# Patient Record
Sex: FEMALE | Race: OTHER | HISPANIC OR LATINO
[De-identification: names, ages, dates, MRNs, and addresses within clinical notes are randomized per-mention and may not be internally consistent; named-entity substitution may affect disease eponyms.]

---

## 2017-03-08 ENCOUNTER — LABORATORY RESULT (OUTPATIENT)
Age: 82
End: 2017-03-08

## 2017-03-08 ENCOUNTER — APPOINTMENT (OUTPATIENT)
Dept: INTERNAL MEDICINE | Facility: CLINIC | Age: 82
End: 2017-03-08

## 2017-03-08 VITALS
SYSTOLIC BLOOD PRESSURE: 118 MMHG | WEIGHT: 170 LBS | HEIGHT: 61 IN | HEART RATE: 80 BPM | DIASTOLIC BLOOD PRESSURE: 86 MMHG | BODY MASS INDEX: 32.1 KG/M2 | TEMPERATURE: 98.5 F

## 2017-03-08 DIAGNOSIS — Z86.19 PERSONAL HISTORY OF OTHER INFECTIOUS AND PARASITIC DISEASES: ICD-10-CM

## 2017-03-10 LAB
25(OH)D3 SERPL-MCNC: 52.6 NG/ML
ALBUMIN SERPL ELPH-MCNC: 4.1 G/DL
ALP BLD-CCNC: 64 U/L
ALT SERPL-CCNC: 8 U/L
ANION GAP SERPL CALC-SCNC: 16 MMOL/L
APPEARANCE: CLEAR
AST SERPL-CCNC: 12 U/L
BASOPHILS # BLD AUTO: 0.01 K/UL
BASOPHILS NFR BLD AUTO: 0.2 %
BILIRUB SERPL-MCNC: 0.4 MG/DL
BILIRUBIN URINE: NEGATIVE
BLOOD URINE: NEGATIVE
BUN SERPL-MCNC: 26 MG/DL
CALCIUM SERPL-MCNC: 9.1 MG/DL
CHLORIDE SERPL-SCNC: 105 MMOL/L
CHOLEST SERPL-MCNC: 262 MG/DL
CHOLEST/HDLC SERPL: 5.5 RATIO
CO2 SERPL-SCNC: 24 MMOL/L
COLOR: YELLOW
CREAT SERPL-MCNC: 0.78 MG/DL
CREAT SPEC-SCNC: 147 MG/DL
EOSINOPHIL # BLD AUTO: 0.06 K/UL
EOSINOPHIL NFR BLD AUTO: 1.1 %
FOLATE SERPL-MCNC: 8.5 NG/ML
GLUCOSE QUALITATIVE U: NORMAL MG/DL
GLUCOSE SERPL-MCNC: 95 MG/DL
HCT VFR BLD CALC: 43.8 %
HDLC SERPL-MCNC: 48 MG/DL
HGB BLD-MCNC: 14.5 G/DL
IMM GRANULOCYTES NFR BLD AUTO: 0.5 %
KETONES URINE: NEGATIVE
LDLC SERPL CALC-MCNC: 168 MG/DL
LEUKOCYTE ESTERASE URINE: ABNORMAL
LYMPHOCYTES # BLD AUTO: 1.7 K/UL
LYMPHOCYTES NFR BLD AUTO: 30.4 %
MAN DIFF?: NORMAL
MCHC RBC-ENTMCNC: 29.2 PG
MCHC RBC-ENTMCNC: 33.1 GM/DL
MCV RBC AUTO: 88.3 FL
MICROALBUMIN 24H UR DL<=1MG/L-MCNC: 7.5 MG/DL
MICROALBUMIN/CREAT 24H UR-RTO: 51 UG/MG
MONOCYTES # BLD AUTO: 0.43 K/UL
MONOCYTES NFR BLD AUTO: 7.7 %
NEUTROPHILS # BLD AUTO: 3.36 K/UL
NEUTROPHILS NFR BLD AUTO: 60.1 %
NITRITE URINE: NEGATIVE
PH URINE: 6
PLATELET # BLD AUTO: 272 K/UL
POTASSIUM SERPL-SCNC: 4.7 MMOL/L
PROT SERPL-MCNC: 6.8 G/DL
PROTEIN URINE: ABNORMAL MG/DL
RBC # BLD: 4.96 M/UL
RBC # FLD: 13.1 %
SODIUM SERPL-SCNC: 145 MMOL/L
SPECIFIC GRAVITY URINE: 1.03
TRIGL SERPL-MCNC: 229 MG/DL
UROBILINOGEN URINE: NORMAL MG/DL
VIT B12 SERPL-MCNC: 318 PG/ML
WBC # FLD AUTO: 5.59 K/UL

## 2017-06-21 ENCOUNTER — RX RENEWAL (OUTPATIENT)
Age: 82
End: 2017-06-21

## 2017-08-03 ENCOUNTER — APPOINTMENT (OUTPATIENT)
Dept: INTERNAL MEDICINE | Facility: CLINIC | Age: 82
End: 2017-08-03

## 2017-08-14 ENCOUNTER — RX RENEWAL (OUTPATIENT)
Age: 82
End: 2017-08-14

## 2017-09-19 ENCOUNTER — APPOINTMENT (OUTPATIENT)
Dept: INTERNAL MEDICINE | Facility: CLINIC | Age: 82
End: 2017-09-19
Payer: MEDICAID

## 2017-09-19 VITALS
OXYGEN SATURATION: 97 % | SYSTOLIC BLOOD PRESSURE: 140 MMHG | HEIGHT: 61 IN | WEIGHT: 169 LBS | DIASTOLIC BLOOD PRESSURE: 70 MMHG | BODY MASS INDEX: 31.91 KG/M2 | TEMPERATURE: 98.2 F | HEART RATE: 78 BPM

## 2017-09-19 VITALS — DIASTOLIC BLOOD PRESSURE: 60 MMHG | SYSTOLIC BLOOD PRESSURE: 120 MMHG

## 2017-09-19 DIAGNOSIS — Z71.89 OTHER SPECIFIED COUNSELING: ICD-10-CM

## 2017-09-19 PROCEDURE — 96372 THER/PROPH/DIAG INJ SC/IM: CPT

## 2017-09-19 PROCEDURE — 99215 OFFICE O/P EST HI 40 MIN: CPT | Mod: 25

## 2017-09-19 PROCEDURE — 90732 PPSV23 VACC 2 YRS+ SUBQ/IM: CPT

## 2017-09-19 PROCEDURE — G0009: CPT

## 2017-09-19 RX ORDER — CYANOCOBALAMIN 1000 UG/ML
1000 INJECTION INTRAMUSCULAR; SUBCUTANEOUS
Qty: 0 | Refills: 0 | Status: COMPLETED | OUTPATIENT
Start: 2017-09-19

## 2017-09-19 RX ADMIN — CYANOCOBALAMIN 0 MCG/ML: 1000 INJECTION INTRAMUSCULAR; SUBCUTANEOUS at 00:00

## 2017-09-20 LAB
25(OH)D3 SERPL-MCNC: 56.6 NG/ML
ALBUMIN SERPL ELPH-MCNC: 3.9 G/DL
ALP BLD-CCNC: 66 U/L
ALT SERPL-CCNC: <4 U/L
ANION GAP SERPL CALC-SCNC: 13 MMOL/L
APPEARANCE: ABNORMAL
AST SERPL-CCNC: 13 U/L
BACTERIA: ABNORMAL
BASOPHILS # BLD AUTO: 0.01 K/UL
BASOPHILS NFR BLD AUTO: 0.1 %
BILIRUB SERPL-MCNC: 0.2 MG/DL
BILIRUBIN URINE: NEGATIVE
BLOOD URINE: ABNORMAL
BUN SERPL-MCNC: 28 MG/DL
CALCIUM OXALATE CRYSTALS: ABNORMAL
CALCIUM SERPL-MCNC: 9.4 MG/DL
CHLORIDE SERPL-SCNC: 105 MMOL/L
CHOLEST SERPL-MCNC: 160 MG/DL
CHOLEST/HDLC SERPL: 3.9 RATIO
CO2 SERPL-SCNC: 23 MMOL/L
COLOR: YELLOW
CREAT SERPL-MCNC: 0.97 MG/DL
CREAT SPEC-SCNC: 190 MG/DL
EOSINOPHIL # BLD AUTO: 0.06 K/UL
EOSINOPHIL NFR BLD AUTO: 0.8 %
FRUCTOSAMINE SERPL-MCNC: 205 UMOL/L
GLUCOSE QUALITATIVE U: NORMAL MG/DL
GLUCOSE SERPL-MCNC: 95 MG/DL
HBA1C MFR BLD HPLC: 5.7 %
HCT VFR BLD CALC: 40.3 %
HDLC SERPL-MCNC: 41 MG/DL
HGB BLD-MCNC: 13.1 G/DL
HYALINE CASTS: 0 /LPF
IMM GRANULOCYTES NFR BLD AUTO: 0.6 %
KETONES URINE: NEGATIVE
LDLC SERPL CALC-MCNC: 66 MG/DL
LEUKOCYTE ESTERASE URINE: NEGATIVE
LYMPHOCYTES # BLD AUTO: 1.69 K/UL
LYMPHOCYTES NFR BLD AUTO: 23.8 %
MAN DIFF?: NORMAL
MCHC RBC-ENTMCNC: 28 PG
MCHC RBC-ENTMCNC: 32.5 GM/DL
MCV RBC AUTO: 86.1 FL
MICROALBUMIN 24H UR DL<=1MG/L-MCNC: 2.5 MG/DL
MICROALBUMIN/CREAT 24H UR-RTO: 13 MG/G
MICROSCOPIC-UA: NORMAL
MONOCYTES # BLD AUTO: 0.45 K/UL
MONOCYTES NFR BLD AUTO: 6.3 %
NEUTROPHILS # BLD AUTO: 4.86 K/UL
NEUTROPHILS NFR BLD AUTO: 68.4 %
NITRITE URINE: POSITIVE
PH URINE: 6
PLATELET # BLD AUTO: 294 K/UL
POTASSIUM SERPL-SCNC: 4.5 MMOL/L
PROT SERPL-MCNC: 6.9 G/DL
PROTEIN URINE: ABNORMAL MG/DL
RBC # BLD: 4.68 M/UL
RBC # FLD: 13.8 %
RED BLOOD CELLS URINE: 3 /HPF
SODIUM SERPL-SCNC: 141 MMOL/L
SPECIFIC GRAVITY URINE: 1.03
SQUAMOUS EPITHELIAL CELLS: 7 /HPF
TRIGL SERPL-MCNC: 267 MG/DL
UROBILINOGEN URINE: 1 MG/DL
WBC # FLD AUTO: 7.11 K/UL
WHITE BLOOD CELLS URINE: 5 /HPF

## 2017-10-13 ENCOUNTER — APPOINTMENT (OUTPATIENT)
Dept: UROLOGY | Facility: CLINIC | Age: 82
End: 2017-10-13
Payer: MEDICAID

## 2017-10-13 VITALS
WEIGHT: 167 LBS | TEMPERATURE: 98.4 F | RESPIRATION RATE: 16 BRPM | BODY MASS INDEX: 31.53 KG/M2 | HEART RATE: 69 BPM | OXYGEN SATURATION: 97 % | HEIGHT: 61 IN

## 2017-10-13 DIAGNOSIS — G56.01 CARPAL TUNNEL SYNDROME, RIGHT UPPER LIMB: ICD-10-CM

## 2017-10-13 PROCEDURE — 99204 OFFICE O/P NEW MOD 45 MIN: CPT

## 2017-10-16 ENCOUNTER — FORM ENCOUNTER (OUTPATIENT)
Age: 82
End: 2017-10-16

## 2017-10-17 ENCOUNTER — OUTPATIENT (OUTPATIENT)
Dept: OUTPATIENT SERVICES | Facility: HOSPITAL | Age: 82
LOS: 1 days | End: 2017-10-17
Payer: MEDICAID

## 2017-10-17 ENCOUNTER — APPOINTMENT (OUTPATIENT)
Dept: RADIOLOGY | Facility: IMAGING CENTER | Age: 82
End: 2017-10-17
Payer: MEDICAID

## 2017-10-17 DIAGNOSIS — Z00.00 ENCOUNTER FOR GENERAL ADULT MEDICAL EXAMINATION WITHOUT ABNORMAL FINDINGS: ICD-10-CM

## 2017-10-17 LAB
APPEARANCE: ABNORMAL
BACTERIA UR CULT: ABNORMAL
BACTERIA: ABNORMAL
BILIRUBIN URINE: NEGATIVE
BLOOD URINE: NEGATIVE
COLOR: ABNORMAL
GLUCOSE QUALITATIVE U: NEGATIVE MG/DL
HYALINE CASTS: 6 /LPF
KETONES URINE: ABNORMAL
LEUKOCYTE ESTERASE URINE: ABNORMAL
MICROSCOPIC-UA: NORMAL
NITRITE URINE: POSITIVE
PH URINE: 5
PROTEIN URINE: ABNORMAL MG/DL
RED BLOOD CELLS URINE: 2 /HPF
SPECIFIC GRAVITY URINE: 1.02
SQUAMOUS EPITHELIAL CELLS: 6 /HPF
UROBILINOGEN URINE: 1 MG/DL
WHITE BLOOD CELLS URINE: 4 /HPF

## 2017-10-17 PROCEDURE — 77080 DXA BONE DENSITY AXIAL: CPT | Mod: 26

## 2017-10-17 PROCEDURE — 77080 DXA BONE DENSITY AXIAL: CPT

## 2017-10-23 LAB — CORE LAB FLUID CYTOLOGY: NORMAL

## 2017-10-24 ENCOUNTER — APPOINTMENT (OUTPATIENT)
Dept: INTERNAL MEDICINE | Facility: CLINIC | Age: 82
End: 2017-10-24
Payer: MEDICAID

## 2017-10-24 VITALS
DIASTOLIC BLOOD PRESSURE: 60 MMHG | HEART RATE: 89 BPM | BODY MASS INDEX: 31.91 KG/M2 | TEMPERATURE: 98.2 F | HEIGHT: 61 IN | OXYGEN SATURATION: 96 % | WEIGHT: 169 LBS | SYSTOLIC BLOOD PRESSURE: 130 MMHG

## 2017-10-24 PROCEDURE — 99214 OFFICE O/P EST MOD 30 MIN: CPT

## 2017-10-24 RX ORDER — SULFAMETHOXAZOLE AND TRIMETHOPRIM 800; 160 MG/1; MG/1
800-160 TABLET ORAL TWICE DAILY
Qty: 10 | Refills: 0 | Status: COMPLETED | COMMUNITY
Start: 2017-10-17 | End: 2017-10-24

## 2017-10-25 ENCOUNTER — APPOINTMENT (OUTPATIENT)
Dept: UROLOGY | Facility: CLINIC | Age: 82
End: 2017-10-25

## 2017-10-25 ENCOUNTER — OUTPATIENT (OUTPATIENT)
Dept: OUTPATIENT SERVICES | Facility: HOSPITAL | Age: 82
LOS: 1 days | End: 2017-10-25
Payer: MEDICAID

## 2017-10-25 ENCOUNTER — APPOINTMENT (OUTPATIENT)
Dept: ULTRASOUND IMAGING | Facility: HOSPITAL | Age: 82
End: 2017-10-25
Payer: MEDICAID

## 2017-10-25 DIAGNOSIS — R31.29 OTHER MICROSCOPIC HEMATURIA: ICD-10-CM

## 2017-10-25 PROCEDURE — 76775 US EXAM ABDO BACK WALL LIM: CPT

## 2017-10-25 PROCEDURE — 76775 US EXAM ABDO BACK WALL LIM: CPT | Mod: 26

## 2017-11-10 ENCOUNTER — APPOINTMENT (OUTPATIENT)
Dept: UROLOGY | Facility: CLINIC | Age: 82
End: 2017-11-10

## 2018-01-09 ENCOUNTER — APPOINTMENT (OUTPATIENT)
Dept: UROLOGY | Facility: CLINIC | Age: 83
End: 2018-01-09
Payer: MEDICAID

## 2018-01-09 VITALS
TEMPERATURE: 98.1 F | BODY MASS INDEX: 31.72 KG/M2 | HEART RATE: 80 BPM | WEIGHT: 168 LBS | RESPIRATION RATE: 16 BRPM | SYSTOLIC BLOOD PRESSURE: 142 MMHG | HEIGHT: 61 IN | DIASTOLIC BLOOD PRESSURE: 80 MMHG

## 2018-01-09 PROCEDURE — 99214 OFFICE O/P EST MOD 30 MIN: CPT

## 2018-01-10 LAB
ANION GAP SERPL CALC-SCNC: 14 MMOL/L
APPEARANCE: CLEAR
BACTERIA: NEGATIVE
BILIRUBIN URINE: NEGATIVE
BLOOD URINE: NEGATIVE
BUN SERPL-MCNC: 22 MG/DL
CALCIUM SERPL-MCNC: 9.6 MG/DL
CHLORIDE SERPL-SCNC: 104 MMOL/L
CO2 SERPL-SCNC: 23 MMOL/L
COLOR: YELLOW
CREAT SERPL-MCNC: 0.82 MG/DL
GLUCOSE QUALITATIVE U: NEGATIVE MG/DL
GLUCOSE SERPL-MCNC: 90 MG/DL
HYALINE CASTS: 9 /LPF
KETONES URINE: NEGATIVE
LEUKOCYTE ESTERASE URINE: NEGATIVE
MICROSCOPIC-UA: NORMAL
NITRITE URINE: NEGATIVE
PH URINE: 6
POTASSIUM SERPL-SCNC: 4.5 MMOL/L
PROTEIN URINE: NEGATIVE MG/DL
RED BLOOD CELLS URINE: 2 /HPF
SODIUM SERPL-SCNC: 141 MMOL/L
SPECIFIC GRAVITY URINE: 1.02
SQUAMOUS EPITHELIAL CELLS: 8 /HPF
UROBILINOGEN URINE: NEGATIVE MG/DL
WHITE BLOOD CELLS URINE: 3 /HPF

## 2018-01-11 LAB — BACTERIA UR CULT: NORMAL

## 2018-01-12 ENCOUNTER — RX RENEWAL (OUTPATIENT)
Age: 83
End: 2018-01-12

## 2018-01-19 ENCOUNTER — APPOINTMENT (OUTPATIENT)
Dept: CT IMAGING | Facility: HOSPITAL | Age: 83
End: 2018-01-19

## 2018-01-25 ENCOUNTER — APPOINTMENT (OUTPATIENT)
Dept: INTERNAL MEDICINE | Facility: CLINIC | Age: 83
End: 2018-01-25

## 2018-02-06 ENCOUNTER — APPOINTMENT (OUTPATIENT)
Dept: UROLOGY | Facility: CLINIC | Age: 83
End: 2018-02-06

## 2018-02-10 ENCOUNTER — INPATIENT (INPATIENT)
Facility: HOSPITAL | Age: 83
LOS: 2 days | Discharge: HOME | DRG: 313 | End: 2018-02-13
Attending: STUDENT IN AN ORGANIZED HEALTH CARE EDUCATION/TRAINING PROGRAM | Admitting: STUDENT IN AN ORGANIZED HEALTH CARE EDUCATION/TRAINING PROGRAM
Payer: MEDICAID

## 2018-02-10 VITALS
HEART RATE: 69 BPM | TEMPERATURE: 98 F | DIASTOLIC BLOOD PRESSURE: 79 MMHG | RESPIRATION RATE: 18 BRPM | SYSTOLIC BLOOD PRESSURE: 132 MMHG | OXYGEN SATURATION: 97 %

## 2018-02-10 DIAGNOSIS — R07.9 CHEST PAIN, UNSPECIFIED: ICD-10-CM

## 2018-02-10 DIAGNOSIS — E11.9 TYPE 2 DIABETES MELLITUS WITHOUT COMPLICATIONS: ICD-10-CM

## 2018-02-10 DIAGNOSIS — I10 ESSENTIAL (PRIMARY) HYPERTENSION: ICD-10-CM

## 2018-02-10 DIAGNOSIS — Z29.9 ENCOUNTER FOR PROPHYLACTIC MEASURES, UNSPECIFIED: ICD-10-CM

## 2018-02-10 DIAGNOSIS — E78.5 HYPERLIPIDEMIA, UNSPECIFIED: ICD-10-CM

## 2018-02-10 DIAGNOSIS — J18.9 PNEUMONIA, UNSPECIFIED ORGANISM: ICD-10-CM

## 2018-02-10 LAB
ALBUMIN SERPL ELPH-MCNC: 3.2 G/DL — LOW (ref 3.5–5)
ALP SERPL-CCNC: 75 U/L — SIGNIFICANT CHANGE UP (ref 40–120)
ALT FLD-CCNC: 16 U/L DA — SIGNIFICANT CHANGE UP (ref 10–60)
ANION GAP SERPL CALC-SCNC: 8 MMOL/L — SIGNIFICANT CHANGE UP (ref 5–17)
APPEARANCE UR: CLEAR — SIGNIFICANT CHANGE UP
APTT BLD: 28.3 SEC — SIGNIFICANT CHANGE UP (ref 27.5–37.4)
AST SERPL-CCNC: 10 U/L — SIGNIFICANT CHANGE UP (ref 10–40)
BASOPHILS # BLD AUTO: 0.1 K/UL — SIGNIFICANT CHANGE UP (ref 0–0.2)
BASOPHILS NFR BLD AUTO: 0.7 % — SIGNIFICANT CHANGE UP (ref 0–2)
BILIRUB SERPL-MCNC: 0.3 MG/DL — SIGNIFICANT CHANGE UP (ref 0.2–1.2)
BILIRUB UR-MCNC: NEGATIVE — SIGNIFICANT CHANGE UP
BUN SERPL-MCNC: 17 MG/DL — SIGNIFICANT CHANGE UP (ref 7–18)
CALCIUM SERPL-MCNC: 8.4 MG/DL — SIGNIFICANT CHANGE UP (ref 8.4–10.5)
CHLORIDE SERPL-SCNC: 110 MMOL/L — HIGH (ref 96–108)
CK MB BLD-MCNC: <2.3 % — SIGNIFICANT CHANGE UP (ref 0–3.5)
CK MB CFR SERPL CALC: <1 NG/ML — SIGNIFICANT CHANGE UP (ref 0–3.6)
CK SERPL-CCNC: 43 U/L — SIGNIFICANT CHANGE UP (ref 21–215)
CO2 SERPL-SCNC: 24 MMOL/L — SIGNIFICANT CHANGE UP (ref 22–31)
COLOR SPEC: YELLOW — SIGNIFICANT CHANGE UP
CREAT SERPL-MCNC: 0.72 MG/DL — SIGNIFICANT CHANGE UP (ref 0.5–1.3)
DIFF PNL FLD: NEGATIVE — SIGNIFICANT CHANGE UP
EOSINOPHIL # BLD AUTO: 0.1 K/UL — SIGNIFICANT CHANGE UP (ref 0–0.5)
EOSINOPHIL NFR BLD AUTO: 1.7 % — SIGNIFICANT CHANGE UP (ref 0–6)
GLUCOSE SERPL-MCNC: 87 MG/DL — SIGNIFICANT CHANGE UP (ref 70–99)
GLUCOSE UR QL: NEGATIVE — SIGNIFICANT CHANGE UP
HCT VFR BLD CALC: 43.2 % — SIGNIFICANT CHANGE UP (ref 34.5–45)
HGB BLD-MCNC: 14 G/DL — SIGNIFICANT CHANGE UP (ref 11.5–15.5)
INR BLD: 0.96 RATIO — SIGNIFICANT CHANGE UP (ref 0.88–1.16)
KETONES UR-MCNC: NEGATIVE — SIGNIFICANT CHANGE UP
LEUKOCYTE ESTERASE UR-ACNC: ABNORMAL
LYMPHOCYTES # BLD AUTO: 2.1 K/UL — SIGNIFICANT CHANGE UP (ref 1–3.3)
LYMPHOCYTES # BLD AUTO: 29.6 % — SIGNIFICANT CHANGE UP (ref 13–44)
MCHC RBC-ENTMCNC: 29.2 PG — SIGNIFICANT CHANGE UP (ref 27–34)
MCHC RBC-ENTMCNC: 32.3 GM/DL — SIGNIFICANT CHANGE UP (ref 32–36)
MCV RBC AUTO: 90.5 FL — SIGNIFICANT CHANGE UP (ref 80–100)
MONOCYTES # BLD AUTO: 0.5 K/UL — SIGNIFICANT CHANGE UP (ref 0–0.9)
MONOCYTES NFR BLD AUTO: 6.3 % — SIGNIFICANT CHANGE UP (ref 2–14)
NEUTROPHILS # BLD AUTO: 4.5 K/UL — SIGNIFICANT CHANGE UP (ref 1.8–7.4)
NEUTROPHILS NFR BLD AUTO: 61.8 % — SIGNIFICANT CHANGE UP (ref 43–77)
NITRITE UR-MCNC: NEGATIVE — SIGNIFICANT CHANGE UP
NT-PROBNP SERPL-SCNC: 350 PG/ML — SIGNIFICANT CHANGE UP (ref 0–450)
PH UR: 5 — SIGNIFICANT CHANGE UP (ref 5–8)
PLATELET # BLD AUTO: 259 K/UL — SIGNIFICANT CHANGE UP (ref 150–400)
POTASSIUM SERPL-MCNC: 4.1 MMOL/L — SIGNIFICANT CHANGE UP (ref 3.5–5.3)
POTASSIUM SERPL-SCNC: 4.1 MMOL/L — SIGNIFICANT CHANGE UP (ref 3.5–5.3)
PROT SERPL-MCNC: 6.8 G/DL — SIGNIFICANT CHANGE UP (ref 6–8.3)
PROT UR-MCNC: NEGATIVE — SIGNIFICANT CHANGE UP
PROTHROM AB SERPL-ACNC: 10.5 SEC — SIGNIFICANT CHANGE UP (ref 9.8–12.7)
RAPID RVP RESULT: SIGNIFICANT CHANGE UP
RBC # BLD: 4.78 M/UL — SIGNIFICANT CHANGE UP (ref 3.8–5.2)
RBC # FLD: 12.6 % — SIGNIFICANT CHANGE UP (ref 10.3–14.5)
SODIUM SERPL-SCNC: 142 MMOL/L — SIGNIFICANT CHANGE UP (ref 135–145)
SP GR SPEC: 1.02 — SIGNIFICANT CHANGE UP (ref 1.01–1.02)
TROPONIN I SERPL-MCNC: <0.015 NG/ML — SIGNIFICANT CHANGE UP (ref 0–0.04)
UROBILINOGEN FLD QL: NEGATIVE — SIGNIFICANT CHANGE UP
WBC # BLD: 7.3 K/UL — SIGNIFICANT CHANGE UP (ref 3.8–10.5)
WBC # FLD AUTO: 7.3 K/UL — SIGNIFICANT CHANGE UP (ref 3.8–10.5)

## 2018-02-10 PROCEDURE — 71046 X-RAY EXAM CHEST 2 VIEWS: CPT | Mod: 26

## 2018-02-10 PROCEDURE — 99285 EMERGENCY DEPT VISIT HI MDM: CPT

## 2018-02-10 RX ORDER — METOPROLOL TARTRATE 50 MG
12.5 TABLET ORAL
Qty: 0 | Refills: 0 | Status: DISCONTINUED | OUTPATIENT
Start: 2018-02-10 | End: 2018-02-12

## 2018-02-10 RX ORDER — ASPIRIN/CALCIUM CARB/MAGNESIUM 324 MG
81 TABLET ORAL DAILY
Qty: 0 | Refills: 0 | Status: DISCONTINUED | OUTPATIENT
Start: 2018-02-10 | End: 2018-02-13

## 2018-02-10 RX ORDER — ONDANSETRON 8 MG/1
4 TABLET, FILM COATED ORAL ONCE
Qty: 0 | Refills: 0 | Status: COMPLETED | OUTPATIENT
Start: 2018-02-10 | End: 2018-02-10

## 2018-02-10 RX ORDER — ENOXAPARIN SODIUM 100 MG/ML
40 INJECTION SUBCUTANEOUS DAILY
Qty: 0 | Refills: 0 | Status: DISCONTINUED | OUTPATIENT
Start: 2018-02-10 | End: 2018-02-13

## 2018-02-10 RX ORDER — ATORVASTATIN CALCIUM 80 MG/1
20 TABLET, FILM COATED ORAL AT BEDTIME
Qty: 0 | Refills: 0 | Status: DISCONTINUED | OUTPATIENT
Start: 2018-02-10 | End: 2018-02-13

## 2018-02-10 RX ORDER — GABAPENTIN 400 MG/1
100 CAPSULE ORAL DAILY
Qty: 0 | Refills: 0 | Status: DISCONTINUED | OUTPATIENT
Start: 2018-02-10 | End: 2018-02-13

## 2018-02-10 RX ORDER — AZITHROMYCIN 500 MG/1
500 TABLET, FILM COATED ORAL ONCE
Qty: 0 | Refills: 0 | Status: COMPLETED | OUTPATIENT
Start: 2018-02-10 | End: 2018-02-10

## 2018-02-10 RX ORDER — ASPIRIN/CALCIUM CARB/MAGNESIUM 324 MG
325 TABLET ORAL ONCE
Qty: 0 | Refills: 0 | Status: COMPLETED | OUTPATIENT
Start: 2018-02-10 | End: 2018-02-10

## 2018-02-10 RX ORDER — CEFTRIAXONE 500 MG/1
1 INJECTION, POWDER, FOR SOLUTION INTRAMUSCULAR; INTRAVENOUS ONCE
Qty: 0 | Refills: 0 | Status: COMPLETED | OUTPATIENT
Start: 2018-02-10 | End: 2018-02-10

## 2018-02-10 RX ORDER — LISINOPRIL 2.5 MG/1
2.5 TABLET ORAL DAILY
Qty: 0 | Refills: 0 | Status: DISCONTINUED | OUTPATIENT
Start: 2018-02-10 | End: 2018-02-13

## 2018-02-10 RX ADMIN — AZITHROMYCIN 250 MILLIGRAM(S): 500 TABLET, FILM COATED ORAL at 18:41

## 2018-02-10 RX ADMIN — ONDANSETRON 4 MILLIGRAM(S): 8 TABLET, FILM COATED ORAL at 16:21

## 2018-02-10 RX ADMIN — CEFTRIAXONE 100 GRAM(S): 500 INJECTION, POWDER, FOR SOLUTION INTRAMUSCULAR; INTRAVENOUS at 18:40

## 2018-02-10 RX ADMIN — Medication 325 MILLIGRAM(S): at 16:21

## 2018-02-10 RX ADMIN — ATORVASTATIN CALCIUM 20 MILLIGRAM(S): 80 TABLET, FILM COATED ORAL at 21:32

## 2018-02-10 NOTE — ED PROVIDER NOTE - CARDIAC, MLM
Normal rate, regular rhythm.  Heart sounds S1, S2.  No murmurs, rubs or gallops. No crackles or rhonchi.

## 2018-02-10 NOTE — H&P ADULT - RS GEN PE MLT RESP DETAILS PC
respirations non-labored/normal/chest wall tenderness/breath sounds equal/no rales/no wheezes/airway patent/good air movement/clear to auscultation bilaterally/no rhonchi

## 2018-02-10 NOTE — H&P ADULT - ASSESSMENT
85 F, from home lives w/ family ambulates independently, w/ PMH HTN, DM and HLD c/o headache and chest pain since this AM.    CXR - Platelike atelectasis or small infiltrate in the left lower lobe. Follow-up PA and lateral views recommended.    Pt was admitted for management of PNA and r/o ACS.

## 2018-02-10 NOTE — H&P ADULT - HISTORY OF PRESENT ILLNESS
84 y/o F c/o CP, headache x todays. Pt is accompanied by granddaughter and daughter. PMHx: HTN, DM PSHx: none. Granddaughter states that Pt has recently been found with hematuria, urologist is Dr. Hanley. Pt states that she feels a headache since waking up this morning, felt weird after waking up to her alarm clock. Pt denies f/c, or any other complaints. NKDA. History obtained via daughter at bedside with interpretator service.     85 F, from home lives w/ family ambulates independently, w/ PMH HTN, DM and HLD c/o headache and chest pain since this AM. Pt woke up this AM and had frontal headache, pain 5/10, self resolved. Additionally, pt noticed mid-sternal chest pain lasting 5 minutes and self resolved, pain 3/10, non-radiating, no aggravating or relieving factors. Denies neurological symptoms, SOB, N/V, D/C.

## 2018-02-10 NOTE — H&P ADULT - ATTENDING COMMENTS
Agree with above   Patient is seen and examined at bedside. She is 86 yo Czech speaking female, from home lives w/ family ambulates independently, w/ PMH HTN, DM and HLD c/o headache and chest pain. During my examination patient did not have any chest pain. She denied fever, cough, runny nose, dizziness, palpitations, SOB, n/v, abdominal pain, change in urinary or bowel habits   ROS as above   PE:   General: Elderly lady, NAD   Neck: Supple, no JVD  Cardio: Regular rate and rhtyhtm, no murmur   Pulm: clear breath sounds, no wheezing   GI/: obese abdomen, non tender, BS(+)  Extr: no edema, no rash   Neuro: AO, no focal weakness     Vital Signs Last 24 Hrs  T(C): 37.1 (11 Feb 2018 11:21), Max: 37.1 (11 Feb 2018 11:21)  T(F): 98.7 (11 Feb 2018 11:21), Max: 98.7 (11 Feb 2018 11:21)  HR: 63 (11 Feb 2018 11:21) (63 - 76)  BP: 140/71 (11 Feb 2018 11:21) (113/52 - 146/65)  BP(mean): --  RR: 16 (11 Feb 2018 11:21) (16 - 23)  SpO2: 96% (11 Feb 2018 11:21) (96% - 99%)    1. Chest pain unlikely cardiac related, but given the history of HTN and DM will rule out ACS  Telemetry monitoring   Trend CE, two sets negative   EKG: NSR   c/w Aspirn, statin and BB  Lipid profile and TSH normal     2. LLL PNA on CXR; patient does not have any symptoms of PNA   Will get CXR PA/lateral   Afebrile, no leukocytosis   Will hold off on antibiotics for now   RVP done and negative     3. DM: f/u HbA1C  c/w Metformin 500 mg daily     The rest as above

## 2018-02-10 NOTE — ED PROVIDER NOTE - OBJECTIVE STATEMENT
84 y/o F c/o CP, headache x todays. Pt is accompanied by granddaughter and daughter. PMHx: HTN, DM PSHx: none. Granddaughter states that Pt has recently been found with hematuria, urologist is Dr. Hanley. Pt states that she feels a headache since waking up this morning, felt weird after waking up to her alarm clock. Pt denies f/c, or any other complaints. NKDA. 84 y/o F c/o CP  x todays. Pt is accompanied by granddaughter and daughter. PMHx: HTN, DM PSHx: none. CP pressure-like. non-radiating. associated with nausea and diaphoresis. CP currently improved. ROS: hematuria hx, urologist is Dr. Hanley. Pt states that she feels a headache since waking up this morning, felt weird after waking up to her alarm clock. Headache is not new and has had previously but CP is new. Pt denies f/c, or any other complaints. NKDA.

## 2018-02-10 NOTE — H&P ADULT - PROBLEM SELECTOR PLAN 1
CXR - Platelike atelectasis or small infiltrate in the left lower lobe.  s/p 1 dose rocephin, zithromax @ ED  c/w rocephin, zithromax CXR - Platelike atelectasis or small infiltrate in the left lower lobe.  s/p 1 dose rocephin, zithromax @ ED  c/w rocephin, zithromax  f/u RVP CXR - Platelike atelectasis or small infiltrate in the left lower lobe.  s/p 1 dose rocephin, zithromax @ ED  c/w rocephin, zithromax  f/u RVP  f/u CT chest no cont r/o PNA  f/u procalcitonin CXR - Platelike atelectasis or small infiltrate in the left lower lobe.  s/p 1 dose rocephin, zithromax @ ED  f/u RVP  f/u CT chest no cont r/o PNA  hold off on abx until CT results is back  f/u procalcitonin

## 2018-02-10 NOTE — H&P ADULT - NEUROLOGICAL DETAILS
alert and oriented x 3/responds to pain/responds to verbal commands/sensation intact/cranial nerves intact/deep reflexes intact

## 2018-02-10 NOTE — ED PROVIDER NOTE - MEDICAL DECISION MAKING DETAILS
multiple risk factors for ACS, will need admission for cereal enzymes, ECHO, EKG, cardiac enzymes, coagulation, in order to r/o PNX PNA. Given no focal neurologic symptoms or trauma, seems unlikely. multiple risk factors for ACS, will need admission for cereal enzymes, ECHO, EKG, cardiac enzymes, coagulation, in order to r/o PNA, PTX, Dissection. Given no focal neurologic symptoms or trauma, seems unlikely the later. multiple risk factors for ACS, will need admission for serial enzymes, ECHO, EKG, cardiac enzymes, coagulation, in order to r/o PNA, PTX, Dissection. Given no focal neurologic symptoms or trauma, seems unlikely the later.

## 2018-02-11 ENCOUNTER — TRANSCRIPTION ENCOUNTER (OUTPATIENT)
Age: 83
End: 2018-02-11

## 2018-02-11 LAB
ANION GAP SERPL CALC-SCNC: 7 MMOL/L — SIGNIFICANT CHANGE UP (ref 5–17)
BUN SERPL-MCNC: 14 MG/DL — SIGNIFICANT CHANGE UP (ref 7–18)
CALCIUM SERPL-MCNC: 8.4 MG/DL — SIGNIFICANT CHANGE UP (ref 8.4–10.5)
CHLORIDE SERPL-SCNC: 109 MMOL/L — HIGH (ref 96–108)
CHOLEST SERPL-MCNC: 127 MG/DL — SIGNIFICANT CHANGE UP (ref 10–199)
CK MB BLD-MCNC: <2.2 % — SIGNIFICANT CHANGE UP (ref 0–3.5)
CK MB CFR SERPL CALC: <1 NG/ML — SIGNIFICANT CHANGE UP (ref 0–3.6)
CK SERPL-CCNC: 45 U/L — SIGNIFICANT CHANGE UP (ref 21–215)
CO2 SERPL-SCNC: 26 MMOL/L — SIGNIFICANT CHANGE UP (ref 22–31)
CREAT SERPL-MCNC: 0.62 MG/DL — SIGNIFICANT CHANGE UP (ref 0.5–1.3)
GLUCOSE SERPL-MCNC: 87 MG/DL — SIGNIFICANT CHANGE UP (ref 70–99)
HBA1C BLD-MCNC: 5.6 % — SIGNIFICANT CHANGE UP (ref 4–5.6)
HCT VFR BLD CALC: 41.6 % — SIGNIFICANT CHANGE UP (ref 34.5–45)
HDLC SERPL-MCNC: 42 MG/DL — SIGNIFICANT CHANGE UP (ref 40–125)
HGB BLD-MCNC: 13 G/DL — SIGNIFICANT CHANGE UP (ref 11.5–15.5)
LIPID PNL WITH DIRECT LDL SERPL: 52 MG/DL — SIGNIFICANT CHANGE UP
MAGNESIUM SERPL-MCNC: 2.1 MG/DL — SIGNIFICANT CHANGE UP (ref 1.6–2.6)
MCHC RBC-ENTMCNC: 28.1 PG — SIGNIFICANT CHANGE UP (ref 27–34)
MCHC RBC-ENTMCNC: 31.3 GM/DL — LOW (ref 32–36)
MCV RBC AUTO: 89.8 FL — SIGNIFICANT CHANGE UP (ref 80–100)
PHOSPHATE SERPL-MCNC: 3.6 MG/DL — SIGNIFICANT CHANGE UP (ref 2.5–4.5)
PLATELET # BLD AUTO: 228 K/UL — SIGNIFICANT CHANGE UP (ref 150–400)
POTASSIUM SERPL-MCNC: 4 MMOL/L — SIGNIFICANT CHANGE UP (ref 3.5–5.3)
POTASSIUM SERPL-SCNC: 4 MMOL/L — SIGNIFICANT CHANGE UP (ref 3.5–5.3)
RBC # BLD: 4.63 M/UL — SIGNIFICANT CHANGE UP (ref 3.8–5.2)
RBC # FLD: 12.3 % — SIGNIFICANT CHANGE UP (ref 10.3–14.5)
SODIUM SERPL-SCNC: 142 MMOL/L — SIGNIFICANT CHANGE UP (ref 135–145)
TOTAL CHOLESTEROL/HDL RATIO MEASUREMENT: 3 RATIO — LOW (ref 3.3–7.1)
TRIGL SERPL-MCNC: 165 MG/DL — HIGH (ref 10–149)
TROPONIN I SERPL-MCNC: <0.015 NG/ML — SIGNIFICANT CHANGE UP (ref 0–0.04)
TSH SERPL-MCNC: 2.48 UU/ML — SIGNIFICANT CHANGE UP (ref 0.34–4.82)
VIT B12 SERPL-MCNC: 323 PG/ML — SIGNIFICANT CHANGE UP (ref 232–1245)
WBC # BLD: 5.9 K/UL — SIGNIFICANT CHANGE UP (ref 3.8–10.5)
WBC # FLD AUTO: 5.9 K/UL — SIGNIFICANT CHANGE UP (ref 3.8–10.5)

## 2018-02-11 PROCEDURE — 99223 1ST HOSP IP/OBS HIGH 75: CPT | Mod: GC

## 2018-02-11 RX ORDER — DEXTROSE 50 % IN WATER 50 %
25 SYRINGE (ML) INTRAVENOUS ONCE
Qty: 0 | Refills: 0 | Status: DISCONTINUED | OUTPATIENT
Start: 2018-02-11 | End: 2018-02-13

## 2018-02-11 RX ORDER — INSULIN LISPRO 100/ML
VIAL (ML) SUBCUTANEOUS
Qty: 0 | Refills: 0 | Status: DISCONTINUED | OUTPATIENT
Start: 2018-02-11 | End: 2018-02-13

## 2018-02-11 RX ORDER — ACETAMINOPHEN 500 MG
650 TABLET ORAL EVERY 6 HOURS
Qty: 0 | Refills: 0 | Status: DISCONTINUED | OUTPATIENT
Start: 2018-02-11 | End: 2018-02-13

## 2018-02-11 RX ORDER — SODIUM CHLORIDE 9 MG/ML
1000 INJECTION, SOLUTION INTRAVENOUS
Qty: 0 | Refills: 0 | Status: DISCONTINUED | OUTPATIENT
Start: 2018-02-11 | End: 2018-02-13

## 2018-02-11 RX ORDER — OXYCODONE AND ACETAMINOPHEN 5; 325 MG/1; MG/1
1 TABLET ORAL EVERY 6 HOURS
Qty: 0 | Refills: 0 | Status: DISCONTINUED | OUTPATIENT
Start: 2018-02-11 | End: 2018-02-13

## 2018-02-11 RX ORDER — DEXTROSE 50 % IN WATER 50 %
12.5 SYRINGE (ML) INTRAVENOUS ONCE
Qty: 0 | Refills: 0 | Status: DISCONTINUED | OUTPATIENT
Start: 2018-02-11 | End: 2018-02-13

## 2018-02-11 RX ORDER — DEXTROSE 50 % IN WATER 50 %
1 SYRINGE (ML) INTRAVENOUS ONCE
Qty: 0 | Refills: 0 | Status: DISCONTINUED | OUTPATIENT
Start: 2018-02-11 | End: 2018-02-13

## 2018-02-11 RX ORDER — GLUCAGON INJECTION, SOLUTION 0.5 MG/.1ML
1 INJECTION, SOLUTION SUBCUTANEOUS ONCE
Qty: 0 | Refills: 0 | Status: DISCONTINUED | OUTPATIENT
Start: 2018-02-11 | End: 2018-02-13

## 2018-02-11 RX ADMIN — LISINOPRIL 2.5 MILLIGRAM(S): 2.5 TABLET ORAL at 05:31

## 2018-02-11 RX ADMIN — Medication 650 MILLIGRAM(S): at 11:12

## 2018-02-11 RX ADMIN — Medication 650 MILLIGRAM(S): at 11:40

## 2018-02-11 RX ADMIN — OXYCODONE AND ACETAMINOPHEN 1 TABLET(S): 5; 325 TABLET ORAL at 12:18

## 2018-02-11 RX ADMIN — Medication 12.5 MILLIGRAM(S): at 05:31

## 2018-02-11 RX ADMIN — ENOXAPARIN SODIUM 40 MILLIGRAM(S): 100 INJECTION SUBCUTANEOUS at 11:12

## 2018-02-11 RX ADMIN — GABAPENTIN 100 MILLIGRAM(S): 400 CAPSULE ORAL at 11:12

## 2018-02-11 RX ADMIN — OXYCODONE AND ACETAMINOPHEN 1 TABLET(S): 5; 325 TABLET ORAL at 12:40

## 2018-02-11 RX ADMIN — Medication 81 MILLIGRAM(S): at 11:12

## 2018-02-11 RX ADMIN — ATORVASTATIN CALCIUM 20 MILLIGRAM(S): 80 TABLET, FILM COATED ORAL at 21:12

## 2018-02-11 NOTE — PROGRESS NOTE ADULT - SUBJECTIVE AND OBJECTIVE BOX
PGY1 Note discussed with supervising resident and primary attending.    Patient is a 85y old  Female who presents with a chief complaint of headache and chest pain (10 Feb 2018 18:42)      INTERVAL HPI/OVERNIGHT EVENTS:    MEDICATIONS  (STANDING):  aspirin  chewable 81 milliGRAM(s) Oral daily  atorvastatin 20 milliGRAM(s) Oral at bedtime  dextrose 5%. 1000 milliLiter(s) (50 mL/Hr) IV Continuous <Continuous>  dextrose 50% Injectable 12.5 Gram(s) IV Push once  dextrose 50% Injectable 25 Gram(s) IV Push once  dextrose 50% Injectable 25 Gram(s) IV Push once  enoxaparin Injectable 40 milliGRAM(s) SubCutaneous daily  gabapentin 100 milliGRAM(s) Oral daily  insulin lispro (HumaLOG) corrective regimen sliding scale   SubCutaneous three times a day before meals  lisinopril 2.5 milliGRAM(s) Oral daily  metoprolol     tartrate 12.5 milliGRAM(s) Oral two times a day    MEDICATIONS  (PRN):  dextrose Gel 1 Dose(s) Oral once PRN Blood Glucose LESS THAN 70 milliGRAM(s)/deciliter  glucagon  Injectable 1 milliGRAM(s) IntraMuscular once PRN Glucose LESS THAN 70 milligrams/deciliter      Allergies    No Known Allergies    Intolerances        REVIEW OF SYSTEMS:  CONSTITUTIONAL: No fever, weight loss, or fatigue  RESPIRATORY: No cough, wheezing, chills or hemoptysis; No shortness of breath  CARDIOVASCULAR: No chest pain, palpitations, dizziness, or leg swelling  GASTROINTESTINAL: No abdominal or epigastric pain. No nausea, vomiting, or hematemesis; No diarrhea or constipation. No melena or hematochezia.  NEUROLOGICAL: No headaches, memory loss, loss of strength, numbness, or tremors  SKIN: No itching, burning, rashes, or lesions     Vital Signs Last 24 Hrs  T(C): 36.6 (2018 05:03), Max: 36.9 (2018 01:45)  T(F): 97.9 (2018 05:03), Max: 98.4 (2018 01:45)  HR: 71 (2018 05:03) (69 - 76)  BP: 137/59 (2018 05:03) (113/52 - 146/65)  BP(mean): --  RR: 16 (2018 05:03) (16 - 23)  SpO2: 96% (2018 05:03) (96% - 99%)    PHYSICAL EXAM:  GENERAL: NAD, well-groomed, well-developed  HEAD:  Atraumatic, Normocephalic  EYES: EOMI, PERRLA, conjunctiva and sclera clear  NECK: Supple, No JVD, Normal thyroid  CHEST/LUNG: Clear to percussion bilaterally; No rales, rhonchi, wheezing, or rubs  HEART: Regular rate and rhythm; No murmurs, rubs, or gallops  ABDOMEN: Soft, Nontender, Nondistended; Bowel sounds present  NERVOUS SYSTEM:  Alert & Oriented X3, Good concentration; Motor Strength 5/5 B/L   EXTREMITIES:  2+ Peripheral Pulses, No clubbing, cyanosis, or edema  SKIN;    LABS:                        13.0   5.9   )-----------( 228      ( 2018 06:21 )             41.6     02-11    142  |  109<H>  |  14  ----------------------------<  87  4.0   |  26  |  0.62    Ca    8.4      2018 06:21  Phos  3.6     02-11  Mg     2.1     02-11    TPro  6.8  /  Alb  3.2<L>  /  TBili  0.3  /  DBili  x   /  AST  10  /  ALT  16  /  AlkPhos  75  02-10    PT/INR - ( 10 Feb 2018 16:19 )   PT: 10.5 sec;   INR: 0.96 ratio         PTT - ( 10 Feb 2018 16:19 )  PTT:28.3 sec  Urinalysis Basic - ( 10 Feb 2018 17:19 )    Color: Yellow / Appearance: Clear / S.025 / pH: x  Gluc: x / Ketone: Negative  / Bili: Negative / Urobili: Negative   Blood: x / Protein: Negative / Nitrite: Negative   Leuk Esterase: Moderate / RBC: 0-2 /HPF / WBC 11-25 /HPF   Sq Epi: x / Non Sq Epi: Moderate /HPF / Bacteria: Few /HPF      CAPILLARY BLOOD GLUCOSE      POCT Blood Glucose.: 107 mg/dL (2018 02:01)      RADIOLOGY & ADDITIONAL TESTS:    Imaging Personally Reviewed:  [ ] YES  [ ] NO    Consultant(s) Notes Reviewed:  [ ] YES  [ ] NO PGY1 Note discussed with supervising resident and primary attending.    Patient is a 85y old  Female who presents with a chief complaint of headache and chest pain (10 Feb 2018 18:42)      INTERVAL HPI/OVERNIGHT EVENTS: no new overnight events. Pt had no headache, mild chest discomfortable.      MEDICATIONS  (STANDING):  aspirin  chewable 81 milliGRAM(s) Oral daily  atorvastatin 20 milliGRAM(s) Oral at bedtime  dextrose 5%. 1000 milliLiter(s) (50 mL/Hr) IV Continuous <Continuous>  dextrose 50% Injectable 12.5 Gram(s) IV Push once  dextrose 50% Injectable 25 Gram(s) IV Push once  dextrose 50% Injectable 25 Gram(s) IV Push once  enoxaparin Injectable 40 milliGRAM(s) SubCutaneous daily  gabapentin 100 milliGRAM(s) Oral daily  insulin lispro (HumaLOG) corrective regimen sliding scale   SubCutaneous three times a day before meals  lisinopril 2.5 milliGRAM(s) Oral daily  metoprolol     tartrate 12.5 milliGRAM(s) Oral two times a day    MEDICATIONS  (PRN):  dextrose Gel 1 Dose(s) Oral once PRN Blood Glucose LESS THAN 70 milliGRAM(s)/deciliter  glucagon  Injectable 1 milliGRAM(s) IntraMuscular once PRN Glucose LESS THAN 70 milligrams/deciliter      Allergies    No Known Allergies    Intolerances        Vital Signs Last 24 Hrs  T(C): 36.6 (2018 05:03), Max: 36.9 (2018 01:45)  T(F): 97.9 (2018 05:03), Max: 98.4 (2018 01:45)  HR: 71 (2018 05:03) (69 - 76)  BP: 137/59 (2018 05:03) (113/52 - 146/65)  BP(mean): --  RR: 16 (2018 05:03) (16 - 23)  SpO2: 96% (2018 05:03) (96% - 99%)    PHYSICAL EXAM:  GENERAL: NAD, well-groomed, well-developed  CHEST/LUNG: Clear to percussion bilaterally; No rales, rhonchi, wheezing, or rubs  HEART: Regular rate and rhythm; No murmurs, rubs, or gallops  ABDOMEN: Soft, Nontender, Nondistended; Bowel sounds present  NERVOUS SYSTEM:  Alert & Oriented X3, Good concentration  EXTREMITIES:  2+ Peripheral Pulses, No clubbing, cyanosis, or edema      LABS:                        13.0   5.9   )-----------( 228      ( 2018 06:21 )             41.6     02-11    142  |  109<H>  |  14  ----------------------------<  87  4.0   |  26  |  0.62    Ca    8.4      2018 06:21  Phos  3.6       Mg     2.1         TPro  6.8  /  Alb  3.2<L>  /  TBili  0.3  /  DBili  x   /  AST  10  /  ALT  16  /  AlkPhos  75  02-10    PT/INR - ( 10 Feb 2018 16:19 )   PT: 10.5 sec;   INR: 0.96 ratio         PTT - ( 10 Feb 2018 16:19 )  PTT:28.3 sec  Urinalysis Basic - ( 10 Feb 2018 17:19 )    Color: Yellow / Appearance: Clear / S.025 / pH: x  Gluc: x / Ketone: Negative  / Bili: Negative / Urobili: Negative   Blood: x / Protein: Negative / Nitrite: Negative   Leuk Esterase: Moderate / RBC: 0-2 /HPF / WBC 11-25 /HPF   Sq Epi: x / Non Sq Epi: Moderate /HPF / Bacteria: Few /HPF      CAPILLARY BLOOD GLUCOSE      POCT Blood Glucose.: 107 mg/dL (2018 02:01)      RADIOLOGY & ADDITIONAL TESTS:    Imaging Personally Reviewed:  [ x] YES  [ ] NO    Consultant(s) Notes Reviewed:  [x ] YES  [ ] NO PGY1 Note discussed with supervising resident and primary attending.    Patient is a 85y old  Female who presents with a chief complaint of headache and chest pain (10 Feb 2018 18:42)      INTERVAL HPI/OVERNIGHT EVENTS: no new overnight events. Pt still have mild headache, mild chest discomfortable.      MEDICATIONS  (STANDING):  aspirin  chewable 81 milliGRAM(s) Oral daily  atorvastatin 20 milliGRAM(s) Oral at bedtime  dextrose 5%. 1000 milliLiter(s) (50 mL/Hr) IV Continuous <Continuous>  dextrose 50% Injectable 12.5 Gram(s) IV Push once  dextrose 50% Injectable 25 Gram(s) IV Push once  dextrose 50% Injectable 25 Gram(s) IV Push once  enoxaparin Injectable 40 milliGRAM(s) SubCutaneous daily  gabapentin 100 milliGRAM(s) Oral daily  insulin lispro (HumaLOG) corrective regimen sliding scale   SubCutaneous three times a day before meals  lisinopril 2.5 milliGRAM(s) Oral daily  metoprolol     tartrate 12.5 milliGRAM(s) Oral two times a day    MEDICATIONS  (PRN):  dextrose Gel 1 Dose(s) Oral once PRN Blood Glucose LESS THAN 70 milliGRAM(s)/deciliter  glucagon  Injectable 1 milliGRAM(s) IntraMuscular once PRN Glucose LESS THAN 70 milligrams/deciliter      Allergies    No Known Allergies    Intolerances        Vital Signs Last 24 Hrs  T(C): 36.6 (2018 05:03), Max: 36.9 (2018 01:45)  T(F): 97.9 (2018 05:03), Max: 98.4 (2018 01:45)  HR: 71 (2018 05:03) (69 - 76)  BP: 137/59 (2018 05:03) (113/52 - 146/65)  BP(mean): --  RR: 16 (2018 05:03) (16 - 23)  SpO2: 96% (2018 05:03) (96% - 99%)    PHYSICAL EXAM:  GENERAL: NAD, well-groomed, well-developed  CHEST/LUNG: Clear to percussion bilaterally; No rales, rhonchi, wheezing, or rubs  HEART: Regular rate and rhythm; No murmurs, rubs, or gallops  ABDOMEN: Soft, Nontender, Nondistended; Bowel sounds present  NERVOUS SYSTEM:  Alert & Oriented X3, Good concentration  EXTREMITIES:  2+ Peripheral Pulses, No clubbing, cyanosis, or edema      LABS:                        13.0   5.9   )-----------( 228      ( 2018 06:21 )             41.6     02-11    142  |  109<H>  |  14  ----------------------------<  87  4.0   |  26  |  0.62    Ca    8.4      2018 06:21  Phos  3.6       Mg     2.1         TPro  6.8  /  Alb  3.2<L>  /  TBili  0.3  /  DBili  x   /  AST  10  /  ALT  16  /  AlkPhos  75  02-10    PT/INR - ( 10 Feb 2018 16:19 )   PT: 10.5 sec;   INR: 0.96 ratio         PTT - ( 10 Feb 2018 16:19 )  PTT:28.3 sec  Urinalysis Basic - ( 10 Feb 2018 17:19 )    Color: Yellow / Appearance: Clear / S.025 / pH: x  Gluc: x / Ketone: Negative  / Bili: Negative / Urobili: Negative   Blood: x / Protein: Negative / Nitrite: Negative   Leuk Esterase: Moderate / RBC: 0-2 /HPF / WBC 11-25 /HPF   Sq Epi: x / Non Sq Epi: Moderate /HPF / Bacteria: Few /HPF      CAPILLARY BLOOD GLUCOSE      POCT Blood Glucose.: 107 mg/dL (2018 02:01)      RADIOLOGY & ADDITIONAL TESTS:    Imaging Personally Reviewed:  [ x] YES  [ ] NO    Consultant(s) Notes Reviewed:  [x ] YES  [ ] NO

## 2018-02-11 NOTE — DISCHARGE NOTE ADULT - PLAN OF CARE
keep HbA1c <7 Please continue home meds for diabetes and follow up with your primary care doctor within 2 weeks. Please continue home meds for HLD and follow up with your primary care doctor within 2 weeks. keep BP <140/90mmHg Please continue home meds for HTN and follow up with your primary care doctor within 2 weeks. symptoms resolved Please continue ?? and follow up with your primary care doctor within 2 weeks. Your dizziness and headache resolved now. Your carotid artery ultrasound normal. MRI head shows?  . Echocardiogram shows ?? Your dizziness and headache resolved now. Your carotid artery ultrasound normal. MRI head shows?  . Echocardiogram shows ??. Please follow up with primary care doctor within 2 weeks. Your dizziness and headache resolved now. Your carotid artery ultrasound normal. MRI head negative . Echocardiogram shows  EF 55-60%, normal. Please follow up with primary care doctor within 2 weeks.

## 2018-02-11 NOTE — DISCHARGE NOTE ADULT - MEDICATION SUMMARY - MEDICATIONS TO TAKE
I will START or STAY ON the medications listed below when I get home from the hospital:    aspirin 81 mg oral tablet, chewable  -- 1 tab(s) by mouth once a day  -- Indication: For Need for prophylactic measure    lisinopril 2.5 mg oral tablet  -- 1 tab(s) by mouth once a day  -- Indication: For HTN (hypertension)    gabapentin 100 mg oral tablet  -- 1 tab(s) by mouth once a day  -- Indication: For Pain    metFORMIN 500 mg oral tablet  -- 1 tab(s) by mouth once a day  -- Indication: For Diabetes mellitus    atorvastatin 20 mg oral tablet  -- 1 tab(s) by mouth once a day  -- Indication: For HLD (hyperlipidemia)

## 2018-02-11 NOTE — DISCHARGE NOTE ADULT - CARE PLAN
Principal Discharge DX:	Pneumonia  Goal:	symptoms resolved  Assessment and plan of treatment:	Please continue ?? and follow up with your primary care doctor within 2 weeks.  Secondary Diagnosis:	Diabetes mellitus  Goal:	keep HbA1c <7  Assessment and plan of treatment:	Please continue home meds for diabetes and follow up with your primary care doctor within 2 weeks.  Secondary Diagnosis:	HLD (hyperlipidemia)  Assessment and plan of treatment:	Please continue home meds for HLD and follow up with your primary care doctor within 2 weeks.  Secondary Diagnosis:	HTN (hypertension)  Goal:	keep BP <140/90mmHg  Assessment and plan of treatment:	Please continue home meds for HTN and follow up with your primary care doctor within 2 weeks. Principal Discharge DX:	Dizziness  Goal:	symptoms resolved  Assessment and plan of treatment:	Your dizziness and headache resolved now. Your carotid artery ultrasound normal. MRI head shows?  . Echocardiogram shows ??  Secondary Diagnosis:	Diabetes mellitus  Goal:	keep HbA1c <7  Assessment and plan of treatment:	Please continue home meds for diabetes and follow up with your primary care doctor within 2 weeks.  Secondary Diagnosis:	HLD (hyperlipidemia)  Assessment and plan of treatment:	Please continue home meds for HLD and follow up with your primary care doctor within 2 weeks.  Secondary Diagnosis:	HTN (hypertension)  Goal:	keep BP <140/90mmHg  Assessment and plan of treatment:	Please continue home meds for HTN and follow up with your primary care doctor within 2 weeks. Principal Discharge DX:	Dizziness  Goal:	symptoms resolved  Assessment and plan of treatment:	Your dizziness and headache resolved now. Your carotid artery ultrasound normal. MRI head shows?  . Echocardiogram shows ??. Please follow up with primary care doctor within 2 weeks.  Secondary Diagnosis:	Diabetes mellitus  Goal:	keep HbA1c <7  Assessment and plan of treatment:	Please continue home meds for diabetes and follow up with your primary care doctor within 2 weeks.  Secondary Diagnosis:	HLD (hyperlipidemia)  Assessment and plan of treatment:	Please continue home meds for HLD and follow up with your primary care doctor within 2 weeks.  Secondary Diagnosis:	HTN (hypertension)  Goal:	keep BP <140/90mmHg  Assessment and plan of treatment:	Please continue home meds for HTN and follow up with your primary care doctor within 2 weeks. Principal Discharge DX:	Dizziness  Goal:	symptoms resolved  Assessment and plan of treatment:	Your dizziness and headache resolved now. Your carotid artery ultrasound normal. MRI head negative . Echocardiogram shows  EF 55-60%, normal. Please follow up with primary care doctor within 2 weeks.  Secondary Diagnosis:	Diabetes mellitus  Goal:	keep HbA1c <7  Assessment and plan of treatment:	Please continue home meds for diabetes and follow up with your primary care doctor within 2 weeks.  Secondary Diagnosis:	HLD (hyperlipidemia)  Assessment and plan of treatment:	Please continue home meds for HLD and follow up with your primary care doctor within 2 weeks.  Secondary Diagnosis:	HTN (hypertension)  Goal:	keep BP <140/90mmHg  Assessment and plan of treatment:	Please continue home meds for HTN and follow up with your primary care doctor within 2 weeks.

## 2018-02-11 NOTE — DISCHARGE NOTE ADULT - HOSPITAL COURSE
85 F, from home lives w/ family ambulates independently, w/ PMH HTN, DM and HLD c/o headache and chest pain.     CXR - Platelike atelectasis or small infiltrate in the left lower lobe. Follow-up PA and lateral views recommended.    Pt was admitted for management of PNA and r/o ACS.     Problem/Plan - 1:  ·  Problem: Pneumonia.  Plan: CXR - Platelike atelectasis or small infiltrate in the left lower lobe.  s/p 1 dose rocephin, zithromax @ ED  RVP negative   f/u CT chest no cont r/o PNA  hold off on abx until CT results is back.      Problem/Plan - 2:  ·  Problem: Chest pain.  Plan: tender to palpation mid-sternal, low suspicion for ACS   EKG NSR VR 72  tropx 2 neg.      Problem/Plan - 3:  ·  Problem: Diabetes mellitus.  Plan: f/u A1c  HSS  AccuCheck  DASH/low carb diet.      Problem/Plan - 4:  ·  Problem: HLD (hyperlipidemia).  Plan: lipid panel shows elevated TG   c/w home med statin.      Problem/Plan - 5:  ·  Problem: HTN (hypertension).  Plan: BP stable  c/w home BP meds  Monitor BP  DASH/low carb diet. Patient is a 86 yo female, from home lives with family ambulates independently, with PMH HTN, DM and HLD c/o headache and chest pain. CXR - Platelike atelectasis or small infiltrate in the left lower lobe. Pt was admitted for management of PNA and r/o ACS. Pt got 1 dose rocephin and zithromax in ED, RVP negative , CT chest no contrast shows ??. EKG NSR VR at 72, cardiac enzyme negative x 2 sets, ACS ruled out. Pt had history of DM, A1C ??, on insuline sliding scale in hospital. Lipid panel shows elevated TG, continue with home statins. BP controlled with home meds, on DASH/TLC diet in hospital.     Pt was seen and examined at bedside, medically stable to be discharged to home today. Discussed with Dr. Estrella about the discharge plan. Patient is a 84 yo female, from home lives with family ambulates independently, with PMH HTN, DM and HLD c/o headache and chest pain. CXR - Platelike atelectasis or small infiltrate in the left lower lobe. Pt was admitted for management of PNA and r/o ACS. Pt got 1 dose rocephin and zithromax in ED, RVP negative. Pt had no fever, no leukocytosis, less likely had PNA.  EKG NSR VR at 72, cardiac enzyme negative x 2 sets, ACS ruled out.  Pt had dizziness and nausea on Monday morning. MRI head negative, carotid duplex normal. Echo shows ??    Pt had history of DM, A1C 5.6, on insuline sliding scale in hospital. Lipid panel shows elevated TG, continue with home statins. BP controlled with home meds, on DASH/TLC diet in hospital.     Pt was seen and examined at bedside, medically stable to be discharged to home today. Discussed with Dr. Estrella about the discharge plan. Patient is a 86 yo female, from home lives with family ambulates independently, with PMH HTN, DM and HLD c/o headache and chest pain. CXR - Platelike atelectasis or small infiltrate in the left lower lobe. Pt was admitted for management of PNA and r/o ACS. Pt got 1 dose rocephin and zithromax in ED, RVP negative. Pt had no fever, no leukocytosis, less likely had PNA.  EKG NSR VR at 72, cardiac enzyme negative x 2 sets, ACS ruled out.  Pt had dizziness and nausea on Monday morning. MRI head ???, carotid duplex normal. Echo shows ??    Pt had history of DM, A1C 5.6, on insuline sliding scale in hospital. Lipid panel shows elevated TG, continue with home statins. BP controlled with home meds, on DASH/TLC diet in hospital.     Pt was seen and examined at bedside, medically stable to be discharged to home today. Discussed with Dr. Estrella about the discharge plan. Patient is a 84 yo female, from home lives with family ambulates independently, with PMH HTN, DM and HLD c/o headache and chest pain. CXR - Platelike atelectasis or small infiltrate in the left lower lobe. Pt was admitted for management of PNA and r/o ACS. Pt got 1 dose rocephin and zithromax in ED, RVP negative. Pt had no fever, no leukocytosis, less likely had PNA.  EKG NSR VR at 72, cardiac enzyme negative x 2 sets, ACS ruled out.  Pt had dizziness and nausea on Monday morning. MRI head negative, carotid duplex normal. Echo shows EF 55-60%, grade 1 diastolic dysfunction. Pt symptoms resolved this morning.     Pt had history of DM, A1C 5.6, on insuline sliding scale in hospital. Lipid panel shows elevated TG, continue with home statins. BP controlled with home meds, on DASH/TLC diet in hospital.     Pt was seen and examined at bedside, medically stable to be discharged to home today. Discussed with Dr. Estrella about the discharge plan.

## 2018-02-11 NOTE — DISCHARGE NOTE ADULT - PATIENT PORTAL LINK FT
You can access the [facility] Portal, offered by [facility], by registering with the following website: [email or web address]

## 2018-02-12 LAB
24R-OH-CALCIDIOL SERPL-MCNC: 34.6 NG/ML — SIGNIFICANT CHANGE UP (ref 30–80)
ANION GAP SERPL CALC-SCNC: 10 MMOL/L — SIGNIFICANT CHANGE UP (ref 5–17)
BUN SERPL-MCNC: 17 MG/DL — SIGNIFICANT CHANGE UP (ref 7–18)
CALCIUM SERPL-MCNC: 8.4 MG/DL — SIGNIFICANT CHANGE UP (ref 8.4–10.5)
CHLORIDE SERPL-SCNC: 107 MMOL/L — SIGNIFICANT CHANGE UP (ref 96–108)
CO2 SERPL-SCNC: 24 MMOL/L — SIGNIFICANT CHANGE UP (ref 22–31)
CREAT SERPL-MCNC: 0.93 MG/DL — SIGNIFICANT CHANGE UP (ref 0.5–1.3)
GLUCOSE SERPL-MCNC: 126 MG/DL — HIGH (ref 70–99)
HCT VFR BLD CALC: 40.7 % — SIGNIFICANT CHANGE UP (ref 34.5–45)
HGB BLD-MCNC: 13.5 G/DL — SIGNIFICANT CHANGE UP (ref 11.5–15.5)
MCHC RBC-ENTMCNC: 29.9 PG — SIGNIFICANT CHANGE UP (ref 27–34)
MCHC RBC-ENTMCNC: 33.1 GM/DL — SIGNIFICANT CHANGE UP (ref 32–36)
MCV RBC AUTO: 90.1 FL — SIGNIFICANT CHANGE UP (ref 80–100)
PLATELET # BLD AUTO: 226 K/UL — SIGNIFICANT CHANGE UP (ref 150–400)
POTASSIUM SERPL-MCNC: 3.9 MMOL/L — SIGNIFICANT CHANGE UP (ref 3.5–5.3)
POTASSIUM SERPL-SCNC: 3.9 MMOL/L — SIGNIFICANT CHANGE UP (ref 3.5–5.3)
RBC # BLD: 4.52 M/UL — SIGNIFICANT CHANGE UP (ref 3.8–5.2)
RBC # FLD: 12.2 % — SIGNIFICANT CHANGE UP (ref 10.3–14.5)
SODIUM SERPL-SCNC: 141 MMOL/L — SIGNIFICANT CHANGE UP (ref 135–145)
WBC # BLD: 5.2 K/UL — SIGNIFICANT CHANGE UP (ref 3.8–10.5)
WBC # FLD AUTO: 5.2 K/UL — SIGNIFICANT CHANGE UP (ref 3.8–10.5)

## 2018-02-12 PROCEDURE — 93880 EXTRACRANIAL BILAT STUDY: CPT | Mod: 26

## 2018-02-12 PROCEDURE — 99233 SBSQ HOSP IP/OBS HIGH 50: CPT | Mod: GC

## 2018-02-12 PROCEDURE — 70551 MRI BRAIN STEM W/O DYE: CPT | Mod: 26

## 2018-02-12 RX ADMIN — LISINOPRIL 2.5 MILLIGRAM(S): 2.5 TABLET ORAL at 05:25

## 2018-02-12 RX ADMIN — ENOXAPARIN SODIUM 40 MILLIGRAM(S): 100 INJECTION SUBCUTANEOUS at 12:37

## 2018-02-12 RX ADMIN — ATORVASTATIN CALCIUM 20 MILLIGRAM(S): 80 TABLET, FILM COATED ORAL at 21:15

## 2018-02-12 RX ADMIN — GABAPENTIN 100 MILLIGRAM(S): 400 CAPSULE ORAL at 12:38

## 2018-02-12 RX ADMIN — Medication 81 MILLIGRAM(S): at 12:37

## 2018-02-12 RX ADMIN — Medication 12.5 MILLIGRAM(S): at 05:25

## 2018-02-12 NOTE — PROGRESS NOTE ADULT - PROBLEM SELECTOR PLAN 1
CXR - Platelike atelectasis or small infiltrate in the left lower lobe.  s/p 1 dose rocephin, zithromax @ ED  RVP negative   f/u CT chest no cont r/o PNA  hold off on abx until CT results is back
CXR - Platelike atelectasis or small infiltrate in the left lower lobe.  s/p 1 dose rocephin, zithromax @ ED  RVP negative   f/u CT chest no cont r/o PNA  hold off on abx until CT results is back

## 2018-02-12 NOTE — PROGRESS NOTE ADULT - SUBJECTIVE AND OBJECTIVE BOX
PGY1 Note discussed with supervising resident and primary attending.    Patient is a 85y old  Female who presents with a chief complaint of headache and chest pain (2018 11:25)      INTERVAL HPI/OVERNIGHT EVENTS:    MEDICATIONS  (STANDING):  aspirin  chewable 81 milliGRAM(s) Oral daily  atorvastatin 20 milliGRAM(s) Oral at bedtime  dextrose 5%. 1000 milliLiter(s) (50 mL/Hr) IV Continuous <Continuous>  dextrose 50% Injectable 12.5 Gram(s) IV Push once  dextrose 50% Injectable 25 Gram(s) IV Push once  dextrose 50% Injectable 25 Gram(s) IV Push once  enoxaparin Injectable 40 milliGRAM(s) SubCutaneous daily  gabapentin 100 milliGRAM(s) Oral daily  insulin lispro (HumaLOG) corrective regimen sliding scale   SubCutaneous three times a day before meals  lisinopril 2.5 milliGRAM(s) Oral daily  metoprolol     tartrate 12.5 milliGRAM(s) Oral two times a day    MEDICATIONS  (PRN):  acetaminophen   Tablet. 650 milliGRAM(s) Oral every 6 hours PRN Mild Pain (1 - 3)  dextrose Gel 1 Dose(s) Oral once PRN Blood Glucose LESS THAN 70 milliGRAM(s)/deciliter  glucagon  Injectable 1 milliGRAM(s) IntraMuscular once PRN Glucose LESS THAN 70 milligrams/deciliter  oxyCODONE    5 mG/acetaminophen 325 mG 1 Tablet(s) Oral every 6 hours PRN Severe Pain (7 - 10)      Allergies    No Known Allergies    Intolerances        REVIEW OF SYSTEMS:  CONSTITUTIONAL: No fever, weight loss, or fatigue  RESPIRATORY: No cough, wheezing, chills or hemoptysis; No shortness of breath  CARDIOVASCULAR: No chest pain, palpitations, dizziness, or leg swelling  GASTROINTESTINAL: No abdominal or epigastric pain. No nausea, vomiting, or hematemesis; No diarrhea or constipation. No melena or hematochezia.  NEUROLOGICAL: No headaches, memory loss, loss of strength, numbness, or tremors  SKIN: No itching, burning, rashes, or lesions     Vital Signs Last 24 Hrs  T(C): 36.7 (2018 05:30), Max: 37.1 (2018 11:21)  T(F): 98 (2018 05:30), Max: 98.8 (2018 20:29)  HR: 63 (2018 05:30) (58 - 69)  BP: 132/56 (2018 05:30) (107/56 - 152/71)  BP(mean): --  RR: 17 (2018 05:30) (16 - 17)  SpO2: 100% (2018 05:30) (95% - 100%)    PHYSICAL EXAM:  GENERAL: NAD, well-groomed, well-developed  HEAD:  Atraumatic, Normocephalic  EYES: EOMI, PERRLA, conjunctiva and sclera clear  NECK: Supple, No JVD, Normal thyroid  CHEST/LUNG: Clear to percussion bilaterally; No rales, rhonchi, wheezing, or rubs  HEART: Regular rate and rhythm; No murmurs, rubs, or gallops  ABDOMEN: Soft, Nontender, Nondistended; Bowel sounds present  NERVOUS SYSTEM:  Alert & Oriented X3, Good concentration; Motor Strength 5/5 B/L   EXTREMITIES:  2+ Peripheral Pulses, No clubbing, cyanosis, or edema  SKIN;    LABS:                        13.0   5.9   )-----------( 228      ( 2018 06:21 )             41.6     02-11    142  |  109<H>  |  14  ----------------------------<  87  4.0   |  26  |  0.62    Ca    8.4      2018 06:21  Phos  3.6     02-11  Mg     2.1     02-11    TPro  6.8  /  Alb  3.2<L>  /  TBili  0.3  /  DBili  x   /  AST  10  /  ALT  16  /  AlkPhos  75  02-10    PT/INR - ( 10 Feb 2018 16:19 )   PT: 10.5 sec;   INR: 0.96 ratio         PTT - ( 10 Feb 2018 16:19 )  PTT:28.3 sec  Urinalysis Basic - ( 10 Feb 2018 17:19 )    Color: Yellow / Appearance: Clear / S.025 / pH: x  Gluc: x / Ketone: Negative  / Bili: Negative / Urobili: Negative   Blood: x / Protein: Negative / Nitrite: Negative   Leuk Esterase: Moderate / RBC: 0-2 /HPF / WBC 11-25 /HPF   Sq Epi: x / Non Sq Epi: Moderate /HPF / Bacteria: Few /HPF      CAPILLARY BLOOD GLUCOSE      POCT Blood Glucose.: 100 mg/dL (2018 21:20)  POCT Blood Glucose.: 101 mg/dL (2018 16:59)  POCT Blood Glucose.: 106 mg/dL (2018 11:31)  POCT Blood Glucose.: 81 mg/dL (2018 07:48)      RADIOLOGY & ADDITIONAL TESTS:    Imaging Personally Reviewed:  [ ] YES  [ ] NO    Consultant(s) Notes Reviewed:  [ ] YES  [ ] NO PGY1 Note discussed with supervising resident and primary attending.    Patient is a 85y old  Female who presents with a chief complaint of headache and chest pain (2018 11:25)      INTERVAL HPI/OVERNIGHT EVENTS: no overnight events.    MEDICATIONS  (STANDING):  aspirin  chewable 81 milliGRAM(s) Oral daily  atorvastatin 20 milliGRAM(s) Oral at bedtime  dextrose 5%. 1000 milliLiter(s) (50 mL/Hr) IV Continuous <Continuous>  dextrose 50% Injectable 12.5 Gram(s) IV Push once  dextrose 50% Injectable 25 Gram(s) IV Push once  dextrose 50% Injectable 25 Gram(s) IV Push once  enoxaparin Injectable 40 milliGRAM(s) SubCutaneous daily  gabapentin 100 milliGRAM(s) Oral daily  insulin lispro (HumaLOG) corrective regimen sliding scale   SubCutaneous three times a day before meals  lisinopril 2.5 milliGRAM(s) Oral daily  metoprolol     tartrate 12.5 milliGRAM(s) Oral two times a day    MEDICATIONS  (PRN):  acetaminophen   Tablet. 650 milliGRAM(s) Oral every 6 hours PRN Mild Pain (1 - 3)  dextrose Gel 1 Dose(s) Oral once PRN Blood Glucose LESS THAN 70 milliGRAM(s)/deciliter  glucagon  Injectable 1 milliGRAM(s) IntraMuscular once PRN Glucose LESS THAN 70 milligrams/deciliter  oxyCODONE    5 mG/acetaminophen 325 mG 1 Tablet(s) Oral every 6 hours PRN Severe Pain (7 - 10)      Allergies    No Known Allergies    Intolerances      Vital Signs Last 24 Hrs  T(C): 36.7 (2018 05:30), Max: 37.1 (2018 11:21)  T(F): 98 (2018 05:30), Max: 98.8 (2018 20:29)  HR: 63 (2018 05:30) (58 - 69)  BP: 132/56 (2018 05:30) (107/56 - 152/71)  BP(mean): --  RR: 17 (2018 05:30) (16 - 17)  SpO2: 100% (2018 05:30) (95% - 100%)    PHYSICAL EXAM:  GENERAL: NAD, well-groomed, well-developed  CHEST/LUNG: Clear to percussion bilaterally; No rales, rhonchi, wheezing, or rubs  HEART: Regular rate and rhythm; No murmurs, rubs, or gallops  ABDOMEN: Soft, Nontender, Nondistended; Bowel sounds present  NERVOUS SYSTEM:  Alert & Oriented X3, Good concentration; Motor Strength 5/5 B/L   EXTREMITIES:  2+ Peripheral Pulses, No clubbing, cyanosis, or edema    LABS:                        13.0   5.9   )-----------( 228      ( 2018 06:21 )             41.6     02-11    142  |  109<H>  |  14  ----------------------------<  87  4.0   |  26  |  0.62    Ca    8.4      2018 06:21  Phos  3.6       Mg     2.1     11    TPro  6.8  /  Alb  3.2<L>  /  TBili  0.3  /  DBili  x   /  AST  10  /  ALT  16  /  AlkPhos  75  02-10    PT/INR - ( 10 Feb 2018 16:19 )   PT: 10.5 sec;   INR: 0.96 ratio         PTT - ( 10 Feb 2018 16:19 )  PTT:28.3 sec  Urinalysis Basic - ( 10 Feb 2018 17:19 )    Color: Yellow / Appearance: Clear / S.025 / pH: x  Gluc: x / Ketone: Negative  / Bili: Negative / Urobili: Negative   Blood: x / Protein: Negative / Nitrite: Negative   Leuk Esterase: Moderate / RBC: 0-2 /HPF / WBC 11-25 /HPF   Sq Epi: x / Non Sq Epi: Moderate /HPF / Bacteria: Few /HPF      CAPILLARY BLOOD GLUCOSE      POCT Blood Glucose.: 100 mg/dL (2018 21:20)  POCT Blood Glucose.: 101 mg/dL (2018 16:59)  POCT Blood Glucose.: 106 mg/dL (2018 11:31)  POCT Blood Glucose.: 81 mg/dL (2018 07:48)      RADIOLOGY & ADDITIONAL TESTS:    Imaging Personally Reviewed:  [ x] YES  [ ] NO    Consultant(s) Notes Reviewed:  [ x] YES  [ ] NO

## 2018-02-12 NOTE — PROGRESS NOTE ADULT - PROBLEM SELECTOR PLAN 4
lipid panel shows elevated TG   c/w home med statin
lipid panel shows elevated TG   c/w home med statin

## 2018-02-12 NOTE — PROGRESS NOTE ADULT - ASSESSMENT
85 F, from home lives w/ family ambulates independently, w/ PMH HTN, DM and HLD c/o headache and chest pain.     CXR - Platelike atelectasis or small infiltrate in the left lower lobe. Follow-up PA and lateral views recommended.    Pt was admitted for management of PNA and r/o ACS.

## 2018-02-12 NOTE — PROGRESS NOTE ADULT - PROBLEM SELECTOR PLAN 5
BP stable  c/w home BP meds  Monitor BP  DASH/low carb diet
BP stable  c/w home BP meds  Monitor BP  DASH/low carb diet

## 2018-02-12 NOTE — PROGRESS NOTE ADULT - ATTENDING COMMENTS
Patient seen and examined at bedside: She is a [place] speaking elderly lady who was not able to use  phone. Jocelyn the PSA helped with translation.   Today she reports that she feels nauseated and has dizziness. She denies headache, chest pain, SOB, vomiting, abdominal pain, change in urinary or bowel habits.     ROS as above   PE:   General: pleasant elderly lady, NAD, comfortably laying in bed   Neck: Supple, no JVD   Cardio: Regular rate and rhythm, no murmur   Pulm: clear breath sounds, no wheezing   GI/: abdomen is soft, non tender, BS (+)   Extr: no rash, no edema   Neuro; AO x3, no focal weakness     Vital Signs Last 24 Hrs  T(C): 37 (12 Feb 2018 11:03), Max: 37.1 (11 Feb 2018 20:29)  T(F): 98.6 (12 Feb 2018 11:03), Max: 98.8 (11 Feb 2018 20:29)  HR: 60 (12 Feb 2018 11:03) (58 - 71)  BP: 152/73 (12 Feb 2018 11:03) (107/56 - 152/73)  BP(mean): --  RR: 18 (12 Feb 2018 11:03) (16 - 18)  SpO2: 100% (12 Feb 2018 11:03) (95% - 100%)    1. Nausea and dizziness concern for posterior circulation stroke  CT head done in ED negative   Will get MRI of brain and US carotids     2. PNA: unlikely, no symptoms, no leukocytosis, no fever   d/c CT chest     3. DM  4. HLD

## 2018-02-12 NOTE — PROGRESS NOTE ADULT - PROBLEM SELECTOR PLAN 6
IMPROVE = 2 for age and immobilization   lovenox for DVT ppx   GI ppx not indicated
IMPROVE = 2 for age and immobilization   lovenox for DVT ppx   GI ppx not indicated

## 2018-02-13 VITALS
SYSTOLIC BLOOD PRESSURE: 144 MMHG | TEMPERATURE: 98 F | DIASTOLIC BLOOD PRESSURE: 78 MMHG | OXYGEN SATURATION: 96 % | HEART RATE: 62 BPM | RESPIRATION RATE: 18 BRPM

## 2018-02-13 LAB
ANION GAP SERPL CALC-SCNC: 7 MMOL/L — SIGNIFICANT CHANGE UP (ref 5–17)
BUN SERPL-MCNC: 19 MG/DL — HIGH (ref 7–18)
CALCIUM SERPL-MCNC: 8.5 MG/DL — SIGNIFICANT CHANGE UP (ref 8.4–10.5)
CHLORIDE SERPL-SCNC: 108 MMOL/L — SIGNIFICANT CHANGE UP (ref 96–108)
CO2 SERPL-SCNC: 27 MMOL/L — SIGNIFICANT CHANGE UP (ref 22–31)
CREAT SERPL-MCNC: 0.73 MG/DL — SIGNIFICANT CHANGE UP (ref 0.5–1.3)
GLUCOSE SERPL-MCNC: 94 MG/DL — SIGNIFICANT CHANGE UP (ref 70–99)
HCT VFR BLD CALC: 44.1 % — SIGNIFICANT CHANGE UP (ref 34.5–45)
HGB BLD-MCNC: 13.9 G/DL — SIGNIFICANT CHANGE UP (ref 11.5–15.5)
MCHC RBC-ENTMCNC: 28.2 PG — SIGNIFICANT CHANGE UP (ref 27–34)
MCHC RBC-ENTMCNC: 31.4 GM/DL — LOW (ref 32–36)
MCV RBC AUTO: 89.5 FL — SIGNIFICANT CHANGE UP (ref 80–100)
PLATELET # BLD AUTO: 254 K/UL — SIGNIFICANT CHANGE UP (ref 150–400)
POTASSIUM SERPL-MCNC: 4.2 MMOL/L — SIGNIFICANT CHANGE UP (ref 3.5–5.3)
POTASSIUM SERPL-SCNC: 4.2 MMOL/L — SIGNIFICANT CHANGE UP (ref 3.5–5.3)
RBC # BLD: 4.93 M/UL — SIGNIFICANT CHANGE UP (ref 3.8–5.2)
RBC # FLD: 12.2 % — SIGNIFICANT CHANGE UP (ref 10.3–14.5)
SODIUM SERPL-SCNC: 142 MMOL/L — SIGNIFICANT CHANGE UP (ref 135–145)
WBC # BLD: 5.9 K/UL — SIGNIFICANT CHANGE UP (ref 3.8–10.5)
WBC # FLD AUTO: 5.9 K/UL — SIGNIFICANT CHANGE UP (ref 3.8–10.5)

## 2018-02-13 PROCEDURE — 99239 HOSP IP/OBS DSCHRG MGMT >30: CPT

## 2018-02-13 RX ORDER — ASPIRIN/CALCIUM CARB/MAGNESIUM 324 MG
1 TABLET ORAL
Qty: 30 | Refills: 0
Start: 2018-02-13 | End: 2018-03-14

## 2018-02-13 NOTE — CHART NOTE - NSCHARTNOTEFT_GEN_A_CORE
Patient was seen and examined at bedside with her daughter being present and helped with translation.   She was admitted for chest pain, headache and dizziness.   ACS ruled out. Received one dose of antibiotics for possible PNA on admission, but PNA ruled out also.   There was some concern for posterior circulation stoke which ruled out: MRI of head and US carotids with no significant changes   Patient received a gentle hydration and dizziness resolved. She is medically stable. All the results given and explained to patients daughter.     ROS is negative   PE:   GEneral: NAD, comfortably sitting   Neck: supple, no JVD   Cardio: regular rate and rhythm, no murmur  Pulm: clear breath sounds, no wheezing   GI/: abdomen is soft, non tender, BS (+)   Extr: no rash, no edema  Neuro: AO x3, no focal weakness     Vital Signs Last 24 Hrs  T(C): 36.7 (13 Feb 2018 12:21), Max: 37.1 (13 Feb 2018 00:05)  T(F): 98.1 (13 Feb 2018 12:21), Max: 98.7 (13 Feb 2018 00:05)  HR: 62 (13 Feb 2018 12:21) (56 - 66)  BP: 144/78 (13 Feb 2018 12:21) (107/54 - 144/78)  BP(mean): --  RR: 18 (13 Feb 2018 12:21) (18 - 18)  SpO2: 96% (13 Feb 2018 12:21) (96% - 100%)

## 2018-02-24 ENCOUNTER — RX RENEWAL (OUTPATIENT)
Age: 83
End: 2018-02-24

## 2018-03-05 ENCOUNTER — APPOINTMENT (OUTPATIENT)
Dept: INTERNAL MEDICINE | Facility: CLINIC | Age: 83
End: 2018-03-05

## 2018-03-12 ENCOUNTER — RX RENEWAL (OUTPATIENT)
Age: 83
End: 2018-03-12

## 2018-04-23 ENCOUNTER — APPOINTMENT (OUTPATIENT)
Dept: INTERNAL MEDICINE | Facility: CLINIC | Age: 83
End: 2018-04-23

## 2018-05-08 ENCOUNTER — APPOINTMENT (OUTPATIENT)
Dept: INTERNAL MEDICINE | Facility: CLINIC | Age: 83
End: 2018-05-08

## 2018-05-23 PROCEDURE — 84100 ASSAY OF PHOSPHORUS: CPT

## 2018-05-23 PROCEDURE — 82306 VITAMIN D 25 HYDROXY: CPT

## 2018-05-23 PROCEDURE — 70551 MRI BRAIN STEM W/O DYE: CPT

## 2018-05-23 PROCEDURE — 85730 THROMBOPLASTIN TIME PARTIAL: CPT

## 2018-05-23 PROCEDURE — 87798 DETECT AGENT NOS DNA AMP: CPT

## 2018-05-23 PROCEDURE — 80048 BASIC METABOLIC PNL TOTAL CA: CPT

## 2018-05-23 PROCEDURE — 71046 X-RAY EXAM CHEST 2 VIEWS: CPT

## 2018-05-23 PROCEDURE — 82550 ASSAY OF CK (CPK): CPT

## 2018-05-23 PROCEDURE — 85027 COMPLETE CBC AUTOMATED: CPT

## 2018-05-23 PROCEDURE — 83036 HEMOGLOBIN GLYCOSYLATED A1C: CPT

## 2018-05-23 PROCEDURE — 87633 RESP VIRUS 12-25 TARGETS: CPT

## 2018-05-23 PROCEDURE — 83735 ASSAY OF MAGNESIUM: CPT

## 2018-05-23 PROCEDURE — 87486 CHLMYD PNEUM DNA AMP PROBE: CPT

## 2018-05-23 PROCEDURE — 82607 VITAMIN B-12: CPT

## 2018-05-23 PROCEDURE — 81001 URINALYSIS AUTO W/SCOPE: CPT

## 2018-05-23 PROCEDURE — 85610 PROTHROMBIN TIME: CPT

## 2018-05-23 PROCEDURE — 96374 THER/PROPH/DIAG INJ IV PUSH: CPT

## 2018-05-23 PROCEDURE — 99285 EMERGENCY DEPT VISIT HI MDM: CPT | Mod: 25

## 2018-05-23 PROCEDURE — 82553 CREATINE MB FRACTION: CPT

## 2018-05-23 PROCEDURE — 93880 EXTRACRANIAL BILAT STUDY: CPT

## 2018-05-23 PROCEDURE — 80053 COMPREHEN METABOLIC PANEL: CPT

## 2018-05-23 PROCEDURE — 96375 TX/PRO/DX INJ NEW DRUG ADDON: CPT

## 2018-05-23 PROCEDURE — 84443 ASSAY THYROID STIM HORMONE: CPT

## 2018-05-23 PROCEDURE — 80061 LIPID PANEL: CPT

## 2018-05-23 PROCEDURE — 93005 ELECTROCARDIOGRAM TRACING: CPT

## 2018-05-23 PROCEDURE — 84484 ASSAY OF TROPONIN QUANT: CPT

## 2018-05-23 PROCEDURE — 82962 GLUCOSE BLOOD TEST: CPT

## 2018-05-23 PROCEDURE — 93306 TTE W/DOPPLER COMPLETE: CPT

## 2018-05-23 PROCEDURE — 87581 M.PNEUMON DNA AMP PROBE: CPT

## 2018-05-23 PROCEDURE — 83880 ASSAY OF NATRIURETIC PEPTIDE: CPT

## 2018-08-06 ENCOUNTER — LABORATORY RESULT (OUTPATIENT)
Age: 83
End: 2018-08-06

## 2018-08-06 ENCOUNTER — APPOINTMENT (OUTPATIENT)
Dept: INTERNAL MEDICINE | Facility: CLINIC | Age: 83
End: 2018-08-06
Payer: MEDICAID

## 2018-08-06 ENCOUNTER — TRANSCRIPTION ENCOUNTER (OUTPATIENT)
Age: 83
End: 2018-08-06

## 2018-08-06 VITALS
BODY MASS INDEX: 30.58 KG/M2 | HEART RATE: 92 BPM | OXYGEN SATURATION: 96 % | SYSTOLIC BLOOD PRESSURE: 128 MMHG | WEIGHT: 162 LBS | HEIGHT: 61 IN | DIASTOLIC BLOOD PRESSURE: 77 MMHG | TEMPERATURE: 98.4 F

## 2018-08-06 DIAGNOSIS — R06.02 SHORTNESS OF BREATH: ICD-10-CM

## 2018-08-06 DIAGNOSIS — Z87.19 PERSONAL HISTORY OF OTHER DISEASES OF THE DIGESTIVE SYSTEM: ICD-10-CM

## 2018-08-06 DIAGNOSIS — Z01.818 ENCOUNTER FOR OTHER PREPROCEDURAL EXAMINATION: ICD-10-CM

## 2018-08-06 DIAGNOSIS — H26.9 UNSPECIFIED CATARACT: ICD-10-CM

## 2018-08-06 DIAGNOSIS — Z87.898 PERSONAL HISTORY OF OTHER SPECIFIED CONDITIONS: ICD-10-CM

## 2018-08-06 DIAGNOSIS — R19.5 OTHER FECAL ABNORMALITIES: ICD-10-CM

## 2018-08-06 DIAGNOSIS — K08.109 COMPLETE LOSS OF TEETH, UNSPECIFIED CAUSE, UNSPECIFIED CLASS: ICD-10-CM

## 2018-08-06 DIAGNOSIS — Z87.440 PERSONAL HISTORY OF URINARY (TRACT) INFECTIONS: ICD-10-CM

## 2018-08-06 DIAGNOSIS — Z86.19 PERSONAL HISTORY OF OTHER INFECTIOUS AND PARASITIC DISEASES: ICD-10-CM

## 2018-08-06 PROCEDURE — 82270 OCCULT BLOOD FECES: CPT

## 2018-08-06 PROCEDURE — 99397 PER PM REEVAL EST PAT 65+ YR: CPT

## 2018-08-06 PROCEDURE — 99213 OFFICE O/P EST LOW 20 MIN: CPT

## 2018-08-06 PROCEDURE — 93000 ELECTROCARDIOGRAM COMPLETE: CPT

## 2018-08-06 NOTE — ASSESSMENT
[FreeTextEntry1] : health maintenance She is up to date with vaccines and will need flu vaccine . she will be checked for varicella for immunity and if positive she will need vaccine  for shingles.  she will have hep C testing and hep B.   colonoscopy she had serrated polyp on left side of her colon and needs fu next 4/19 for colonoscopy.  teeth poor repair needs dental appt and needs full upper teeth  dentures.   tinea she will use nystatin cream and for rash she needs to be evaluated for bed bugs and fleas from her dog  for itching she can take Claritin.  DM will check on her labs for control.  Check her protein in her urine.    osteopenia repeat bone density needed next year.  bmi 30  risks of obesity and need for diet and eating healthy Obesity is a complex disorder involving an excessive amount of body fat. Obesity isn't just a cosmetic concern. It increases your risk of diseases and health problems, such as heart disease, diabetes and high blood pressure.\par \par Being extremely obese means you are especially likely to have health problems related to your weight.\par \par \par The good news is that even modest weight loss can improve or prevent the health problems associated with obesity. Dietary changes, increased physical activity and behavior changes can help you lose weight. Prescription medications and weight-loss surgery are additional options for treating obesity.\par \par \par \par \par Symptoms\par \par Obesity is diagnosed when your body mass index (BMI) is 30 or higher. Your body mass index is calculated by dividing your weight in kilograms (kg) by your height in meters (m) squared. \par \par \par BMI\par \par Weight status\par \par \par Below 18.5 Underweight \par 18.5-24.9 Normal \par 25.0-29.9 Overweight \par 30.0-34.9 Obese (Class I) \par 35.0-39.9 Obese (Class II) \par 40.0 and higher Extreme obesity (Class III) \par \par For most people, BMI provides a reasonable estimate of body fat. However, BMI doesn't directly measure body fat, so some people, such as muscular athletes, may have a BMI in the obese category even though they don't have excess body fat. Ask your doctor if your BMI is a problem. \par \par When to see a doctor\par \par If you think you may be obese, and especially if you're concerned about weight-related health problems, see your doctor or health care provider. You and your provider can evaluate your health risks and discuss your weight-loss options. \par \par Request an Appointment at [facility]\par \par Causes\par \par Although there are genetic, behavioral and hormonal influences on body weight, obesity occurs when you take in more calories than you burn through exercise and normal daily activities. Your body stores these excess calories as fat.\par \par Obesity can sometimes be traced to a medical cause, such as Prader-Willi syndrome, Cushing's syndrome, and other diseases and conditions. However, these disorders are rare and, in general, the principal causes of obesity are:\par •Inactivity. If you're not very active, you don't burn as many calories. With a sedentary lifestyle, you can easily take in more calories every day than you use through exercise and normal daily activities.\par •Unhealthy diet and eating habits. Weight gain is inevitable if you regularly eat more calories than you burn. And most Americans' diets are too high in calories and are full of fast food and high-calorie beverages.\par \par Risk factors\par \par Obesity usually results from a combination of causes and contributing factors, including:\par •Genetics. Your genes may affect the amount of body fat you store, and where that fat is distributed. Genetics may also play a role in how efficiently your body converts food into energy and how your body burns calories during exercise.\par •Family lifestyle. Obesity tends to run in families. If one or both of your parents are obese, your risk of being obese is increased. That's not just because of genetics. Family members tend to share similar eating and activity habits.\par •Inactivity. If you're not very active, you don't burn as many calories. With a sedentary lifestyle, you can easily take in more calories every day than you burn through exercise and routine daily activities. Having medical problems, such as arthritis, can lead to decreased activity, which contributes to weight gain.\par •Unhealthy diet. A diet that's high in calories, lacking in fruits and vegetables, full of fast food, and laden with high-calorie beverages and oversized portions contributes to weight gain.\par •Medical problems. In some people, obesity can be traced to a medical cause, such as Prader-Willi syndrome, Cushing's syndrome and other conditions. Medical problems, such as arthritis, also can lead to decreased activity, which may result in weight gain.\par •Certain medications. Some medications can lead to weight gain if you don't compensate through diet or activity. These medications include some antidepressants, anti-seizure medications, diabetes medications, antipsychotic medications, steroids and beta blockers.\par •Social and economic issues. Research has linked social and economic factors to obesity. Avoiding obesity is difficult if you don't have safe areas to exercise. Similarly, you may not have been taught healthy ways of cooking, or you may not have money to buy healthier foods. In addition, the people you spend time with may influence your weight — you're more likely to become obese if you have obese friends or relatives.\par •Age. Obesity can occur at any age, even in young children. But as you age, hormonal changes and a less active lifestyle increase your risk of obesity. In addition, the amount of muscle in your body tends to decrease with age. This lower muscle mass leads to a decrease in metabolism. These changes also reduce calorie needs, and can make it harder to keep off excess weight. If you don't consciously control what you eat and become more physically active as you age, you'll likely gain weight.\par •Pregnancy. During pregnancy, a woman's weight necessarily increases. Some women find this weight difficult to lose after the baby is born. This weight gain may contribute to the development of obesity in women.\par •Quitting smoking. Quitting smoking is often associated with weight gain. And for some, it can lead to enough weight gain that the person becomes obese. In the long run, however, quitting smoking is still a greater benefit to your health than continuing to smoke.\par •Lack of sleep. Not getting enough sleep or getting too much sleep can cause changes in hormones that increase your appetite. You may also crave foods high in calories and carbohydrates, which can contribute to weight gain.\par \par Even if you have one or more of these risk factors, it doesn't mean that you're destined to become obese. You can counteract most risk factors through diet, physical activity and exercise, and behavior changes.\par \par Complications\par \par If you're obese, you're more likely to develop a number of potentially serious health problems, including:\par •High triglycerides and low high-density lipoprotein (HDL) cholesterol\par •Type 2 diabetes\par •High blood pressure\par •Metabolic syndrome — a combination of high blood sugar, high blood pressure, high triglycerides and low HDL cholesterol\par •Heart disease\par •Stroke\par •Cancer, including cancer of the uterus, cervix, endometrium, ovaries, breast, colon, rectum, esophagus, liver, gallbladder, pancreas, kidney and prostate\par •Breathing disorders, including sleep apnea, a potentially serious sleep disorder in which breathing repeatedly stops and starts\par •Gallbladder disease\par •Gynecological problems, such as infertility and irregular periods\par •Erectile dysfunction and sexual health issues\par •Nonalcoholic fatty liver disease, a condition in which fat builds up in the liver and can cause inflammation or scarring\par •Osteoarthritis\par \par Quality of life\par \par When you're obese, your overall quality of life may be diminished. You may not be able to do things you used to do, such as participating in enjoyable activities. You may avoid public places. Obese people may even encounter discrimination.\par \par Other weight-related issues that may affect your quality of life include:\par •Depression\par •Disability\par •Sexual problems\par •Shame and guilt\par •Social isolation\par •Lower work achievement\par \par Prevention\par \par Whether you're at risk of becoming obese, currently overweight or at a healthy weight, you can take steps to prevent unhealthy weight gain and related health problems. Not surprisingly, the steps to prevent weight gain are the same as the steps to lose weight: daily exercise, a healthy diet, and a long-term commitment to watch what you eat and drink.\par •Exercise regularly. You need to get 150 to 300 minutes of moderate-intensity activity a week to prevent weight gain. Moderately intense physical activities include fast walking and swimming.\par •Follow a healthy eating plan. Focus on low-calorie, nutrient-dense foods, such as fruits, veg\par

## 2018-08-06 NOTE — PHYSICAL EXAM
[Well Developed] : well developed [Well Nourished] : well nourished [Normal Voice Quality] : was normal [Conjunctiva] : the conjunctiva were normal in both eyes [PERRL] : pupils were equal in size, round, and reactive to light [EOM Intact] : extraocular movements were intact [Normal Appearance] : was normal in appearance [Neck Supple] : was supple [No Tracheal Deviation] : the trachea was midline [Enlarged Diffusely] : was not enlarged [JVP Elevated ___cm] : the JVP was not elevated [Rate ___] : at [unfilled] breaths per minute [Normal Rhythm/Effort] : normal respiratory rhythm and effort [Clear Bilaterally] : the lungs were clear to auscultation bilaterally [Normal to Percussion] : the lungs were normal to percussion [5th Left ICS - MCL] : palpated at the 5th LICS in the midclavicular line [Heart Rate ___] : [unfilled] bpm [Normal Rate] : normal [Normal S1] : normal S1 [Normal S2] : normal S2 [S3] : no S3 [S4] : no S4 [No Murmur] : no murmurs heard [No Pitting Edema] : no pitting edema present [Rt] : varicose veins of the right leg noted [Lt] : varicose veins of the left leg noted [Right Carotid Bruit] : no bruit heard over the right carotid [Left Carotid Bruit] : no bruit heard over the left carotid [Right Femoral Bruit] : no bruit heard over the right femoral artery [Left Femoral Bruit] : no bruit heard over the left femoral artery [2+] : left 2+ [No Abnormalities] : the abdominal aorta was not enlarged and no bruit was heard [Bruit] : no bruit heard [Examination Of The Breasts] : a normal appearance [No Discharge] : no discharge [Axillary Lymph Nodes Enlarged Bilaterally] : no enlarged nodes [Soft, Nontender] : the abdomen was soft and nontender [No Mass] : no masses were palpated [No HSM] : no hepatosplenomegaly noted [Normal rectal exam] : was normal [Occult Blood Positive] : was negative for occult blood [Gross Blood] : no gross blood [Stool Sample Taken] : a stool sample was obtained [Fecal Impaction] : no fecal impaction was present [No Visual Abnormalities] : no visible abnormalities [Normal Lordosis] : normal lordosis [No Scoliosis] : no scoliosis [No Tenderness to Palpation] : no spine tenderness on palpation [No Masses] : no masses [Full ROM] : full ROM [No Pain with ROM] : no pain with motion in any direction [Intact] : all reflexes within normal limits bilaterally [Normal Station and Gait] : the gait and station were normal [Normal Motor Tone] : the muscle tone was normal [Involuntary Movements] : no involuntary movements were seen [Normal Scalp] : inspection of the scalp showed no abnormalities [Examination Of The Hair] : texture and distribution of hair was normal [Complexion Medium] : medium complexion [Tattoo - Single] : no tattoos observed [Normal] : the deep tendon reflexes were normal [Normal Mental Status] : the patient's orientation, memory, attention, language and fund of knowledge were normal [Appropriate] : appropriate [Impaired judgment] : intact judgment [Impaired Insight] : intact insight [Right Foot Was Examined] : Right foot ~C was examined [Left Foot Was Examined] : left foot ~C was examined [None] : no ulcers in either foot were found [] : with full ROM [de-identified] : kyphosis of thoracic spine.

## 2018-08-06 NOTE — HEALTH RISK ASSESSMENT
[Good] : ~his/her~  mood as  good [FreeTextEntry1] : itching [] : No [No falls in past year] : Patient reported no falls in the past year [0] : 2) Feeling down, depressed, or hopeless: Not at all (0) [[id number]] : 0 [HIV test declined] : HIV test declined [Hepatitis C test offered] : Hepatitis C test offered [Change in mental status noted] : No change in mental status noted [Language] : denies difficulty with language [Behavior] : denies difficulty with behavior [Learning/Retaining New Information] : denies difficulty learning/retaining new information [Handling Complex Tasks] : denies difficulty handling complex tasks [Reasoning] : denies difficulty with reasoning [Spatial Ability and Orientation] : denies difficulty with spatial ability and orientation [None] : None [With Family] : lives with family [# of Members in Household ___] :  household currently consist of [unfilled] member(s) [Retired] : retired [Less Than High School] : less than high school [] :  [# Of Children ___] : has [unfilled] children [Sexually Active] : not sexually active [Feels Safe at Home] : Feels safe at home [Fully functional (bathing, dressing, toileting, transferring, walking, feeding)] : Fully functional (bathing, dressing, toileting, transferring, walking, feeding) [Fully functional (using the telephone, shopping, preparing meals, housekeeping, doing laundry, using] : Fully functional and needs no help or supervision to perform IADLs (using the telephone, shopping, preparing meals, housekeeping, doing laundry, using transportation, managing medications and managing finances) [Reports changes in hearing] : Reports no changes in hearing [Reports changes in vision] : Reports no changes in vision [Reports changes in dental health] : Reports no changes in dental health [Smoke Detector] : no smoke detector [Carbon Monoxide Detector] : no carbon monoxide detector [Guns at Home] : no guns at home [Safety elements used in home] : no safety elements used in home [Seat Belt] :  uses seat belt [Sunscreen] : does not use sunscreen [Travel to Developing Areas] : does not  travel to developing areas [TB Exposure] : is not being exposed to tuberculosis [Caregiver Concerns] : does not have caregiver concerns [MammogramDate] : 4/16  [MammogramComments] : birads 2 [BoneDensityDate] : 10/17 [BoneDensityComments] : osteopenia [ColonoscopyDate] : 4/16 [ColonoscopyComments] : serrated adenoma   [de-identified] : last exam last year [de-identified] : unsure  [Discussed at today's visit] : Advance Directives Discussed at today's visit

## 2018-08-06 NOTE — HISTORY OF PRESENT ILLNESS
[Family Member] : family member [FreeTextEntry1] : itching.    pt is accompanied by her granddaughter for interpretation.   [de-identified] : Pt is a 85 yr old woman who came for cpe and for itching.  pt states she started to itch all over her body with a rash 2 weeks.  She has not been on any medications that are new.  it didn’t occur after eating something and has not used a new soap or detergent.  No travel outside of [place].  She has it under her breast.

## 2018-08-07 PROBLEM — I10 ESSENTIAL (PRIMARY) HYPERTENSION: Chronic | Status: ACTIVE | Noted: 2018-02-12

## 2018-08-07 PROBLEM — E11.9 TYPE 2 DIABETES MELLITUS WITHOUT COMPLICATIONS: Chronic | Status: ACTIVE | Noted: 2018-02-12

## 2018-08-07 PROBLEM — E78.5 HYPERLIPIDEMIA, UNSPECIFIED: Chronic | Status: ACTIVE | Noted: 2018-02-10

## 2018-08-08 LAB
25(OH)D3 SERPL-MCNC: 29.9 NG/ML
ALBUMIN SERPL ELPH-MCNC: 4.4 G/DL
ALP BLD-CCNC: 82 U/L
ALT SERPL-CCNC: 8 U/L
ANION GAP SERPL CALC-SCNC: 14 MMOL/L
AST SERPL-CCNC: 12 U/L
BARLEY IGE QN: <0.1 KUA/L
BASOPHILS # BLD AUTO: 0.02 K/UL
BASOPHILS NFR BLD AUTO: 0.2 %
BILIRUB SERPL-MCNC: 0.2 MG/DL
BUN SERPL-MCNC: 24 MG/DL
CALCIUM SERPL-MCNC: 9.2 MG/DL
CHERRY IGE QN: <0.1 KUA/L
CHLORIDE SERPL-SCNC: 103 MMOL/L
CHOLEST SERPL-MCNC: 247 MG/DL
CHOLEST/HDLC SERPL: 5.7 RATIO
CO2 SERPL-SCNC: 25 MMOL/L
COW MILK IGE QN: <0.1 KUA/L
CRAB IGE QN: <0.1 KUA/L
CREAT SERPL-MCNC: 0.85 MG/DL
DEPRECATED BARLEY IGE RAST QL: 0
DEPRECATED CHERRY IGE RAST QL: 0
DEPRECATED COW MILK IGE RAST QL: 0
DEPRECATED CRAB IGE RAST QL: 0
DEPRECATED EGG WHITE IGE RAST QL: 0
DEPRECATED OAT IGE RAST QL: 0
DEPRECATED PEANUT IGE RAST QL: 0
DEPRECATED RYE IGE RAST QL: 0
DEPRECATED SOYBEAN IGE RAST QL: 0
DEPRECATED WHEAT IGE RAST QL: 0
EGG WHITE IGE QN: <0.1 KUA/L
EOSINOPHIL # BLD AUTO: 0.26 K/UL
EOSINOPHIL NFR BLD AUTO: 3.2 %
FOLATE SERPL-MCNC: 3.7 NG/ML
FRUCTOSAMINE SERPL-MCNC: 217 UMOL/L
GLUCOSE SERPL-MCNC: 117 MG/DL
HBA1C MFR BLD HPLC: 5.7 %
HBV SURFACE AB SER QL: NONREACTIVE
HBV SURFACE AG SER QL: NONREACTIVE
HCT VFR BLD CALC: 44.9 %
HCV AB SER QL: NONREACTIVE
HCV S/CO RATIO: 0.1 S/CO
HDLC SERPL-MCNC: 43 MG/DL
HGB BLD-MCNC: 14 G/DL
IMM GRANULOCYTES NFR BLD AUTO: 0.5 %
LDLC SERPL CALC-MCNC: NORMAL
LYMPHOCYTES # BLD AUTO: 1.66 K/UL
LYMPHOCYTES NFR BLD AUTO: 20.6 %
MAN DIFF?: NORMAL
MCHC RBC-ENTMCNC: 28.5 PG
MCHC RBC-ENTMCNC: 31.2 GM/DL
MCV RBC AUTO: 91.3 FL
MONOCYTES # BLD AUTO: 0.52 K/UL
MONOCYTES NFR BLD AUTO: 6.4 %
NEUTROPHILS # BLD AUTO: 5.57 K/UL
NEUTROPHILS NFR BLD AUTO: 69.1 %
OAT IGE QN: <0.1 KUA/L
PEANUT IGE QN: <0.1 KUA/L
PLATELET # BLD AUTO: 344 K/UL
POTASSIUM SERPL-SCNC: 4.3 MMOL/L
PROT SERPL-MCNC: 7 G/DL
RBC # BLD: 4.92 M/UL
RBC # FLD: 14.1 %
RYE IGE QN: <0.1 KUA/L
SODIUM SERPL-SCNC: 142 MMOL/L
SOYBEAN IGE QN: <0.1 KUA/L
TOTAL IGE SMQN RAST: 329 KU/L
TRIGL SERPL-MCNC: 419 MG/DL
VIT B12 SERPL-MCNC: 338 PG/ML
VZV AB TITR SER: POSITIVE
VZV IGG SER IF-ACNC: 1860 INDEX
WBC # FLD AUTO: 8.07 K/UL
WHEAT IGE QN: <0.1 KUA/L

## 2018-08-09 LAB
ANA PAT FLD IF-IMP: ABNORMAL
ANA SER IF-ACNC: ABNORMAL

## 2018-08-11 ENCOUNTER — TRANSCRIPTION ENCOUNTER (OUTPATIENT)
Age: 83
End: 2018-08-11

## 2018-08-11 ENCOUNTER — RX RENEWAL (OUTPATIENT)
Age: 83
End: 2018-08-11

## 2018-08-11 LAB
A ALTERNATA IGE QN: 0.33 KUA/L
A FUMIGATUS IGE QN: <0.1 KUA/L
APPEARANCE: CLEAR
BERMUDA GRASS IGE QN: <0.1 KUA/L
BILIRUBIN URINE: NEGATIVE
BLOOD URINE: NEGATIVE
BOXELDER IGE QN: <0.1 KUA/L
C HERBARUM IGE QN: <0.1 KUA/L
CALIF WALNUT IGE QN: <0.1 KUA/L
CAT DANDER IGE QN: <0.1 KUA/L
CMN PIGWEED IGE QN: <0.1 KUA/L
COLOR: YELLOW
COMMON RAGWEED IGE QN: <0.1 KUA/L
COTTONWOOD IGE QN: <0.1 KUA/L
CREAT SPEC-SCNC: 73 MG/DL
D FARINAE IGE QN: <0.1 KUA/L
D PTERONYSS IGE QN: <0.1 KUA/L
DEPRECATED A ALTERNATA IGE RAST QL: NORMAL
DEPRECATED A FUMIGATUS IGE RAST QL: 0
DEPRECATED BERMUDA GRASS IGE RAST QL: 0
DEPRECATED BOXELDER IGE RAST QL: 0
DEPRECATED C HERBARUM IGE RAST QL: 0
DEPRECATED CAT DANDER IGE RAST QL: 0
DEPRECATED COMMON PIGWEED IGE RAST QL: 0
DEPRECATED COMMON RAGWEED IGE RAST QL: 0
DEPRECATED COTTONWOOD IGE RAST QL: 0
DEPRECATED D FARINAE IGE RAST QL: 0
DEPRECATED D PTERONYSS IGE RAST QL: 0
DEPRECATED DOG DANDER IGE RAST QL: 0
DEPRECATED GOOSEFOOT IGE RAST QL: 0
DEPRECATED LONDON PLANE IGE RAST QL: 0
DEPRECATED MUGWORT IGE RAST QL: 0
DEPRECATED P NOTATUM IGE RAST QL: NORMAL
DEPRECATED RED CEDAR IGE RAST QL: 0
DEPRECATED ROACH IGE RAST QL: NORMAL
DEPRECATED SHEEP SORREL IGE RAST QL: 0
DEPRECATED SILVER BIRCH IGE RAST QL: 0
DEPRECATED TIMOTHY IGE RAST QL: 0
DEPRECATED WHITE ASH IGE RAST QL: 0
DEPRECATED WHITE OAK IGE RAST QL: 0
DOG DANDER IGE QN: <0.1 KUA/L
GLUCOSE QUALITATIVE U: NEGATIVE MG/DL
GOOSEFOOT IGE QN: <0.1 KUA/L
HEMOCCULT STL QL IA: NEGATIVE
KETONES URINE: NEGATIVE
LEUKOCYTE ESTERASE URINE: NEGATIVE
LONDON PLANE IGE QN: <0.1 KUA/L
MICROALBUMIN 24H UR DL<=1MG/L-MCNC: <1.2 MG/DL
MICROALBUMIN/CREAT 24H UR-RTO: NORMAL
MUGWORT IGE QN: <0.1 KUA/L
MULBERRY (T70) CLASS: 0
MULBERRY (T70) CONC: <0.1 KUA/L
NITRITE URINE: NEGATIVE
P NOTATUM IGE QN: 0.23 KUA/L
PH URINE: 6
PROTEIN URINE: NEGATIVE MG/DL
RED CEDAR IGE QN: <0.1 KUA/L
ROACH IGE QN: 0.19 KUA/L
SHEEP SORREL IGE QN: <0.1 KUA/L
SILVER BIRCH IGE QN: <0.1 KUA/L
SPECIFIC GRAVITY URINE: 1.02
TIMOTHY IGE QN: <0.1 KUA/L
TREE ALLERG MIX1 IGE QL: 0
UROBILINOGEN URINE: NEGATIVE MG/DL
WHITE ASH IGE QN: <0.1 KUA/L
WHITE ELM IGE QN: 0
WHITE ELM IGE QN: <0.1 KUA/L
WHITE OAK IGE QN: <0.1 KUA/L

## 2018-08-19 ENCOUNTER — FORM ENCOUNTER (OUTPATIENT)
Age: 83
End: 2018-08-19

## 2018-08-20 ENCOUNTER — OUTPATIENT (OUTPATIENT)
Dept: OUTPATIENT SERVICES | Facility: HOSPITAL | Age: 83
LOS: 1 days | End: 2018-08-20
Payer: MEDICAID

## 2018-08-20 ENCOUNTER — APPOINTMENT (OUTPATIENT)
Dept: MAMMOGRAPHY | Facility: HOSPITAL | Age: 83
End: 2018-08-20
Payer: MEDICAID

## 2018-08-20 DIAGNOSIS — Z12.31 ENCOUNTER FOR SCREENING MAMMOGRAM FOR MALIGNANT NEOPLASM OF BREAST: ICD-10-CM

## 2018-08-20 PROCEDURE — 77067 SCR MAMMO BI INCL CAD: CPT

## 2018-08-20 PROCEDURE — 77067 SCR MAMMO BI INCL CAD: CPT | Mod: 26

## 2018-08-20 PROCEDURE — 77063 BREAST TOMOSYNTHESIS BI: CPT | Mod: 26

## 2018-08-20 PROCEDURE — 77063 BREAST TOMOSYNTHESIS BI: CPT

## 2018-08-30 ENCOUNTER — FORM ENCOUNTER (OUTPATIENT)
Age: 83
End: 2018-08-30

## 2018-08-31 ENCOUNTER — APPOINTMENT (OUTPATIENT)
Dept: ULTRASOUND IMAGING | Facility: IMAGING CENTER | Age: 83
End: 2018-08-31
Payer: MEDICAID

## 2018-08-31 ENCOUNTER — APPOINTMENT (OUTPATIENT)
Dept: MAMMOGRAPHY | Facility: IMAGING CENTER | Age: 83
End: 2018-08-31
Payer: MEDICAID

## 2018-08-31 ENCOUNTER — OUTPATIENT (OUTPATIENT)
Dept: OUTPATIENT SERVICES | Facility: HOSPITAL | Age: 83
LOS: 1 days | End: 2018-08-31
Payer: MEDICAID

## 2018-08-31 DIAGNOSIS — R92.2 INCONCLUSIVE MAMMOGRAM: ICD-10-CM

## 2018-08-31 PROCEDURE — 77066 DX MAMMO INCL CAD BI: CPT | Mod: 26

## 2018-08-31 PROCEDURE — 77066 DX MAMMO INCL CAD BI: CPT

## 2018-08-31 PROCEDURE — G0279: CPT | Mod: 26

## 2018-08-31 PROCEDURE — 76642 ULTRASOUND BREAST LIMITED: CPT

## 2018-08-31 PROCEDURE — G0279: CPT

## 2018-08-31 PROCEDURE — 76642 ULTRASOUND BREAST LIMITED: CPT | Mod: 26,50

## 2018-09-06 ENCOUNTER — RX RENEWAL (OUTPATIENT)
Age: 83
End: 2018-09-06

## 2018-09-11 ENCOUNTER — APPOINTMENT (OUTPATIENT)
Dept: DERMATOLOGY | Facility: CLINIC | Age: 83
End: 2018-09-11

## 2018-09-16 ENCOUNTER — TRANSCRIPTION ENCOUNTER (OUTPATIENT)
Age: 83
End: 2018-09-16

## 2018-09-28 ENCOUNTER — APPOINTMENT (OUTPATIENT)
Dept: RHEUMATOLOGY | Facility: CLINIC | Age: 83
End: 2018-09-28
Payer: MEDICAID

## 2018-09-28 VITALS
OXYGEN SATURATION: 97 % | BODY MASS INDEX: 30.02 KG/M2 | WEIGHT: 159 LBS | TEMPERATURE: 97.8 F | SYSTOLIC BLOOD PRESSURE: 127 MMHG | HEART RATE: 77 BPM | DIASTOLIC BLOOD PRESSURE: 74 MMHG | HEIGHT: 61 IN

## 2018-09-28 PROCEDURE — 99203 OFFICE O/P NEW LOW 30 MIN: CPT

## 2018-09-28 RX ORDER — MULTIVIT-MIN/IRON/FOLIC ACID/K 18-600-40
500-400 CAPSULE ORAL
Qty: 90 | Refills: 3 | Status: DISCONTINUED | COMMUNITY
Start: 2017-03-08 | End: 2018-09-28

## 2018-09-28 RX ORDER — CETIRIZINE HYDROCHLORIDE TABLETS 10 MG/1
10 TABLET, FILM COATED ORAL
Qty: 30 | Refills: 0 | Status: DISCONTINUED | COMMUNITY
Start: 2018-08-06 | End: 2018-09-28

## 2018-09-28 RX ORDER — NYSTATIN 100000 [USP'U]/G
100000 CREAM TOPICAL
Qty: 2 | Refills: 2 | Status: DISCONTINUED | COMMUNITY
Start: 2018-08-06 | End: 2018-09-28

## 2018-09-28 RX ORDER — GABAPENTIN 100 MG/1
100 CAPSULE ORAL
Qty: 30 | Refills: 0 | Status: DISCONTINUED | COMMUNITY
Start: 2017-06-07 | End: 2018-09-28

## 2018-09-28 RX ORDER — NYSTATIN 100000 [USP'U]/G
100000 CREAM TOPICAL
Qty: 30 | Refills: 0 | Status: DISCONTINUED | COMMUNITY
Start: 2017-04-24 | End: 2018-09-28

## 2018-10-02 ENCOUNTER — RX RENEWAL (OUTPATIENT)
Age: 83
End: 2018-10-02

## 2018-10-02 RX ORDER — CLOTRIMAZOLE AND BETAMETHASONE DIPROPIONATE 10; .5 MG/G; MG/G
1-0.05 CREAM TOPICAL 3 TIMES DAILY
Qty: 6 | Refills: 0 | Status: DISCONTINUED | COMMUNITY
Start: 2018-09-28 | End: 2018-10-02

## 2018-10-08 ENCOUNTER — APPOINTMENT (OUTPATIENT)
Dept: INTERNAL MEDICINE | Facility: CLINIC | Age: 83
End: 2018-10-08
Payer: MEDICAID

## 2018-10-08 VITALS
HEART RATE: 77 BPM | BODY MASS INDEX: 30.02 KG/M2 | OXYGEN SATURATION: 98 % | HEIGHT: 61 IN | SYSTOLIC BLOOD PRESSURE: 120 MMHG | WEIGHT: 159 LBS | DIASTOLIC BLOOD PRESSURE: 74 MMHG | TEMPERATURE: 97.9 F

## 2018-10-08 DIAGNOSIS — R76.8 OTHER SPECIFIED ABNORMAL IMMUNOLOGICAL FINDINGS IN SERUM: ICD-10-CM

## 2018-10-08 PROCEDURE — 99215 OFFICE O/P EST HI 40 MIN: CPT | Mod: 25

## 2018-10-08 PROCEDURE — 96372 THER/PROPH/DIAG INJ SC/IM: CPT

## 2018-10-08 PROCEDURE — 10060 I&D ABSCESS SIMPLE/SINGLE: CPT

## 2018-10-08 RX ORDER — CYANOCOBALAMIN 1000 UG/ML
1000 INJECTION INTRAMUSCULAR; SUBCUTANEOUS
Qty: 0 | Refills: 0 | Status: COMPLETED | OUTPATIENT
Start: 2018-10-08

## 2018-10-08 RX ADMIN — CYANOCOBALAMIN 0 MCG/ML: 1000 INJECTION INTRAMUSCULAR; SUBCUTANEOUS at 00:00

## 2018-10-08 NOTE — ASSESSMENT
[FreeTextEntry1] : infected sebaceous cyst draining thick exudate area dressed and cleaned  with peroxide, alcohol and bacitracin placed and pt was placed on  oral Keflex I discussed seeing dermatologist for her  infected cyst for removal of sac if needed and her skin  changes on her feet.    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 What are vitamin B12 deficiency and folic acid deficiency? — Vitamin B12 and folic acid (also called "folate" or "vitamin B9") are 2 different vitamins that your body needs to work normally. A "deficiency" means that your body does not have as much of something as it needs.\par \par People can have vitamin B12 deficiency, folic acid deficiency, or both.\par \par Why are vitamin B12 and folic acid important? — Your body needs vitamin B12 for many things. For example, you need vitamin B12 to make new blood cells, such as red blood cells, and for your nervous system to work normally.\par \par Your body needs folic acid, too, to make new cells. Folic acid is especially important for women who are planning to get pregnant (or might get pregnant). That's because folic acid is needed, especially during the first few weeks of pregnancy, for a baby to develop normally. Folic acid is needed for the spinal cord to develop correctly.\par \par What causes vitamin B12 deficiency or folic acid deficiency? — People can get vitamin B12 or folic acid deficiency when:\par \par ?They don't eat enough foods that have the vitamin in them – This is more common in the case of folic acid, which is found in plants. It can also be the cause of vitamin B12 deficiency for people who eat a vegetarian diet and do not take extra vitamin B12. (Vitamin B12 is found in meats and animal products like eggs.)\par \par \par ?They eat foods with vitamin B12 and folic acid, but their body can't absorb or use the vitamins normally – These things can happen with certain medical problems or as a side effect of certain medicines. This is more common in the case of vitamin B12.\par \par \par What are the symptoms of vitamin B12 deficiency and folic acid deficiency? — A deficiency of vitamin B12, folic acid, or both can cause anemia. Anemia is the term doctors use when a person has too few red blood cells. Anemia can cause headaches or trouble breathing, especially with exercise. It can also make people feel tired or weak.\par \par Vitamin B12 deficiency can also cause other symptoms, including:\par \par ?Tingling or numbness in the hands or feet\par \par ?Trouble walking\par \par ?Mood changes\par \par ?Memory problems or trouble thinking clearly\par \par \par Should I see a doctor or nurse? — Yes. You should see your doctor or nurse if you have any of the symptoms listed above.\par \par Is there a test for vitamin B12 deficiency and folic acid deficiency? — Yes. Your doctor or nurse can do blood tests to check for deficiencies of these vitamins.\par \par If you have vitamin B12 or folic acid deficiency, your doctor or nurse will want to know why. He or she might do other tests to find out if it’s because of a medical condition or a problem with your diet.\par \par How are vitamin B12 deficiency and folic acid deficiency treated? — Doctors treat vitamin B12 deficiency by giving people vitamin B12. It comes in the form of a shot or pills. Your doctor will decide which form is better for your situation.\par \par Doctors treat folic acid deficiency by giving people folic acid. This treatment comes as a pill. Women who are pregnant or planning to get pregnant should also make sure they are getting enough folic acid.\par \par Your doctor or nurse will also treat the cause of your deficiency, if it can be treated.\par \par Can vitamin B12 deficiency and folic acid deficiency be prevented? — Yes. You can lower your chances of getting vitamin B12 deficiency and folic acid deficiency by eating foods that have the vitamins in them. Foods with vitamin B12 include all kinds of meat, fish, and animal products like eggs and milk. Foods with folic acid include fruits and green leafy vegetables. Many grains, like bread and cereals, have vitamin B12 added to them.\par \par If you don't eat these foods, your doctor or nurse might recommend that you take a vitamin supplement. Vitamin supplements are pills, capsules, or liquids that have nutrients in them.\par \par If you are a strict vegetarian or vegan and do not eat any meats, dairy foods, or eggs, you will not get enough vitamin B12 from your diet alone. To avoid getting vitamin B12 deficiency, you will need to take a vitamin B12 supplement or eat foods with vitamin B12 added to them.\par \par What if I want to get pregnant? — If you want to get pregnant, you should start taking a multivitamin at least 1 month before you start trying to get pregnant. Choose a "prenatal" multivitamin that has at least 400 micrograms of folic acid. Show your doctor or nurse the vitamin you plan to take to make sure the doses are right for you and your baby. Too much of some vitamins can be harmful.\par  DM is well controlled and her last hgba1c was 5.7% and I will decrease her metformin to 1/2 tab a day and if able 1/4 and if need DC.    breast nodule she will need to go to  breast specialist.    pt has decreased her appetite and doesn’t feel like eating as much.    SErrated polyp of colon on left descending colon  pt will have fu and I discussed this with the family and will consider re scoping in another 2 yrsl.    If pt develops fever , chills or more drainage or erythema go to er.

## 2018-10-08 NOTE — PHYSICAL EXAM
[No Acute Distress] : no acute distress [Well Nourished] : well nourished [Well Developed] : well developed [Well-Appearing] : well-appearing [Normal Sclera/Conjunctiva] : normal sclera/conjunctiva [PERRL] : pupils equal round and reactive to light [Normal Outer Ear/Nose] : the outer ears and nose were normal in appearance [Normal Oropharynx] : the oropharynx was normal [No JVD] : no jugular venous distention [Supple] : supple [No Lymphadenopathy] : no lymphadenopathy [No Respiratory Distress] : no respiratory distress  [Clear to Auscultation] : lungs were clear to auscultation bilaterally [No Accessory Muscle Use] : no accessory muscle use [Normal Rate] : normal rate  [Regular Rhythm] : with a regular rhythm [Normal S1, S2] : normal S1 and S2 [No Edema] : there was no peripheral edema [Normal Anterior Cervical Nodes] : no anterior cervical lymphadenopathy [No CVA Tenderness] : no CVA  tenderness [No Spinal Tenderness] : no spinal tenderness [No Joint Swelling] : no joint swelling [Grossly Normal Strength/Tone] : grossly normal strength/tone [Normal Scalp] : inspection of the scalp showed no abnormalities [No Focal Deficits] : no focal deficits [Normal Affect] : the affect was normal [Normal Insight/Judgement] : insight and judgment were intact

## 2018-10-08 NOTE — COUNSELING
[Healthy eating counseling provided] : healthy eating [Low Salt Diet] : Low salt diet [Sleep ___ hours/day] : Sleep [unfilled] hours/day [Engage in a relaxing activity] : Engage in a relaxing activity [Plan in advance] : Plan in advance [None] : None [Good understanding] : Patient has a good understanding of lifestyle changes and the steps needed to achieve self management goals

## 2018-10-08 NOTE — HISTORY OF PRESENT ILLNESS
[Family Member] : family member [FreeTextEntry1] : results, itching abscess [de-identified] : Pt states she has less itching  and has been having hip pain and went to rheumatologist and to start therapy for arthritis.  She has not gone to the breast clinic .  She will make appt.  She is otherwise feeling better.

## 2018-10-09 LAB
CARDIOLIPIN AB SER IA-ACNC: NEGATIVE
CEA SERPL-MCNC: 1 NG/ML
ENA SCL70 IGG SER IA-ACNC: <0.2 AL
ENA SS-A AB SER IA-ACNC: <0.2 AL
ENA SS-B AB SER IA-ACNC: <0.2 AL

## 2018-10-10 LAB
DSDNA AB SER-ACNC: <12 IU/ML
MITOCHONDRIA AB SER IF-ACNC: NORMAL
SMOOTH MUSCLE AB SER QL IF: NORMAL
THYROGLOB AB SERPL-ACNC: <20 IU/ML
THYROPEROXIDASE AB SERPL IA-ACNC: <10 IU/ML

## 2018-10-11 ENCOUNTER — APPOINTMENT (OUTPATIENT)
Dept: DERMATOLOGY | Facility: CLINIC | Age: 83
End: 2018-10-11
Payer: MEDICAID

## 2018-10-11 VITALS
HEART RATE: 77 BPM | SYSTOLIC BLOOD PRESSURE: 118 MMHG | TEMPERATURE: 98.2 F | HEIGHT: 61 IN | WEIGHT: 160 LBS | BODY MASS INDEX: 30.21 KG/M2 | DIASTOLIC BLOOD PRESSURE: 67 MMHG

## 2018-10-11 LAB
PS IGA SER QL: <20 U/ML
PS IGG SER QL: <10 U/ML
PS IGM SER QL: 41 U/ML

## 2018-10-11 PROCEDURE — 99214 OFFICE O/P EST MOD 30 MIN: CPT

## 2018-10-15 ENCOUNTER — RX RENEWAL (OUTPATIENT)
Age: 83
End: 2018-10-15

## 2018-10-15 ENCOUNTER — TRANSCRIPTION ENCOUNTER (OUTPATIENT)
Age: 83
End: 2018-10-15

## 2018-10-25 ENCOUNTER — RESULT REVIEW (OUTPATIENT)
Age: 83
End: 2018-10-25

## 2018-10-29 ENCOUNTER — APPOINTMENT (OUTPATIENT)
Dept: BREAST CENTER | Facility: CLINIC | Age: 83
End: 2018-10-29
Payer: MEDICAID

## 2018-10-29 VITALS
DIASTOLIC BLOOD PRESSURE: 72 MMHG | HEIGHT: 61 IN | OXYGEN SATURATION: 96 % | SYSTOLIC BLOOD PRESSURE: 114 MMHG | BODY MASS INDEX: 30.21 KG/M2 | HEART RATE: 71 BPM | TEMPERATURE: 97.8 F | WEIGHT: 160 LBS

## 2018-10-29 DIAGNOSIS — Z78.9 OTHER SPECIFIED HEALTH STATUS: ICD-10-CM

## 2018-10-29 DIAGNOSIS — Z63.4 DISAPPEARANCE AND DEATH OF FAMILY MEMBER: ICD-10-CM

## 2018-10-29 PROCEDURE — 99203 OFFICE O/P NEW LOW 30 MIN: CPT

## 2018-10-29 SDOH — SOCIAL STABILITY - SOCIAL INSECURITY: DISSAPEARANCE AND DEATH OF FAMILY MEMBER: Z63.4

## 2018-11-06 ENCOUNTER — APPOINTMENT (OUTPATIENT)
Dept: DERMATOLOGY | Facility: CLINIC | Age: 83
End: 2018-11-06
Payer: MEDICAID

## 2018-11-06 VITALS
HEIGHT: 61 IN | OXYGEN SATURATION: 94 % | WEIGHT: 160 LBS | HEART RATE: 81 BPM | DIASTOLIC BLOOD PRESSURE: 71 MMHG | TEMPERATURE: 97.9 F | BODY MASS INDEX: 30.21 KG/M2 | SYSTOLIC BLOOD PRESSURE: 107 MMHG

## 2018-11-06 PROCEDURE — 99214 OFFICE O/P EST MOD 30 MIN: CPT

## 2018-11-07 LAB — TSH SERPL-ACNC: 3.33 UIU/ML

## 2018-11-26 ENCOUNTER — APPOINTMENT (OUTPATIENT)
Dept: RHEUMATOLOGY | Facility: CLINIC | Age: 83
End: 2018-11-26

## 2018-12-10 ENCOUNTER — APPOINTMENT (OUTPATIENT)
Dept: INTERNAL MEDICINE | Facility: CLINIC | Age: 83
End: 2018-12-10
Payer: MEDICAID

## 2018-12-10 VITALS
TEMPERATURE: 98 F | HEART RATE: 80 BPM | OXYGEN SATURATION: 96 % | DIASTOLIC BLOOD PRESSURE: 72 MMHG | BODY MASS INDEX: 28.7 KG/M2 | HEIGHT: 61 IN | SYSTOLIC BLOOD PRESSURE: 135 MMHG | WEIGHT: 152 LBS

## 2018-12-10 PROCEDURE — 99214 OFFICE O/P EST MOD 30 MIN: CPT | Mod: 25

## 2018-12-10 PROCEDURE — 96372 THER/PROPH/DIAG INJ SC/IM: CPT

## 2018-12-10 RX ORDER — CEPHALEXIN 500 MG/1
500 CAPSULE ORAL
Qty: 10 | Refills: 0 | Status: COMPLETED | COMMUNITY
Start: 2018-10-08 | End: 2018-12-10

## 2018-12-10 RX ORDER — CETIRIZINE HYDROCHLORIDE 10 MG/1
10 TABLET, COATED ORAL
Qty: 30 | Refills: 1 | Status: COMPLETED | COMMUNITY
Start: 2018-09-06 | End: 2018-12-10

## 2018-12-10 RX ORDER — CYANOCOBALAMIN 1000 UG/ML
1000 INJECTION INTRAMUSCULAR; SUBCUTANEOUS
Qty: 0 | Refills: 0 | Status: COMPLETED | OUTPATIENT
Start: 2018-12-10

## 2018-12-10 RX ADMIN — CYANOCOBALAMIN 0 MCG/ML: 1000 INJECTION INTRAMUSCULAR; SUBCUTANEOUS at 00:00

## 2018-12-10 NOTE — HISTORY OF PRESENT ILLNESS
[FreeTextEntry1] : cold  [de-identified] : pt is accompanied by her granddaughter  jazlyn.  Pt states last week she developed decreased appetite, has a cough non productive  has congestion, no wheezing or chest discomfort or fever.  she doesn’t have sob.  She states has meals on Wheels MWF and she is supervised by family member .  Her granddaughter states when she is there she eats everything.  She saw the dermatologist and dI appreciate her note.  She also saw breast specialist and will fu in 4 months and they discussed breast bx.   She no longer has itching and is on medication.

## 2018-12-10 NOTE — ASSESSMENT
[FreeTextEntry1] : URi rest increase fluids  take mucinex dm Q 12 hrs nasal inhaler and if symptoms worsen call me and come in.   \par The common cold most often causes a runny nose, nasal congestion, and sneezing. You may also have a sore throat, cough, headache, or other symptoms.\par \par \par \par \par \par Causes\par \par \par \par \par \par \par It is called the common cold for good reason. There are over one billion colds in the [place] each year. You and your children will probably have more colds than any other type of illness.\par \par Colds are the most common reason that children miss school and parents miss work. Parents often get colds from their children.\par \par Children can get many colds every year. They usually get them from other children. A cold can spread quickly through schools or daycares.\par \par Colds can occur at any time of the year, but they are most common in the winter or rainy seasons.\par \par A cold virus spreads through tiny, air droplets that are released when the sick person sneezes, coughs, or blows their nose.\par \par You can catch a cold if:\par •A person with a cold sneezes, coughs, or blows their nose near you\par •You touch your nose, eyes, or mouth after you have touched something contaminated by the virus, such as a toy or doorknob \par \par People are most contagious for the first 2 to 3 days of a cold. A cold is most often not contagious after the first week.\par \par \par \par \par \par \par \par Symptoms\par \par \par \par \par \par \par Cold symptoms usually start about 2 or 3 days after you came in contact with the virus, although it could take up to a week. Symptoms mostly affect the nose.\par \par The most common cold symptoms are:\par •Nasal congestion\par •Runny nose\par •Scratchy throat\par •Sneezing\par \par Adults and older children with colds generally have a low fever or no fever. Young children often run a fever around 100°F to 102°F (37.7°C to 38.8°C).\par \par Depending on which virus caused your cold, you may also have:\par •Cough\par •Decreased appetite\par •Headache\par •Muscle aches\par •Postnasal drip\par •Sore throat \par Cold symptoms\par \par \par \par \par Treatment\par \par \par \par \par \par \par Most colds go away in a few days. Some things you can do to take care of yourself with a cold include:\par •Get plenty of rest and drink fluids.\par •Over-the-counter (OTC) cold and cough medicines may help ease symptoms in adults and older children. They do not make your cold go away faster, but can help you feel better. These OTC medicines are not recommended for children under age 4.\par •Antibiotics should not be used to treat a common cold.\par •Many alternative treatments have been tried for colds, such as vitamin C, zinc supplements, and echinacea. Talk to your health care provider before trying any herbs or supplements.\par \par \par \par \par \par [place] (Prognosis)\par \par \par \par \par \par \par The fluid from your runny nose will become thicker. It may turn yellow or green within a few days. This is normal, and not a reason for antibiotics.\par \par Most cold symptoms go away within a week in most cases. If you still feel sick after 7 days, see your provider. Your provider may check to rule out a sinus infection, allergies, or other medical problem.\par \par \par \par \par \par Possible Complications\par \par \par \par \par \par \par Colds are the most common trigger of wheezing in children with asthma.\par \par A cold may also lead to:\par •Bronchitis\par •Ear infection\par •Pneumonia\par •Sinusitis\par \par \par \par \par \par When to Contact a Medical Professional\par \par \par \par \par \par \par Try treating your cold at home first. Call your provider if:\par •You have problems breathing.\par •Your symptoms get worse or do not improve after 7 to 10 days.\par \par \par \par \par \par Prevention\par \par \par \par \par \par \par To lower your chances of getting sick: \par •Always wash your hands. Children and adults should wash hands after nose-wiping, diapering, and using the bathroom, and before eating and preparing food.\par •Disinfect your environment. Clean commonly touched surfaces (such as sink handles, door knobs, and sleeping mats) with an EPA-approved disinfectant.\par •Choose smaller  classes for your children.\par •Use instant hand sanitizers to stop the spread of germs.\par •Use paper towels instead of sharing cloth towels. \par \par The immune system helps your body fight off infection. Here are ways to support the immune system: \par •Avoid secondhand smoke. It is responsible for many health problems, including colds.\par •DO NOT use antibiotics if they are not needed.\par •Breastfeed infants if possible. Breast milk is known to protect against respiratory tract infections in children, even years after you stop breastfeeding.\par •Drink plenty of fluids to help your immune system work properly.\par •Eat yogurt that contains "active cultures." These may help prevent colds. Probiotics may help prevent colds in children.\par •Get enough sleep. \par \par \par \par \par \par Alternative Names\par \par \par \par \par \par \par Upper respiratory infection - viral; Cold\par \par \par \par \par \par Patient Instructions\par \par \par \par \par \par •Allergic rhinitis - what to ask your doctor - adult\par •Allergic rhinitis - what to ask your doctor - child\par •Colds and the flu - what to ask your doctor - adult\par •Colds and the flu - what to ask your doctor - child\par  She has lost weight  and is not eating as well as in the past  especially with her cold but has lost 8 lbs . I discussed trying ensure for diabetic glycera  or monition her eating  and give her foods that she likes.  Her dm is well controlled and will stop metformin and  see how her blood sugars are.  She will return in 3months for her cpe.  and labs.  Breast nodules fu with breast specialist and repeat mammogram and us of breast  possible bx will be done.  B12 def will give her b12 today .

## 2018-12-10 NOTE — CURRENT MEDS
[facility]    [place] [address]    Phone:  [phone]    Fax:  [phone]       Thank You for choosing us for your health care visit. We are glad to serve you and happy to provide you with this summary of your visit. Please help us to ensure we have accurate records. If you find anything that needs to be changed, please let our staff know as soon as possible.          Your Demographic Information     Patient Name Sex     Julissa Batista Female 1961       Ethnic Group Patient Race    Not of  or  Origin Black/      Your Visit Details     Date & Time Provider Department    2017 2:15 PM MD DENISE Temple [facility]      Your Upcoming Appointment*(Max 10)     Tuesday May 23, 2017  3:00 PM CDT   Lab Visit with ADT LAB   [facility] [address]    [place] [address]   [phone]            2017 12:30 PM CST   Follow-up Visit with Nils Albrecht MD   [facility] [address]    [place] [address]   [phone]              Your To Do List     Future Orders Please Complete On or Around Expires    GLYCOHEMOGLOBIN  May 23, 2017 2017    HEPATITIS C ANTIBODY WITH REFLEX  May 23, 2017 2017    LIPID PANEL WITH REFLEX  May 23, 2017 2017    MAMMO SCREENING BILATERAL  May 23, 2017 (Approximate) May 23, 2018    MICROALBUMIN URINE RANDOM  May 23, 2017 2017    Follow-Up    Return in about 6 months (around 2017), or if symptoms worsen or fail to improve, for recheck dm.      We Ordered or Performed the Following     SERVICE TO PULMONARY MEDICINE       Conditions Discussed Today or Order-Related Diagnoses        Comments    Type 2 diabetes mellitus without complication, without long-term current use of insulin  (CMS/HCC)    -  Primary     Sleep apnea, unspecified type         Hypertension         Need for hepatitis C screening test         Breast cancer screening           Your Vitals Were     BP Pulse Temp Height Weight LMP    124/84 (BP Location: [facility], Patient Position: Sitting, Cuff Size: Large Adult) 119 98.1 °F (36.7 °C) (Oral) 5' 4\" (1.626 m) 277 lb 4.8 oz (125.8 kg) 06/13/2016    SpO2 BMI Smoking Status             98% 47.6 kg/m2 Never Smoker         Medications Prescribed or Re-Ordered Today     losartan (COZAAR) 25 MG tablet    Sig - Route: Take 1 tablet by mouth daily. - Oral    Class: Eprescribe    Pharmacy: [facility] [address] [place] [address] [address] AT [facility] of [employer] Ph #: [phone]    metFORMIN (GLUCOPHAGE) 500 MG tablet    Sig - Route: Take 1 tablet by mouth 2 times daily (with meals). - Oral    Class: Eprescribe    Pharmacy: [facility] [address] [place] [address] N DR [address] AT [facility] [employer] Ph #: [phone]    Notes to Pharmacy: New dose      Your Current Medications Are        Disp Refills Start End    losartan (COZAAR) 25 MG tablet 90 tablet 1 5/23/2017     Sig - Route: Take 1 tablet by mouth daily. - Oral    Class: Eprescribe    metFORMIN (GLUCOPHAGE) 500 MG tablet 180 tablet 1 5/23/2017     Sig - Route: Take 1 tablet by mouth 2 times daily (with meals). - Oral    Class: Eprescribe    Notes to Pharmacy: New dose    albuterol 108 (90 BASE) MCG/ACT inhaler 1 Inhaler 0 12/21/2016     Sig - Route: Inhale 2 puffs into the lungs every 4 hours as needed for Shortness of Breath or Wheezing. - Inhalation    Class: Eprescribe    rabeprazole (ACIPHEX) 20 MG tablet 30 tablet 1 10/20/2016     Sig - Route: Take 1 tablet by mouth daily. - Oral    Class: Eprescribe      Discontinued Medications        Reason for Discontinue    doxycycline hyclate (VIBRAMYCIN) 100 MG capsule Therapy Completed      Allergies     Cephalexin HIVES    Pt. Had  outbreak hives on day ten of a course of cephalexin on 6/21/14.      Immunizations History as of 5/23/2017     Name Date    INFLUENZA QUADRIVALENT 10/13/2014    Influenza 10/11/2012, 10/28/2010, 9/16/2009, 2/7/2009    Pneumococcal Polysaccharide Adult 2/7/2009    Td:Adult type tetanus/diphtheria 9/25/2004, 10/31/1995    Tdap 5/21/2009      Problem List as of 5/23/2017     Obesity    Seborrheic dermatitis    Type 2 diabetes mellitus without complication, without long-term current use of insulin (CMS/[facility])    Hypertension    Esophageal reflux    Sleep apnea            Patient Instructions     None       [Takes medication as prescribed] : takes [None] : Patient does not have any barriers to medication adherence

## 2018-12-10 NOTE — PHYSICAL EXAM
[No Acute Distress] : no acute distress [Well Nourished] : well nourished [Well Developed] : well developed [Normal Voice/Communication] : normal voice/communication [Normal Sclera/Conjunctiva] : normal sclera/conjunctiva [PERRL] : pupils equal round and reactive to light [EOMI] : extraocular movements intact [Normal TMs] : both tympanic membranes were normal [No JVD] : no jugular venous distention [Supple] : supple [No Lymphadenopathy] : no lymphadenopathy [No Respiratory Distress] : no respiratory distress  [Clear to Auscultation] : lungs were clear to auscultation bilaterally [No Accessory Muscle Use] : no accessory muscle use [Normal Rate] : normal rate  [Regular Rhythm] : with a regular rhythm [Normal S1, S2] : normal S1 and S2 [No Edema] : there was no peripheral edema [Soft] : abdomen soft [Non Tender] : non-tender [No HSM] : no HSM [Normal Bowel Sounds] : normal bowel sounds [No CVA Tenderness] : no CVA  tenderness [No Spinal Tenderness] : no spinal tenderness [No Joint Swelling] : no joint swelling [Grossly Normal Strength/Tone] : grossly normal strength/tone [No Rash] : no rash [No Skin Lesions] : no skin lesions [Normal Gait] : normal gait [Coordination Grossly Intact] : coordination grossly intact [No Focal Deficits] : no focal deficits [Speech Grossly Normal] : speech grossly normal [Normal Affect] : the affect was normal [Alert and Oriented x3] : oriented to person, place, and time [Normal Mood] : the mood was normal [Normal Insight/Judgement] : insight and judgment were intact

## 2018-12-30 ENCOUNTER — TRANSCRIPTION ENCOUNTER (OUTPATIENT)
Age: 83
End: 2018-12-30

## 2018-12-30 ENCOUNTER — RX RENEWAL (OUTPATIENT)
Age: 83
End: 2018-12-30

## 2019-02-06 ENCOUNTER — TRANSCRIPTION ENCOUNTER (OUTPATIENT)
Age: 84
End: 2019-02-06

## 2019-02-06 ENCOUNTER — RX RENEWAL (OUTPATIENT)
Age: 84
End: 2019-02-06

## 2019-03-11 ENCOUNTER — LABORATORY RESULT (OUTPATIENT)
Age: 84
End: 2019-03-11

## 2019-03-11 ENCOUNTER — APPOINTMENT (OUTPATIENT)
Dept: INTERNAL MEDICINE | Facility: CLINIC | Age: 84
End: 2019-03-11
Payer: MEDICAID

## 2019-03-11 ENCOUNTER — RX RENEWAL (OUTPATIENT)
Age: 84
End: 2019-03-11

## 2019-03-11 VITALS
DIASTOLIC BLOOD PRESSURE: 67 MMHG | TEMPERATURE: 98.5 F | OXYGEN SATURATION: 96 % | HEART RATE: 79 BPM | SYSTOLIC BLOOD PRESSURE: 142 MMHG | BODY MASS INDEX: 29.64 KG/M2 | WEIGHT: 157 LBS | HEIGHT: 61 IN

## 2019-03-11 LAB
25(OH)D3 SERPL-MCNC: 40.4 NG/ML
ALBUMIN SERPL ELPH-MCNC: 4.3 G/DL
ALP BLD-CCNC: 80 U/L
ALT SERPL-CCNC: 10 U/L
ANION GAP SERPL CALC-SCNC: 11 MMOL/L
APPEARANCE: CLEAR
AST SERPL-CCNC: 13 U/L
BILIRUB SERPL-MCNC: 0.4 MG/DL
BILIRUBIN URINE: NEGATIVE
BLOOD URINE: NEGATIVE
BUN SERPL-MCNC: 21 MG/DL
CALCIUM SERPL-MCNC: 9.3 MG/DL
CHLORIDE SERPL-SCNC: 106 MMOL/L
CHOLEST SERPL-MCNC: 163 MG/DL
CHOLEST/HDLC SERPL: 3.5 RATIO
CO2 SERPL-SCNC: 25 MMOL/L
COLOR: YELLOW
CREAT SERPL-MCNC: 0.75 MG/DL
FRUCTOSAMINE SERPL-MCNC: 199 UMOL/L
GLUCOSE QUALITATIVE U: NEGATIVE
GLUCOSE SERPL-MCNC: 96 MG/DL
HBA1C MFR BLD HPLC: 5.8 %
HDLC SERPL-MCNC: 46 MG/DL
KETONES URINE: NEGATIVE
LDLC SERPL CALC-MCNC: 86 MG/DL
LEUKOCYTE ESTERASE URINE: ABNORMAL
NITRITE URINE: NEGATIVE
PH URINE: 6
POTASSIUM SERPL-SCNC: 4.7 MMOL/L
PROT SERPL-MCNC: 6.8 G/DL
PROTEIN URINE: NORMAL
SODIUM SERPL-SCNC: 142 MMOL/L
SPECIFIC GRAVITY URINE: 1.03
TRIGL SERPL-MCNC: 155 MG/DL
UROBILINOGEN URINE: NORMAL

## 2019-03-11 PROCEDURE — 99214 OFFICE O/P EST MOD 30 MIN: CPT

## 2019-03-11 NOTE — HISTORY OF PRESENT ILLNESS
[Family Member] : family member [FreeTextEntry1] : abn mammogram  [de-identified] : Pt had abnormal mammogram and needs fu breast sonogram and mammogram . she has been feeling well.  she had recent ruptured blood vessel in her left eye and her daughter showed me a picture.  she is taking her medication and is doing well.  She doens t have any issues presently that she wants to discuss .  She has gained weight and is 157 5 lbs.

## 2019-03-11 NOTE — COUNSELING
[Healthy eating counseling provided] : healthy eating [Walking] : Walking [Sleep ___ hours/day] : Sleep [unfilled] hours/day [Engage in a relaxing activity] : Engage in a relaxing activity [Plan in advance] : Plan in advance [None] : None [Good understanding] : Patient has a good understanding of lifestyle changes and the steps needed to achieve self management goals

## 2019-03-11 NOTE — PHYSICAL EXAM
[No Acute Distress] : no acute distress [Well Nourished] : well nourished [Normal Voice/Communication] : normal voice/communication [Normal Sclera/Conjunctiva] : normal sclera/conjunctiva [PERRL] : pupils equal round and reactive to light [Normal Oropharynx] : the oropharynx was normal [Supple] : supple [No Respiratory Distress] : no respiratory distress  [Clear to Auscultation] : lungs were clear to auscultation bilaterally [No Accessory Muscle Use] : no accessory muscle use [Normal Rate] : normal rate  [Regular Rhythm] : with a regular rhythm [Normal S1, S2] : normal S1 and S2 [No Edema] : there was no peripheral edema [Soft] : abdomen soft [Non Tender] : non-tender [No HSM] : no HSM [Normal Bowel Sounds] : normal bowel sounds [Normal Posterior Cervical Nodes] : no posterior cervical lymphadenopathy [Normal Anterior Cervical Nodes] : no anterior cervical lymphadenopathy [No CVA Tenderness] : no CVA  tenderness [No Spinal Tenderness] : no spinal tenderness [No Joint Swelling] : no joint swelling [Grossly Normal Strength/Tone] : grossly normal strength/tone [Normal Gait] : normal gait [Coordination Grossly Intact] : coordination grossly intact [Normal Affect] : the affect was normal [Normal Insight/Judgement] : insight and judgment were intact [de-identified] : 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

## 2019-03-11 NOTE — ASSESSMENT
[FreeTextEntry1] : lichen simplex not responding to medication and should go to her dermatologist .  eye hemorrhage I would like her to fu with eye doctor to check her eye pressures but most likely due to her having Valsalva maneuver  or lifting something heavy and will now be careful.  DM will repeat labs.  abnormal mammogram and needs 6 month fu which is now and will order us breast and mammogram.  She  needs shingles vaccine

## 2019-03-28 ENCOUNTER — TRANSCRIPTION ENCOUNTER (OUTPATIENT)
Age: 84
End: 2019-03-28

## 2019-04-04 ENCOUNTER — APPOINTMENT (OUTPATIENT)
Dept: DERMATOLOGY | Facility: CLINIC | Age: 84
End: 2019-04-04
Payer: MEDICAID

## 2019-04-04 VITALS
HEIGHT: 61 IN | DIASTOLIC BLOOD PRESSURE: 66 MMHG | HEART RATE: 79 BPM | WEIGHT: 163 LBS | TEMPERATURE: 98 F | BODY MASS INDEX: 30.78 KG/M2 | SYSTOLIC BLOOD PRESSURE: 123 MMHG

## 2019-04-04 PROCEDURE — 99213 OFFICE O/P EST LOW 20 MIN: CPT

## 2019-05-12 ENCOUNTER — FORM ENCOUNTER (OUTPATIENT)
Age: 84
End: 2019-05-12

## 2019-05-13 ENCOUNTER — APPOINTMENT (OUTPATIENT)
Dept: MAMMOGRAPHY | Facility: IMAGING CENTER | Age: 84
End: 2019-05-13

## 2019-05-13 ENCOUNTER — OUTPATIENT (OUTPATIENT)
Dept: OUTPATIENT SERVICES | Facility: HOSPITAL | Age: 84
LOS: 1 days | End: 2019-05-13
Payer: MEDICAID

## 2019-05-13 ENCOUNTER — APPOINTMENT (OUTPATIENT)
Dept: ULTRASOUND IMAGING | Facility: IMAGING CENTER | Age: 84
End: 2019-05-13

## 2019-05-13 DIAGNOSIS — R92.8 OTHER ABNORMAL AND INCONCLUSIVE FINDINGS ON DIAGNOSTIC IMAGING OF BREAST: ICD-10-CM

## 2019-05-13 PROCEDURE — 77066 DX MAMMO INCL CAD BI: CPT

## 2019-05-13 PROCEDURE — G0279: CPT | Mod: 26

## 2019-05-13 PROCEDURE — 77062 BREAST TOMOSYNTHESIS BI: CPT | Mod: 26

## 2019-05-13 PROCEDURE — G0279: CPT

## 2019-05-13 PROCEDURE — 76642 ULTRASOUND BREAST LIMITED: CPT | Mod: 26,50

## 2019-05-13 PROCEDURE — 76642 ULTRASOUND BREAST LIMITED: CPT

## 2019-05-13 PROCEDURE — 77066 DX MAMMO INCL CAD BI: CPT | Mod: 26

## 2019-05-29 ENCOUNTER — APPOINTMENT (OUTPATIENT)
Dept: UROLOGY | Facility: CLINIC | Age: 84
End: 2019-05-29
Payer: MEDICAID

## 2019-05-29 ENCOUNTER — APPOINTMENT (OUTPATIENT)
Dept: INTERNAL MEDICINE | Facility: CLINIC | Age: 84
End: 2019-05-29
Payer: MEDICAID

## 2019-05-29 ENCOUNTER — LABORATORY RESULT (OUTPATIENT)
Age: 84
End: 2019-05-29

## 2019-05-29 VITALS
HEIGHT: 61 IN | WEIGHT: 160 LBS | HEART RATE: 77 BPM | BODY MASS INDEX: 30.21 KG/M2 | OXYGEN SATURATION: 95 % | SYSTOLIC BLOOD PRESSURE: 115 MMHG | DIASTOLIC BLOOD PRESSURE: 63 MMHG | TEMPERATURE: 98 F

## 2019-05-29 DIAGNOSIS — L72.3 SEBACEOUS CYST: ICD-10-CM

## 2019-05-29 DIAGNOSIS — R80.9 PROTEINURIA, UNSPECIFIED: ICD-10-CM

## 2019-05-29 DIAGNOSIS — L08.9 SEBACEOUS CYST: ICD-10-CM

## 2019-05-29 PROCEDURE — 99214 OFFICE O/P EST MOD 30 MIN: CPT

## 2019-05-29 RX ORDER — SODIUM BICARBONATE, SODIUM CHLORIDE 700; 2300 MG/3G; MG/3G
2300-700 POWDER, FOR SOLUTION NASAL
Qty: 1 | Refills: 0 | Status: COMPLETED | COMMUNITY
Start: 2018-12-10 | End: 2019-05-29

## 2019-05-29 RX ORDER — CIPROFLOXACIN HYDROCHLORIDE 250 MG/1
250 TABLET, FILM COATED ORAL
Qty: 14 | Refills: 0 | Status: COMPLETED | COMMUNITY
Start: 2019-03-11 | End: 2019-05-29

## 2019-05-29 RX ORDER — GUAIFENESIN AND DEXTROMETHORPHAN HYDROBROMIDE 600; 30 MG/1; MG/1
30-600 TABLET, EXTENDED RELEASE ORAL
Qty: 10 | Refills: 0 | Status: COMPLETED | COMMUNITY
Start: 2018-12-10 | End: 2019-05-29

## 2019-05-29 RX ORDER — HYDROXYZINE HYDROCHLORIDE 10 MG/1
10 TABLET ORAL
Qty: 60 | Refills: 0 | Status: COMPLETED | COMMUNITY
Start: 2018-11-06 | End: 2019-05-29

## 2019-05-29 NOTE — HEALTH RISK ASSESSMENT
[] : No [0] : 2) Feeling down, depressed, or hopeless: Not at all (0) [No falls in past year] : Patient reported no falls in the past year [[id number]] : 0 [de-identified] : walks  [de-identified] : low fat diet low salt

## 2019-05-29 NOTE — COUNSELING
[Healthy eating counseling provided] : healthy eating [Fall prevention counseling provided] : fall prevention  [Walking] : Walking [Engage in a relaxing activity] : Engage in a relaxing activity [Sleep ___ hours/day] : Sleep [unfilled] hours/day [Plan in advance] : Plan in advance [Adequate lighting] : Adequate lighting [Use proper foot wear] : Use proper foot wear [Use recommended devices] : Use recommended devices [No throw rugs] : No throw rugs [None] : None [Good understanding] : Patient has a good understanding of lifestyle changes and the steps needed to achieve self management goals

## 2019-05-29 NOTE — PHYSICAL EXAM
[General Appearance - Well Developed] : well developed [Normal Appearance] : normal appearance [General Appearance - Well Nourished] : well nourished [Well Groomed] : well groomed [General Appearance - In No Acute Distress] : no acute distress [Abdomen Soft] : soft [Costovertebral Angle Tenderness] : no ~M costovertebral angle tenderness [Abdomen Tenderness] : non-tender [Urinary Bladder Findings] : the bladder was normal on palpation [Edema] : no peripheral edema [] : no respiratory distress [Respiration, Rhythm And Depth] : normal respiratory rhythm and effort [Exaggerated Use Of Accessory Muscles For Inspiration] : no accessory muscle use [Oriented To Time, Place, And Person] : oriented to person, place, and time [Affect] : the affect was normal [Mood] : the mood was normal [Not Anxious] : not anxious [No Focal Deficits] : no focal deficits [Normal Station and Gait] : the gait and station were normal for the patient's age [No Palpable Adenopathy] : no palpable adenopathy

## 2019-05-29 NOTE — PHYSICAL EXAM
[No Acute Distress] : no acute distress [Well Nourished] : well nourished [Well Developed] : well developed [Normal Sclera/Conjunctiva] : normal sclera/conjunctiva [Normal Voice/Communication] : normal voice/communication [EOMI] : extraocular movements intact [Normal Outer Ear/Nose] : the outer ears and nose were normal in appearance [PERRL] : pupils equal round and reactive to light [No JVD] : no jugular venous distention [Normal Oropharynx] : the oropharynx was normal [No Respiratory Distress] : no respiratory distress  [Supple] : supple [No Lymphadenopathy] : no lymphadenopathy [Clear to Auscultation] : lungs were clear to auscultation bilaterally [No Accessory Muscle Use] : no accessory muscle use [Normal Rate] : normal rate  [No Edema] : there was no peripheral edema [Normal S1, S2] : normal S1 and S2 [Regular Rhythm] : with a regular rhythm [Soft] : abdomen soft [Non-distended] : non-distended [Non Tender] : non-tender [No HSM] : no HSM [Normal Bowel Sounds] : normal bowel sounds [Normal Anterior Cervical Nodes] : no anterior cervical lymphadenopathy [Normal Posterior Cervical Nodes] : no posterior cervical lymphadenopathy [No CVA Tenderness] : no CVA  tenderness [No Joint Swelling] : no joint swelling [No Spinal Tenderness] : no spinal tenderness [Normal Scalp] : inspection of the scalp showed no abnormalities [Abnormal Color] : normal color and pigmentation [Examination Of The Hair] : texture and distribution of hair was normal [Skin Turgor Decreased] : normal skin turgor [Skin Lesions 1] : no skin lesions were observed [Complexion Medium] : medium complexion [Normal] : the deep tendon reflexes were normal [Normal Mental Status] : the patient's orientation, memory, attention, language and fund of knowledge were normal [Appropriate] : appropriate [Impaired judgment] : intact judgment [Impaired Insight] : intact insight [Comprehensive Foot Exam Normal] : Right and left foot were examined and both feet are normal. No ulcers in either foot. Toes are normal and with full ROM.  Normal tactile sensation with monofilament testing throughout both feet [de-identified] : pain with elevation of her right leg in hip.

## 2019-05-29 NOTE — HISTORY OF PRESENT ILLNESS
[Family Member] : family member [FreeTextEntry1] : headache [de-identified] : pt states she has headaches daily for the last three months.  She states her pain is over the front of her head.  The pain level is  8/10 and  doesn’t have  double vision but has dizziness.  The pain is pulsating.  she takes advil sometimes and it helps.  It  occur during the day.  it lasts various amounts of time  but once she takes advil it resolves.  She doesn’t have imbalance or weakness in her arms or legs  .  She states loud noises make it worse.

## 2019-05-29 NOTE — HISTORY OF PRESENT ILLNESS
[Urinary Retention] : no urinary retention [FreeTextEntry1] : Very pleasant 86 year-old woman presents for followup of microscopic hematuria and hydronephrosis. Patient was recommended to undergo a workup for microscopic hematuria approximately a year and a half ago, however she did not followup. She presents today with increasing blood in her urine. Recent urinalysis demonstrated 11 red blood cells per high-power field. She denies dysuria. No gross hematuria. No flank pain or suprapubic pain. No nausea or vomiting. No other complaints. [Urinary Urgency] : no urinary urgency [Urinary Frequency] : no urinary frequency [Nocturia] : no nocturia [Weak Stream] : no weak stream [Straining] : no straining [Dysuria] : no dysuria [Hematuria - Gross] : no gross hematuria [Hematuria - Microscopic] : microscopic hematuria [Bladder Spasm] : no bladder spasm [Flank Pain] : no flank pain [Abdominal Pain] : no abdominal pain [Fever] : no fever [Fatigue] : no fatigue [Nausea] : no nausea [Anorexia] : no anorexia

## 2019-05-29 NOTE — ASSESSMENT
[FreeTextEntry1] : headaches She will have mri of brain referral to neurologist and  has had blood testing and will do sed rate and crp  again and cbc and chem .  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 she is 86 yr s old and hip replacement would be dangerous.    She DM  prediabetes no medication needed.    she has seen eye doctor and told it was normal exam.  breast fu mammogram and us of both breasts for Aug.  She should take Tylenol for her headaches.  her bp is well controlled  and not cause .  She has rbc in urine and should see urologist

## 2019-05-29 NOTE — ASSESSMENT
[FreeTextEntry1] : Very pleasant 86 year-old woman with microscopic hematuria\par -Labs reviewed\par -CMP to check renal function\par -CT urogram\par -cystoscopy\par -Extensive discussion of the potential etiologies of microscopic hematuria, as well as the need to complete full work up.  Patient understands and wishes to proceed.

## 2019-05-30 LAB
ALBUMIN SERPL ELPH-MCNC: 4.3 G/DL
ALP BLD-CCNC: 77 U/L
ALT SERPL-CCNC: 9 U/L
ANA SER IF-ACNC: NEGATIVE
ANION GAP SERPL CALC-SCNC: 13 MMOL/L
APPEARANCE: ABNORMAL
AST SERPL-CCNC: 11 U/L
BASOPHILS # BLD AUTO: 0.02 K/UL
BASOPHILS NFR BLD AUTO: 0.3 %
BILIRUB SERPL-MCNC: 0.4 MG/DL
BILIRUBIN URINE: NEGATIVE
BLOOD URINE: NEGATIVE
BUN SERPL-MCNC: 22 MG/DL
CALCIUM SERPL-MCNC: 9.5 MG/DL
CHLORIDE SERPL-SCNC: 103 MMOL/L
CHOLEST SERPL-MCNC: 187 MG/DL
CHOLEST/HDLC SERPL: 4.1 RATIO
CO2 SERPL-SCNC: 25 MMOL/L
COLOR: YELLOW
CREAT SERPL-MCNC: 0.74 MG/DL
CRP SERPL-MCNC: 0.12 MG/DL
EOSINOPHIL # BLD AUTO: 0.08 K/UL
EOSINOPHIL NFR BLD AUTO: 1.1 %
ERYTHROCYTE [SEDIMENTATION RATE] IN BLOOD BY WESTERGREN METHOD: 23 MM/HR
FOLATE SERPL-MCNC: 6.3 NG/ML
GLUCOSE QUALITATIVE U: NEGATIVE
GLUCOSE SERPL-MCNC: 92 MG/DL
HCT VFR BLD CALC: 43.7 %
HDLC SERPL-MCNC: 46 MG/DL
HGB BLD-MCNC: 14 G/DL
IMM GRANULOCYTES NFR BLD AUTO: 0.4 %
KETONES URINE: NEGATIVE
LDLC SERPL CALC-MCNC: 99 MG/DL
LEUKOCYTE ESTERASE URINE: NEGATIVE
LYMPHOCYTES # BLD AUTO: 1.63 K/UL
LYMPHOCYTES NFR BLD AUTO: 23.3 %
MAN DIFF?: NORMAL
MCHC RBC-ENTMCNC: 27.9 PG
MCHC RBC-ENTMCNC: 32 GM/DL
MCV RBC AUTO: 87.2 FL
MONOCYTES # BLD AUTO: 0.56 K/UL
MONOCYTES NFR BLD AUTO: 8 %
NEUTROPHILS # BLD AUTO: 4.69 K/UL
NEUTROPHILS NFR BLD AUTO: 66.9 %
NITRITE URINE: NEGATIVE
PH URINE: 6
PLATELET # BLD AUTO: 306 K/UL
POTASSIUM SERPL-SCNC: 4.7 MMOL/L
PROT SERPL-MCNC: 6.9 G/DL
PROTEIN URINE: NORMAL
RBC # BLD: 5.01 M/UL
RBC # FLD: 13.6 %
SODIUM SERPL-SCNC: 141 MMOL/L
SPECIFIC GRAVITY URINE: 1.02
TRIGL SERPL-MCNC: 209 MG/DL
UROBILINOGEN URINE: NORMAL
VIT B12 SERPL-MCNC: 483 PG/ML
WBC # FLD AUTO: 7.01 K/UL

## 2019-06-11 ENCOUNTER — FORM ENCOUNTER (OUTPATIENT)
Age: 84
End: 2019-06-11

## 2019-06-12 ENCOUNTER — OUTPATIENT (OUTPATIENT)
Dept: OUTPATIENT SERVICES | Facility: HOSPITAL | Age: 84
LOS: 1 days | End: 2019-06-12
Payer: MEDICAID

## 2019-06-12 ENCOUNTER — APPOINTMENT (OUTPATIENT)
Dept: MRI IMAGING | Facility: HOSPITAL | Age: 84
End: 2019-06-12
Payer: MEDICAID

## 2019-06-12 DIAGNOSIS — R42 DIZZINESS AND GIDDINESS: ICD-10-CM

## 2019-06-12 DIAGNOSIS — R51 HEADACHE: ICD-10-CM

## 2019-06-12 PROCEDURE — 70553 MRI BRAIN STEM W/O & W/DYE: CPT

## 2019-06-12 PROCEDURE — 70553 MRI BRAIN STEM W/O & W/DYE: CPT | Mod: 26

## 2019-06-14 ENCOUNTER — APPOINTMENT (OUTPATIENT)
Dept: CT IMAGING | Facility: HOSPITAL | Age: 84
End: 2019-06-14
Payer: MEDICAID

## 2019-06-14 ENCOUNTER — OUTPATIENT (OUTPATIENT)
Dept: OUTPATIENT SERVICES | Facility: HOSPITAL | Age: 84
LOS: 1 days | End: 2019-06-14
Payer: MEDICAID

## 2019-06-14 DIAGNOSIS — N13.30 UNSPECIFIED HYDRONEPHROSIS: ICD-10-CM

## 2019-06-14 DIAGNOSIS — R31.29 OTHER MICROSCOPIC HEMATURIA: ICD-10-CM

## 2019-06-14 PROCEDURE — 74178 CT ABD&PLV WO CNTR FLWD CNTR: CPT

## 2019-06-14 PROCEDURE — 74178 CT ABD&PLV WO CNTR FLWD CNTR: CPT | Mod: 26

## 2019-06-19 ENCOUNTER — APPOINTMENT (OUTPATIENT)
Dept: UROLOGY | Facility: CLINIC | Age: 84
End: 2019-06-19
Payer: MEDICAID

## 2019-06-19 DIAGNOSIS — R31.29 OTHER MICROSCOPIC HEMATURIA: ICD-10-CM

## 2019-06-19 DIAGNOSIS — N13.30 UNSPECIFIED HYDRONEPHROSIS: ICD-10-CM

## 2019-06-19 PROCEDURE — 99214 OFFICE O/P EST MOD 30 MIN: CPT | Mod: 25

## 2019-06-19 PROCEDURE — 52000 CYSTOURETHROSCOPY: CPT

## 2019-06-19 NOTE — HISTORY OF PRESENT ILLNESS
[Hematuria - Microscopic] : microscopic hematuria [Urinary Retention] : no urinary retention [Urinary Urgency] : no urinary urgency [FreeTextEntry1] : Very pleasant 86 year-old woman who presents for followup of microscopic hematuria and hydronephrosis. Patient recently underwent a CT urogram which demonstrated parapelvic cysts but no hydronephrosis. There were no upper tract abnormalities. She additionally underwent cystoscopy today which demonstrated no urothelial lesions. She denies dysuria. No gross hematuria. No flank pain or suprapubic pain. No nausea or vomiting. No specific timing to her symptoms. No aggravating or alleviating factors that she knows of. [Urinary Frequency] : no urinary frequency [Nocturia] : no nocturia [Dysuria] : no dysuria [Straining] : no straining [Weak Stream] : no weak stream [Bladder Spasm] : no bladder spasm [Hematuria - Gross] : no gross hematuria [Abdominal Pain] : no abdominal pain [Flank Pain] : no flank pain [Fever] : no fever [Fatigue] : no fatigue [Nausea] : no nausea [Anorexia] : no anorexia

## 2019-06-19 NOTE — ASSESSMENT
[FreeTextEntry1] : Very pleasant 86 year-old woman who presents for followup of microscopic hematuria and hydronephrosis\par -Prior labs reviewed\par -CT images reviewed\par -Cystoscopy demonstrates no urothelial abnormalities\par -We discussed the benign nature of renal cysts\par -Follow up in 6 months with urinalysis

## 2019-06-19 NOTE — PHYSICAL EXAM
[General Appearance - Well Developed] : well developed [General Appearance - Well Nourished] : well nourished [Normal Appearance] : normal appearance [Well Groomed] : well groomed [Abdomen Soft] : soft [General Appearance - In No Acute Distress] : no acute distress [Abdomen Tenderness] : non-tender [Costovertebral Angle Tenderness] : no ~M costovertebral angle tenderness [Urinary Bladder Findings] : the bladder was normal on palpation [Edema] : no peripheral edema [Respiration, Rhythm And Depth] : normal respiratory rhythm and effort [] : no respiratory distress [Oriented To Time, Place, And Person] : oriented to person, place, and time [Exaggerated Use Of Accessory Muscles For Inspiration] : no accessory muscle use [Affect] : the affect was normal [Mood] : the mood was normal [Normal Station and Gait] : the gait and station were normal for the patient's age [Not Anxious] : not anxious [No Palpable Adenopathy] : no palpable adenopathy [No Focal Deficits] : no focal deficits

## 2019-07-18 ENCOUNTER — TRANSCRIPTION ENCOUNTER (OUTPATIENT)
Age: 84
End: 2019-07-18

## 2019-07-18 ENCOUNTER — RX RENEWAL (OUTPATIENT)
Age: 84
End: 2019-07-18

## 2019-08-12 ENCOUNTER — APPOINTMENT (OUTPATIENT)
Dept: INTERNAL MEDICINE | Facility: CLINIC | Age: 84
End: 2019-08-12
Payer: MEDICAID

## 2019-08-12 VITALS
HEIGHT: 61 IN | HEART RATE: 76 BPM | TEMPERATURE: 98 F | WEIGHT: 159 LBS | OXYGEN SATURATION: 97 % | BODY MASS INDEX: 30.02 KG/M2 | SYSTOLIC BLOOD PRESSURE: 139 MMHG | DIASTOLIC BLOOD PRESSURE: 79 MMHG

## 2019-08-12 DIAGNOSIS — N63.0 UNSPECIFIED LUMP IN UNSPECIFIED BREAST: ICD-10-CM

## 2019-08-12 DIAGNOSIS — E55.9 VITAMIN D DEFICIENCY, UNSPECIFIED: ICD-10-CM

## 2019-08-12 LAB
25(OH)D3 SERPL-MCNC: 34.2 NG/ML
ALBUMIN SERPL ELPH-MCNC: 4.3 G/DL
ALP BLD-CCNC: 78 U/L
ALT SERPL-CCNC: 8 U/L
ANION GAP SERPL CALC-SCNC: 13 MMOL/L
AST SERPL-CCNC: 11 U/L
BILIRUB SERPL-MCNC: 0.6 MG/DL
BUN SERPL-MCNC: 22 MG/DL
CALCIUM SERPL-MCNC: 9.1 MG/DL
CHLORIDE SERPL-SCNC: 104 MMOL/L
CHOLEST SERPL-MCNC: 187 MG/DL
CHOLEST/HDLC SERPL: 4.2 RATIO
CO2 SERPL-SCNC: 24 MMOL/L
CREAT SERPL-MCNC: 0.8 MG/DL
ESTIMATED AVERAGE GLUCOSE: 111 MG/DL
FOLATE SERPL-MCNC: 7.9 NG/ML
FRUCTOSAMINE SERPL-MCNC: 215 UMOL/L
GLUCOSE SERPL-MCNC: 107 MG/DL
HBA1C MFR BLD HPLC: 5.5 %
HDLC SERPL-MCNC: 45 MG/DL
LDLC SERPL CALC-MCNC: 108 MG/DL
POTASSIUM SERPL-SCNC: 4.8 MMOL/L
PROT SERPL-MCNC: 6.9 G/DL
SODIUM SERPL-SCNC: 141 MMOL/L
TRIGL SERPL-MCNC: 171 MG/DL
VIT B12 SERPL-MCNC: 433 PG/ML

## 2019-08-12 PROCEDURE — 93000 ELECTROCARDIOGRAM COMPLETE: CPT

## 2019-08-12 PROCEDURE — 99397 PER PM REEVAL EST PAT 65+ YR: CPT | Mod: 25

## 2019-08-12 RX ORDER — GREEN TEA/HOODIA GORDONII 315-12.5MG
CAPSULE ORAL
Qty: 30 | Refills: 3 | Status: COMPLETED | COMMUNITY
Start: 2018-10-08 | End: 2019-08-12

## 2019-08-12 RX ORDER — ZOSTER VACCINE RECOMBINANT, ADJUVANTED 50 MCG/0.5
50 KIT INTRAMUSCULAR
Qty: 1 | Refills: 2 | Status: DISCONTINUED | COMMUNITY
Start: 2019-03-11 | End: 2019-08-12

## 2019-08-12 RX ORDER — TRIAMCINOLONE ACETONIDE 1 MG/G
0.1 CREAM TOPICAL
Qty: 1 | Refills: 1 | Status: COMPLETED | COMMUNITY
Start: 2018-10-11 | End: 2019-08-12

## 2019-08-12 RX ORDER — GLY/DIMETH/PETROLAT,WHT/WATER
CREAM (GRAM) TOPICAL DAILY
Qty: 1 | Refills: 1 | Status: COMPLETED | COMMUNITY
Start: 2018-10-11 | End: 2019-08-12

## 2019-08-12 RX ORDER — BUDESONIDE 32 UG/1
32 SPRAY, METERED NASAL DAILY
Qty: 1 | Refills: 1 | Status: COMPLETED | COMMUNITY
Start: 2018-12-10 | End: 2019-08-12

## 2019-08-12 RX ORDER — BETAMETHASONE DIPROPIONATE 0.5 MG/G
0.05 OINTMENT TOPICAL TWICE DAILY
Qty: 1 | Refills: 2 | Status: COMPLETED | COMMUNITY
Start: 2018-10-02 | End: 2019-08-12

## 2019-08-12 RX ORDER — POLYVINYL ALCOHOL 14 MG/ML
1.4 SOLUTION/ DROPS OPHTHALMIC
Qty: 15 | Refills: 0 | Status: COMPLETED | COMMUNITY
Start: 2017-04-19 | End: 2019-08-12

## 2019-08-12 RX ORDER — HYDROCORTISONE 1 %
12 CREAM (GRAM) TOPICAL TWICE DAILY
Qty: 1 | Refills: 2 | Status: COMPLETED | COMMUNITY
Start: 2017-04-24 | End: 2019-08-12

## 2019-08-12 RX ORDER — SKIN CLEANSER
CLEANSER (GRAM) TOPICAL
Qty: 1 | Refills: 5 | Status: COMPLETED | COMMUNITY
Start: 2018-10-11 | End: 2019-08-12

## 2019-08-12 NOTE — ASSESSMENT
[FreeTextEntry1] : health maintenance -  [facility] will need repeat us of breast now and mammogram bmi 30  risks of obesity and need for diet and eating healthy Obesity is a complex disorder involving an excessive amount of body fat. Obesity isn't just a cosmetic concern. It increases your risk of diseases and health problems, such as heart disease, diabetes and high blood pressure.\par \par Being extremely obese means you are especially likely to have health problems related to your weight.\par \par \par The good news is that even modest weight loss can improve or prevent the health problems associated with obesity. Dietary changes, increased physical activity and behavior changes can help you lose weight. Prescription medications and weight-loss surgery are additional options for treating obesity.\par \par \par \par \par Symptoms\par \par Obesity is diagnosed when your body mass index (BMI) is 30 or higher. Your body mass index is calculated by dividing your weight in kilograms (kg) by your height in meters (m) squared. \par \par \par BMI\par \par Weight status\par \par \par Below 18.5 Underweight \par 18.5-24.9 Normal \par 25.0-29.9 Overweight \par 30.0-34.9 Obese (Class I) \par 35.0-39.9 Obese (Class II) \par 40.0 and higher Extreme obesity (Class III) \par \par For most people, BMI provides a reasonable estimate of body fat. However, BMI doesn't directly measure body fat, so some people, such as muscular athletes, may have a BMI in the obese category even though they don't have excess body fat. Ask your doctor if your BMI is a problem. \par \par When to see a doctor\par \par If you think you may be obese, and especially if you're concerned about weight-related health problems, see your doctor or health care provider. You and your provider can evaluate your health risks and discuss your weight-loss options. \par \par Request an Appointment at [facility]\par \par Causes\par \par Although there are genetic, behavioral and hormonal influences on body weight, obesity occurs when you take in more calories than you burn through exercise and normal daily activities. Your body stores these excess calories as fat.\par \par Obesity can sometimes be traced to a medical cause, such as Prader-Willi syndrome, Cushing's syndrome, and other diseases and conditions. However, these disorders are rare and, in general, the principal causes of obesity are:\par •Inactivity. If you're not very active, you don't burn as many calories. With a sedentary lifestyle, you can easily take in more calories every day than you use through exercise and normal daily activities.\par •Unhealthy diet and eating habits. Weight gain is inevitable if you regularly eat more calories than you burn. And most Americans' diets are too high in calories and are full of fast food and high-calorie beverages.\par \par Risk factors\par \par Obesity usually results from a combination of causes and contributing factors, including:\par •Genetics. Your genes may affect the amount of body fat you store, and where that fat is distributed. Genetics may also play a role in how efficiently your body converts food into energy and how your body burns calories during exercise.\par •Family lifestyle. Obesity tends to run in families. If one or both of your parents are obese, your risk of being obese is increased. That's not just because of genetics. Family members tend to share similar eating and activity habits.\par •Inactivity. If you're not very active, you don't burn as many calories. With a sedentary lifestyle, you can easily take in more calories every day than you burn through exercise and routine daily activities. Having medical problems, such as arthritis, can lead to decreased activity, which contributes to weight gain.\par •Unhealthy diet. A diet that's high in calories, lacking in fruits and vegetables, full of fast food, and laden with high-calorie beverages and oversized portions contributes to weight gain.\par •Medical problems. In some people, obesity can be traced to a medical cause, such as Prader-Willi syndrome, Cushing's syndrome and other conditions. Medical problems, such as arthritis, also can lead to decreased activity, which may result in weight gain.\par •Certain medications. Some medications can lead to weight gain if you don't compensate through diet or activity. These medications include some antidepressants, anti-seizure medications, diabetes medications, antipsychotic medications, steroids and beta blockers.\par •Social and economic issues. Research has linked social and economic factors to obesity. Avoiding obesity is difficult if you don't have safe areas to exercise. Similarly, you may not have been taught healthy ways of cooking, or you may not have money to buy healthier foods. In addition, the people you spend time with may influence your weight — you're more likely to become obese if you have obese friends or relatives.\par •Age. Obesity can occur at any age, even in young children. But as you age, hormonal changes and a less active lifestyle increase your risk of obesity. In addition, the amount of muscle in your body tends to decrease with age. This lower muscle mass leads to a decrease in metabolism. These changes also reduce calorie needs, and can make it harder to keep off excess weight. If you don't consciously control what you eat and become more physically active as you age, you'll likely gain weight.\par •Pregnancy. During pregnancy, a woman's weight necessarily increases. Some women find this weight difficult to lose after the baby is born. This weight gain may contribute to the development of obesity in women.\par •Quitting smoking. Quitting smoking is often associated with weight gain. And for some, it can lead to enough weight gain that the person becomes obese. In the long run, however, quitting smoking is still a greater benefit to your health than continuing to smoke.\par •Lack of sleep. Not getting enough sleep or getting too much sleep can cause changes in hormones that increase your appetite. You may also crave foods high in calories and carbohydrates, which can contribute to weight gain.\par \par Even if you have one or more of these risk factors, it doesn't mean that you're destined to become obese. You can counteract most risk factors through diet, physical activity and exercise, and behavior changes.\par \par Complications\par \par If you're obese, you're more likely to develop a number of potentially serious health problems, including:\par •High triglycerides and low high-density lipoprotein (HDL) cholesterol\par •Type 2 diabetes\par •High blood pressure\par •Metabolic syndrome — a combination of high blood sugar, high blood pressure, high triglycerides and low HDL cholesterol\par •Heart disease\par •Stroke\par •Cancer, including cancer of the uterus, cervix, endometrium, ovaries, breast, colon, rectum, esophagus, liver, gallbladder, pancreas, kidney and prostate\par •Breathing disorders, including sleep apnea, a potentially serious sleep disorder in which breathing repeatedly stops and starts\par •Gallbladder disease\par •Gynecological problems, such as infertility and irregular periods\par •Erectile dysfunction and sexual health issues\par •Nonalcoholic fatty liver disease, a condition in which fat builds up in the liver and can cause inflammation or scarring\par •Osteoarthritis\par \par Quality of life\par \par When you're obese, your overall quality of life may be diminished. You may not be able to do things you used to do, such as participating in enjoyable activities. You may avoid public places. Obese people may even encounter discrimination.\par \par Other weight-related issues that may affect your quality of life include:\par •Depression\par •Disability\par •Sexual problems\par •Shame and guilt\par •Social isolation\par •Lower work achievement\par \par Prevention\par \par Whether you're at risk of becoming obese, currently overweight or at a healthy weight, you can take steps to prevent unhealthy weight gain and related health problems. Not surprisingly, the steps to prevent weight gain are the same as the steps to lose weight: daily exercise, a healthy diet, and a long-term commitment to watch what you eat and drink.\par •Exercise regularly. You need to get 150 to 300 minutes of moderate-intensity activity a week to prevent weight gain. Moderately intense physical activities include fast walking and swimming.\par •Follow a healthy eating plan. Focus on low-calorie, nutrient-dense foods, such as fruits, veg\par She is up to date with eye exam and bone density and colonoscopy . She had serrated polyp and discussed fu in 1-2 yrs.   dm well controlled   arthritis of hips and knees weight loss exercise,.  She has seen urologist and I appreciate his note she has appt with neurologist.

## 2019-08-12 NOTE — COUNSELING
[Fall prevention counseling provided] : Fall prevention counseling provided [Adequate lighting] : Adequate lighting [Use proper foot wear] : Use proper foot wear [Use recommended devices] : Use recommended devices [Sleep ___ hours/day] : Sleep [unfilled] hours/day [Engage in a relaxing activity] : Engage in a relaxing activity [Plan in advance] : Plan in advance [Potential consequences of obesity discussed] : Potential consequences of obesity discussed [Benefits of weight loss discussed] : Benefits of weight loss discussed [Encouraged to maintain food diary] : Encouraged to maintain food diary [Encouraged to increase physical activity] : Encouraged to increase physical activity [Weigh Self Weekly] : weigh self weekly [Decrease Portions] : decrease portions [____ min/wk Activity] : [unfilled] min/wk activity [Keep Food Diary] : keep food diary [None] : None [Good understanding] : Patient has a good understanding of lifestyle changes and steps needed to achieve self management goal [Target Wt Loss Goal ___] : Weight Loss Goals: Target weight loss goal [unfilled] lbs [FreeTextEntry1] : diabetic low fat [FreeTextEntry2] : ideal weight 115-132  [de-identified] : healthy eating low fat low salt diet diabetic diet

## 2019-08-12 NOTE — DATA REVIEWED
[FreeTextEntry1] : reviewed ekg rate 66/min normal sinus rhythm lad qrs 86ms qt 398/417ms pr 160n ms p 106ms

## 2019-08-12 NOTE — HISTORY OF PRESENT ILLNESS
[Family Member] : family member [FreeTextEntry1] : cpe  [de-identified] : pt is a 86 yr old woman who has diabetes, osteopenia, hematuria and diverticulosis who came for her cpe.

## 2019-08-12 NOTE — PHYSICAL EXAM
[Well Developed] : well developed [Well Nourished] : well nourished [Normal Voice Quality] : was normal [Normal Verbal Skills] : the patient had normal verbal communication skills [Normal Nonverbal Skills] : normal nonverbal communication skills were demonstrated [Conjunctiva] : the conjunctiva were normal in both eyes [PERRL] : pupils were equal in size, round, and reactive to light [EOM Intact] : extraocular movements were intact [Normal Outer Ear/Nose] : the outer ears and nose were normal in appearance [Normal Oropharynx] : the oropharynx was normal [Normal TMs] : both tympanic membranes were normal [Normal Nasal Mucosa] : the nasal mucosa was normal [Normal Appearance] : was normal in appearance [Neck Supple] : was supple [No Tracheal Deviation] : the trachea was midline [Rate ___] : at [unfilled] breaths per minute [Normal Rhythm/Effort] : normal respiratory rhythm and effort [Clear Bilaterally] : the lungs were clear to auscultation bilaterally [Normal to Percussion] : the lungs were normal to percussion [5th Left ICS - MCL] : palpated at the 5th LICS in the midclavicular line [Normal Rate] : normal [Heart Rate ___] : [unfilled] bpm [Rhythm Regular] : regular [Normal S1] : normal S1 [Normal S2] : normal S2 [No Murmur] : no murmurs heard [No Pitting Edema] : no pitting edema present [2+] : left 2+ [No Abnormalities] : the abdominal aorta was not enlarged and no bruit was heard [Examination Of The Breasts] : a normal appearance [No Discharge] : no discharge [Flat] : flat [No Mass] : no masses were palpated [Soft, Nontender] : the abdomen was soft and nontender [No HSM] : no hepatosplenomegaly noted [None] : no hernias were palpable [Declined Rectal Exam] : declined rectal exam [No Lymphangitis] : no lymphangitis observed [Normal Kyphosis] : normal kyphosis [No Visual Abnormalities] : no visible abnormalities [Normal Lordosis] : normal lordosis [No Scoliosis] : no scoliosis [No Tenderness to Palpation] : no spine tenderness on palpation [No Masses] : no masses [Full ROM] : full ROM [No Pain with ROM] : no pain with motion in any direction [Intact] : all reflexes within normal limits bilaterally [Normal Station and Gait] : the gait and station were normal [Normal Motor Tone] : the muscle tone was normal [Involuntary Movements] : no involuntary movements were seen [Normal Scalp] : inspection of the scalp showed no abnormalities [Examination Of The Hair] : texture and distribution of hair was normal [Complexion Medium] : medium complexion [Skin Lesions 1] : Skin lesion: [Normal] : the deep tendon reflexes were normal [Normal Mental Status] : the patient's orientation, memory, attention, language and fund of knowledge were normal [Appropriate] : appropriate [Comprehensive Foot Exam Normal] : Right and left foot were examined and both feet are normal. No ulcers in either foot. Toes are normal and with full ROM.  Normal tactile sensation with monofilament testing throughout both feet [Enlarged Diffusely] : was not enlarged [JVP Elevated ___cm] : the JVP was not elevated [S4] : no S4 [S3] : no S3 [Rt] : no varicose veins of the right leg [Right Carotid Bruit] : no bruit heard over the right carotid [Lt] : no varicose veins of the left leg [Right Femoral Bruit] : no bruit heard over the right femoral artery [Left Carotid Bruit] : no bruit heard over the left carotid [Left Femoral Bruit] : no bruit heard over the left femoral artery [Bruit] : no bruit heard [Postauricular Lymph Nodes Enlarged Bilaterally] : nodes not enlarged [Preauricular Lymph Nodes Enlarged Bilaterally] : nodes not enlarged [Submandibular Lymph Nodes Enlarged Bilaterally] : nodes not enlarged [Suboccipital Lymph Nodes Enlarged Bilaterally] : nodes not enlarged [Submental Lymph Nodes Enlarged] : nodes not enlarged [Cervical Lymph Nodes Enlarged Posterior Bilaterally] : nodes not enlarged [Cervical Lymph Nodes Enlarged Anterior Bilaterally] : nodes not enlarged [Supraclavicular Lymph Nodes Enlarged Bilaterally] : nodes not enlarged [Axillary Lymph Nodes Enlarged Bilaterally] : nodes not enlarged [Epitrochlear Lymph Nodes Enlarged Bilaterally] : nodes not enlarged [Inguinal Lymph Nodes Enlarged Bilaterally] : nodes not enlarged [Femoral Lymph Nodes Enlarged Bilaterally] : nodes not enlarged [Impaired Insight] : intact insight [Impaired judgment] : intact judgment [de-identified] : declined  [de-identified] : teeth missing tongue normal , scar on left lower lip    [de-identified] : bilateral knee crepitations and hip pain and could not flex hammer toe on right foot

## 2019-08-12 NOTE — HEALTH RISK ASSESSMENT
[Good] : ~his/her~  mood as  good [No] : No [No falls in past year] : Patient reported no falls in the past year [0] : 2) Feeling down, depressed, or hopeless: Not at all (0) [Patient reported mammogram was abnormal] : Patient reported mammogram was abnormal [Patient reported bone density results were abnormal] : Patient reported bone density results were abnormal [HIV test declined] : HIV test declined [Hepatitis C test offered] : Hepatitis C test offered [None] : None [With Family] : lives with family [# of Members in Household ___] :  household currently consist of [unfilled] member(s) [Retired] : retired [Less Than High School] : less than high school [] :  [# Of Children ___] : has [unfilled] children [Feels Safe at Home] : Feels safe at home [Fully functional (bathing, dressing, toileting, transferring, walking, feeding)] : Fully functional (bathing, dressing, toileting, transferring, walking, feeding) [Fully functional (using the telephone, shopping, preparing meals, housekeeping, doing laundry, using] : Fully functional and needs no help or supervision to perform IADLs (using the telephone, shopping, preparing meals, housekeeping, doing laundry, using transportation, managing medications and managing finances) [Smoke Detector] : smoke detector [Carbon Monoxide Detector] : carbon monoxide detector [Safety elements used in home] : safety elements used in home [Seat Belt] :  uses seat belt [FreeTextEntry1] : none [] : No [de-identified] : low fat  [de-identified] : walking  [Change in mental status noted] : No change in mental status noted [[id number]] : 0 [Language] : denies difficulty with language [Behavior] : denies difficulty with behavior [Learning/Retaining New Information] : denies difficulty learning/retaining new information [Handling Complex Tasks] : denies difficulty handling complex tasks [Reasoning] : denies difficulty with reasoning [Sexually Active] : not sexually active [Spatial Ability and Orientation] : denies difficulty with spatial ability and orientation [Reports changes in hearing] : Reports no changes in hearing [Reports changes in dental health] : Reports no changes in dental health [Reports changes in vision] : Reports no changes in vision [Guns at Home] : no guns at home [Sunscreen] : does not use sunscreen [Travel to Developing Areas] : does not  travel to developing areas [TB Exposure] : is not being exposed to tuberculosis [Caregiver Concerns] : does not have caregiver concerns [MammogramDate] : 5/19 [MammogramComments] : needs mammogram andus of breast  [BoneDensityDate] : 2017 [BoneDensityComments] : osteopenia  [ColonoscopyDate] : 2016 [HepatitisCDate] : 8/18 [de-identified] : last eye exam 2019 [de-identified] : last year [AdvancecareDate] : 08/19

## 2019-09-20 ENCOUNTER — TRANSCRIPTION ENCOUNTER (OUTPATIENT)
Age: 84
End: 2019-09-20

## 2019-09-20 ENCOUNTER — OTHER (OUTPATIENT)
Age: 84
End: 2019-09-20

## 2019-09-30 ENCOUNTER — FORM ENCOUNTER (OUTPATIENT)
Age: 84
End: 2019-09-30

## 2019-10-01 ENCOUNTER — OUTPATIENT (OUTPATIENT)
Dept: OUTPATIENT SERVICES | Facility: HOSPITAL | Age: 84
LOS: 1 days | End: 2019-10-01
Payer: MEDICAID

## 2019-10-01 ENCOUNTER — APPOINTMENT (OUTPATIENT)
Dept: ULTRASOUND IMAGING | Facility: HOSPITAL | Age: 84
End: 2019-10-01
Payer: MEDICAID

## 2019-10-01 ENCOUNTER — APPOINTMENT (OUTPATIENT)
Dept: MAMMOGRAPHY | Facility: HOSPITAL | Age: 84
End: 2019-10-01
Payer: MEDICAID

## 2019-10-01 DIAGNOSIS — R92.8 OTHER ABNORMAL AND INCONCLUSIVE FINDINGS ON DIAGNOSTIC IMAGING OF BREAST: ICD-10-CM

## 2019-10-01 DIAGNOSIS — Z12.31 ENCOUNTER FOR SCREENING MAMMOGRAM FOR MALIGNANT NEOPLASM OF BREAST: ICD-10-CM

## 2019-10-01 PROCEDURE — 77066 DX MAMMO INCL CAD BI: CPT

## 2019-10-01 PROCEDURE — 77062 BREAST TOMOSYNTHESIS BI: CPT | Mod: 26

## 2019-10-01 PROCEDURE — G0279: CPT

## 2019-10-01 PROCEDURE — 76641 ULTRASOUND BREAST COMPLETE: CPT | Mod: 26,50

## 2019-10-01 PROCEDURE — 77066 DX MAMMO INCL CAD BI: CPT | Mod: 26

## 2019-10-01 PROCEDURE — 76641 ULTRASOUND BREAST COMPLETE: CPT

## 2019-10-02 ENCOUNTER — APPOINTMENT (OUTPATIENT)
Dept: MAMMOGRAPHY | Facility: IMAGING CENTER | Age: 84
End: 2019-10-02

## 2019-10-02 ENCOUNTER — APPOINTMENT (OUTPATIENT)
Dept: ULTRASOUND IMAGING | Facility: IMAGING CENTER | Age: 84
End: 2019-10-02

## 2019-10-03 ENCOUNTER — APPOINTMENT (OUTPATIENT)
Dept: NEUROLOGY | Facility: CLINIC | Age: 84
End: 2019-10-03

## 2019-10-14 ENCOUNTER — APPOINTMENT (OUTPATIENT)
Dept: INTERNAL MEDICINE | Facility: CLINIC | Age: 84
End: 2019-10-14
Payer: MEDICAID

## 2019-10-14 VITALS
DIASTOLIC BLOOD PRESSURE: 72 MMHG | SYSTOLIC BLOOD PRESSURE: 126 MMHG | HEIGHT: 61 IN | TEMPERATURE: 98.1 F | BODY MASS INDEX: 30.58 KG/M2 | OXYGEN SATURATION: 98 % | HEART RATE: 92 BPM | WEIGHT: 162 LBS

## 2019-10-14 DIAGNOSIS — Z87.2 PERSONAL HISTORY OF DISEASES OF THE SKIN AND SUBCUTANEOUS TISSUE: ICD-10-CM

## 2019-10-14 DIAGNOSIS — Z78.9 OTHER SPECIFIED HEALTH STATUS: ICD-10-CM

## 2019-10-14 DIAGNOSIS — R21 RASH AND OTHER NONSPECIFIC SKIN ERUPTION: ICD-10-CM

## 2019-10-14 DIAGNOSIS — L28.0 LICHEN SIMPLEX CHRONICUS: ICD-10-CM

## 2019-10-14 DIAGNOSIS — Z86.69 PERSONAL HISTORY OF OTHER DISEASES OF THE NERVOUS SYSTEM AND SENSE ORGANS: ICD-10-CM

## 2019-10-14 DIAGNOSIS — Z87.898 PERSONAL HISTORY OF OTHER SPECIFIED CONDITIONS: ICD-10-CM

## 2019-10-14 PROCEDURE — 99215 OFFICE O/P EST HI 40 MIN: CPT

## 2019-10-14 RX ORDER — INFLUENZA A VIRUS A/MICHIGAN/45/2015 X-275 (H1N1) ANTIGEN (FORMALDEHYDE INACTIVATED), INFLUENZA A VIRUS A/SINGAPORE/INFIMH-16-0019/2016 IVR-186 (H3N2) ANTIGEN (FORMALDEHYDE INACTIVATED), AND INFLUENZA B VIRUS B/MARYLAND/15/2016 BX-69A (A B/COLORADO/6/2017-LIKE VIRUS) ANTIGEN (FORMALDEHYDE INACTIVATED) 60; 60; 60 UG/.5ML; UG/.5ML; UG/.5ML
0.5 INJECTION, SUSPENSION INTRAMUSCULAR
Qty: 1 | Refills: 0 | Status: ACTIVE | COMMUNITY
Start: 2019-10-14 | End: 1900-01-01

## 2019-10-14 NOTE — HEALTH RISK ASSESSMENT
[No] : No [No falls in past year] : Patient reported no falls in the past year [0] : 1) Little interest or pleasure doing things: Not at all (0) [de-identified] : doesn't walk too much  [] : No [de-identified] : healthy diet  [[id number]] : 0

## 2019-10-14 NOTE — PAST MEDICAL HISTORY
[Postmenopausal] : postmenopausal [Menopause Age____] : age at menopause was [unfilled] [Total Preg ___] : G[unfilled] [Live Births ___] : P[unfilled]
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
## 2019-10-14 NOTE — REVIEW OF SYSTEMS
[Muscle Pain] : muscle pain [Headache] : headache [Negative] : Psychiatric [Pain] : no pain [Discharge] : no discharge [Vision Problems] : no vision problems [Redness] : no redness [Itching] : no itching [Joint Pain] : no joint pain [Joint Stiffness] : no joint stiffness [Joint Swelling] : no joint swelling [Muscle Weakness] : no muscle weakness [Back Pain] : no back pain [Dizziness] : no dizziness [Fainting] : no fainting [Confusion] : no confusion [Memory Loss] : no memory loss [Suicidal] : not suicidal [Insomnia] : no insomnia [Unsteady Walking] : no ataxia [Anxiety] : no anxiety [Depression] : no depression [FreeTextEntry2] : wt loss last year 5 pounds

## 2019-10-14 NOTE — PHYSICAL EXAM
[Kyphosis] : kyphosis [Grossly Normal Strength/Tone] : grossly normal strength/tone [No Joint Swelling] : no joint swelling [Normal] : normal gait, coordination grossly intact, no focal deficits and deep tendon reflexes were 2+ and symmetric [Speech Grossly Normal] : speech grossly normal [Scoliosis] : no scoliosis [de-identified] : Pt has lichen sclerosis with leathery texture, scaling, discoloration. [de-identified] : calf measure 13 inches bilaterally. no calf tenderness and negative homans sign bilateral no erythema sufflation to 190 mmhg on right and  left 165 mmhg and difference .

## 2019-10-14 NOTE — PLAN
[FreeTextEntry1] : Influenza Virus Vaccine (Inactivated) (in floo EN za VYE vidya vak SEEN, in ak ti LINSEY stella) \par \par COMMON USES:  It is used to prevent the flu. \par \par HOW TO USE THIS MEDICINE:  HOW IS THIS DRUG BEST TAKEN? Use this drug as ordered by your doctor. Read all information given to you. Follow all instructions closely. It is given as a shot into a muscle. HOW DO I STORE AND/OR THROW OUT THIS DRUG? If you need to store this drug at home, talk with your doctor, nurse, or pharmacist about how to store it. WHAT DO I DO IF I MISS A DOSE? Call your doctor to find out what to do. \par \par CAUTIONS:  Tell all of your health care providers that you take this drug. This includes your doctors, nurses, pharmacists, and dentists. If you have a latex allergy, talk with your doctor. If you are allergic to eggs, talk with the doctor. This drug may not protect all people who use it. Talk with the doctor. This drug is a vaccine with a virus that is not active. It cannot cause the disease. This drug is not a cure for the flu. It must be given before you are exposed to the flu in order to work. Most of the time, it takes a few weeks for this drug to work. This drug only protects you for 1 flu season. You will need to get the flu vaccine each year. Not all brands of vaccines are for all children. Talk with your child's doctor. Some children may need to have more than 1 dose of this vaccine. Talk with your child's doctor. Some children have had a fever and seizures caused by fevers with some flu vaccines. Most of the time, this happened in children younger than 5 years of age. Fever has also been seen in children 5 to younger than 9 years of age. Talk with your child's doctor. Tell your doctor if you are pregnant or plan on getting pregnant. You will need to talk about the benefits and risks of using this drug while you are pregnant. Tell your doctor if you are breast-feeding. You will need to talk about any risks to your baby. \par \par POSSIBLE SIDE EFFECTS:  WHAT ARE SOME SIDE EFFECTS THAT I NEED TO CALL MY DOCTOR ABOUT RIGHT AWAY? WARNING/CAUTION: Even though it may be rare, some people may have very bad and sometimes deadly side effects when taking a drug. Tell your doctor or get medical help right away if you have any of the following signs or symptoms that may be related to a very bad side effect: Signs of an allergic reaction, like rash; hives; itching; red, swollen, blistered, or peeling skin with or without fever; wheezing; tightness in the chest or throat; trouble breathing, swallowing, or talking; unusual hoarseness; or swelling of the mouth, face, lips, tongue, or throat. A burning, numbness, or tingling feeling that is not normal. Not able to move face muscles as much. Trouble controlling body movements. Very bad dizziness or passing out. Muscle weakness. Seizures. Very bad headache. Change in eyesight. WHAT ARE SOME OTHER SIDE EFFECTS OF THIS DRUG? All drugs may cause side effects. However, many people have no side effects or only have minor side effects. Call your doctor or get medical help if any of these side effects or any other side effects bother you or do not go away: For all patients taking this drug: Redness or swelling where the shot is given. Pain where the shot was given. Headache. Muscle or joint pain. Feeling tired or weak. Young children: Mild fever. Upset stomach or throwing up. Diarrhea. Not hungry. Stomach pain. Feeling sleepy. Feeling fussy. Crying that is not normal. These are not all of the side effects that may occur. If you have questions about side effects, call your doctor. Call your doctor for medical advice about side effects. You may report side effects to the FDA at 4-646-FMU-3928. You may also report side effects at http://www.fda.gov/medwatch. \par \par BEFORE USING THIS MEDICINE:  WHAT DO I NEED TO TELL MY DOCTOR BEFORE I TAKE THIS DRUG? TELL YOUR DOCTOR: If you have an allergy to any part of this drug. TELL YOUR DOCTOR: If you are allergic to any drugs like this one, any other drugs, foods, or other substances. Tell your doctor about the allergy and what signs you had, like rash; hives; itching; shortness of breath; wheezing; cough; swelling of face, lips, tongue, or throat; or any other signs. This drug may interact with other drugs or health problems. Tell your doctor and pharmacist about all of your drugs (prescription or OTC, natural products, vitamins) and health problems. You must check to make sure that it is safe for you to take this drug with all of your drugs and health problems. Do not start, stop, or change the dose of any drug without checking with your doctor. \par \par OVERDOSE:  If you think there has been an overdose, call your poison control center or get medical care right away. Be ready to tell or show what was taken, how much, and when it happened. \par \par ADDITIONAL INFORMATION:  If your symptoms or health problems do not get better or if they become worse, call your doctor. Do not share your drugs with others and do not take anyone else's drugs. Keep a list of all your drugs (prescription, natural products, vitamins, OTC) with you. Give this list to your doctor. Talk with the doctor before starting any new drug, including prescription or OTC, natural products, or vitamins. Keep all drugs in a safe place. Keep all drugs out of the reach of children and pets. Throw away unused or  drugs. Do not flush down a toilet or pour down a drain unless you are told to do so. Check with your pharmacist if you have questions about the best way to throw out drugs. There may be drug take-back programs in your area. Some drugs may have another patient information leaflet. Check with your pharmacist. If you have any questions about this drug, please talk with your doctor, nurse, pharmacist, or other health care provider. \par       \par

## 2019-10-14 NOTE — ASSESSMENT
[FreeTextEntry1] : Pt is here for assessment of bilateral LE pain that is getting worse- will get a venous duplex for bilateral legs  she will go aspa since there is a difference in her pressure tolerance between the right and left calf.  If pain worses go to er.  we discussed risks of pe.  B12 def resolve \par saw her ophthalmologist  last month- normal eye exam \par dental exam last year- pt will make an appointment\par neurology consult- pt will make a new appt as care take wasn't able to take her- she will also get her carotid\par Pt will get the flu vaccine at pharmacy \par \par I cant see mammogram and us of breast.  it is blank and will try and obtain results \par \par breast nodules stable complicated cysts on right and hypoechoic nodule stable  and left  normal \par COMMON USES:  It is used to prevent the flu. \par \par HOW TO USE THIS MEDICINE:  HOW IS THIS DRUG BEST TAKEN? Use this drug as ordered by your doctor. Read all information given to you. Follow all instructions closely. It is given as a shot into a muscle. HOW DO I STORE AND/OR THROW OUT THIS DRUG? If you need to store this drug at home, talk with your doctor, nurse, or pharmacist about how to store it. WHAT DO I DO IF I MISS A DOSE? Call your doctor to find out what to do. \par \par CAUTIONS:  Tell all of your health care providers that you take this drug. This includes your doctors, nurses, pharmacists, and dentists. If you have a latex allergy, talk with your doctor. If you are allergic to eggs, talk with the doctor. This drug may not protect all people who use it. Talk with the doctor. This drug is a vaccine with a virus that is not active. It cannot cause the disease. This drug is not a cure for the flu. It must be given before you are exposed to the flu in order to work. Most of the time, it takes a few weeks for this drug to work. This drug only protects you for 1 flu season. You will need to get the flu vaccine each year. Not all brands of vaccines are for all children. Talk with your child's doctor. Some children may need to have more than 1 dose of this vaccine. Talk with your child's doctor. Some children have had a fever and seizures caused by fevers with some flu vaccines. Most of the time, this happened in children younger than 5 years of age. Fever has also been seen in children 5 to younger than 9 years of age. Talk with your child's doctor. Tell your doctor if you are pregnant or plan on getting pregnant. You will need to talk about the benefits and risks of using this drug while you are pregnant. Tell your doctor if you are breast-feeding. You will need to talk about any risks to your baby. \par \par POSSIBLE SIDE EFFECTS:  WHAT ARE SOME SIDE EFFECTS THAT I NEED TO CALL MY DOCTOR ABOUT RIGHT AWAY? WARNING/CAUTION: Even though it may be rare, some people may have very bad and sometimes deadly side effects when taking a drug. Tell your doctor or get medical help right away if you have any of the following signs or symptoms that may be related to a very bad side effect: Signs of an allergic reaction, like rash; hives; itching; red, swollen, blistered, or peeling skin with or without fever; wheezing; tightness in the chest or throat; trouble breathing, swallowing, or talking; unusual hoarseness; or swelling of the mouth, face, lips, tongue, or throat. A burning, numbness, or tingling feeling that is not normal. Not able to move face muscles as much. Trouble controlling body movements. Very bad dizziness or passing out. Muscle weakness. Seizures. Very bad headache. Change in eyesight. WHAT ARE SOME OTHER SIDE EFFECTS OF THIS DRUG? All drugs may cause side effects. However, many people have no side effects or only have minor side effects. Call your doctor or get medical help if any of these side effects or any other side effects bother you or do not go away: For all patients taking this drug: Redness or swelling where the shot is given. Pain where the shot was given. Headache. Muscle or joint pain. Feeling tired or weak. Young children: Mild fever. Upset stomach or throwing up. Diarrhea. Not hungry. Stomach pain. Feeling sleepy. Feeling fussy. Crying that is not normal. These are not all of the side effects that may occur. If you have questions about side effects, call your doctor. Call your doctor for medical advice about side effects. You may report side effects to the FDA at 1-862-IDO-6064. You may also report side effects at http://www.fda.gov/medwatch. \par \par BEFORE USING THIS MEDICINE:  WHAT DO I NEED TO TELL MY DOCTOR BEFORE I TAKE THIS DRUG? TELL YOUR DOCTOR: If you have an allergy to any part of this drug. TELL YOUR DOCTOR: If you are allergic to any drugs like this one, any other drugs, foods, or other substances. Tell your doctor about the allergy and what signs you had, like rash; hives; itching; shortness of breath; wheezing; cough; swelling of face, lips, tongue, or throat; or any other signs. This drug may interact with other drugs or health problems. Tell your doctor and pharmacist about all of your drugs (prescription or OTC, natural products, vitamins) and health problems. You must check to make sure that it is safe for you to take this drug with all of your drugs and health problems. Do not start, stop, or change the dose of any drug without checking with your doctor. \par \par OVERDOSE:  If you think there has been an overdose, call your poison control center or get medical care right away. Be ready to tell or show what was taken, how much, and when it happened. \par \par ADDITIONAL INFORMATION:  If your symptoms or health problems do not get better or if they become worse, call your doctor. Do not share your drugs with others and do not take anyone else's drugs. Keep a list of all your drugs (prescription, natural products, vitamins, OTC) with you. Give this list to your doctor. Talk with the doctor before starting any new drug, including prescription or OTC, natural products, or vitamins. Keep all drugs in a safe place. Keep all drugs out of the reach of children and pets. Throw away unused or  drugs. Do not flush down a toilet or pour down a drain unless you are told to do so. Check with your pharmacist if you have questions about the best way to throw out drugs. There may be drug take-back programs in your area. Some drugs may have another patient information leaflet. Check with your pharmacist. If you have any questions about this drug, please talk with your doctor, nurse, pharmacist, or other health care provider. \par \par Copyright 2019 [employer]. All rights reserved.          \par \par

## 2019-10-14 NOTE — END OF VISIT
[>50% of Time Spent on Counseling and Coordination of Care for  ___] : Greater than 50% of the encounter time was spent on counseling and coordination of care for [unfilled] [] : Resident [Time Spent: ___ minutes] : I have spent [unfilled] minutes of face to face time with the patient [FreeTextEntry2] : I discussed with resident history plan pe and was done with resident

## 2019-10-14 NOTE — COUNSELING
[Fall prevention counseling provided] : Fall prevention counseling provided [____ min/wk Activity] : [unfilled] min/wk activity [Sleep ___ hours/day] : Sleep [unfilled] hours/day [Adequate lighting] : Adequate lighting [Use proper foot wear] : Use proper foot wear [No throw rugs] : No throw rugs [Use recommended devices] : Use recommended devices

## 2019-10-31 ENCOUNTER — FORM ENCOUNTER (OUTPATIENT)
Age: 84
End: 2019-10-31

## 2019-11-01 ENCOUNTER — OUTPATIENT (OUTPATIENT)
Dept: OUTPATIENT SERVICES | Facility: HOSPITAL | Age: 84
LOS: 1 days | End: 2019-11-01
Payer: MEDICAID

## 2019-11-01 ENCOUNTER — APPOINTMENT (OUTPATIENT)
Dept: ULTRASOUND IMAGING | Facility: HOSPITAL | Age: 84
End: 2019-11-01
Payer: MEDICAID

## 2019-11-01 DIAGNOSIS — M79.604 PAIN IN RIGHT LEG: ICD-10-CM

## 2019-11-01 DIAGNOSIS — R51 HEADACHE: ICD-10-CM

## 2019-11-01 DIAGNOSIS — R42 DIZZINESS AND GIDDINESS: ICD-10-CM

## 2019-11-01 PROCEDURE — 93880 EXTRACRANIAL BILAT STUDY: CPT | Mod: 26

## 2019-11-01 PROCEDURE — 93880 EXTRACRANIAL BILAT STUDY: CPT

## 2019-11-01 PROCEDURE — 93970 EXTREMITY STUDY: CPT | Mod: 26

## 2019-11-01 PROCEDURE — 93970 EXTREMITY STUDY: CPT

## 2019-11-06 ENCOUNTER — APPOINTMENT (OUTPATIENT)
Dept: NEUROLOGY | Facility: CLINIC | Age: 84
End: 2019-11-06
Payer: MEDICAID

## 2019-11-06 VITALS
HEART RATE: 81 BPM | DIASTOLIC BLOOD PRESSURE: 74 MMHG | SYSTOLIC BLOOD PRESSURE: 132 MMHG | WEIGHT: 162 LBS | TEMPERATURE: 97.8 F | BODY MASS INDEX: 30.58 KG/M2 | HEIGHT: 61 IN

## 2019-11-06 PROCEDURE — 99205 OFFICE O/P NEW HI 60 MIN: CPT

## 2019-11-06 NOTE — PHYSICAL EXAM
[General Appearance - Alert] : alert [Person] : oriented to person [Time] : oriented to time [Short Term Intact] : short term memory intact [Remote Intact] : remote memory intact [Registration Intact] : recent registration memory intact [Span Intact] : the attention span was normal [Concentration Intact] : normal concentrating ability [Visual Intact] : visual attention was ~T not ~L decreased [Naming Objects] : no difficulty naming common objects [Repeating Phrases] : no difficulty repeating a phrase [Writing A Sentence] : no difficulty writing a sentence [Fluency] : fluency intact [Comprehension] : comprehension intact [Reading] : reading intact [Past History] : adequate knowledge of personal past history [Cranial Nerves Optic (II)] : visual acuity intact bilaterally,  visual fields full to confrontation, pupils equal round and reactive to light [Cranial Nerves Oculomotor (III)] : extraocular motion intact [Cranial Nerves Trigeminal (V)] : facial sensation intact symmetrically [Cranial Nerves Facial (VII)] : face symmetrical [Cranial Nerves Vestibulocochlear (VIII)] : hearing was intact bilaterally [Cranial Nerves Glossopharyngeal (IX)] : tongue and palate midline [Cranial Nerves Accessory (XI - Cranial And Spinal)] : head turning and shoulder shrug symmetric [Cranial Nerves Hypoglossal (XII)] : there was no tongue deviation with protrusion [Motor Handedness Right-Handed] : the patient is right hand dominant [Motor Strength Lower Extremities Right] : there was weakness of the right lower extremity [Sensation Tactile Decrease] : light touch was intact [Sensation Pain / Temperature Decrease] : pain and temperature was intact [2+] : Patella left 2+ [1+] : Ankle jerk left 1+ [Sclera] : the sclera and conjunctiva were normal [PERRL With Normal Accommodation] : pupils were equal in size, round, reactive to light, with normal accommodation [Extraocular Movements] : extraocular movements were intact [Outer Ear] : the ears and nose were normal in appearance [Oropharynx] : the oropharynx was normal [Auscultation Breath Sounds / Voice Sounds] : lungs were clear to auscultation bilaterally [Heart Rate And Rhythm] : heart rate was normal and rhythm regular [Heart Sounds] : normal S1 and S2 [Heart Sounds Gallop] : no gallops [Murmurs] : no murmurs [Heart Sounds Pericardial Friction Rub] : no pericardial rub [Full Pulse] : the pedal pulses are present [Edema] : there was no peripheral edema [Bowel Sounds] : normal bowel sounds [Abdomen Soft] : soft [Abdomen Tenderness] : non-tender [Abdomen Mass (___ Cm)] : no abdominal mass palpated [No CVA Tenderness] : no ~M costovertebral angle tenderness [No Spinal Tenderness] : no spinal tenderness [Nail Clubbing] : no clubbing  or cyanosis of the fingernails [Antalgic Gait Right] : antalgic on the right [Monospasticity Of Right Leg] : spasticity of the right leg was present [Skin Color & Pigmentation] : normal skin color and pigmentation [Skin Turgor] : normal skin turgor [] : no rash [Place] : disoriented to place [Current Events] : inadequate knowledge of current events [Vocabulary] : inadequate range of vocabulary [Paresis Pronator Drift Right-Sided] : no pronator drift on the right [Paresis Pronator Drift Left-Sided] : no pronator drift on the left [Motor Strength Upper Extremities Bilaterally] : strength was normal in both upper extremities [Motor Strength Upper Extremities Right] : strength was normal in the right upper extremity [Motor Strength Upper Extremities Left] : strength was normal in the left upper extremity [Motor Strength Lower Extremities Left] : strength was normal in the left lower extremity [Dysesthesia] : no dysesthesia [Hyperesthesia] : no hyperesthesia [Vibration Decrease Leg / Foot Both Ankles] : normal at both ankles [Vibration Decrease Leg / Foot Toes Both Feet] : normal at the toes of both feet  [Limited Balance] : balance was intact [Dysdiadochokinesia Bilaterally] : not present [Coordination - Dysmetria Impaired Finger-to-Nose Bilateral] : not present [Coordination - Dysmetria Impaired Heel-to-Shin Bilateral] : not present [Plantar Reflex Right Only] : normal on the right [Plantar Reflex Left Only] : normal on the left [Apractic] : not apractic [Ataxic] : not ataxic [Ataxic, Wide-Based] : not wide-based and ataxic [Stooped] : not stooped [Shuffling] : not shuffling [Antalgic Gait Left] : not antalgic on the left

## 2019-11-06 NOTE — REVIEW OF SYSTEMS
[Confused or Disoriented] : confusion [Memory Lapses or Loss] : memory loss [Decr. Concentrating Ability] : decreased concentrating ability [Leg Weakness] : leg weakness [Numbness] : numbness [Migraine Headache] : migraine headaches [Difficulty Walking] : difficulty walking [Limping] : limping [Incontinence] : incontinence [Vaginal Discharge] : vaginal discharge [Joint Pain] : joint pain [Joint Stiffness] : joint stiffness [Limb Pain] : limb pain [Fever] : no fever [Chills] : no chills [Feeling Poorly] : not feeling poorly [Feeling Tired] : not feeling tired [Recent Weight Gain (___ Lbs)] : no recent weight gain [Recent Weight Loss (___ Lbs)] : no recent weight loss [Difficulty with Language] : no ~M difficulty with language [Changed Thought Patterns] : no change in thought patterns [Repeating Questions] : no repeated questioning about recent events [Facial Weakness] : no facial weakness [Arm Weakness] : no arm weakness [Hand Weakness] : no hand weakness [Poor Coordination] : good coordination [Difficulty Writing] : no difficulty writing [Difficulties in Speech] : no speech difficulties [Tingling] : no tingling [Hypersensitivity] : no hypersensitivity [Seizures] : no convulsions [Dizziness] : no dizziness [Fainting] : no fainting [Lightheadedness] : no lightheadedness [Vertigo] : no vertigo [Inability to Walk] : able to walk [Ataxia] : no ataxia [Frequent Falls] : not falling [Suicidal] : not suicidal [Sleep Disturbances] : no sleep disturbances [Anxiety] : no anxiety [Depression] : no depression [Change In Personality] : no personality change [Emotional Problems] : no emotional problems [Eye Pain] : no eye pain [Red Eyes] : eyes not red [Eyesight Problems] : no eyesight problems [Discharge From Eyes] : no purulent discharge from the eyes [Dry Eyes] : no dryness of the eyes [Eyes Itch] : no itching of the eyes [Earache] : no earache [Loss Of Hearing] : no hearing loss [Nosebleeds] : no nosebleeds [Nasal Discharge] : no nasal discharge [Sore Throat] : no sore throat [Hoarseness] : no hoarseness [Heart Rate Is Slow] : the heart rate was not slow [Heart Rate Is Fast] : the heart rate was not fast [Chest Pain] : no chest pain [Palpitations] : no palpitations [Leg Claudication] : no intermittent leg claudication [Lower Ext Edema] : no lower extremity edema [Shortness Of Breath] : no shortness of breath [Wheezing] : no wheezing [Cough] : no cough [SOB on Exertion] : no shortness of breath during exertion [Orthopnea] : no orthopnea [PND] : no PND [Abdominal Pain] : no abdominal pain [Vomiting] : no vomiting [Constipation] : no constipation [Diarrhea] : no diarrhea [Heartburn] : no heartburn [Melena] : no melena [Dysuria] : no dysuria [Pelvic Pain] : no pelvic pain [Dysmenorrhea] : no dysmenorrhea [Abn Vaginal Bleeding] : no unexplained vaginal bleeding [Arthralgias] : no arthralgias [Joint Swelling] : no joint swelling [Limb Swelling] : no limb swelling [Skin Lesions] : no skin lesions [Skin Wound] : no skin wound [Itching] : no itching [Change In A Mole] : no change in a mole [Breast Pain] : no breast pain [Breast Lump] : no breast lump [Proptosis] : no proptosis [Hot Flashes] : no hot flashes [Muscle Weakness] : no muscle weakness [Deepening Of The Voice] : no deepening of the voice [Easy Bleeding] : no tendency for easy bleeding [Easy Bruising] : no tendency for easy bruising [Swollen Glands] : no swollen glands [Swollen Glands In The Neck] : no swollen glands in the neck

## 2019-11-06 NOTE — ASSESSMENT
[FreeTextEntry1] : Fixed flexion right thigh and restricted mobility suggests right hip disease. Urinary incontinence could be age related, central or due to lumbar canal stenosis. MRI lumbar spine and XRay right hip are the next steps before Physical Therapy can be prescribed correctly. She also has signs of dementia. I have reviewed the MRI and carotid doppler. There are no immediate surgical treatments required. 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 If her low score on the MMSE is not due to educational status she could be diagnosed with AD.. Also recommend NCV/ EMG to investigate and differentiate neuropathy and radiculopathy as causes of lower extremity weakness
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
## 2019-12-04 ENCOUNTER — APPOINTMENT (OUTPATIENT)
Dept: INTERNAL MEDICINE | Facility: CLINIC | Age: 84
End: 2019-12-04
Payer: MEDICAID

## 2019-12-04 VITALS
HEIGHT: 61 IN | BODY MASS INDEX: 30.58 KG/M2 | SYSTOLIC BLOOD PRESSURE: 148 MMHG | HEART RATE: 82 BPM | TEMPERATURE: 98.2 F | OXYGEN SATURATION: 98 % | DIASTOLIC BLOOD PRESSURE: 86 MMHG | WEIGHT: 162 LBS

## 2019-12-04 VITALS — SYSTOLIC BLOOD PRESSURE: 141 MMHG | DIASTOLIC BLOOD PRESSURE: 79 MMHG

## 2019-12-04 DIAGNOSIS — H57.89 OTHER SPECIFIED DISORDERS OF EYE AND ADNEXA: ICD-10-CM

## 2019-12-04 DIAGNOSIS — E53.8 DEFICIENCY OF OTHER SPECIFIED B GROUP VITAMINS: ICD-10-CM

## 2019-12-04 DIAGNOSIS — B35.4 TINEA CORPORIS: ICD-10-CM

## 2019-12-04 DIAGNOSIS — J06.9 ACUTE UPPER RESPIRATORY INFECTION, UNSPECIFIED: ICD-10-CM

## 2019-12-04 DIAGNOSIS — M79.604 PAIN IN RIGHT LEG: ICD-10-CM

## 2019-12-04 DIAGNOSIS — M79.605 PAIN IN RIGHT LEG: ICD-10-CM

## 2019-12-04 DIAGNOSIS — R51 HEADACHE: ICD-10-CM

## 2019-12-04 DIAGNOSIS — N39.0 URINARY TRACT INFECTION, SITE NOT SPECIFIED: ICD-10-CM

## 2019-12-04 DIAGNOSIS — Z87.2 PERSONAL HISTORY OF DISEASES OF THE SKIN AND SUBCUTANEOUS TISSUE: ICD-10-CM
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
## 2019-12-04 DIAGNOSIS — Z87.898 PERSONAL HISTORY OF OTHER SPECIFIED CONDITIONS: ICD-10-CM

## 2019-12-04 PROCEDURE — 99214 OFFICE O/P EST MOD 30 MIN: CPT

## 2019-12-04 NOTE — PHYSICAL EXAM
[PERRL] : pupils equal round and reactive to light [Supple] : supple [Normal Oropharynx] : the oropharynx was normal [Normal] : no respiratory distress, lungs were clear to auscultation bilaterally and no accessory muscle use [Regular Rhythm] : with a regular rhythm [Normal Rate] : normal rate  [No Edema] : there was no peripheral edema [Normal S1, S2] : normal S1 and S2 [No Extremity Clubbing/Cyanosis] : no extremity clubbing/cyanosis [Soft] : abdomen soft [Non Tender] : non-tender [Normal Anterior Cervical Nodes] : no anterior cervical lymphadenopathy [Normal Bowel Sounds] : normal bowel sounds [Kyphosis] : kyphosis [No Rash] : no rash [Scoliosis] : scoliosis [Normal Mood] : the mood was normal [de-identified] : multiple teeth missing no upper teeth only 4 on bottom [de-identified] : fullness of her colon she didn’t go to br today.   [de-identified] : bilateral knee crepitation and pain with flexion and extension and flexion of hips bilateral  [de-identified] : walks with cane .

## 2019-12-04 NOTE — ASSESSMENT
[FreeTextEntry1] :  spinal stenosis will order her  walker and will need pt  . Magdalena is being evaluated by neurologist.  Leg pain is likely due to her stenosis of spine but will fu on the xrays of her hips and could have degenerative disease  but is not a good candidate for replacement .    hpn DASH stands for Dietary Approaches to Stop Hypertension. The DASH diet can help lower high blood pressure and cholesterol and other fats in your blood. It can help lower your risk for heart attack and stroke and help you lose weight. This diet is low in sodium (salt) and rich in nutrients.\par \par \par \par \par \par How DASH Works\par \par \par \par \par \par \par The DASH diet reduces high blood pressure by lowering the amount of sodium in your diet to 2300 milligrams (mg) a day. Lowering sodium to 1500 mg a day reduces blood pressure even more. It also includes variety of foods rich in nutrients that help lower blood pressure, such as potassium, calcium, and magnesium.\par \par On the DASH diet, you will:\par •Get plenty of vegetables, fruits, and fat-free or low-fat dairy\par •Include whole grains, beans, seeds, nuts, and vegetable oils\par •Eat lean meats, poultry, and fish\par •Cut back on salt, red meat, sweets, and sugary drinks\par •Limit alcoholic beverages \par \par You should also get at least 30 minutes of moderate-intensity exercise most days of the week. Examples include brisk walking or riding a bike. Aim to get 2 hours and 30 minutes of exercise per week.\par \par You can follow the DASH diet if you want to prevent high blood pressure. It can also help you lose extra weight. Most people can benefit from lowering sodium intake to 2300 milligrams (mg) a day.\par \par Your health care provider may suggest cutting back to 1500 mg a day if you:\par •Already have high blood pressure\par •Have diabetes or chronic kidney disease\par •Are \par •Are age 51 or older \par \par If you take medicine to treat high blood pressure, do not stop taking your medicine while on the DASH diet. Be sure to tell your provider you are following the DASH diet.\par \par \par \par \par \par How to get Started\par \par \par \par \par \par \par On the DASH diet, you can eat foods from all food groups. But you eat more of the foods that are naturally low in salt, cholesterol, and saturated fats. You will also include foods that are high in potassium, calcium, magnesium, and fiber.\par \par Here's a list of the food groups and how many servings of each you should have per day. For a diet that has 2000 calories per day, you should eat:\par •Vegetables (4 to 5 servings a day)\par •Fruits (4 to 5 servings a day)\par •Low-fat or fat-free dairy products, such as milk and yogurt (2 to 3 servings a day)\par •Whole grains (7 to 8 servings a day, and 3 should be whole grains)\par •Fish, lean meats, and poultry (2 servings or less a day)\par •Beans, seeds, and nuts (4 to 5 servings a week)\par •Fats and oils (2 to 3 servings a day)\par •Sweets or added sugars, such as jelly, hard candy, maple syrup, sorbet, and sugar (fewer than 5 servings a week) \par \par The number of servings you have each day depends on how many calories you need.\par •If you're trying to lose weight, you may need fewer servings than listed.\par •If you are not very active, aim for the lower number of servings listed.\par •If you are moderately active, have the higher number of servings.\par •If you are very active, you may need more servings than listed.\par \par Your provider can help find the right number of servings a day for you.\par \par \par \par \par \par Know your Serving Sizes\par \par \par \par \par \par \par To know how much to eat, you need to know serving sizes. Below are sample servings for each food group.\par \par Vegetables:\par •1 cup (70 grams) raw leafy vegetables\par •½ cup (90 grams) chopped raw or cooked vegetables \par \par Fruits:\par •1 medium fruit (6 ounces or 168 grams)\par •½ cup (70 grams) fresh, frozen, or canned fruit\par •¼ cup (25 grams) dried fruit \par \par Fat-free or low-fat dairy products:\par •1 cup (240 milliliters) milk or yogurt\par •1½ ounce (oz) or 50 grams (g) cheese \par \par Whole grains (Aim to make all of your grain choices whole grain. Whole grain products contain more fiber and protein than "refined" grain products.):\par •1 slice bread\par •½ cup (80 grams) cooked rice, pasta, or cereal \par \par Lean meats, poultry, and fish:\par •3 oz (85 g) of cooked fish, lean meat, or poultry \par \par Nuts, seeds, and legumes:\par •½ cup (90 grams) cooked legumes (dried beans, peas)\par •1/3 cup (45 grams) nuts\par •1 tablespoon (10 grams) seeds \par \par Fats and oils:\par •1 teaspoon (5 milliliters) vegetable oil\par •2 tablespoons (30 grams) low-fat salad dressing\par •1 teaspoon (5 grams) soft margarine \par \par Sweets and added sugars:\par •1 tablespoon (15 grams) sugar\par •1 tablespoon (15 grams) jelly or jam\par •½ cup (70 grams) sorbet, gelatin dessert \par \par \par \par \par \par Tips for Following DASH\par \par \par \par \par \par \par It's easy to follow the DASH diet. But it might mean making some changes to how you currently eat. To get started:\par •DO NOT try to make changes all at once. It's fine to change your eating habits gradually.\par •To add vegetables to your diet, try having a salad at lunch. Or, add cucumber, lettuce, shredded carrots, or tomatoes to your sandwiches.\par •There should always been something green on your plate. It's fine to use frozen vegetables instead of fresh. Just make sure the package does not contain added salt or fat.\par •Add sliced fruit to your cereal or oatmeal for breakfast.\par •For dessert, choose fresh fruit or low-fat frozen yogurt instead of high-calorie sweets, such as cakes or pies.\par •Choose healthy snacks, such as unsalted rice cakes or popcorn, raw vegetables, or yogurt. Dried fruits, seeds, and nuts also make great snack choices. Just keep these portions small.\par •Think of meat as part of your meal, instead of the main course. Limit your servings of lean meat to 6 ounces (170 grams) a day. You can have two 3-ounce (85 grams) servings during the day.\par •Try cooking without meat at least twice each week. Instead, eat beans, nuts, tofu, or eggs for your protein.\par \par \par \par \par \par Tips to Lower Your Salt\par \par \par \par \par \par \par To lower the amount of salt in your diet:\par •Take the saltshaker off the table.\par •Flavor your food with herbs and spices instead of salt. Lemon, lime, and vinegar also add flavor.\par •Avoid canned foods and frozen entrees. They are often high in salt. When you make things from scratch you have more control over how much salt goes in them.\par •Check all food labels for sodium. You may be surprised at how much you find, and where you find it. Frozen dinners, soups, salad dressings, and prepared foods often have a lot of sodium.\par •Choose foods that contain less than 5% for the daily value of sodium.\par •Look for low-sodium versions of foods when you can find them.\par •Limit foods and condiments that have a lot of salt, such as pickles, olives, cured meats, ketchup, soy sauce, mustard, and barbeque sauce.\par •When dining out, ask that your food be made with no added salt or MSG.   we might need to increase her lisnopril to 5 mgs .  \par \par \par DM well controlled on no medication and on diet.    \par \par hyperlipidemia next visit repeat  on fast.  \par \par

## 2019-12-04 NOTE — HISTORY OF PRESENT ILLNESS
[Formal Caregiver] : formal caregiver [de-identified] : Pt  is feeling well . She  is a dm and still has pain in her legs. She as seen neurologist and I appreciate his note .She is having pain in her right hip and is to have xrays.  She has spinal stenosis and neurogenic claudication.  She is to have mri of spine and nerve conduction studies.  She has carotid study and was normal and doppler of her venous was negative.   She was recently seen by dentist and she has diseased teeth and gums and is to have all her teeth removed and dentures made.

## 2019-12-19 ENCOUNTER — OTHER (OUTPATIENT)
Age: 84
End: 2019-12-19

## 2019-12-22 LAB
25(OH)D3 SERPL-MCNC: 38.3 NG/ML
ALBUMIN SERPL ELPH-MCNC: 4.1 G/DL
ALP BLD-CCNC: 76 U/L
ALT SERPL-CCNC: 12 U/L
ANION GAP SERPL CALC-SCNC: 12 MMOL/L
AST SERPL-CCNC: 13 U/L
BILIRUB SERPL-MCNC: 0.3 MG/DL
BUN SERPL-MCNC: 21 MG/DL
CALCIUM SERPL-MCNC: 9.3 MG/DL
CHLORIDE SERPL-SCNC: 104 MMOL/L
CHOLEST SERPL-MCNC: 194 MG/DL
CHOLEST/HDLC SERPL: 4.6 RATIO
CO2 SERPL-SCNC: 25 MMOL/L
CREAT SERPL-MCNC: 0.74 MG/DL
ESTIMATED AVERAGE GLUCOSE: 120 MG/DL
FRUCTOSAMINE SERPL-MCNC: 202 UMOL/L
GLUCOSE SERPL-MCNC: 96 MG/DL
HBA1C MFR BLD HPLC: 5.8 %
HDLC SERPL-MCNC: 42 MG/DL
LDLC SERPL CALC-MCNC: 106 MG/DL
POTASSIUM SERPL-SCNC: 5 MMOL/L
PROT SERPL-MCNC: 6.6 G/DL
SODIUM SERPL-SCNC: 141 MMOL/L
TRIGL SERPL-MCNC: 232 MG/DL

## 2019-12-25 ENCOUNTER — FORM ENCOUNTER (OUTPATIENT)
Age: 84
End: 2019-12-25

## 2019-12-26 ENCOUNTER — APPOINTMENT (OUTPATIENT)
Dept: MRI IMAGING | Facility: HOSPITAL | Age: 84
End: 2019-12-26
Payer: MEDICAID

## 2019-12-26 ENCOUNTER — OUTPATIENT (OUTPATIENT)
Dept: OUTPATIENT SERVICES | Facility: HOSPITAL | Age: 84
LOS: 1 days | End: 2019-12-26
Payer: MEDICAID

## 2019-12-26 DIAGNOSIS — M48.062 SPINAL STENOSIS, LUMBAR REGION WITH NEUROGENIC CLAUDICATION: ICD-10-CM

## 2019-12-26 DIAGNOSIS — M25.559 PAIN IN UNSPECIFIED HIP: ICD-10-CM

## 2019-12-26 PROCEDURE — 72158 MRI LUMBAR SPINE W/O & W/DYE: CPT | Mod: 26

## 2019-12-26 PROCEDURE — 73502 X-RAY EXAM HIP UNI 2-3 VIEWS: CPT | Mod: 26,RT

## 2019-12-26 PROCEDURE — 73502 X-RAY EXAM HIP UNI 2-3 VIEWS: CPT

## 2019-12-26 PROCEDURE — 72158 MRI LUMBAR SPINE W/O & W/DYE: CPT

## 2020-01-16 ENCOUNTER — APPOINTMENT (OUTPATIENT)
Dept: NEUROLOGY | Facility: CLINIC | Age: 85
End: 2020-01-16
Payer: MEDICAID

## 2020-01-16 VITALS
DIASTOLIC BLOOD PRESSURE: 77 MMHG | SYSTOLIC BLOOD PRESSURE: 127 MMHG | OXYGEN SATURATION: 97 % | TEMPERATURE: 98 F | HEART RATE: 98 BPM | HEIGHT: 61 IN | WEIGHT: 162 LBS | BODY MASS INDEX: 30.58 KG/M2

## 2020-01-16 PROCEDURE — 99215 OFFICE O/P EST HI 40 MIN: CPT

## 2020-01-16 RX ORDER — NUT.TX.GLUC.INTOLER,LAC-FR,SOY
LIQUID (ML) ORAL
Qty: 24 | Refills: 0 | Status: ACTIVE | COMMUNITY
Start: 2020-01-16 | End: 1900-01-01

## 2020-01-16 NOTE — PHYSICAL EXAM
[General Appearance - Alert] : alert [Person] : oriented to person [Time] : oriented to time [Short Term Intact] : short term memory intact [Remote Intact] : remote memory intact [Registration Intact] : recent registration memory intact [Span Intact] : the attention span was normal [Concentration Intact] : normal concentrating ability [Visual Intact] : visual attention was ~T not ~L decreased [Repeating Phrases] : no difficulty repeating a phrase [Naming Objects] : no difficulty naming common objects [Comprehension] : comprehension intact [Fluency] : fluency intact [Writing A Sentence] : no difficulty writing a sentence [Reading] : reading intact [Past History] : adequate knowledge of personal past history [Cranial Nerves Trigeminal (V)] : facial sensation intact symmetrically [Cranial Nerves Oculomotor (III)] : extraocular motion intact [Cranial Nerves Optic (II)] : visual acuity intact bilaterally,  visual fields full to confrontation, pupils equal round and reactive to light [Cranial Nerves Facial (VII)] : face symmetrical [Cranial Nerves Vestibulocochlear (VIII)] : hearing was intact bilaterally [Cranial Nerves Glossopharyngeal (IX)] : tongue and palate midline [Cranial Nerves Accessory (XI - Cranial And Spinal)] : head turning and shoulder shrug symmetric [Cranial Nerves Hypoglossal (XII)] : there was no tongue deviation with protrusion [Motor Handedness Right-Handed] : the patient is right hand dominant [Motor Strength Lower Extremities Right] : there was weakness of the right lower extremity [Sensation Tactile Decrease] : light touch was intact [Sensation Pain / Temperature Decrease] : pain and temperature was intact [2+] : Patella left 2+ [1+] : Ankle jerk left 1+ [Sclera] : the sclera and conjunctiva were normal [PERRL With Normal Accommodation] : pupils were equal in size, round, reactive to light, with normal accommodation [Oropharynx] : the oropharynx was normal [Outer Ear] : the ears and nose were normal in appearance [Extraocular Movements] : extraocular movements were intact [Auscultation Breath Sounds / Voice Sounds] : lungs were clear to auscultation bilaterally [Heart Sounds] : normal S1 and S2 [Heart Sounds Gallop] : no gallops [Heart Rate And Rhythm] : heart rate was normal and rhythm regular [Heart Sounds Pericardial Friction Rub] : no pericardial rub [Murmurs] : no murmurs [Full Pulse] : the pedal pulses are present [Bowel Sounds] : normal bowel sounds [Edema] : there was no peripheral edema [Abdomen Tenderness] : non-tender [Abdomen Soft] : soft [Abdomen Mass (___ Cm)] : no abdominal mass palpated [No CVA Tenderness] : no ~M costovertebral angle tenderness [No Spinal Tenderness] : no spinal tenderness [Nail Clubbing] : no clubbing  or cyanosis of the fingernails [Antalgic Gait Right] : antalgic on the right [Monospasticity Of Right Leg] : spasticity of the right leg was present [Skin Color & Pigmentation] : normal skin color and pigmentation [Skin Turgor] : normal skin turgor [] : no rash [Place] : disoriented to place [Current Events] : inadequate knowledge of current events [Vocabulary] : inadequate range of vocabulary [Paresis Pronator Drift Right-Sided] : no pronator drift on the right [Paresis Pronator Drift Left-Sided] : no pronator drift on the left [Motor Strength Upper Extremities Bilaterally] : strength was normal in both upper extremities [Motor Strength Upper Extremities Right] : strength was normal in the right upper extremity [Motor Strength Upper Extremities Left] : strength was normal in the left upper extremity [Dysesthesia] : no dysesthesia [Motor Strength Lower Extremities Left] : strength was normal in the left lower extremity [Hyperesthesia] : no hyperesthesia [Vibration Decrease Leg / Foot Toes Both Feet] : normal at the toes of both feet  [Vibration Decrease Leg / Foot Both Ankles] : normal at both ankles [Dysdiadochokinesia Bilaterally] : not present [Limited Balance] : balance was intact [Coordination - Dysmetria Impaired Finger-to-Nose Bilateral] : not present [Coordination - Dysmetria Impaired Heel-to-Shin Bilateral] : not present [Plantar Reflex Left Only] : normal on the left [Plantar Reflex Right Only] : normal on the right [Apractic] : not apractic [Ataxic] : not ataxic [Ataxic, Wide-Based] : not wide-based and ataxic [Shuffling] : not shuffling [Stooped] : not stooped [Antalgic Gait Left] : not antalgic on the left
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
## 2020-01-16 NOTE — REVIEW OF SYSTEMS
[Confused or Disoriented] : confusion [Memory Lapses or Loss] : memory loss [Decr. Concentrating Ability] : decreased concentrating ability [Leg Weakness] : leg weakness [Numbness] : numbness [Migraine Headache] : migraine headaches [Difficulty Walking] : difficulty walking [Limping] : limping [Incontinence] : incontinence [Vaginal Discharge] : vaginal discharge [Joint Pain] : joint pain [Limb Pain] : limb pain [Joint Stiffness] : joint stiffness [Fever] : no fever [Chills] : no chills [Feeling Poorly] : not feeling poorly [Recent Weight Gain (___ Lbs)] : no recent weight gain [Feeling Tired] : not feeling tired [Recent Weight Loss (___ Lbs)] : no recent weight loss [Difficulty with Language] : no ~M difficulty with language [Changed Thought Patterns] : no change in thought patterns [Repeating Questions] : no repeated questioning about recent events [Facial Weakness] : no facial weakness [Hand Weakness] : no hand weakness [Arm Weakness] : no arm weakness [Poor Coordination] : good coordination [Difficulty Writing] : no difficulty writing [Difficulties in Speech] : no speech difficulties [Tingling] : no tingling [Hypersensitivity] : no hypersensitivity [Seizures] : no convulsions [Dizziness] : no dizziness [Fainting] : no fainting [Lightheadedness] : no lightheadedness [Vertigo] : no vertigo [Inability to Walk] : able to walk [Ataxia] : no ataxia [Frequent Falls] : not falling [Suicidal] : not suicidal [Sleep Disturbances] : no sleep disturbances [Anxiety] : no anxiety [Depression] : no depression [Change In Personality] : no personality change [Emotional Problems] : no emotional problems [Red Eyes] : eyes not red [Eye Pain] : no eye pain [Eyesight Problems] : no eyesight problems [Discharge From Eyes] : no purulent discharge from the eyes [Dry Eyes] : no dryness of the eyes [Eyes Itch] : no itching of the eyes [Earache] : no earache [Loss Of Hearing] : no hearing loss [Nosebleeds] : no nosebleeds [Sore Throat] : no sore throat [Nasal Discharge] : no nasal discharge [Heart Rate Is Slow] : the heart rate was not slow [Hoarseness] : no hoarseness [Heart Rate Is Fast] : the heart rate was not fast [Leg Claudication] : no intermittent leg claudication [Palpitations] : no palpitations [Chest Pain] : no chest pain [Lower Ext Edema] : no lower extremity edema [Shortness Of Breath] : no shortness of breath [Wheezing] : no wheezing [Cough] : no cough [SOB on Exertion] : no shortness of breath during exertion [Orthopnea] : no orthopnea [PND] : no PND [Abdominal Pain] : no abdominal pain [Vomiting] : no vomiting [Constipation] : no constipation [Heartburn] : no heartburn [Diarrhea] : no diarrhea [Melena] : no melena [Dysuria] : no dysuria [Pelvic Pain] : no pelvic pain [Dysmenorrhea] : no dysmenorrhea [Abn Vaginal Bleeding] : no unexplained vaginal bleeding [Arthralgias] : no arthralgias [Joint Swelling] : no joint swelling [Limb Swelling] : no limb swelling [Skin Lesions] : no skin lesions [Skin Wound] : no skin wound [Itching] : no itching [Change In A Mole] : no change in a mole [Breast Pain] : no breast pain [Breast Lump] : no breast lump [Proptosis] : no proptosis [Hot Flashes] : no hot flashes [Deepening Of The Voice] : no deepening of the voice [Muscle Weakness] : no muscle weakness [Easy Bleeding] : no tendency for easy bleeding [Easy Bruising] : no tendency for easy bruising [Swollen Glands] : no swollen glands [Swollen Glands In The Neck] : no swollen glands in the neck

## 2020-01-16 NOTE — DISCUSSION/SUMMARY
[FreeTextEntry1] : Reviewed MRi spine and X ray hip; she has severe lumbar stenosis at L4-L5 and at moderate stenosis at other levels; she also has severe right hip osteoarthritis.\par Recommend consult spine surgery , orthopaedics surgery and pain management. Advise strongly not to undertake a long direct flight to [facility] that was booked earlier.

## 2020-01-17 ENCOUNTER — EMERGENCY (EMERGENCY)
Facility: HOSPITAL | Age: 85
LOS: 1 days | Discharge: HOME | End: 2020-01-17
Attending: EMERGENCY MEDICINE
Payer: MEDICAID

## 2020-01-17 ENCOUNTER — TRANSCRIPTION ENCOUNTER (OUTPATIENT)
Age: 85
End: 2020-01-17

## 2020-01-17 VITALS — TEMPERATURE: 98 F

## 2020-01-17 VITALS
RESPIRATION RATE: 16 BRPM | WEIGHT: 134.92 LBS | DIASTOLIC BLOOD PRESSURE: 73 MMHG | HEART RATE: 75 BPM | SYSTOLIC BLOOD PRESSURE: 136 MMHG | OXYGEN SATURATION: 98 % | TEMPERATURE: 98 F

## 2020-01-17 LAB
FLU A RESULT: SIGNIFICANT CHANGE UP
FLU A RESULT: SIGNIFICANT CHANGE UP
FLUAV AG NPH QL: SIGNIFICANT CHANGE UP
FLUBV AG NPH QL: DETECTED
RSV RESULT: SIGNIFICANT CHANGE UP
RSV RNA RESP QL NAA+PROBE: SIGNIFICANT CHANGE UP

## 2020-01-17 PROCEDURE — 71046 X-RAY EXAM CHEST 2 VIEWS: CPT | Mod: 26

## 2020-01-17 PROCEDURE — 99283 EMERGENCY DEPT VISIT LOW MDM: CPT | Mod: 25

## 2020-01-17 PROCEDURE — 87631 RESP VIRUS 3-5 TARGETS: CPT

## 2020-01-17 PROCEDURE — 71046 X-RAY EXAM CHEST 2 VIEWS: CPT

## 2020-01-17 PROCEDURE — 99283 EMERGENCY DEPT VISIT LOW MDM: CPT

## 2020-01-17 RX ORDER — ACETAMINOPHEN 500 MG
2 TABLET ORAL
Qty: 30 | Refills: 0
Start: 2020-01-17

## 2020-01-17 RX ORDER — IBUPROFEN 200 MG
1 TABLET ORAL
Qty: 30 | Refills: 0
Start: 2020-01-17

## 2020-01-17 NOTE — ED PROVIDER NOTE - PATIENT PORTAL LINK FT
You can access the FollowMyHealth Patient Portal offered by [facility] by registering at the following website: [email or web address]. By joining [facility]’s FollowMyHealth portal, you will also be able to view your health information using other applications (apps) compatible with our system.

## 2020-01-17 NOTE — ED PROVIDER NOTE - OBJECTIVE STATEMENT
85 y/o F patient with a significant PMHx of HTN, HLD, DM presents to the ED for flu like illness. Patient endorses cough, body aches, and throat pain. Patient relates restlessness and difficulty sleeping at night. Patient is tolerating PO. Patient states she took tylenol with minimal relief prior to arrival. Patient denies any fever, chills, sweats, urinary symptoms, nausea, vomiting, bowel changes, or any other complaints.

## 2020-01-17 NOTE — ED ADULT NURSE NOTE - NSIMPLEMENTINTERV_GEN_ALL_ED
Implemented All Universal Safety Interventions:  [place] to call system. Call bell, personal items and telephone within reach. Instruct patient to call for assistance. Room bathroom lighting operational. Non-slip footwear when patient is off stretcher. Physically safe environment: no spills, clutter or unnecessary equipment. Stretcher in lowest position, wheels locked, appropriate side rails in place.

## 2020-01-22 ENCOUNTER — APPOINTMENT (OUTPATIENT)
Dept: SPINE | Facility: CLINIC | Age: 85
End: 2020-01-22

## 2020-01-22 ENCOUNTER — APPOINTMENT (OUTPATIENT)
Dept: SPINE | Facility: CLINIC | Age: 85
End: 2020-01-22
Payer: MEDICAID

## 2020-01-22 VITALS
HEIGHT: 61 IN | TEMPERATURE: 97.8 F | WEIGHT: 162 LBS | HEART RATE: 79 BPM | SYSTOLIC BLOOD PRESSURE: 123 MMHG | DIASTOLIC BLOOD PRESSURE: 77 MMHG | OXYGEN SATURATION: 95 % | BODY MASS INDEX: 30.58 KG/M2 | RESPIRATION RATE: 17 BRPM

## 2020-01-22 PROCEDURE — 99203 OFFICE O/P NEW LOW 30 MIN: CPT

## 2020-01-22 NOTE — ASSESSMENT
[FreeTextEntry1] : No Surgery recommended\par Aquatic Therapy\par Education provided regarding plan of care.\par

## 2020-01-22 NOTE — PHYSICAL EXAM
[General Appearance - Alert] : alert [Oriented To Time, Place, And Person] : oriented to person, place, and time [General Appearance - In No Acute Distress] : in no acute distress [Cranial Nerves Optic (II)] : visual acuity intact bilaterally,  pupils equal round and reactive to light [Cranial Nerves Oculomotor (III)] : extraocular motion intact [Cranial Nerves Trigeminal (V)] : facial sensation intact symmetrically [Cranial Nerves Facial (VII)] : face symmetrical [Cranial Nerves Vestibulocochlear (VIII)] : hearing was intact bilaterally [Cranial Nerves Glossopharyngeal (IX)] : tongue and palate midline [Cranial Nerves Accessory (XI - Cranial And Spinal)] : head turning and shoulder shrug symmetric [Cranial Nerves Hypoglossal (XII)] : there was no tongue deviation with protrusion [Motor Tone] : muscle tone was normal in all four extremities [Motor Strength] : muscle strength was normal in all four extremities [Limited Balance] : the patient's balance was impaired [No Visual Abnormalities] : no visible abnormailities [Straight-Leg Raise Test - Left] : straight leg raise of the left leg was negative [Straight-Leg Raise Test - Right] : straight leg raise  of the right leg was negative [Able to heel walk] : the patient was not able to heel walk [Able to toe walk] : the patient was not able to toe walk [Neck Appearance] : the appearance of the neck was normal [] : no respiratory distress [Heart Rate And Rhythm] : heart rate was normal and rhythm regular [Abdomen Soft] : soft [FreeTextEntry1] : ambulates with assistance [Skin Color & Pigmentation] : normal skin color and pigmentation

## 2020-01-22 NOTE — HISTORY OF PRESENT ILLNESS
[FreeTextEntry1] : Lowerback pain [de-identified] : This is an 86 year old woman here for evaluation of her chronic lowerback pain. She is Chinese speaking with family accompanying her. Dr. Damico provided the language interpretation. She ambulates with cane.

## 2020-01-22 NOTE — REVIEW OF SYSTEMS
[Poor Coordination] : poor coordination [As Noted in HPI] : as noted in HPI [Difficulty Walking] : difficulty walking [Joint Pain] : joint pain [Joint Swelling] : no joint swelling [Joint Stiffness] : no joint stiffness [Limb Pain] : no limb pain [Limb Swelling] : no limb swelling [Negative] : Endocrine

## 2020-01-23 ENCOUNTER — APPOINTMENT (OUTPATIENT)
Dept: ORTHOPEDIC SURGERY | Facility: CLINIC | Age: 85
End: 2020-01-23
Payer: MEDICAID

## 2020-01-23 VITALS
DIASTOLIC BLOOD PRESSURE: 71 MMHG | BODY MASS INDEX: 30.58 KG/M2 | WEIGHT: 162 LBS | SYSTOLIC BLOOD PRESSURE: 120 MMHG | HEIGHT: 61 IN | HEART RATE: 85 BPM

## 2020-01-23 DIAGNOSIS — M16.11 UNILATERAL PRIMARY OSTEOARTHRITIS, RIGHT HIP: ICD-10-CM

## 2020-01-23 PROCEDURE — 99203 OFFICE O/P NEW LOW 30 MIN: CPT

## 2020-01-31 NOTE — CONSULT LETTER
[Dear  ___] : Dear  [unfilled], [Consult Letter:] : I had the pleasure of evaluating your patient, [unfilled]. [Consult Closing:] : Thank you very much for allowing me to participate in the care of this patient.  If you have any questions, please do not hesitate to contact me. [Sincerely,] : Sincerely, [FreeTextEntry2] : TANMAY SKELTON  [FreeTextEntry1] : \par  [FreeTextEntry3] : Scar Arreguin MD\par \par ______________________________________________\par [facility]: Hip/Knee Arthroplasty\par [address], [address], [place] [address]\par (t) [phone]\par (f) [phone]

## 2020-01-31 NOTE — PHYSICAL EXAM
[Coxalgic] : coxalgic [LE] : Sensory: Intact in bilateral lower extremities [Knee] : patellar 2+ and symmetric bilaterally [Ankle] : ankle 2+ and symmetric bilaterally [DP] : dorsalis pedis 2+ and symmetric bilaterally [PT] : posterior tibial 2+ and symmetric bilaterally [de-identified] : On general examination the patient is adequately groomed and nourished. The vital parameters are as recorded. \par There is no lymphedema or diffuse swelling, no varicose veins, no skin warmth/erythema/scars/swelling, no ulcers and no palpable lymph nodes or masses in both lower extremities. Bilateral pedal pulses are well palpable.\par Upper Extremity:\par Both right and left upper extremities are unremarkable in terms of skin rash, lesions, pigmentation, redness, tenderness, swelling, joint instability, abnormal deformity or crepitus. The overall range of motion, sensation, motor tone and strength testing are normal.\par \par Hip Exam:\par The gait is right stiff hip coxalgic.\par The patient has unequal leg lengths - right lower limb shortening of 0.5 cm and no pelvic tilt. \par Hector/Go test is 18 inches on the right and 6 inches on the left. \par Active SLR is 40 degrees on the right and 60 degrees on the left. Both hips demonstrate no scars and the skin has no signs of inflammation or tenderness. \par Both Hips have a range of motion that is symmetrical in flexion and extension of:\par Hip flexion:             Right 40 degrees                Left 100 degrees\par Hip abduction:      Right 10 degrees                  Left 40 degrees\par Hip adduction:      Right 10 degrees                    Left 20 degrees\par Internal rotation:      Right 5 degrees                   Left 20 degrees\par External rotation:    Right 5 degrees                  Left 40 degrees\par Right hip has a 20 degree flexion contracture \par Left hip has no flexion contracture, deformity or instability. \par Labral impingement tests are negative.\par Right hip flexor, abductor and extensor power is grade 4+.\par Left hip flexor, abductor and extensor power is grade 5.\par  \par Spine Exam:\par There is mild curvature of the spine with loss of normal lumbar lordosis. The skin is devoid of erythema, scars, pigmentation or rashes. There is mild lumbar spasm and tenderness especially at L4-L5 or L5-S1. \par The range of motion of the lumbar spine is limited by pain and spasm. Forward flexion is 80% normal, extension catch positive, lateral flexion and rotation 80% normal. There is no lumbar spine imbalance or instability detected.\par Bilateral passive SLR is right 40 degrees, left 40 degrees in supine and sitting positions. Lasegue's Test is negative.\par Neurology:\par The patient is alert and oriented in person, place and time. The mood is calm and affect is normal.\par Testing for coordination including Rhomberg's Test and Finger-Nose Test, sensation, motor tone and power and deep tendon reflexes in both lower extremities is normal.  [de-identified] : AP and Lateral Radiographs of right hip taken in system on 12/26/19 reveal advanced degenerative joint disease, with joint space narrowing, bone on bone contact, and osteophyte formation.\par \par \par EXAM: MR SPINE LUMBAR WAW IC \par \par PROCEDURE DATE: 12/26/2019 \par \par INTERPRETATION: INDICATION: Neurogenic claudication. Back pain with \par radiculopathy. \par TECHNIQUE: Multiplanar lumbar imaging was conducted using a 1.5 Ed[place]ira \par magnet. T1 and T2 techniques were incorporated. Post contrast imaging was \par conducted. 7.5 cc gadolinium was administered. \par COMPARISON EXAMINATION: No prior \par \par FINDINGS: \par ALIGNMENT: There are multiple subluxations with degenerative changes \par throughout the lumbosacral region \par VERTEBRAL BODIES: No acute fracture or destructive lesion is identified. \par DISC SPACES: Disc space narrowing and degenerative changes are prominent \par throughout the mid and lower lumbar region. Mild degeneration is noted in \par the lower thoracic region. \par L1-2: A small broad-based herniation is noted. Facets and ligaments are \par mildly thickened. \par L2-3: A a smaller broad-based herniation is noted with moderate thickening \par of facets and ligaments right greater than left. Canal is mildly stenotic \par L3-4: Moderate stenosis is noted due to disc prolapse and facet and \par ligamentous hypertrophy. \par L4-5: Severe stenosis is noted due to broad-based disc prolapse and more \par extensive hypertrophic changes right greater than left. \par L5-S1: A bulge is noted with mild arthritic changes of the facets. \par POSTERIOR ELEMENTS: Hypertrophic changes are noted \par CANAL AND FORAMINA: Multilevel stenosis, severe at L4-5, and moderate at \par L2-3 and L3-4. \par INTRADURAL SPACE: The distal cord appears to be intact. No cord edema or \par changes of myelopathy is noted. With contrast, no enhancing intradural \par lesion is identified. \par MISCELLANEOUS: The lower thoracic canal is widely patent with mild \par bulges and mild degenerative changes. \par \par IMPRESSION: \par \par \par 1. Multilevel disc disease and degenerative changes. \par 2. Severe stenosis noted at L4-5 with moderate stenosis at the L2-3 and L3-4 \par levels. See above. \par 3. No fracture or destructive lesion identified. \par 4. No enhancing intradural lesion or mass.. \par \par ARTURO SALEEM M.D., ATTENDING RADIOLOGIST \par This document has been electronically signed. Dec 26 2019 4:10PM \par

## 2020-01-31 NOTE — DISCUSSION/SUMMARY
[de-identified] : Right hip advanced degenerative joint disease, Lumbar DJD with DDD\par Ideally patient would require right THR, patient and family refuse surgical discussion. Treatment will remain conservative. \par The natural history and treatment of degenerative arthritis was discussed with the patient at length today. The spectrum of treatment including nonoperative modalities to surgical intervention was elucidated. Noninvasive and nonoperative treatment modalities include weight reduction, activity modification with low impact exercise,  as needed use of acetaminophen or anti-inflammatory medications if tolerated, glucosamine/chondroitin supplements, and physical therapy. Further treatments can include corticosteroid injection and the use of viscosupplementation with hyaluronic acid injections. Definitive surgical treatment can certainly include total joint arthroplasty also. The risks and benefits of each treatment options was discussed and all questions were answered.\par The patient was informed of the findings and recommended conservative management in the form of a home exercise program, activity modifications, stationary bicycling, swimming and weight loss program. A trial of Glucosamine and Chondroiten Sulphate was recommended.\par A prescription for a course of physical therapy was provided.\par Follow-up appointment was recommended in 3 months.\par  \par
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
## 2020-02-20 ENCOUNTER — APPOINTMENT (OUTPATIENT)
Dept: NEUROLOGY | Facility: CLINIC | Age: 85
End: 2020-02-20
Payer: MEDICAID

## 2020-02-20 VITALS
BODY MASS INDEX: 30.58 KG/M2 | HEIGHT: 61 IN | TEMPERATURE: 97.5 F | OXYGEN SATURATION: 96 % | DIASTOLIC BLOOD PRESSURE: 62 MMHG | WEIGHT: 162 LBS | HEART RATE: 79 BPM | SYSTOLIC BLOOD PRESSURE: 137 MMHG

## 2020-02-20 DIAGNOSIS — G44.89 OTHER HEADACHE SYNDROME: ICD-10-CM

## 2020-02-20 PROCEDURE — 99214 OFFICE O/P EST MOD 30 MIN: CPT

## 2020-02-21 PROBLEM — G44.89 CHRONIC MIXED HEADACHE SYNDROME: Status: ACTIVE | Noted: 2020-02-21
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
## 2020-02-21 NOTE — REVIEW OF SYSTEMS
[Confused or Disoriented] : confusion [Decr. Concentrating Ability] : decreased concentrating ability [Memory Lapses or Loss] : memory loss [Leg Weakness] : leg weakness [Numbness] : numbness [Migraine Headache] : migraine headaches [Difficulty Walking] : difficulty walking [Limping] : limping [Incontinence] : incontinence [Vaginal Discharge] : vaginal discharge [Joint Pain] : joint pain [Joint Stiffness] : joint stiffness [Limb Pain] : limb pain [Fever] : no fever [Chills] : no chills [Feeling Poorly] : not feeling poorly [Feeling Tired] : not feeling tired [Recent Weight Gain (___ Lbs)] : no recent weight gain [Recent Weight Loss (___ Lbs)] : no recent weight loss [Difficulty with Language] : no ~M difficulty with language [Changed Thought Patterns] : no change in thought patterns [Repeating Questions] : no repeated questioning about recent events [Facial Weakness] : no facial weakness [Arm Weakness] : no arm weakness [Hand Weakness] : no hand weakness [Poor Coordination] : good coordination [Difficulty Writing] : no difficulty writing [Difficulties in Speech] : no speech difficulties [Tingling] : no tingling [Hypersensitivity] : no hypersensitivity [Seizures] : no convulsions [Dizziness] : no dizziness [Fainting] : no fainting [Lightheadedness] : no lightheadedness [Vertigo] : no vertigo [Inability to Walk] : able to walk [Ataxia] : no ataxia [Frequent Falls] : not falling [Suicidal] : not suicidal [Sleep Disturbances] : no sleep disturbances [Anxiety] : no anxiety [Depression] : no depression [Change In Personality] : no personality change [Emotional Problems] : no emotional problems [Eye Pain] : no eye pain [Red Eyes] : eyes not red [Eyesight Problems] : no eyesight problems [Discharge From Eyes] : no purulent discharge from the eyes [Dry Eyes] : no dryness of the eyes [Eyes Itch] : no itching of the eyes [Earache] : no earache [Loss Of Hearing] : no hearing loss [Nosebleeds] : no nosebleeds [Nasal Discharge] : no nasal discharge [Sore Throat] : no sore throat [Hoarseness] : no hoarseness [Heart Rate Is Slow] : the heart rate was not slow [Heart Rate Is Fast] : the heart rate was not fast [Chest Pain] : no chest pain [Palpitations] : no palpitations [Leg Claudication] : no intermittent leg claudication [Lower Ext Edema] : no lower extremity edema [Shortness Of Breath] : no shortness of breath [Wheezing] : no wheezing [Cough] : no cough [SOB on Exertion] : no shortness of breath during exertion [Orthopnea] : no orthopnea [PND] : no PND [Abdominal Pain] : no abdominal pain [Vomiting] : no vomiting [Constipation] : no constipation [Diarrhea] : no diarrhea [Heartburn] : no heartburn [Melena] : no melena [Dysuria] : no dysuria [Pelvic Pain] : no pelvic pain [Dysmenorrhea] : no dysmenorrhea [Abn Vaginal Bleeding] : no unexplained vaginal bleeding [Arthralgias] : no arthralgias [Joint Swelling] : no joint swelling [Limb Swelling] : no limb swelling [Skin Lesions] : no skin lesions [Skin Wound] : no skin wound [Itching] : no itching [Change In A Mole] : no change in a mole [Breast Pain] : no breast pain [Breast Lump] : no breast lump [Proptosis] : no proptosis [Hot Flashes] : no hot flashes [Muscle Weakness] : no muscle weakness [Deepening Of The Voice] : no deepening of the voice [Easy Bleeding] : no tendency for easy bleeding [Easy Bruising] : no tendency for easy bruising [Swollen Glands] : no swollen glands [Swollen Glands In The Neck] : no swollen glands in the neck

## 2020-02-21 NOTE — PHYSICAL EXAM
[Person] : oriented to person [General Appearance - Alert] : alert [Time] : oriented to time [Remote Intact] : remote memory intact [Short Term Intact] : short term memory intact [Span Intact] : the attention span was normal [Registration Intact] : recent registration memory intact [Concentration Intact] : normal concentrating ability [Naming Objects] : no difficulty naming common objects [Visual Intact] : visual attention was ~T not ~L decreased [Repeating Phrases] : no difficulty repeating a phrase [Writing A Sentence] : no difficulty writing a sentence [Comprehension] : comprehension intact [Reading] : reading intact [Fluency] : fluency intact [Past History] : adequate knowledge of personal past history [Cranial Nerves Optic (II)] : visual acuity intact bilaterally,  visual fields full to confrontation, pupils equal round and reactive to light [Cranial Nerves Trigeminal (V)] : facial sensation intact symmetrically [Cranial Nerves Oculomotor (III)] : extraocular motion intact [Cranial Nerves Glossopharyngeal (IX)] : tongue and palate midline [Cranial Nerves Vestibulocochlear (VIII)] : hearing was intact bilaterally [Cranial Nerves Facial (VII)] : face symmetrical [Motor Handedness Right-Handed] : the patient is right hand dominant [Cranial Nerves Hypoglossal (XII)] : there was no tongue deviation with protrusion [Cranial Nerves Accessory (XI - Cranial And Spinal)] : head turning and shoulder shrug symmetric [Motor Strength Lower Extremities Right] : there was weakness of the right lower extremity [Sensation Tactile Decrease] : light touch was intact [Sensation Pain / Temperature Decrease] : pain and temperature was intact [2+] : Patella left 2+ [1+] : Ankle jerk left 1+ [Extraocular Movements] : extraocular movements were intact [Sclera] : the sclera and conjunctiva were normal [PERRL With Normal Accommodation] : pupils were equal in size, round, reactive to light, with normal accommodation [Outer Ear] : the ears and nose were normal in appearance [Auscultation Breath Sounds / Voice Sounds] : lungs were clear to auscultation bilaterally [Oropharynx] : the oropharynx was normal [Heart Sounds] : normal S1 and S2 [Heart Rate And Rhythm] : heart rate was normal and rhythm regular [Heart Sounds Gallop] : no gallops [Murmurs] : no murmurs [Heart Sounds Pericardial Friction Rub] : no pericardial rub [Full Pulse] : the pedal pulses are present [Bowel Sounds] : normal bowel sounds [Abdomen Soft] : soft [Edema] : there was no peripheral edema [Abdomen Tenderness] : non-tender [Abdomen Mass (___ Cm)] : no abdominal mass palpated [No CVA Tenderness] : no ~M costovertebral angle tenderness [No Spinal Tenderness] : no spinal tenderness [Nail Clubbing] : no clubbing  or cyanosis of the fingernails [Antalgic Gait Right] : antalgic on the right [Monospasticity Of Right Leg] : spasticity of the right leg was present [Skin Color & Pigmentation] : normal skin color and pigmentation [Skin Turgor] : normal skin turgor [] : no rash [Place] : disoriented to place [Current Events] : inadequate knowledge of current events [Vocabulary] : inadequate range of vocabulary [Paresis Pronator Drift Right-Sided] : no pronator drift on the right [Paresis Pronator Drift Left-Sided] : no pronator drift on the left [Motor Strength Upper Extremities Bilaterally] : strength was normal in both upper extremities [Motor Strength Upper Extremities Right] : strength was normal in the right upper extremity [Motor Strength Upper Extremities Left] : strength was normal in the left upper extremity [Motor Strength Lower Extremities Left] : strength was normal in the left lower extremity [Dysesthesia] : no dysesthesia [Hyperesthesia] : no hyperesthesia [Vibration Decrease Leg / Foot Both Ankles] : normal at both ankles [Vibration Decrease Leg / Foot Toes Both Feet] : normal at the toes of both feet  [Limited Balance] : balance was intact [Dysdiadochokinesia Bilaterally] : not present [Coordination - Dysmetria Impaired Finger-to-Nose Bilateral] : not present [Plantar Reflex Right Only] : normal on the right [Coordination - Dysmetria Impaired Heel-to-Shin Bilateral] : not present [Plantar Reflex Left Only] : normal on the left [Apractic] : not apractic [Ataxic] : not ataxic [Ataxic, Wide-Based] : not wide-based and ataxic [Stooped] : not stooped [Shuffling] : not shuffling [Antalgic Gait Left] : not antalgic on the left

## 2020-02-21 NOTE — DISCUSSION/SUMMARY
[FreeTextEntry1] : Reviewed MRi spine and X ray hip; she has severe lumbar stenosis at L4-L5 and at moderate stenosis at other levels; she also has severe right hip osteoarthritis.\par Recommend consult spine surgery , orthopaedics surgery and pain management. Advise strongly not to undertake a long direct flight to [facility] that was booked earlier.\par Discussed reports by orthopedics who would prefer THL for right hip but offered steroid injections, PT weight loss and chondroitin glucosamine as non-operative alternatives; Spine surgery advised against surgery. Her daughter is the care-giver and cannot work because she is full time care-giver. She uses a cane and would benefit from a walker. Encouraged socialization and walking and treatment by orthopedics (injections)\par For headache control, to avoid weight gain, interactions with other medications and memory loss, recommend zonisamide 50 mg BID increasing gradually as per instructions when she follows up

## 2020-04-06 ENCOUNTER — APPOINTMENT (OUTPATIENT)
Dept: INTERNAL MEDICINE | Facility: CLINIC | Age: 85
End: 2020-04-06

## 2020-04-26 ENCOUNTER — TRANSCRIPTION ENCOUNTER (OUTPATIENT)
Age: 85
End: 2020-04-26

## 2020-04-27 ENCOUNTER — TRANSCRIPTION ENCOUNTER (OUTPATIENT)
Age: 85
End: 2020-04-27

## 2020-05-31 ENCOUNTER — TRANSCRIPTION ENCOUNTER (OUTPATIENT)
Age: 85
End: 2020-05-31

## 2020-06-29 ENCOUNTER — APPOINTMENT (OUTPATIENT)
Dept: NEUROLOGY | Facility: CLINIC | Age: 85
End: 2020-06-29

## 2020-08-11 ENCOUNTER — APPOINTMENT (OUTPATIENT)
Dept: NEUROLOGY | Facility: CLINIC | Age: 85
End: 2020-08-11
Payer: MEDICAID

## 2020-08-11 VITALS
HEART RATE: 93 BPM | OXYGEN SATURATION: 97 % | HEIGHT: 61 IN | TEMPERATURE: 98.3 F | SYSTOLIC BLOOD PRESSURE: 150 MMHG | DIASTOLIC BLOOD PRESSURE: 81 MMHG

## 2020-08-11 DIAGNOSIS — R32 UNSPECIFIED URINARY INCONTINENCE: ICD-10-CM

## 2020-08-11 DIAGNOSIS — G82.20 PARAPLEGIA, UNSPECIFIED: ICD-10-CM

## 2020-08-11 PROCEDURE — 99215 OFFICE O/P EST HI 40 MIN: CPT

## 2020-08-11 RX ORDER — ZONISAMIDE 50 MG/1
50 CAPSULE ORAL TWICE DAILY
Qty: 60 | Refills: 3 | Status: ACTIVE | COMMUNITY
Start: 2020-02-21 | End: 1900-01-01

## 2020-08-11 NOTE — HISTORY OF PRESENT ILLNESS
[FreeTextEntry1] : Accompanied by care-giver. They report severe pain in the right hi[p, leg/knee and back. She has not been able to aqua therapy because of the covid pandemic. Her condition is much worse, except she no longer complains of headache. However the headache medication ran out. \par She cries and talks about wishing she were dead. \par Even with a cane she has overwhelming difficulty walking. She did not get the walker that was prescribed.\par She is scheduled for facet blocks with Dr Bloom

## 2020-08-11 NOTE — REVIEW OF SYSTEMS
[Memory Lapses or Loss] : memory loss [Decr. Concentrating Ability] : decreased concentrating ability [Leg Weakness] : leg weakness [Inability to Walk] : inability to walk [Difficulty Walking] : difficulty walking [Limping] : limping [Depression] : depression [Sleep Disturbances] : sleep disturbances [Suicidal] : suicidal [Leg Claudication] : intermittent leg claudication [Lower Ext Edema] : lower extremity edema [Incontinence] : incontinence [Arthralgias] : arthralgias [Joint Swelling] : joint swelling [Joint Pain] : joint pain [Limb Swelling] : limb swelling [Limb Pain] : limb pain [Joint Stiffness] : joint stiffness [Negative] : ENT [Confused or Disoriented] : no confusion [Difficulty with Language] : no ~M difficulty with language [Changed Thought Patterns] : no change in thought patterns [Repeating Questions] : no repeated questioning about recent events [Facial Weakness] : no facial weakness [Arm Weakness] : no arm weakness [Hand Weakness] : no hand weakness [Poor Coordination] : good coordination [Difficulty Writing] : no difficulty writing [Difficulties in Speech] : no speech difficulties [Numbness] : no numbness [Abnormal Sensation] : no abnormal sensation [Tingling] : no tingling [Hypersensitivity] : no hypersensitivity [Seizures] : no convulsions [Dizziness] : no dizziness [Lightheadedness] : no lightheadedness [Fainting] : no fainting [Migraine Headache] : no migraine headache [Vertigo] : no vertigo [Frequent Falls] : not falling [Ataxia] : no ataxia [Heart Rate Is Fast] : the heart rate was not fast [Heart Rate Is Slow] : the heart rate was not slow [Palpitations] : no palpitations [Chest Pain] : no chest pain [Shortness Of Breath] : no shortness of breath [Wheezing] : no wheezing [Cough] : no cough [PND] : no PND [SOB on Exertion] : no shortness of breath during exertion [Orthopnea] : no orthopnea [Vomiting] : no vomiting [Abdominal Pain] : no abdominal pain [Constipation] : no constipation [Diarrhea] : no diarrhea [Heartburn] : no heartburn [Dysuria] : no dysuria [Pelvic Pain] : no pelvic pain [Skin Lesions] : no skin lesions [Itching] : no itching [Easy Bleeding] : no tendency for easy bleeding [Easy Bruising] : no tendency for easy bruising [Swollen Glands] : no swollen glands [Swollen Glands In The Neck] : no swollen glands in the neck

## 2020-08-11 NOTE — DISCUSSION/SUMMARY
[FreeTextEntry1] : Severe right > left hip and knee osteoarthritis with lumbar spondylosis with L5/S1 central stenosis and foraminal stenosis. Not a candidate for surgery in view of age and quality of bones. \par Recommend weight loss, physical therapy, walker and wheelchair, braces\par With the aid of images on the internet explained why she has pain and why she has to lose weight to help relieve her pain.\par Follow up after 2 months

## 2020-08-11 NOTE — PHYSICAL EXAM
[General Appearance - Alert] : alert [General Appearance - Well Nourished] : well nourished [Impaired Insight] : insight and judgment were intact [Oriented To Time, Place, And Person] : oriented to person, place, and time [Affect] : the affect was normal [Person] : oriented to person [Remote Intact] : remote memory intact [Registration Intact] : recent registration memory intact [Span Intact] : the attention span was normal [Concentration Intact] : normal concentrating ability [Visual Intact] : visual attention was ~T not ~L decreased [Writing A Sentence] : no difficulty writing a sentence [Naming Objects] : no difficulty naming common objects [Fluency] : fluency intact [Cranial Nerves Optic (II)] : visual acuity intact bilaterally,  visual fields full to confrontation, pupils equal round and reactive to light [Cranial Nerves Trigeminal (V)] : facial sensation intact symmetrically [Cranial Nerves Oculomotor (III)] : extraocular motion intact [Cranial Nerves Glossopharyngeal (IX)] : tongue and palate midline [Cranial Nerves Facial (VII)] : face symmetrical [Cranial Nerves Vestibulocochlear (VIII)] : hearing was intact bilaterally [Motor Tone] : muscle tone was normal in all four extremities [Cranial Nerves Hypoglossal (XII)] : there was no tongue deviation with protrusion [Cranial Nerves Accessory (XI - Cranial And Spinal)] : head turning and shoulder shrug symmetric [Paraparesis (Lower Extremities)] : the patient was paraplegic [Sensation Tactile Decrease] : light touch was intact [Sensation Pain / Temperature Decrease] : pain and temperature was intact [Sensation Vibration Decrease] : vibration was intact [1+] : Patella right 1+ [2+] : Ankle jerk left 2+ [Sclera] : the sclera and conjunctiva were normal [PERRL With Normal Accommodation] : pupils were equal in size, round, reactive to light, with normal accommodation [Extraocular Movements] : extraocular movements were intact [Outer Ear] : the ears and nose were normal in appearance [Oropharynx] : the oropharynx was normal [Neck Appearance] : the appearance of the neck was normal [Thyroid Diffuse Enlargement] : the thyroid was not enlarged [Neck Cervical Mass (___cm)] : no neck mass was observed [Jugular Venous Distention Increased] : there was no jugular-venous distention [] : no respiratory distress [Thyroid Nodule] : there were no palpable thyroid nodules [Auscultation Breath Sounds / Voice Sounds] : lungs were clear to auscultation bilaterally [Place] : disoriented to place [Time] : disoriented to time [Short Term Intact] : short term memory impaired [Repeating Phrases] : difficulty repeating a phrase [Limited Balance] : balance was intact [Dysdiadochokinesia Bilaterally] : not present [Past-pointing] : there was no past-pointing [Coordination - Dysmetria Impaired Finger-to-Nose Bilateral] : not present [Plantar Reflex Right Only] : normal on the right [Coordination - Dysmetria Impaired Heel-to-Shin Bilateral] : not present [Primitive Reflexes] : primitive reflexes were absent [FreeTextEntry6] : Limited due to pain particularly in the right hip and knee [Plantar Reflex Left Only] : normal on the left

## 2020-08-24 ENCOUNTER — APPOINTMENT (OUTPATIENT)
Dept: INTERNAL MEDICINE | Facility: CLINIC | Age: 85
End: 2020-08-24
Payer: MEDICAID

## 2020-08-24 VITALS
HEART RATE: 75 BPM | SYSTOLIC BLOOD PRESSURE: 99 MMHG | OXYGEN SATURATION: 98 % | WEIGHT: 165 LBS | HEIGHT: 61 IN | TEMPERATURE: 98.1 F | DIASTOLIC BLOOD PRESSURE: 53 MMHG | BODY MASS INDEX: 31.15 KG/M2

## 2020-08-24 DIAGNOSIS — R13.12 DYSPHAGIA, OROPHARYNGEAL PHASE: ICD-10-CM

## 2020-08-24 DIAGNOSIS — M25.551 PAIN IN RIGHT HIP: ICD-10-CM

## 2020-08-24 DIAGNOSIS — L72.0 EPIDERMAL CYST: ICD-10-CM

## 2020-08-24 DIAGNOSIS — M47.816 SPONDYLOSIS W/OUT MYELOPATHY OR RADICULOPATHY, LUMBAR REGION: ICD-10-CM

## 2020-08-24 LAB
25(OH)D3 SERPL-MCNC: 43.5 NG/ML
ALBUMIN SERPL ELPH-MCNC: 4 G/DL
ALP BLD-CCNC: 84 U/L
ALT SERPL-CCNC: 10 U/L
ANION GAP SERPL CALC-SCNC: 11 MMOL/L
APPEARANCE: CLEAR
AST SERPL-CCNC: 14 U/L
BASOPHILS # BLD AUTO: 0.02 K/UL
BASOPHILS NFR BLD AUTO: 0.4 %
BILIRUB SERPL-MCNC: 0.5 MG/DL
BILIRUBIN URINE: NEGATIVE
BLOOD URINE: NEGATIVE
BUN SERPL-MCNC: 19 MG/DL
CALCIUM SERPL-MCNC: 9 MG/DL
CHLORIDE SERPL-SCNC: 105 MMOL/L
CHOLEST SERPL-MCNC: 143 MG/DL
CHOLEST/HDLC SERPL: 3.4 RATIO
CO2 SERPL-SCNC: 25 MMOL/L
COLOR: YELLOW
CREAT SERPL-MCNC: 0.73 MG/DL
EOSINOPHIL # BLD AUTO: 0.09 K/UL
EOSINOPHIL NFR BLD AUTO: 1.6 %
ESTIMATED AVERAGE GLUCOSE: 111 MG/DL
FRUCTOSAMINE SERPL-MCNC: 206 UMOL/L
GLUCOSE QUALITATIVE U: NEGATIVE
GLUCOSE SERPL-MCNC: 76 MG/DL
HBA1C MFR BLD HPLC: 5.5 %
HCT VFR BLD CALC: 43.5 %
HDLC SERPL-MCNC: 43 MG/DL
HGB BLD-MCNC: 13.6 G/DL
IMM GRANULOCYTES NFR BLD AUTO: 0.4 %
KETONES URINE: NEGATIVE
LDLC SERPL CALC-MCNC: 70 MG/DL
LEUKOCYTE ESTERASE URINE: NEGATIVE
LYMPHOCYTES # BLD AUTO: 1.62 K/UL
LYMPHOCYTES NFR BLD AUTO: 28.8 %
MAN DIFF?: NORMAL
MCHC RBC-ENTMCNC: 28.7 PG
MCHC RBC-ENTMCNC: 31.3 GM/DL
MCV RBC AUTO: 91.8 FL
MONOCYTES # BLD AUTO: 0.5 K/UL
MONOCYTES NFR BLD AUTO: 8.9 %
NEUTROPHILS # BLD AUTO: 3.38 K/UL
NEUTROPHILS NFR BLD AUTO: 59.9 %
NITRITE URINE: NEGATIVE
PH URINE: 6
PLATELET # BLD AUTO: 297 K/UL
POTASSIUM SERPL-SCNC: 4.6 MMOL/L
PROT SERPL-MCNC: 6.1 G/DL
PROTEIN URINE: NORMAL
RBC # BLD: 4.74 M/UL
RBC # FLD: 13.3 %
SODIUM SERPL-SCNC: 141 MMOL/L
SPECIFIC GRAVITY URINE: 1.02
TRIGL SERPL-MCNC: 151 MG/DL
TSH SERPL-ACNC: 3.77 UIU/ML
UROBILINOGEN URINE: NORMAL
WBC # FLD AUTO: 5.63 K/UL

## 2020-08-24 PROCEDURE — 99215 OFFICE O/P EST HI 40 MIN: CPT

## 2020-08-24 RX ORDER — LISINOPRIL 2.5 MG/1
2.5 TABLET ORAL DAILY
Qty: 90 | Refills: 3 | Status: DISCONTINUED | COMMUNITY
Start: 2017-09-19 | End: 2020-08-24

## 2020-08-24 NOTE — ASSESSMENT
[FreeTextEntry1] : spinal stenosis - Pt is receiving  pt and takes Tylenol when needed She needs help throughout the day.  She needs a hospital bed to help get her up  and needs shower chair and rales . Unless acute low back pain is caused by a serious medical condition (which is uncommon), it typically resolves fairly quickly, even if there is a bulging or herniated disc.\par Still, low back pain can make it hard to do your usual activities, and it can be frustrating to feel like you just have to wait for it to get better. Below are some simple things you can do that may help relieve your pain.\par Remaining active — Many people are afraid that they will hurt their back further or delay recovery by remaining active. However, remaining active, to the extent that you are able, is one of the best things you can do for your back.\par If you have severe pain, you may need to rest your back for a day or so. It may be most comfortable to lie on your back with a pillow under your knees and your head and shoulders elevated. For sleeping, you may want to lie on your side with your upper knee bent and a pillow between your knees. However, prolonged bed rest is not recommended. Studies have shown that people with low back pain recover faster when they remain active. Movement helps to relieve muscle spasms and prevents loss of muscle strength.\par While you should avoid strenuous activities and sports while you are in pain, it is fine to continue doing regular day-to-day activities and light exercises, such as walking. If certain activities cause your back to hurt too much, try something else instead.\par Heat — Using a heating pad or heated wrap can help with low back pain during the first few weeks. It is not clear if cold helps as well, but some people may find that it relieves pain temporarily.\par Modifications at work — Most experts recommend that people with low back pain continue to work so long as it is possible to avoid prolonged standing or sitting and heavy lifting. If your job does not allow you to sit or stand comfortably, you may need to take some time off work while you recover. While standing at work, stepping on a block of wood with one foot (and periodically alternating the foot on the block) may be helpful.\par Pain medications — You can try taking an over-the-counter medication to help relieve pain. Nonsteroidal antiinflammatory drugs (NSAIDs), such as ibuprofen (sample brand names: Advil, Motrin) and naproxen (brand name: Aleve), may work better than acetaminophen (brand name: Tylenol) for low back pain.\par If you do take pain medication, it may be more effective to take a dose on a regular basis for three to five days, rather than using the medication only when your pain becomes unbearable. Muscle relaxants (eg, cyclobenzaprine [brand name: Flexeril]) are prescription medications; while these may help relieve back pain, they can cause drowsiness and are probably no better than ibuprofen in relieving pain. Your health care provider can talk to you about whether muscle relaxants might help in your situation. They may be helpful before bedtime when used for a short time, ie, a week or two. People who need to be alert, such as while driving or operating machinery, should not use muscle relaxants.\par Opioids (drugs derived from morphine) are not recommended for most people with back pain. In rare situations, a health care provider might prescribe them for a few days if a person has severe pain that is not responding to other treatments, but they are generally not much more effective than NSAIDs. In addition, opioids have a relatively high risk of side effects and the potential to cause harm, including the risk of dependency and abuse.\par Exercise — Starting a new exercise program immediately after a new episode of low back pain will not speed recovery from the acute episode. However, there is evidence that exercise is beneficial in people with chronic back pain. Spinal manipulation — "Spinal manipulation" is a technique sometimes used by chiropractors, physical therapists, osteopaths, massage therapists, and others to relieve back pain. It involves moving the joints of the spine beyond the normal range of motion. Studies suggest that spinal manipulation may provide modest pain relief and improved function, and it generally appears to be safe if performed by an experienced professional. If you are interested in trying this approach, talk with your health care provider about how to integrate it into your treatment plan.\par Acupuncture — Acupuncture involves inserting very fine needles into specific points, as determined by traditional Chinese maps of the body's flow of energy. There is not consistent evidence that acupuncture is effective for people with acute low back pain; however, some people find it helpful.\par Massage — There is no evidence that massage is effective in treating acute low back pain. However, you may find that it is generally relaxing and helps you feel better temporarily.\par Psychological therapy — In some cases, mental health issues can contribute to low back pain. Psychological therapy has mostly been studied in the context of treating longer-term (chronic) back pain; however, it may be beneficial for some people with acute pain as well. Other treatments — You may have heard of other treatments that claim to relieve back pain. Unfortunately, most of these have not been proven to work or are only effective in specific situations. Examples include:\par ?Injections – Some clinicians recommend injections of a local anesthetic (numbing medication) into the soft tissues of the back, although it is not clear if these injections are effective. The areas targeted by these injections are called "trigger points." Trigger-point injections may be of benefit in some people with chronic back pain, but they are typically not recommended for treating acute pain.\par Injections of a glucocorticoid (steroid) medication are sometimes recommended for people with chronic low back pain with sciatica or radiculopathy The injection is given into the epidural space, which is located below the spinal cord. Epidural glucocorticoid injections do appear to improve pain slightly at two and six weeks after the injection, but not at 3, 6, or 12 months after the injection. There is no evidence that epidural steroid injections are helpful for people with back pain without sciatica.\par ?Corsets and braces – While wearable supportive garments may claim to help relieve or prevent low back pain, these are typically not effective.\par ?Traction – Traction involves the use of weights to realign or pull the spinal column into alignment. Clinical studies have shown no benefit from traction in the treatment of acute back pain.\par ?Switching to a firmer mattress – People often wonder if sleeping on a firmer mattress can help prevent or treat low back pain. Small studies have suggested that using a less firm mattress may actually be more likely to relieve pain; however, there is not enough evidence to support switching to a specific type of sleeping surface for this reason.\par ?Methods involving energy or electricity – Other interventions include ultrasound, interferential therapy, shortwave diathermy, transcutaneous electrical nerve stimulation, and low-level laser therapy, all of which involve applying energy to the skin's surface. None of these interventions have been proven to be effective, particularly during the first four to six weeks of an episode of back pain.\par CHRONIC LOW BACK PAIN TREATMENTWhile most people recover completely from an episode of acute low back pain, some people do go on to have longer-term pain. Chronic pain is typica\par \par 2.  dm - ---The following has been discussed:---\par -Targets for weight and HgA1c have been discussed with patient \par -FS goals have been reviewed with the patient in detail:\par AM <130 post meal<160-180\par -Diet and weight goals have been discussed with the patient in detail.\par -The importance of exercise in the treatment of diabetes has been discussed \par with the patient in detail.\par -Extensive dietary advice provided to patient and the need to avoid concentrated \par sweets/simple carbohydrates and to ensure to consume protein with each meal. \par -Patient instructed to limit carbohydrates to 60 gms per meal and 15 gms per \par snacks. \par -Patient to keep a blood sugar log to check fasting and before meals\par -Patient instructed on importance of daily feet inspection and to reports any \par open lesions to physician promptly\par \par It is well controlled.  \par 3 hyperlipidemia  fu lipids continue low fat diet.    \par 4.  arthritis-  Pts that consume certain foods can worsen their symptoms of arthritis.  The five worse foods are bagels, gluten, French fries and processed fast foods, trans fats  such as soybean, sunflower oil, omega 6 , vegetable oil and corn oil .  Blackened and barbecue foods  Advanced glycogen enzymes , night shade vegetables such as tomatoes, peppers, potatoes , and sugars.  Foods that help   tart cherries, broccoli, papaya, olive oil probiotics nuts such as almonds, pineapple  green tea.  Papain breaks down protein and bromelin reduces inflammation tumeric destroys free radicals and devils claw reduces arthritis pain.  as well as ginger extract , rutin , [place] yucca root, citrus bioflavonoids  and Boswelia extract.\par 5 hpn -  Her bp  is low and will repeat if continues to be low will hold lisinopril .    Pt needs help at home  hospital bed , shower rales  chair.

## 2020-08-24 NOTE — END OF VISIT
[FreeTextEntry3] : resident was present with me for total exam . [Time Spent: ___ minutes] : I have spent [unfilled] minutes of time on the encounter. [>50% of the face to face encounter time was spent on counseling and/or coordination of care for ___] : Greater than 50% of the face to face encounter time was spent on counseling and/or coordination of care for [unfilled]

## 2020-08-24 NOTE — PHYSICAL EXAM
[PERRL] : pupils equal round and reactive to light [Supple] : supple [Normal Rate] : normal rate  [Normal S1, S2] : normal S1 and S2 [Regular Rhythm] : with a regular rhythm [No Edema] : there was no peripheral edema [No CVA Tenderness] : no CVA  tenderness [Normal Motor Tone] : the muscle tone was normal [Normal] : affect was normal and insight and judgment were intact [de-identified] : limited movement  [de-identified] : left foot  lichen planus chronicum  dry scaly skin ,  inbetween toes  tinea scaling and peeling .   [de-identified] : unable to walk

## 2020-08-24 NOTE — HEALTH RISK ASSESSMENT
[No] : No [] : No [No falls in past year] : Patient reported no falls in the past year [0] : 2) Feeling down, depressed, or hopeless: Not at all (0) [de-identified] : neurosurgeon Dr Bender , neurologist  Dr Hicks  [de-identified] : limited  [de-identified] : diabetic  [[id number]] : 0

## 2020-08-24 NOTE — HISTORY OF PRESENT ILLNESS
[Formal Caregiver] : formal caregiver [de-identified] : Pt is accompanied by Sanjuana her granddaughter.   Pt has been seen by neurologist and neurosurgeon and has severe spinal stenosis and is receiving Pt.  Pt has diabetes, and cant walk due to her severe spinal  stenosis.  She needs a walker at  home and wheel chair when outside . She cant climb up and down stairs.  She cant bathe herself in a shower, cook or do laundry or shop.  she cant take transportation alone.  She cant do housekeeping in her home.  She needs assistance through out the day. she cant go  to bathroom alone.

## 2020-08-24 NOTE — COUNSELING
[Adequate lighting] : Adequate lighting [Fall prevention counseling provided] : Fall prevention counseling provided [No throw rugs] : No throw rugs [Use proper foot wear] : Use proper foot wear [Use recommended devices] : Use recommended devices [Sleep ___ hours/day] : Sleep [unfilled] hours/day [Engage in a relaxing activity] : Engage in a relaxing activity [Plan in advance] : Plan in advance

## 2020-08-25 LAB
CREAT SPEC-SCNC: 158 MG/DL
MICROALBUMIN 24H UR DL<=1MG/L-MCNC: 1.6 MG/DL
MICROALBUMIN/CREAT 24H UR-RTO: 10 MG/G

## 2020-09-17 ENCOUNTER — APPOINTMENT (OUTPATIENT)
Dept: INTERNAL MEDICINE | Facility: CLINIC | Age: 85
End: 2020-09-17
Payer: MEDICAID

## 2020-09-17 VITALS
TEMPERATURE: 98.5 F | SYSTOLIC BLOOD PRESSURE: 125 MMHG | OXYGEN SATURATION: 96 % | HEART RATE: 88 BPM | BODY MASS INDEX: 30.23 KG/M2 | DIASTOLIC BLOOD PRESSURE: 75 MMHG | WEIGHT: 160 LBS

## 2020-09-17 DIAGNOSIS — R92.8 OTHER ABNORMAL AND INCONCLUSIVE FINDINGS ON DIAGNOSTIC IMAGING OF BREAST: ICD-10-CM

## 2020-09-17 DIAGNOSIS — R20.9 UNSPECIFIED DISTURBANCES OF SKIN SENSATION: ICD-10-CM

## 2020-09-17 DIAGNOSIS — E78.1 PURE HYPERGLYCERIDEMIA: ICD-10-CM

## 2020-09-17 DIAGNOSIS — I10 ESSENTIAL (PRIMARY) HYPERTENSION: ICD-10-CM

## 2020-09-17 DIAGNOSIS — M25.559 PAIN IN UNSPECIFIED HIP: ICD-10-CM

## 2020-09-17 DIAGNOSIS — R94.31 ABNORMAL ELECTROCARDIOGRAM [ECG] [EKG]: ICD-10-CM

## 2020-09-17 DIAGNOSIS — M48.062 SPINAL STENOSIS, LUMBAR REGION WITH NEUROGENIC CLAUDICATION: ICD-10-CM

## 2020-09-17 DIAGNOSIS — Z00.00 ENCOUNTER FOR GENERAL ADULT MEDICAL EXAMINATION W/OUT ABNORMAL FINDINGS: ICD-10-CM

## 2020-09-17 DIAGNOSIS — K63.5 POLYP OF COLON: ICD-10-CM

## 2020-09-17 DIAGNOSIS — M85.80 OTHER SPECIFIED DISORDERS OF BONE DENSITY AND STRUCTURE, UNSPECIFIED SITE: ICD-10-CM

## 2020-09-17 DIAGNOSIS — R26.9 UNSPECIFIED ABNORMALITIES OF GAIT AND MOBILITY: ICD-10-CM

## 2020-09-17 DIAGNOSIS — M51.36 OTHER INTERVERTEBRAL DISC DEGENERATION, LUMBAR REGION: ICD-10-CM

## 2020-09-17 DIAGNOSIS — E78.2 MIXED HYPERLIPIDEMIA: ICD-10-CM

## 2020-09-17 PROCEDURE — 99397 PER PM REEVAL EST PAT 65+ YR: CPT | Mod: 25

## 2020-09-17 PROCEDURE — 93000 ELECTROCARDIOGRAM COMPLETE: CPT

## 2020-09-17 PROCEDURE — 99214 OFFICE O/P EST MOD 30 MIN: CPT | Mod: 25

## 2020-09-17 RX ORDER — WHEELCHAIR
EACH MISCELLANEOUS
Qty: 1 | Refills: 0 | Status: ACTIVE | OUTPATIENT
Start: 2020-09-17

## 2020-09-17 RX ORDER — INFLUENZA A VIRUS A/VICTORIA/4897/2022 IVR-238 (H1N1) ANTIGEN (FORMALDEHYDE INACTIVATED), INFLUENZA A VIRUS A/DARWIN/9/2021 SAN-010 (H3N2) ANTIGEN (FORMALDEHYDE INACTIVATED), INFLUENZA B VIRUS B/PHUKET/3073/2013 ANTIGEN (FORMALDEHYDE INACTIVATED), AND INFLUENZA B VIRUS B/MICHIGAN/01/2021 ANTIGEN (FORMALDEHYDE INACTIVATED) 60; 60; 60; 60 UG/.7ML; UG/.7ML; UG/.7ML; UG/.7ML
0.7 INJECTION, SUSPENSION INTRAMUSCULAR
Qty: 1 | Refills: 0 | Status: ACTIVE | COMMUNITY
Start: 2020-09-17 | End: 1900-01-01

## 2020-09-17 NOTE — HEALTH RISK ASSESSMENT
[Poor] : ~his/her~ mood as  poor [No] : No [No falls in past year] : Patient reported no falls in the past year [3] : 2) Feeling down, depressed, or hopeless for nearly every day (3) [Patient reported mammogram was abnormal] : Patient reported mammogram was abnormal [Patient reported bone density results were abnormal] : Patient reported bone density results were abnormal [Patient reported colonoscopy was abnormal] : Patient reported colonoscopy was abnormal [HIV test declined] : HIV test declined [Hepatitis C test offered] : Hepatitis C test offered [With Family] : lives with family [# of Members in Household ___] :  household currently consist of [unfilled] member(s) [Unemployed] : unemployed [Less Than High School] : less than high school [] :  [# Of Children ___] : has [unfilled] children [Feels Safe at Home] : Feels safe at home [Smoke Detector] : smoke detector [Carbon Monoxide Detector] : carbon monoxide detector [Safety elements used in home] : safety elements used in home [Seat Belt] :  uses seat belt [FreeTextEntry1] : back and hip pain  [] : No [de-identified] : neurology Dr Mcginnis, neurosurgery [de-identified] : none [de-identified] : none [[id number]] : 6 [Change in mental status noted] : No change in mental status noted [Language] : denies difficulty with language [Behavior] : denies difficulty with behavior [Learning/Retaining New Information] : denies difficulty learning/retaining new information [Handling Complex Tasks] : denies difficulty handling complex tasks [Reasoning] : denies difficulty with reasoning [Spatial Ability and Orientation] : denies difficulty with spatial ability and orientation [Sexually Active] : not sexually active [High Risk Behavior] : no high risk behavior [Reports changes in hearing] : Reports no changes in hearing [Reports changes in vision] : Reports no changes in vision [Reports changes in dental health] : Reports no changes in dental health [Guns at Home] : no guns at home [Sunscreen] : does not use sunscreen [Travel to Developing Areas] : does not  travel to developing areas [TB Exposure] : is not being exposed to tuberculosis [MammogramDate] : 4/12/2019 [MammogramComments] : birads 2  [BoneDensityDate] : 10/17/2017 [BoneDensityComments] : osteopenia [ColonoscopyDate] : 4/29/2016 [ColonoscopyComments] : diverticulosis [HepatitisCDate] : 8/6/2018 [de-identified] : unable to do that alone because of the hip and back pain  [de-identified] : unable to do that alone [de-identified] : last visit was years ago  [de-identified] : last visit was last year [de-identified] : last visit was last week  [AdvancecareDate] : 9/17

## 2020-09-17 NOTE — HISTORY OF PRESENT ILLNESS
[Family Member] : family member [FreeTextEntry1] : cpe and back and hip pain  [de-identified] : pt is accompanied by her daughter Nadia Holley . Pt has been seen by neurologist and neurosurgeon and has severe spinal stenosis with bach and right hip pain and is recommending PT , wheel chair and a walker and they did not get them yet  . she cant walk due to her severe spinal stenosis. She needs a walker at home and wheel chair when outside. She cant climb up and down stairs. She cant bathe herself in a shower, cook or do laundry or shop. she is requesting home assistance . She cant do housekeeping in her home. She needs assistance through out the day. she cant go to bathroom alone. she also c/o of headache that is intermittent started a year ago .\par

## 2020-09-17 NOTE — COUNSELING
[Fall prevention counseling provided] : Fall prevention counseling provided [Adequate lighting] : Adequate lighting [No throw rugs] : No throw rugs [Use proper foot wear] : Use proper foot wear [Use recommended devices] : Use recommended devices [Behavioral health counseling provided] : Behavioral health counseling provided [Potential consequences of obesity discussed] : Potential consequences of obesity discussed [Benefits of weight loss discussed] : Benefits of weight loss discussed [Encouraged to maintain food diary] : Encouraged to maintain food diary [Encouraged to increase physical activity] : Encouraged to increase physical activity [Encouraged to use exercise tracking device] : Encouraged to use exercise tracking device [Weigh Self Weekly] : weigh self weekly

## 2020-09-17 NOTE — PLAN
[FreeTextEntry1] : : Gabapentin 100 MG Oral Capsule\par \par GENERIC NAME:  Gabapentin Capsules (GA ba pen tin) \par \par COMMON USES:  It is used to treat painful nerve diseases. It is used to help control certain kinds of seizures. It may be given to you for other reasons. Talk with the doctor. \par \par HOW TO USE THIS MEDICINE:  HOW IS THIS DRUG BEST TAKEN? Use this drug as ordered by your doctor. Read all information given to you. Follow all instructions closely. Keep taking this drug as you have been told by your doctor or other health care provider, even if you feel well. If you are taking an antacid that has aluminum or magnesium in it, take this drug at least 2 hours after taking the antacid. Take with or without food. Swallow whole with a full glass of water. HOW DO I STORE AND/OR THROW OUT THIS DRUG? Store at room temperature in a dry place. Do not store in a bathroom. Keep all drugs in a safe place. Keep all drugs out of the reach of children and pets. Throw away unused or  drugs. Do not flush down a toilet or pour down a drain unless you are told to do so. Check with your pharmacist if you have questions about the best way to throw out drugs. There may be drug take-back programs in your area. WHAT DO I DO IF I MISS A DOSE? Take a missed dose as soon as you think about it. If it is close to the time for your next dose, skip the missed dose and go back to your normal time. Do not take 2 doses at the same time or extra doses. \par \par CAUTIONS:  For all uses of this drug: Tell all of your health care providers that you take this drug. This includes your doctors, nurses, pharmacists, and dentists. Avoid driving and doing other tasks or actions that call for you to be alert until you see how this drug affects you. This drug may affect certain lab tests. Tell all of your health care providers and lab workers that you take this drug. Have blood work checked as you have been told by the doctor. Talk with the doctor. Talk with your doctor before you use alcohol, marijuana or other forms of cannabis, or prescription or OTC drugs that may slow your actions. This drug is not the same as gabapentin enacarbil ([employer]iz[employer]). Do not use in its place. Talk with the doctor. Do not stop taking this drug all of a sudden without calling your doctor. You may have a greater risk of side effects. If you need to stop this drug, you will want to slowly stop it as ordered by your doctor. A severe and sometimes deadly reaction has happened. Most of the time, this reaction has signs like fever, rash, or swollen glands with problems in body organs like the liver, kidney, blood, heart, muscles and joints, or lungs. If you have questions, talk with the doctor. Severe breathing problems have happened with this drug in people taking certain other drugs (like opioid pain drugs). This has also happened in people who already have lung or breathing problems. The risk may also be greater in people who are older than 65. Sometimes, breathing problems have been deadly. If you have questions, talk with the doctor. If you are 65 or older, use this drug with care. You could have more side effects. If the patient is 3 to 12 years of age, use this drug with care. The risk of mood or behavior problems may be higher in these children. Tell your doctor if you are pregnant, plan on getting pregnant, or are breast-feeding. You will need to talk about the benefits and risks to you and the baby. For seizures: If seizures are different or worse after starting this drug, talk with the doctor. \par \par POSSIBLE SIDE EFFECTS:  WHAT ARE SOME SIDE EFFECTS THAT I NEED TO CALL MY DOCTOR ABOUT RIGHT AWAY? WARNING/CAUTION: Even though it may be rare, some people may have very bad and sometimes deadly side effects when taking a drug. Tell your doctor or get medical help right away if you have any of the following signs or symptoms that may be related to a very bad side effect: Signs of an allergic reaction, like rash; hives; itching; red, swollen, blistered, or peeling skin with or without fever; wheezing; tightness in the chest or throat; trouble breathing, swallowing, or talking; unusual hoarseness; or swelling of the mouth, face, lips, tongue, or throat. Signs of liver problems like dark urine, feeling tired, not hungry, upset stomach or stomach pain, light-colored stools, throwing up, or yellow skin or eyes. Signs of kidney problems like unable to pass urine, change in how much urine is passed, blood in the urine, or a big weight gain. Trouble controlling body movements, twitching, change in balance, trouble swallowing or speaking. Memory problems or loss. Change in eyesight. Feeling confused, not able to focus, or change in behavior. Shakiness. Trouble breathing, slow breathing, or shallow breathing. Change in color of skin to a bluish color like on the lips, nail beds, fingers, or toes. Shortness of breath, a big weight gain, or swelling in the arms or legs. Not able to control eye movements. Swollen gland. Fever, chills, or sore throat; any unexplained bruising or bleeding; or feeling very tired or weak. Muscle pain or weakness. Get medical help right away if you feel very sleepy, very dizzy, or if you pass out. Caregivers or others need to get medical help right away if the patient does not respond, does not answer or react like normal, or will not wake up. Like other drugs that may be used for seizures, this drug may rarely raise the risk of suicidal thoughts or actions. The risk may be higher in people who have had suicidal thoughts or actions in the past. Call the doctor right away about any new or worse signs like depression; feeling nervous, restless, or grouchy; panic attacks; or other changes in mood or behavior. Call the doctor right away if any suicidal thoughts or actions occur. WHAT ARE SOME OTHER SIDE EFFECTS OF THIS DRUG? All drugs may cause side effects. However, many people have no side effects or only have minor side effects. Call your doctor or get medical help if any of these side effects or any other side effects bother you or do not go away: Feeling dizzy, sleepy, tired, or weak. Diarrhea, upset stomach, or throwing up. Dry mouth. These are not all of the side effects that may occur. If you have questions about side effects, call your doctor. Call your doctor for medical advice about side effects. You may report side effects to the FDA at 1-186.573.6361. You may also report side effects at https://www.fda.gov/medwatch. \par \par BEFORE USING THIS MEDICINE:  WHAT DO I NEED TO TELL MY DOCTOR BEFORE I TAKE THIS DRUG? TELL YOUR DOCTOR: If you are allergic to this drug; any part of this drug; or any other drugs, foods, or substances. Tell your doctor about the allergy and what signs you had. TELL YOUR DOCTOR: If you have kidney disease or are on dialysis. This is not a list of all drugs or health problems that interact with this drug. Tell your doctor and pharmacist about all of your drugs (prescription or OTC, natural products, vitamins) and health problems. You must check to make sure that it is safe for you to take this drug with all of your drugs and health problems. Do not start, stop, or change the dose of any drug without checking with your doctor. \par \par OVERDOSE:  If you think there has been an overdose, call your poison control center or get medical care right away. Be ready to tell or show what was taken, how much, and when it happened. \par \par ADDITIONAL INFORMATION:  If your symptoms or health problems do not get better or if they become worse, call your doctor. Do not share your drugs with others and do not take anyone else's drugs. This drug comes with an extra patient fact sheet called a Medication Guide. Read it with care. Read it again each time this drug is refilled. If you have any questions about this drug, please talk with the doctor, pharmacist, or other health care provider. \par \par Copyright 2020 [employer] All Rights Reserved.     \par

## 2020-09-17 NOTE — REVIEW OF SYSTEMS
[Headache] : headache [Depression] : depression [Negative] : Heme/Lymph [FreeTextEntry9] : severe right hip and low back pain

## 2020-09-17 NOTE — PHYSICAL EXAM
[Well Nourished] : well nourished [Uncomfortable] : uncomfortable [Normal Voice Quality] : was normal [Normal Verbal Skills] : the patient had normal verbal communication skills [Conjunctiva] : the conjunctiva were normal in both eyes [PERRL] : pupils were equal in size, round, and reactive to light [EOM Intact] : extraocular movements were intact [Normal Appearance] : was normal in appearance [Rate ___] : at [unfilled] breaths per minute [Normal Rhythm/Effort] : normal respiratory rhythm and effort [Clear Bilaterally] : the lungs were clear to auscultation bilaterally [Normal Breath Sounds] : breath sounds were normal [Rales / Crackles Bilateral] : no rales or crackles were heard [Wheezing Bilaterally] : no wheezing was heard [Rhonchi Bilateral] : no rhonchi were heard [Decreased Breath Sounds] : breath sounds were not diminished [Pleural Friction Rub Bilaterally] : no friction rub heard [Bronchial Breath Sounds Bilaterally] : no bronchial breath sounds were heard [Normal to Percussion] : the lungs were normal to percussion [No Abnormalities] : the abdominal aorta was not enlarged and no bruit was heard [5th Left ICS - MCL] : palpated at the 5th LICS in the midclavicular line [Normal Rate] : normal [Heart Rate ___] : [unfilled] bpm [Rhythm Regular] : regular [No Murmur] : no murmurs heard [No Pitting Edema] : no pitting edema present [2+] : left 2+ [Examination Of The Breasts] : a normal appearance [No Discharge] : no discharge [No Masses] : no breast masses were palpable [Obese] : obese [Soft, Nontender] : the abdomen was soft and nontender [No Mass] : no masses were palpated [No HSM] : no hepatosplenomegaly noted [None] : no CVA tenderness [No Lymphangitis] : no lymphangitis observed [No Visual Abnormalities] : no visible abnormalities [___] : [unfilled] [Normal Motor Tone] : the muscle tone was normal [Involuntary Movements] : no involuntary movements were seen [Normal Scalp] : inspection of the scalp showed no abnormalities [Normal] : the deep tendon reflexes were normal [Depressed] : depressed [Slowed Rate Of Thought] : slowed rate of thought [Comprehensive Foot Exam Normal] : Right and left foot were examined and both feet are normal. No ulcers in either foot. Toes are normal and with full ROM.  Normal tactile sensation with monofilament testing throughout both feet [Left Femoral Bruit] : no bruit heard over the left femoral artery [Rt] : no varicose veins of the right leg [Lt] : no varicose veins of the left leg [Right Carotid Bruit] : no bruit heard over the right carotid [Left Carotid Bruit] : no bruit heard over the left carotid [Right Femoral Bruit] : no bruit heard over the right femoral artery [Bruit] : no bruit heard [Dilated Collat. Veins] : no collateral vein dilation [Postauricular Lymph Nodes Enlarged Bilaterally] : nodes not enlarged [Preauricular Lymph Nodes Enlarged Bilaterally] : nodes not enlarged [Submandibular Lymph Nodes Enlarged Bilaterally] : nodes not enlarged [Suboccipital Lymph Nodes Enlarged Bilaterally] : nodes not enlarged [Submental Lymph Nodes Enlarged] : nodes not enlarged [Cervical Lymph Nodes Enlarged Posterior Bilaterally] : nodes not enlarged [Cervical Lymph Nodes Enlarged Anterior Bilaterally] : nodes not enlarged [Supraclavicular Lymph Nodes Enlarged Bilaterally] : nodes not enlarged [Axillary Lymph Nodes Enlarged Bilaterally] : nodes not enlarged [Epitrochlear Lymph Nodes Enlarged Bilaterally] : nodes not enlarged [Femoral Lymph Nodes Enlarged Bilaterally] : nodes not enlarged [Inguinal Lymph Nodes Enlarged Bilaterally] : nodes not enlarged [Limping On The Right] : not limping on the right [Limping On The Left] : not limping on the left [Concentration Intact] : a decrease in concentrating ability was observed [Delusions] : exhibited no delusions [Hallucinations] : exhibited no hallucinations [de-identified] : she isnot well responding to questions  [de-identified] : severe low back pain [de-identified] : severe right hip pain  [de-identified] : not responding to qs

## 2020-09-17 NOTE — DATA REVIEWED
[FreeTextEntry1] : data reviewed\par ekg showed normal sinus rhythm  with left axis deviation \par QRS 90 msec\par /436\par  ms

## 2020-09-17 NOTE — ASSESSMENT
[FreeTextEntry1] : 1 health maintenance  . she is due to her flu vaccine , she is due to her coloscopy next year . she is due to her eye doctor visit \par 2- BMI 30.25 , she is obese and we discussed the need to control diet .  risks of obesity and need for diet and eating healthy Obesity is a complex disorder involving an excessive amount of body fat. Obesity isn't just a cosmetic concern. It increases your risk of diseases and health problems, such as heart disease, diabetes and high blood pressure.\par \par Being extremely obese means you are especially likely to have health problems related to your weight.\par \par \par The good news is that even modest weight loss can improve or prevent the health problems associated with obesity. Dietary changes, increased physical activity and behavior changes can help you lose weight. Prescription medications and weight-loss surgery are additional options for treating obesity.\par \par \par \par \par Symptoms\par \par Obesity is diagnosed when your body mass index (BMI) is 30 or higher. Your body mass index is calculated by dividing your weight in kilograms (kg) by your height in meters (m) squared. \par \par \par BMI\par \par Weight status\par \par \par Below 18.5 Underweight \par 18.5-24.9 Normal \par 25.0-29.9 Overweight \par 30.0-34.9 Obese (Class I) \par 35.0-39.9 Obese (Class II) \par 40.0 and higher Extreme obesity (Class III) \par \par For most people, BMI provides a reasonable estimate of body fat. However, BMI doesn't directly measure body fat, so some people, such as muscular athletes, may have a BMI in the obese category even though they don't have excess body fat. Ask your doctor if your BMI is a problem. \par \par When to see a doctor\par \par If you think you may be obese, and especially if you're concerned about weight-related health problems, see your doctor or health care provider. You and your provider can evaluate your health risks and discuss your weight-loss options. \par \par Request an Appointment at [facility]\par \par Causes\par \par Although there are genetic, behavioral and hormonal influences on body weight, obesity occurs when you take in more calories than you burn through exercise and normal daily activities. Your body stores these excess calories as fat.\par \par Obesity can sometimes be traced to a medical cause, such as Prader-Willi syndrome, Cushing's syndrome, and other diseases and conditions. However, these disorders are rare and, in general, the principal causes of obesity are:\par •Inactivity. If you're not very active, you don't burn as many calories. With a sedentary lifestyle, you can easily take in more calories every day than you use through exercise and normal daily activities.\par •Unhealthy diet and eating habits. Weight gain is inevitable if you regularly eat more calories than you burn. And most Americans' diets are too high in calories and are full of fast food and high-calorie beverages.\par \par Risk factors\par \par Obesity usually results from a combination of causes and contributing factors, including:\par •Genetics. Your genes may affect the amount of body fat you store, and where that fat is distributed. Genetics may also play a role in how efficiently your body converts food into energy and how your body burns calories during exercise.\par •Family lifestyle. Obesity tends to run in families. If one or both of your parents are obese, your risk of being obese is increased. That's not just because of genetics. Family members tend to share similar eating and activity habits.\par •Inactivity. If you're not very active, you don't burn as many calories. With a sedentary lifestyle, you can easily take in more calories every day than you burn through exercise and routine daily activities. Having medical problems, such as arthritis, can lead to decreased activity, which contributes to weight gain.\par •Unhealthy diet. A diet that's high in calories, lacking in fruits and vegetables, full of fast food, and laden with high-calorie beverages and oversized portions contributes to weight gain.\par •Medical problems. In some people, obesity can be traced to a medical cause, such as Prader-Willi syndrome, Cushing's syndrome and other conditions. Medical problems, such as arthritis, also can lead to decreased activity, which may result in weight gain.\par •Certain medications. Some medications can lead to weight gain if you don't compensate through diet or activity. These medications include some antidepressants, anti-seizure medications, diabetes medications, antipsychotic medications, steroids and beta blockers.\par •Social and economic issues. Research has linked social and economic factors to obesity. Avoiding obesity is difficult if you don't have safe areas to exercise. Similarly, you may not have been taught healthy ways of cooking, or you may not have money to buy healthier foods. In addition, the people you spend time with may influence your weight — you're more likely to become obese if you have obese friends or relatives.\par •Age. Obesity can occur at any age, even in young children. But as you age, hormonal changes and a less active lifestyle increase your risk of obesity. In addition, the amount of muscle in your body tends to decrease with age. This lower muscle mass leads to a decrease in metabolism. These changes also reduce calorie needs, and can make it harder to keep off excess weight. If you don't consciously control what you eat and become more physically active as you age, you'll likely gain weight.\par •Pregnancy. During pregnancy, a woman's weight necessarily increases. Some women find this weight difficult to lose after the baby is born. This weight gain may contribute to the development of obesity in women.\par •Quitting smoking. Quitting smoking is often associated with weight gain. And for some, it can lead to enough weight gain that the person becomes obese. In the long run, however, quitting smoking is still a greater benefit to your health than continuing to smoke.\par •Lack of sleep. Not getting enough sleep or getting too much sleep can cause changes in hormones that increase your appetite. You may also crave foods high in calories and carbohydrates, which can contribute to weight gain.\par \par Even if you have one or more of these risk factors, it doesn't mean that you're destined to become obese. You can counteract most risk factors through diet, physical activity and exercise, and behavior changes.\par \par Complications\par \par If you're obese, you're more likely to develop a number of potentially serious health problems, including:\par •High triglycerides and low high-density lipoprotein (HDL) cholesterol\par •Type 2 diabetes\par •High blood pressure\par •Metabolic syndrome — a combination of high blood sugar, high blood pressure, high triglycerides and low HDL cholesterol\par •Heart disease\par •Stroke\par •Cancer, including cancer of the uterus, cervix, endometrium, ovaries, breast, colon, rectum, esophagus, liver, gallbladder, pancreas, kidney and prostate\par •Breathing disorders, including sleep apnea, a potentially serious sleep disorder in which breathing repeatedly stops and starts\par •Gallbladder disease\par •Gynecological problems, such as infertility and irregular periods\par •Erectile dysfunction and sexual health issues\par •Nonalcoholic fatty liver disease, a condition in which fat builds up in the liver and can cause inflammation or scarring\par •Osteoarthritis\par \par Quality of life\par \par When you're obese, your overall quality of life may be diminished. You may not be able to do things you used to do, such as participating in enjoyable activities. You may avoid public places. Obese people may even encounter discrimination.\par \par Other weight-related issues that may affect your quality of life include:\par •Depression\par •Disability\par •Sexual problems\par •Shame and guilt\par •Social isolation\par •Lower work achievement\par \par Prevention\par \par Whether you're at risk of becoming obese, currently overweight or at a healthy weight, you can take steps to prevent unhealthy weight gain and related health problems. Not surprisingly, the steps to prevent weight gain are the same as the steps to lose weight: daily exercise, a healthy diet, and a long-term commitment to watch what you eat and drink.\par •Exercise regularly. You need to get 150 to 300 minutes of moderate-intensity activity a week to prevent weight gain. Moderately intense physical activities include fast walking and swimming.\par •Follow a healthy eating plan. Focus on low-calorie, nutrient-dense foods, such as fruits, veg\par \par 3 . benign HTN well controlled on Meds and low salt diet \par \par DIETARY SALT (SODIUM); DASH DIET AND BLOOD PRESSURE:\par To decrease the sodium in your diet: \par · Use fresh vegetables and foods as much as possible.\par · Avoid canned and processed foods. Cured meats such as albarran, ham, and sausages are high in salt.\par · Try using different herbs and spices in your cooking instead of salt.\par · In restaurants, avoid foods with sauces, cheese, and cured meats. Ask for low-sodium choices.\par To get more potassium in your diet, eat:\par · Bananas, fresh or dried apricots, peaches, citrus fruits, melons\par · Cauliflower, broccoli, tomatoes, carrots, raw spinach, beet greens, potatoes\par To get more magnesium in your diet, eat:\par · Whole grain foods, leafy green vegetables, dried fruits\par • Fish and seafood, poultry \par To get more calcium in your diet, eat:\par · Nonfat milk, yogurt, and low-fat cheeses \par · [place] and sardines\par · Cooked dried beans\par · Broccoli, kale, and bok juan luis\par · Tofu or soybean curd\par DASH stands for "dietary approaches to stop hypertension." The DASH diet is low in total and saturated fat. It is rich in fruits, vegetables, and low-fat dairy foods. The diet allows you to get natural fiber, calcium, and magnesium from food. It prevents or lowers high blood pressure. It can also help lower cholesterol in your blood. \par Don't change how you eat all at once. It's much more likely that you'll succeed if you make only one or two small changes at a time. Wait until those changes are a habit, then make a couple more changes. Some good starting steps include: \par Add one serving of vegetables to your meals at lunch and dinner. This is an easy way to help you get more vegetables in your diet. \par Have a piece of fruit as an afternoon or after-dinner snack. One glass of juice at breakfast is not enough fruit in your diet. \par Use half your usual amount of butter, margarine, or salad dressing. \par Buy nonfat salad dressing or nonfat sour cream.\par Follow this guide to select your menu of meals. The number of calories we want you to eat each day will tell you how many servings you can choose from each food group.\par Calories: 1600 2100 2600 3100 Servings Grains 6 7 ½ 10 ½ 12 ½ Vegetables 	 4 4 ½ 5 6 Fruit 4 5 5 6 Dairy (low-fat) 2 ½ 3 3 3 ½ Meat, poultry, fish ½ 1 ½ 2 2 ½ Nuts, seeds ½ ½ ½ 1 Fats and sweets 1 ½ 2 ½ 3 4\par Grains and grain products like breads and cereals provide energy, fiber and vitamins. Whole grains have more of these nutrients. One serving equals one of the following:\par Bagel, 1/2 medium; Barley, cooked 1/2 cup; Biscuit, country style 1 medium; Bread, whole wheat, white 1 slice; Cereals, cold or cooked, 1/2 cup; Cornbread, 1 medium piece; Crackers, arminda, 2; Crackers, saltine, 4; Dinner roll, 1medium; English muffin, ½; Hamburger bun, ½; Muffin, 1 medium; Pancake, 1 medium; Pasta, 1/2 cup cooked; Pilar, 1/2 large or 1 small; Popcorn, 1 cup popped; Pretzels, 1 ounce; Rice, white, brown, or wild, 1/2 cup cooked; Tortillas, corn or flour, 1 medium; Waffle, 1 medium; Wheat germ, 1/4 cup; \par Vegetables are rich sources of potassium, magnesium, and fiber. One serving is 1/2 cup of any of the following cooked vegetables:\par Asparagus, Beans (green, yellow), Beets, Broccoli, [place] Sprouts, Carrots, Cauliflower, Benjy, chicory, mustard and turnip (and other) greens, Corn, Kale, Lima beans, Mixed vegetables, Okra, Parsnips, Peas, green, Potatoes (1/2 medium or 1/2 cup mashed), Pumpkin, Rutabaga, Spaghetti or tomato sauce, Spinach, Squash (zucchini or yellow), Stewed tomatoes, Succotash, Sweet potatoes, Turnips, Yam \par Raw vegetables: Carrots,1/2 cup; Celery, 1/2 cup; Lettuce (danielle, loose-leaf, green-leaf), 1 cup; Peppers, 1/2 cup; Spinach, 1 cup; Tomato, 1/2\par Fruits and fruit juices are important sources of potassium and magnesium. Fruits also contain fiber and are low in sodium and fat. One serving equals:\par Any fruit juice, # cup (6 ounces); Canned or frozen fruit, ½ cup (includes applesauce); Dried fruit, ¼ cup; \par Fresh fruit:\par Apple, 1 medium; Apricots, 2 medium; Banana, 1 medium; Berries, 1/2 cup; Melon, 1 wedge, or 1/2 cup; Cherries, 10; Grapefruit, 1/2; Grapes, 15; Kiwi, 1 medium; Jatinder, 1/2 small; Nectarine, 1 medium; Orange, 1 medium; Peach, 1 medium; Pear, 1 medium; Pineapple, 1/2 cup; Plums, 2 medium; Tangerine, 1 large\par Dairy foods provide protein and calcium. Use low-fat or nonfat dairy products to cut down on fat. One serving equals:\par Skim milk, 1 cup (8 ounces); 1% low fat milk, 1 cup (8 ounces); 2% low fat milk, 1 cup (8 ounces) nonfat dry milk powder (1/3 cup); Low-fat cottage cheese, 1 cup (8 ounces); Parmesan cheese, 1 tablespoon; Mozzarella cheese, part skim, 1/4 cup (1 ounce); Low-fat cheddar cheese, 11/2 ounces; Ricotta cheese, part skim milk or nonfat, 1/4 cup (11/2 ounces); Other low fat or nonfat cheeses (11/2 oz.); Low-fat or nonfat yogurt, fruit-flavored or plain, 1 cup (8 ounces)\par Low-fat or nonfat frozen yogurt, 1/2 cup (4 ounces); Note: People who can't digest dairy products can try taking lactase enzyme pills or drops (available at drug and grocery stores) when they eat dairy. There is also milk available with the enzyme already added. Or you can buy lactose-free milk.\par Meat, poultry, and fish are good sources of protein and magnesium. One serving equals:\par Lean meat including beef, veal, or pork, 3 ounces cooked; Skinless, white meat poultry including turkey, chicken, 3 ounces; Fish and shellfish, 3 ounces cooked; Low-fat luncheon meats, 1 ounce; Egg, 1 medium; Tofu, 3 ounces\par Note: Three ounces of cooked meat is about the size of a deck of cards.\par Nuts, seeds, and legumes are rich sources of energy, magnesium, potassium, protein and fiber. Nuts and seeds are also high in fat, so portions should be small.\par Almonds, 1/3 cup; Beans such as kidney, sifuentes, and navy, 1/2 cup cooked; Chickpeas and lentils, 1/2 cup cooked; Cashews, 1/3 cup; Filberts, 1/3 cup; Mixed nuts, 1/3 cup; Peanut butter, 2 tablespoons; Peanuts, 1/3 cup; Sesame seeds, 2 tablespoons; Sunflower seeds, 2 tablespoons; Tofu, regular, 3 ounces; Walnuts, 1/3 cup \par Following the above diet will give you about 27% fat in your diet. The goal is to have 30% or less of the calories you eat each day be from fat. To meet that goal, do not eat more than 2-3 servings daily of added fat. Also try to limit sweets. One serving equals:\par Butter or margarine, 1 teaspoon; Mayonnaise, 1 teaspoon; Low-fat mayonnaise, 1 tablespoon; Salad dressing, 1 tablespoon; Low-fat salad dressing, 2 tablespoons; Oil, 1 teaspoon (use olive, canola, safflower, or other vegetable oils); Candy, hard, 3 pieces; Jelly or jam, 1 tablespoon); Jell-O, 1/2 cup; Jelly beans, 1/2 ounce; Maple syrup, 1 tablespoon; Popsicle, 1; Sherbet or nonfat or low-fat frozen yogurt, 1/2 cup; Sugar, 1 tablespoon; Sugared lemonade or fruit punch, 1 cup (8 ounces); Note: Try diet fruit-flavored gelatin or frozen, canned, or fresh fruit for dessert.\par \par Small amounts of alcohol may have benefits to the heart and blood pressure. However, excess use of alcohol can cause damage to the brain, liver and other organs. It can lead to high blood pressure. Drinking more than two drinks (15 ml) every day can raise your blood pressure. 15 ml of alcohol equals: \par • one 12-ounce bottle of beer \par • a half glass (5 ounces) of wine \par • 1 ounce (one shot) of 100 proof hard liquor\par 4. HLD well controlled on meds \par Hyperlipedemia exercise weight loss low fat diet.  If you've been keeping an eye on your blood pressure and cholesterol levels, there's something else you might need to monitor: your triglycerides. Having a high level of triglycerides, a type of fat (lipid) in your blood, can increase your risk of heart disease.\par \par However, the same lifestyle choices that promote overall health can help lower your triglycerides, too.\par \par Triglycerides are a type of fat (lipid) found in your blood. When you eat, your body converts any calories it doesn't need to use right away into triglycerides. The triglycerides are stored in your fat cells. Later, hormones release triglycerides for energy between meals. If you regularly eat more calories than you burn, particularly "easy" calories like carbohydrates and fats, you may have high triglycerides (hypertriglyceridemia).\par \par A simple blood test can reveal whether your triglycerides fall into a healthy range.\par •Normal - Less than 150 milligrams per deciliter (mg/dL), or less than 1.7 millimoles per liter (mmol/L)\par •Borderline high - 150 to 199 mg/dL (1.8 to 2.2 mmol/L)\par •High - 200 to 499 mg/dL (2.3 to 5.6 mmol/L)\par •Very high - 500 mg/dL or above (5.7 mmol/L or above)\par \par Your doctor will usually check for high triglycerides as part of a cholesterol test (sometimes called a lipid panel or lipid profile). You'll have to fast for nine to 12 hours before blood can be drawn for an accurate triglyceride measurement.\par \par Triglycerides and cholesterol are separate types of lipids that circulate in your blood. Triglycerides store unused calories and provide your body with energy, and cholesterol is used to build cells and certain hormones. Because triglycerides and cholesterol can't dissolve in blood, they circulate throughout your body with the help of proteins that transport the lipids (lipoproteins).\par \par Although it's unclear how, high triglycerides may contribute to hardening of the arteries or thickening of the artery walls (atherosclerosis) - which increases the risk of stroke, heart attack and heart disease. Extremely high triglycerides - for example, levels above 1000 mg/dL (11.29 mmol/L) - can also cause acute pancreatitis.\par \par High triglycerides are often a sign of other conditions that increase the risk of heart disease and stroke as well, including obesity and metabolic syndrome - a cluster of conditions that includes too much fat around the waist, high blood pressure, high triglycerides, high blood sugar and abnormal cholesterol levels.\par \par Sometimes high triglycerides are a sign of poorly controlled type 2 diabetes, low levels of thyroid hormones (hypothyroidism), liver or kidney disease, or rare genetic conditions that affect how your body converts fat to energy. High triglycerides could also be a side effect of taking medications such as beta blockers, birth control pills, diuretics or steroids.\par \par Healthy lifestyle choices are key:\par •Lose weight. If you're overweight, losing 5 to 10 pounds can help lower your triglycerides. Motivate yourself by focusing on the benefits of losing weight, such as more energy and improved health.\par •Cut back on calories. Remember that extra calories are converted to triglycerides and stored as fat. Reducing your calories will reduce triglycerides.\par •Avoid sugary and refined foods. Simple carbohydrates, such as sugar and foods made with white flour, can increase triglycerides.\par •Choose healthier fats. Trade saturated fat found in meats for healthier monounsaturated fat found in plants, such as olive, peanut and canola oils. Substitute fish high in omega-3 fatty acids - such as mackerel and salmon - for red meat.\par •Limit how much alcohol you drink. Alcohol is high in calories and sugar and has a particularly potent effect on triglycerides. Even small amounts of alcohol can raise triglyceride levels.\par •Exercise regularly. Aim for at least 30 minutes of physical activity on most or all days of the week. Regular exercise can lower triglycerides and boost "good" cholesterol. Take a brisk daily walk, swim laps or join an exercise group. If you don't have time to exercise for 30 minutes, try squeezing it in 10 minutes at a time. Take a short walk, climb the stairs at work, or try some situps or pushups as you watch television.\par \par If healthy lifestyle changes aren't enough to control high triglycerides, your doctor might recommend some of the following:\par •Statins. Your doctor might prescribe these cholesterol-lowering drugs if you also have low high-density lipoprotein (HDL, or "good") cholesterol; high low-density lipoprotein (LDL, or "bad") cholesterol; or if you have a history of blocked arteries or diabetes. Examples include atorvastatin (Lipitor) and simvastatin (Zocor). Muscle pain is a potential side effect.\par •Fish oils. Also known as omega-3 fatty acids, fish oil supplements can help lower your triglycerides. High doses are needed, however, so this option is often reserved for people who have triglyceride levels over 500 mg/dL (5.7 mmol/L). \par •Fibrates. Fibrate medications, such as fenofibrate (TriCor, Fenoglide, others) and gemfibrozil (Lopid), also can lower your triglyceride levels. Fibrates seem to work best in people who have triglyceride levels over 500 mg/dL (5.7 mmol/L). Fibrates may increase the risk of side effects when taken together with statins.\par •Niacin. Niacin, sometimes called nicotinic acid, can lower your triglycerides and your "bad" cholesterol (low-density lipoprotein, or LDL, cholesterol). It's typically reserved for people who have triglyceride levels over 500 mg/dL (5.7 mmol/L). Don't take over-the-counter niacin without talking to your doctor first. Niacin can interact with other medications and can cause significant side effects.\par \par If your doctor prescribes medication to lower your triglycerides, take the medication as prescribed. And remember the significance of the healthy lifestyle changes you've made. Medications can help - but lifestyle matters, too\par \par 5- low back pain and severe right hip pain , she was seen by neurologist and neurosurgery and given her age , they will go conservative with PT  and she will F/u with Pain management and  and willnot go for surgery . we discussed the importance of PT and muscle strengthening exercises.\par She was given injection into her back and unsure what was given. She  presently is not taking any medication and she has severe pain. She has been given gabapentin in past .  \par I will give her TheraPatch as well and refer her back for pain management  to pain specialist.  I explained risk s of gabapentin.  \par \par   I explained massage therapy might help and gait  strengthening.  \par 6 new change on ekg  poor r wave progression V12 refer to cardiology Se is presently asymptomatic and might just be

## 2020-09-21 ENCOUNTER — APPOINTMENT (OUTPATIENT)
Dept: INTERNAL MEDICINE | Facility: CLINIC | Age: 85
End: 2020-09-21

## 2020-10-12 ENCOUNTER — APPOINTMENT (OUTPATIENT)
Dept: NEUROLOGY | Facility: CLINIC | Age: 85
End: 2020-10-12

## 2020-10-19 ENCOUNTER — RX RENEWAL (OUTPATIENT)
Age: 85
End: 2020-10-19

## 2020-10-19 RX ORDER — OMEGA-3-ACID ETHYL ESTERS CAPSULES 1 G/1
1 CAPSULE, LIQUID FILLED ORAL DAILY
Qty: 360 | Refills: 3 | Status: ACTIVE | COMMUNITY
Start: 2018-08-08 | End: 1900-01-01

## 2020-12-16 ENCOUNTER — TRANSCRIPTION ENCOUNTER (OUTPATIENT)
Age: 85
End: 2020-12-16

## 2020-12-16 RX ORDER — FOLIC ACID 1 MG/1
1 TABLET ORAL DAILY
Qty: 90 | Refills: 3 | Status: ACTIVE | COMMUNITY
Start: 2018-10-08 | End: 1900-01-01

## 2020-12-16 RX ORDER — MULTIVIT-MIN/FOLIC/VIT K/LYCOP 400-300MCG
25 MCG TABLET ORAL
Qty: 90 | Refills: 3 | Status: ACTIVE | COMMUNITY
Start: 2018-09-16 | End: 1900-01-01

## 2020-12-16 RX ORDER — GABAPENTIN 100 MG/1
100 CAPSULE ORAL
Qty: 90 | Refills: 1 | Status: ACTIVE | COMMUNITY
Start: 2020-09-17 | End: 1900-01-01

## 2020-12-17 ENCOUNTER — TRANSCRIPTION ENCOUNTER (OUTPATIENT)
Age: 85
End: 2020-12-17

## 2020-12-17 ENCOUNTER — APPOINTMENT (OUTPATIENT)
Dept: INTERNAL MEDICINE | Facility: CLINIC | Age: 85
End: 2020-12-17
Payer: MEDICAID

## 2020-12-17 DIAGNOSIS — G89.29 LOW BACK PAIN: ICD-10-CM

## 2020-12-17 DIAGNOSIS — M54.5 LOW BACK PAIN: ICD-10-CM

## 2020-12-17 DIAGNOSIS — E11.9 TYPE 2 DIABETES MELLITUS W/OUT COMPLICATIONS: ICD-10-CM

## 2020-12-17 PROCEDURE — 99442: CPT

## 2020-12-17 RX ORDER — PHENOL 1.4 %
600-400 AEROSOL, SPRAY (ML) MUCOUS MEMBRANE
Qty: 90 | Refills: 3 | Status: ACTIVE | COMMUNITY
Start: 2018-09-28 | End: 1900-01-01

## 2021-01-26 NOTE — ED ADULT NURSE REASSESSMENT NOTE - NS ED NURSE REASSESS COMMENT FT1
Consult with F. Dr Earnest Guadalupe text paged pt Reyesdejimenez, Maria had episode of v-tach. Informed pt resting comfortably no distress noted.

## 2021-04-19 NOTE — PATIENT PROFILE ADULT. - ABILITY TO HEAR (WITH HEARING AID OR HEARING APPLIANCE IF NORMALLY USED):
Adequate: hears normal conversation without difficulty negative for - chills, fatigue, fever or weight loss  ENT ROS: negative for - headaches, oral lesions or sore throat  Cardiovascular ROS: no chest pain , orthopnea or pnd   Gastrointestinal ROS: no abdominal pain, change in bowel habits, or black or bloody stools  Skin - no rash   Neuro - no blurry vision , no loc . No focal weakness   msk - no jt tenderness or swelling    Vascular - no claudication , rest completed and negative   Lymphatic - complete and negative   Hematology - oncology - complete and negative   Allergy immunology - complete and negative    no burning or hematuria       LUNG CANCER SCREENING     1. CRITERIA MET    []     CT ORDERED  []      2. CRITERIA NOT MET   [x]      3. REFUSED                    []        REASON CRITERIA NOT MET     1. SMOKING LESS THAN 30 PY  []      2. AGE LESS THAN 55 or GREATER 77 YEARS  [x]      3. QUIT SMOKING 15 YEARS OR GREATER   []      4. RECENT CT WITH IN 11 MONTHS    []      5. LIFE EXPECTANCY < 5 YEARS   []      6. SIGNS  AND SYMPTOMS OF LUNG CANCER   []           Immunization   There is no immunization history for the selected administration types on file for this patient. Pneumococcal Vaccine     [] Up to date    [] Indicated   [x] Refused  [] Contraindicated       Influenza Vaccine   [] Up to date    [] Indicated   [x] Refused  [] Contraindicated   Few days of Covid vaccine  Refuses any vaccination including Covid  PAST MEDICAL HISTORY:         Diagnosis Date    Abnormal vaginal bleeding 4/19/2019    Acquired hallux rigidus 4/19/2019    Acute and chronic respiratory failure with hypoxia ([address] 11/6/2018    Acute on chronic respiratory failure with hypoxia and hypercapnia ([address] 11/6/2018    ASCUS with positive high risk HPV cervical     Asthma     Bipolar disorder ([address] 4/19/2019    Breast discharge 4/9/2015    Cellulitis 4/19/2019    Cervical radiculopathy 4/19/2019    Chest pain 11/4/2018    Chlamydia ?     Reported hx    Chronic cor pulmonale ([address]     COPD (chronic obstructive pulmonary disease) ([address]     D-dimer, elevated 11/18/2018    Dysfunctional uterine bleeding 1/27/2014    Dysplasia of cervix, low grade (ISAIAS 1) 3/30/2017    Noted on colposcopy 3/7/17    Dyspnea 11/3/2018    Ectopic pregnancy - Right 2/9/2014    Pt was initiallly dx with a left ovarian ectopic. After Laproscopic eval she was noted to have a Right Tubal ectopic. She underwent a RSO on 2/9/14.  Elevated blood pressure reading     Elevated brain natriuretic peptide (BNP) level     Gastroesophageal reflux disease 4/19/2019    H/O abnormal cervical Papanicolaou smear 2/3/2017    LSIL - 2/2015 Pap collected 2/3/2017. Patient is very difficult SSE as she is morbidly obese. Required ringed forceps and snowman speculum.       Heartburn     History of trichomonal vaginitis 1/20106    tx'd    Hypertension     Hypoalbuminemia due to protein-calorie malnutrition ([address] 11/6/2018    Hypocalcemia 11/21/2018    Migraine 4/19/2019    Morbid obesity with BMI of 70 and over, adult ([address]     BMI 74    MRSA (methicillin resistant Staphylococcus aureus) 7/2010    Left leg    Muscle weakness     Neck pain 4/19/2019    Normocytic normochromic anemia 11/6/2018    Obesity     Obesity hypoventilation syndrome ([address] 11/4/2018    BRYSON (obstructive sleep apnea)     BRYSON on CPAP    Ovarian ectopic pregnancy 2/9/2014    Right tubal ectopic tx'd with MTX AND LS RSO(initially dx as Left ovarian By USN 2/8/14)    Pain     Pelvic adhesive disease 2/9/14    C/S to bladder, as well as reactive to ectopic; also hx PID    Pulmonary hypertension ([address]     Shoulder joint pain 8/19/2013    Sprain of ankle 4/19/2019    Tobacco abuse 11/4/2018    Type 2 diabetes mellitus without complication, without long-term current use of insulin ([address] 11/4/2018    Unspecified sleep apnea     Vitamin D deficiency 4/19/2019       Family History:       Problem Relation Age of Onset    High Blood Pressure Mother     Heart Disease Mother     Cancer Father         lung    Lung Cancer Father     Cancer Maternal Aunt         breast    Diabetes Maternal Aunt     Breast Cancer Maternal Aunt     Cancer Paternal Aunt         lung    Lung Cancer Paternal Aunt     Thyroid Disease Sister     Colon Cancer Neg Hx     Eclampsia Neg Hx     Hypertension Neg Hx     Ovarian Cancer Neg Hx      Labor Neg Hx     Spont Abortions Neg Hx     Stroke Neg Hx        SURGICAL HISTORY:   Past Surgical History:   Procedure Laterality Date     SECTION      CHOLECYSTECTOMY, LAPAROSCOPIC      LEG SURGERY      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              Not in a hospital admission. No Known Allergies  Social History     Tobacco Use   Smoking Status Current Every Day Smoker    Packs/day: 0.10    Types: Cigarettes    Start date: 9/3/2014   Smokeless Tobacco Never Used   Tobacco Comment      smokes marijuana and cigarettes     Prior to Admission medications    Medication Sig Start Date End Date Taking?  Authorizing Provider   gabapentin (NEURONTIN) 600 MG tablet  3/22/21   Historical Provider, MD   topiramate (TOPAMAX) 100 MG tablet  3/22/21   Historical Provider, MD Butch Leyden 250-50 MCG/DOSE AEPB  3/3/21   Historical Provider, MD   diclofenac (VOLTAREN) 50 MG EC tablet  3/22/21   Historical Provider, MD   vitamin D3 (CHOLECALCIFEROL) 25 MCG (1000 UT) TABS tablet  3/22/21   Historical Provider, MD   Insulin Pen Needle (COMFORT EZ PEN NEEDLES) 32G X 6 MM MISC Comfort EZ Pen Needles 32 gauge x 1/4\"    Historical Provider, MD   Liraglutide (VICTOZA) 18 MG/3ML SOPN SC injection Victoza 2-Monty 0.6 mg/0.1 mL (18 mg/3 mL) subcutaneous pen injector Historical Provider, MD   diclofenac sodium (VOLTAREN) 1 % GEL 1 g 1/20/21   Historical Provider, MD   famotidine (PEPCID) 20 MG tablet Take 1 tablet by mouth nightly 12/14/20   JUANY Collins CNP   pantoprazole (PROTONIX) 40 MG tablet Take 1 tablet by mouth daily 12/14/20   JUANY Collins CNP   sucralfate (CARAFATE) 1 GM tablet Take 1 tablet by mouth 4 times daily 12/14/20   JUANY Collins CNP   tiotropium (SPIRIVA HANDIHALER) 18 MCG inhalation capsule Spiriva Respimat 2.5 mcg/actuation solution for inhalation   Inhale 2 puffs every day by inhalation route.     Historical Provider, MD   ondansetron (ZOFRAN ODT) 4 MG disintegrating tablet Take 1 tablet by mouth every 8 hours as needed for Nausea 11/14/19   Teresa Araujo MD   Promethazine-DM [facility]) 6.25-15 MG/5ML SOLN solution Take 5 mLs by mouth 4 times daily 9/12/19   Historical Provider, MD   aspirin 81 MG chewable tablet Take 1 tablet by mouth daily 11/22/18   Levora Riff, DO   butalbital-acetaminophen-caffeine (FIORICET, ESGIC) -78 MG per tablet Take 1 tablet by mouth every 4 hours as needed for Headaches 11/21/18   Levora Riff, DO   albuterol sulfate  (90 Base) MCG/ACT inhaler Inhale 2 puffs into the lungs 4 times daily 11/21/18   Levora Riff, DO   ipratropium (ATROVENT HFA) 17 MCG/ACT inhaler Inhale 2 puffs into the lungs 4 times daily 11/21/18   Levora Riff, DO   lisinopril (PRINIVIL;ZESTRIL) 20 MG tablet Take 1 tablet by mouth daily 11/7/18   Levora Riff, DO   metFORMIN (GLUCOPHAGE) 500 MG tablet Take 1 tablet by mouth 2 times daily (with meals)  Patient not taking: Reported on 2/24/2021 11/6/18   Levora Riff, DO   POTASSIUM CHLORIDE PO Take 25 mg by mouth daily    Historical Provider, MD   benzonatate (TESSALON PERLES) 100 MG capsule Take 1 capsule by mouth every 8 hours as needed for Cough 10/25/18   Fatimah Garrett MD   hydrochlorothiazide (HYDRODIURIL) 25 MG tablet Take 25 mg by mouth daily    Historical Provider, MD   vitamin D (ERGOCALCIFEROL) 08308 UNITS CAPS capsule Take 1,000 Units by mouth daily     Historical Provider, MD   metoprolol (LOPRESSOR) 50 MG tablet Take 50 mg by mouth 2 times daily 8/10/15   Historical Provider, MD   loratadine (CLARITIN) 10 MG tablet Take 10 mg by mouth daily    Historical Provider, MD         Physical Exam  General Appearance:    Alert, cooperative, no distress, appears stated age, morbid obesity, no distress, mild pedal edema. Not using any accessory muscles no respiratory distress   Head:    Normocephalic, without obvious abnormality, atraumatic   Eye examination reveal no jaundice. No Claudia syndrome. Neck is short and fat. Nose revealed no polyps. No sinus tenderness noted. Throat examination is unremarkable, oropharyngeal orifice is compromised. Nose examination revealed no polyps. No sinus tenderness noted. Ear examination normal.                   :    Neck:   Supple, symmetrical, trachea midline, no adenopathy;     thyroid:  no enlargement/tenderness/nodules; no carotid    bruit or JVD. Neck is short and fat    Back:     Symmetric, no curvature, ROM normal, no CVA tenderness   Lungs:       Poor air entry on both sides due to obesity. Percussion note is impaired because of obesity and all sites. Breathing vesicular. No rales, rhonchi are audible. No pleural friction rub is audible.        Chest Wall:    No tenderness or deformity      Heart:    Regular rate and rhythm, S1 and S2 normal, no murmur, rub        or gallop no rvh                           Abdomen:                                                 Pulses:                                            Lymph nodes:                    Neurologic:                  Soft, non-tender, bowel sounds active all four quadrants,     no masses, no organomegaly         2+ and symmetric all extremities            Cervical, supraclavicular not enlarged or matted or tender      CNII-XII intact, normal strength 5/5 . Sensation grossly normal  and reflexes normal 2+  throughout     Clubbing No  Lower ext edema Yes1+   [x] , 2 +  [] , 3+   []  Upper ext edema No       Musculoskeletal - no joint swelling or tenderness or synovitis               /78 (Site: Left Lower Arm, Position: Sitting, Cuff Size: Large Adult)   Pulse 78   Temp 97.5 °F (36.4 °C) (Temporal)   Ht 5' 11\" (1.803 m)   Wt (!) 557 lb (252.7 kg)   SpO2 98% Comment: ROOM AIR  BMI 77.69 kg/m²     CXR  No recent chest x-ray      CT Scans  No recent CT scan    Echo  No recent echo        Assessment   Diagnosis Orders   1. BRYSON (obstructive sleep apnea)  DME Order for [facility]) as OP   2. Morbid obesity with BMI of 70 and over, adult ([facility]     3. Obesity hypoventilation syndrome ([facility]     4. Chronic obstructive pulmonary disease, unspecified COPD type ([facility]     5. Pulmonary hypertension ([facility]         Plan:   Patient is morbidly obese. He has a sleep apnea syndrome and also. COPD. Most important thing in her case is due to his loss of weight. He has participated and weight reduction program.  She would like to have surgery but because of her huge weight she had a very large risk for the surgery. She was advised to participate in the program to lose some weight before she will consider for surgery. She has chronic obstructive lung disease with was advised to continue the use of bronchodilators as before. She has cor pulmonale due to COPD and and obesity hypoventilation syndrome. She has pulmonary hypertension as part of the cor pulmonale. Patient was given an DME order for supplies.

## 2021-05-12 NOTE — H&P ADULT - CARDIOVASCULAR SYMPTOMS
Hematology/Oncology Consultation Note      Date: 2021    Name: Lion Calderon  : 1956        Janie Islas MD         Subjective:     Chief complaint: Thrombocytosis    History of Present Illness:   Ms. Carolina Escalera is a most pleasant 59y.o. year old female who was seen for consultation of thrombocytosis. The patient has an unremarkable past medical history. On 2/3/2021 she was diagnosed with COVID 19 infection. 2021 CBC reported WBC 9.5, hemoglobin 13.7, hematocrit 40.3%, and increased numbers of platelets. Her platelet count was unable to report due to significant platelet clumping reportedly. Lab reviews indicated on 12/10/2018 her platelet was [phone]. She reported that she is getting better after COVID19 infection. She also completed 2 doses of COVID vaccination recently. The patient otherwise has no other complaints. Denied fever, chills, night sweat, unintentional weight loss, skin lumps or bumps, acute bleeding or bruising issues. Denied headache, acute vision change, dizziness, chest pain, worsen shortness of breath, palpitation, productive cough, nausea, vomiting, abdominal pain, altered bowel habits, dysuria, worsen bone pain or back pain, new focal numbness or weakness. Family History: Mother had breast cancers. Past Medical History, Family History, and Social History:    History reviewed. No pertinent past medical history.   Past Surgical History:   Procedure Laterality Date    HX HYSTERECTOMY       Social History     Socioeconomic History    Marital status:      Spouse name: Not on file    Number of children: Not on file    Years of education: Not on file    Highest education level: Not on file   Occupational History    Not on file   Social Needs    Financial resource strain: Not on file    Food insecurity     Worry: Not on file     Inability: Not on file    Transportation needs     Medical: Not on file     Non-medical: Not on file   Tobacco Use    Smoking status: Current Some Day Smoker     Types: Cigars    Smokeless tobacco: Never Used   Substance and Sexual Activity    Alcohol use: Yes     Alcohol/week: 1.0 standard drinks     Types: 1 Shots of liquor per week     Comment: social    Drug use: No    Sexual activity: Not on file   Lifestyle    Physical activity     Days per week: Not on file     Minutes per session: Not on file    Stress: Not on file   Relationships    Social connections     Talks on phone: Not on file     Gets together: Not on file     Attends Zoroastrianism service: Not on file     Active member of club or organization: Not on file     Attends meetings of clubs or organizations: Not on file     Relationship status: Not on file    Intimate partner violence     Fear of current or ex partner: Not on file     Emotionally abused: Not on file     Physically abused: Not on file     Forced sexual activity: Not on file   Other Topics Concern    Not on file   Social History Narrative    Not on file     Family History   Problem Relation Age of Onset    Cancer Mother     Glaucoma Mother     Dementia Mother     Heart Disease Father     Cancer Maternal Grandmother      Current Outpatient Medications   Medication Sig Dispense Refill    gemfibroziL (LOPID) 600 mg tablet TAKE 1 TABLET BY MOUTH TWICE A  Tab 0    sertraline (ZOLOFT) 25 mg tablet TAKE 1 TABLET BY MOUTH EVERY DAY 90 Tab 2    ezetimibe (ZETIA) 10 mg tablet TAKE 1 TABLET BY MOUTH EVERY DAY 90 Tab 2    valACYclovir (VALTREX) 500 mg tablet Take  by mouth daily.  alendronate (FOSAMAX) 35 mg tablet Take  by mouth every seven (7) days.  fish oil-omega-3 fatty acids 340-1,000 mg capsule Take 2 Caps by mouth daily.  glucosamine/chondr govea A sod (GLUCOSAMINE-CHONDROITIN) 750-600 mg tab Take 2 Tabs by mouth daily.  cholecalciferol (VITAMIN D3) 1,000 unit cap Take  by mouth daily.          Review of Systems   Constitutional: Negative for chills, diaphoresis, fever, malaise/fatigue and weight loss. Respiratory: Negative for cough, hemoptysis, shortness of breath and wheezing. Cardiovascular: Negative for chest pain, palpitations and leg swelling. Gastrointestinal: Negative for abdominal pain, diarrhea, heartburn, nausea and vomiting. Genitourinary: Negative for dysuria, frequency, hematuria and urgency. Musculoskeletal: Negative for joint pain and myalgias. Skin: Negative for itching and rash. Neurological: Negative for dizziness, seizures, weakness and headaches. Psychiatric/Behavioral: Negative for depression. The patient does not have insomnia. Objective:     Visit Vitals  /71   Pulse 83   Temp 98.7 °F (37.1 °C) (Temporal)   Resp 16   Ht 5' 4\" (1.626 m)   Wt 75.8 kg (167 lb)   SpO2 99%   BMI 28.67 kg/m²       ECOG Performance Status (grade): 0  0 - able to carry on all pre-disease activity w/out restriction  1 - restricted but able to carry out light work  2 - ambulatory and can self- care but unable to carry out work  3 - bed or chair >50% of waking hours  4 - completely disable, total care, confined to bed or chair    Physical Exam  Constitutional:       Appearance: Normal appearance. HENT:      Head: Normocephalic and atraumatic. Eyes:      Pupils: Pupils are equal, round, and reactive to light. Neck:      Musculoskeletal: Neck supple. Cardiovascular:      Rate and Rhythm: Normal rate and regular rhythm. Heart sounds: Normal heart sounds. Pulmonary:      Effort: Pulmonary effort is normal.      Breath sounds: Normal breath sounds. Abdominal:      General: Bowel sounds are normal.      Palpations: Abdomen is soft. Tenderness: There is no abdominal tenderness. There is no guarding. Musculoskeletal: Normal range of motion. Right lower leg: No edema. Left lower leg: No edema. Skin:     General: Skin is warm. Neurological:      General: No focal deficit present.       Mental Status: She is alert and oriented to person, place, and time. Mental status is at baseline. Diagnostics:      No results found for this or any previous visit (from the past 96 hour(s)). Imaging:  No results found for this or any previous visit. Results for orders placed in visit on 08/25/20   XR DEXA BONE DENSITY APPENDICULAR       No results found for this or any previous visit. Pathology          Assessment:                                        1. Thrombocytosis ([address]        Plan:                                        # Thrombocytosis  -- 2/3/2021 she was diagnosed with COVID 19 infection. -- 4/17/2021 CBC reported WBC 9.5, hemoglobin 13.7, hematocrit 40.3%, and increased numbers of platelets. Her platelet count was unable to report due to significant platelet clumping reportedly. Lab reviews indicated on 12/10/2018 her platelet was [phone].  -- She also completed 2 doses of COVID vaccination recently. -- I have explained to the patient that differential diagnosis of thrombocytosis could be a reactive process which is related to Iron deficiency or blood loss, infection, inflammation, or post-splenectomy. Reactive thrombocytosis accounts for majority of cases and clinical settings. Primary thrombocytosis is caused by somatic mutations of genes which regulate thrombopoiesis (eg, JAK2, CALR, or MPL) and that are associated with Essential thrombocythemia, Polycythemia vera, Primary myelofibrosis, Chronic myeloid leukemia, MDS, or AML. -- I will repeat CBC with diff. I will also check an ESR/CRP, LISE, Iron study and ferritin to look for secondary causes of an elevated platelet count. We will check peripheral smear for any morphological abnormalities. I also send out JAK2, CALR, MPL mutations and BCR/ABL testing. The patient was agreeable for the plan. -- I will see the patient back in clinic in about 3-4 weeks for lab review. Always sooner if required.       Orders Placed This Encounter    METABOLIC PANEL, COMPREHENSIVE     Standing Status:   Future     Standing Expiration Date:   5/13/2022    IRON PROFILE     Standing Status:   Future     Standing Expiration Date:   5/13/2022    FERRITIN     Standing Status:   Future     Standing Expiration Date:   5/12/2022    SED RATE (ESR)     Standing Status:   Future     Standing Expiration Date:   5/12/2022    PERIPHERAL SMEAR     Standing Status:   Future     Standing Expiration Date:   5/12/2022    CBC WITH AUTOMATED DIFF     Standing Status:   Future     Standing Expiration Date:   5/13/2022    BCR-ABL1, PCR, QT     Standing Status:   Future     Standing Expiration Date:   5/12/2022    CALR MUTATION ANALYSIS     Standing Status:   Future     Standing Expiration Date:   5/12/2022    JAK2 MUTATION ANALYSIS     Standing Status:   Future     Standing Expiration Date:   5/12/2022    MPL MUTATION ANALYSIS     Standing Status:   Future     Standing Expiration Date:   5/12/2022    ANTINUCLEAR ANTIBODIES, IFA     Standing Status:   Future     Standing Expiration Date:   5/12/2022           Ms. Andrea Nicole has a reminder for a \"due or due soon\" health maintenance. I have asked that she contact her primary care provider for follow-up on this health maintenance. All of patient's questions answered to their apparent satisfaction. They verbally show understanding and agreement with aforementioned plan. I would like to thank Dr Nathalia Romero MD  for allowing me to participate in the care of this very pleasant patient. If I can be of further assistance please do not hesitate to call. Asad Starr MD  5/12/2021          About 45 minutes were spent for this encounter with more than 50% of the time spent in face-to-face counseling, discussing on diagnosis and management plan going forward, and co-ordination of care. Parts of this document has been produced using Dragon dictation system. Unrecognized errors in transcription may be present.  Please do not hesitate to reach out for any questions or clarifications.       CC: Janie Islas MD chest pain

## 2023-04-11 NOTE — ED PROVIDER NOTE - CONSTITUTIONAL, MLM
Patient informed and scheduled normal... Well appearing, awake, alert, oriented to person, place, time/situation and in no apparent distress.

## 2024-02-02 RX ADMIN — TRAMADOL HYDROCHLORIDE 50 MILLIGRAM(S): 50 TABLET ORAL at 21:57

## 2024-02-28 ENCOUNTER — EMERGENCY (EMERGENCY)
Facility: HOSPITAL | Age: 89
LOS: 1 days | Discharge: ANOTHER HOSPITAL | End: 2024-02-28
Attending: EMERGENCY MEDICINE | Admitting: EMERGENCY MEDICINE
Payer: MEDICAID

## 2024-02-28 VITALS
OXYGEN SATURATION: 97 % | DIASTOLIC BLOOD PRESSURE: 102 MMHG | TEMPERATURE: 99 F | RESPIRATION RATE: 22 BRPM | HEART RATE: 100 BPM | SYSTOLIC BLOOD PRESSURE: 182 MMHG

## 2024-02-28 LAB
ALBUMIN SERPL ELPH-MCNC: 3.3 G/DL — SIGNIFICANT CHANGE UP (ref 3.3–5)
ALP SERPL-CCNC: 98 U/L — SIGNIFICANT CHANGE UP (ref 40–120)
ALT FLD-CCNC: 14 U/L — SIGNIFICANT CHANGE UP (ref 4–33)
ANION GAP SERPL CALC-SCNC: 12 MMOL/L — SIGNIFICANT CHANGE UP (ref 7–14)
APTT BLD: 25.4 SEC — SIGNIFICANT CHANGE UP (ref 24.5–35.6)
AST SERPL-CCNC: 15 U/L — SIGNIFICANT CHANGE UP (ref 4–32)
B PERT DNA SPEC QL NAA+PROBE: SIGNIFICANT CHANGE UP
B PERT+PARAPERT DNA PNL SPEC NAA+PROBE: SIGNIFICANT CHANGE UP
BASE EXCESS BLDV CALC-SCNC: 0.1 MMOL/L — SIGNIFICANT CHANGE UP (ref -2–3)
BASOPHILS # BLD AUTO: 0.03 K/UL — SIGNIFICANT CHANGE UP (ref 0–0.2)
BASOPHILS NFR BLD AUTO: 0.2 % — SIGNIFICANT CHANGE UP (ref 0–2)
BILIRUB SERPL-MCNC: 0.7 MG/DL — SIGNIFICANT CHANGE UP (ref 0.2–1.2)
BLOOD GAS VENOUS COMPREHENSIVE RESULT: SIGNIFICANT CHANGE UP
BORDETELLA PARAPERTUSSIS (RAPRVP): SIGNIFICANT CHANGE UP
BUN SERPL-MCNC: 26 MG/DL — HIGH (ref 7–23)
C PNEUM DNA SPEC QL NAA+PROBE: SIGNIFICANT CHANGE UP
CALCIUM SERPL-MCNC: 8.9 MG/DL — SIGNIFICANT CHANGE UP (ref 8.4–10.5)
CHLORIDE BLDV-SCNC: 103 MMOL/L — SIGNIFICANT CHANGE UP (ref 96–108)
CHLORIDE SERPL-SCNC: 102 MMOL/L — SIGNIFICANT CHANGE UP (ref 98–107)
CO2 BLDV-SCNC: 26.7 MMOL/L — HIGH (ref 22–26)
CO2 SERPL-SCNC: 23 MMOL/L — SIGNIFICANT CHANGE UP (ref 22–31)
CREAT SERPL-MCNC: 0.79 MG/DL — SIGNIFICANT CHANGE UP (ref 0.5–1.3)
EGFR: 71 ML/MIN/1.73M2 — SIGNIFICANT CHANGE UP
EOSINOPHIL # BLD AUTO: 0.01 K/UL — SIGNIFICANT CHANGE UP (ref 0–0.5)
EOSINOPHIL NFR BLD AUTO: 0.1 % — SIGNIFICANT CHANGE UP (ref 0–6)
FLUAV SUBTYP SPEC NAA+PROBE: SIGNIFICANT CHANGE UP
FLUBV RNA SPEC QL NAA+PROBE: SIGNIFICANT CHANGE UP
GAS PNL BLDV: 135 MMOL/L — LOW (ref 136–145)
GAS PNL BLDV: SIGNIFICANT CHANGE UP
GLUCOSE BLDV-MCNC: 130 MG/DL — HIGH (ref 70–99)
GLUCOSE SERPL-MCNC: 137 MG/DL — HIGH (ref 70–99)
HADV DNA SPEC QL NAA+PROBE: SIGNIFICANT CHANGE UP
HCO3 BLDV-SCNC: 25 MMOL/L — SIGNIFICANT CHANGE UP (ref 22–29)
HCOV 229E RNA SPEC QL NAA+PROBE: SIGNIFICANT CHANGE UP
HCOV HKU1 RNA SPEC QL NAA+PROBE: SIGNIFICANT CHANGE UP
HCOV NL63 RNA SPEC QL NAA+PROBE: SIGNIFICANT CHANGE UP
HCOV OC43 RNA SPEC QL NAA+PROBE: SIGNIFICANT CHANGE UP
HCT VFR BLD CALC: 31.3 % — LOW (ref 34.5–45)
HCT VFR BLDA CALC: 30 % — LOW (ref 34.5–46.5)
HGB BLD CALC-MCNC: 10.1 G/DL — LOW (ref 11.7–16.1)
HGB BLD-MCNC: 9.8 G/DL — LOW (ref 11.5–15.5)
HMPV RNA SPEC QL NAA+PROBE: SIGNIFICANT CHANGE UP
HPIV1 RNA SPEC QL NAA+PROBE: SIGNIFICANT CHANGE UP
HPIV2 RNA SPEC QL NAA+PROBE: SIGNIFICANT CHANGE UP
HPIV3 RNA SPEC QL NAA+PROBE: SIGNIFICANT CHANGE UP
HPIV4 RNA SPEC QL NAA+PROBE: SIGNIFICANT CHANGE UP
IANC: 9.77 K/UL — HIGH (ref 1.8–7.4)
IMM GRANULOCYTES NFR BLD AUTO: 1.6 % — HIGH (ref 0–0.9)
INR BLD: 0.99 RATIO — SIGNIFICANT CHANGE UP (ref 0.85–1.18)
LACTATE BLDV-MCNC: 1.5 MMOL/L — SIGNIFICANT CHANGE UP (ref 0.5–2)
LYMPHOCYTES # BLD AUTO: 1.16 K/UL — SIGNIFICANT CHANGE UP (ref 1–3.3)
LYMPHOCYTES # BLD AUTO: 9.1 % — LOW (ref 13–44)
M PNEUMO DNA SPEC QL NAA+PROBE: SIGNIFICANT CHANGE UP
MCHC RBC-ENTMCNC: 25.5 PG — LOW (ref 27–34)
MCHC RBC-ENTMCNC: 31.3 GM/DL — LOW (ref 32–36)
MCV RBC AUTO: 81.3 FL — SIGNIFICANT CHANGE UP (ref 80–100)
MONOCYTES # BLD AUTO: 1.55 K/UL — HIGH (ref 0–0.9)
MONOCYTES NFR BLD AUTO: 12.2 % — SIGNIFICANT CHANGE UP (ref 2–14)
NEUTROPHILS # BLD AUTO: 9.77 K/UL — HIGH (ref 1.8–7.4)
NEUTROPHILS NFR BLD AUTO: 76.8 % — SIGNIFICANT CHANGE UP (ref 43–77)
NRBC # BLD: 0 /100 WBCS — SIGNIFICANT CHANGE UP (ref 0–0)
NRBC # FLD: 0 K/UL — SIGNIFICANT CHANGE UP (ref 0–0)
NT-PROBNP SERPL-SCNC: 2028 PG/ML — HIGH
PCO2 BLDV: 43 MMHG — SIGNIFICANT CHANGE UP (ref 39–52)
PH BLDV: 7.38 — SIGNIFICANT CHANGE UP (ref 7.32–7.43)
PLATELET # BLD AUTO: 343 K/UL — SIGNIFICANT CHANGE UP (ref 150–400)
PO2 BLDV: <20 MMHG — LOW (ref 25–45)
POTASSIUM BLDV-SCNC: 4.4 MMOL/L — SIGNIFICANT CHANGE UP (ref 3.5–5.1)
POTASSIUM SERPL-MCNC: 4.5 MMOL/L — SIGNIFICANT CHANGE UP (ref 3.5–5.3)
POTASSIUM SERPL-SCNC: 4.5 MMOL/L — SIGNIFICANT CHANGE UP (ref 3.5–5.3)
PROT SERPL-MCNC: 7.4 G/DL — SIGNIFICANT CHANGE UP (ref 6–8.3)
PROTHROM AB SERPL-ACNC: 11.2 SEC — SIGNIFICANT CHANGE UP (ref 9.5–13)
RAPID RVP RESULT: SIGNIFICANT CHANGE UP
RBC # BLD: 3.85 M/UL — SIGNIFICANT CHANGE UP (ref 3.8–5.2)
RBC # FLD: 16.4 % — HIGH (ref 10.3–14.5)
RSV RNA SPEC QL NAA+PROBE: SIGNIFICANT CHANGE UP
RV+EV RNA SPEC QL NAA+PROBE: SIGNIFICANT CHANGE UP
SAO2 % BLDV: 24.2 % — LOW (ref 67–88)
SARS-COV-2 RNA SPEC QL NAA+PROBE: SIGNIFICANT CHANGE UP
SODIUM SERPL-SCNC: 137 MMOL/L — SIGNIFICANT CHANGE UP (ref 135–145)
TROPONIN T, HIGH SENSITIVITY RESULT: 32 NG/L — SIGNIFICANT CHANGE UP
TROPONIN T, HIGH SENSITIVITY RESULT: 34 NG/L — SIGNIFICANT CHANGE UP
WBC # BLD: 12.72 K/UL — HIGH (ref 3.8–10.5)
WBC # FLD AUTO: 12.72 K/UL — HIGH (ref 3.8–10.5)

## 2024-02-28 PROCEDURE — 99291 CRITICAL CARE FIRST HOUR: CPT

## 2024-02-28 PROCEDURE — 71045 X-RAY EXAM CHEST 1 VIEW: CPT | Mod: 26

## 2024-02-28 PROCEDURE — 93010 ELECTROCARDIOGRAM REPORT: CPT

## 2024-02-28 RX ORDER — CEFTRIAXONE 500 MG/1
1000 INJECTION, POWDER, FOR SOLUTION INTRAMUSCULAR; INTRAVENOUS ONCE
Refills: 0 | Status: COMPLETED | OUTPATIENT
Start: 2024-02-28 | End: 2024-02-28

## 2024-02-28 RX ORDER — ACETAMINOPHEN 500 MG
1000 TABLET ORAL ONCE
Refills: 0 | Status: COMPLETED | OUTPATIENT
Start: 2024-02-28 | End: 2024-02-28

## 2024-02-28 RX ORDER — AZITHROMYCIN 500 MG/1
500 TABLET, FILM COATED ORAL ONCE
Refills: 0 | Status: COMPLETED | OUTPATIENT
Start: 2024-02-28 | End: 2024-02-28

## 2024-02-28 RX ADMIN — AZITHROMYCIN 255 MILLIGRAM(S): 500 TABLET, FILM COATED ORAL at 22:28

## 2024-02-28 RX ADMIN — CEFTRIAXONE 100 MILLIGRAM(S): 500 INJECTION, POWDER, FOR SOLUTION INTRAMUSCULAR; INTRAVENOUS at 21:24

## 2024-02-28 RX ADMIN — Medication 400 MILLIGRAM(S): at 20:08

## 2024-02-28 NOTE — ED PROVIDER NOTE - PROGRESS NOTE DETAILS
Breathing improved on BIPAP. Pt now off BIPAP. On 2 L NC o2 sat 100%. Breathing comfortably MARGE: Patient was signed out to me pending imaging for fall that occurred 2 weeks ago.  CT head and neck were unremarkable however CT of the chest shows that patient has fractures of the upper sternal body with posterior displacement of the more cephalad portion, likely acute or subacute with surrounding hematoma.  Acute versus subacute mildly displaced left anterior third rib fracture.  T5 vertebral body compression fracture likely either subacute or chronic.  Small bilateral pleural effusions.  I also obtained a CT of patient's pelvis which shows likely acute impacted fracture of the femoral neck on the right.  Patient and family amenable to transfer to [facility] [place] for trauma evaluation.  Patient will be transferred to the ED from our ED for further workup.  Labs shows patient is negative for RVP.  Otherwise anemic 9.8/31.3 electrolytes are within normal limits otherwise.  Troponin x 2 is flat at 34->32.  Patient has nitrite positive urine but no WBCs.  Many epithelial cells and bacteria.  Patient is status post antibiotics and Tylenol for fever 101.5 likely secondary to UTI.  RVP is negative for infection at this time.  Dr. OLGA Soto excepting for trauma service for evaluation immediately. MARGE: Patient was signed out to me pending imaging for fall that occurred 2 weeks ago.  CT head and neck were unremarkable however CT of the chest shows that patient has fractures of the upper sternal body with posterior displacement of the more cephalad portion, likely acute or subacute with surrounding hematoma.  Acute versus subacute mildly displaced left anterior third rib fracture.  T5 vertebral body compression fracture likely either subacute or chronic.  Small bilateral pleural effusions.  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  Patient and family amenable to transfer to [facility] for trauma evaluation.  Patient will be transferred to the ED from our ED for further workup.  Labs shows patient is negative for RVP.  Otherwise anemic 9.8/31.3 electrolytes are within normal limits otherwise.  Troponin x 2 is flat at 34->32.  Patient has nitrite positive urine but no WBCs.  Many epithelial cells and bacteria.  Patient is status post antibiotics and Tylenol for fever 101.5 likely secondary to UTI.  RVP is negative for infection at this time.  Dr. OLGA Soto excepting for trauma service for evaluation immediately.

## 2024-02-28 NOTE — ED PROVIDER NOTE - CRITICAL CARE ATTENDING CONTRIBUTION TO CARE
I have evaluated the patient and agree with the documentation and assessment as made by the PA. We have discussed plan of care and work up for the patient.  I performed all elements of critical care.     Brief HPI:  89-year-old female history of dementia presents for chest pain, shortness of breath, leg swelling for several days.  Patient unable to provide history due to respiratory distress.  Family states that she had mechanical fall 2 weeks ago falling on her chest, hitting head, and neck.  Patient has been experiencing chills.  No measured temperatures at home.  No history of DVT or PE.  No reported nausea, vomiting, abdominal pain, diarrhea, dysuria.  Non-smoker.  No sick contacts.    Vitals:   Reviewed    Exam:    GEN:  Non-toxic appearing, non-distressed, speaking full sentences, non-diaphoretic, AAOx3  HEENT:  NCAT, neck supple, EOMI, PERRLA, sclera anicteric, no conjunctival pallor or injection, no stridor, normal voice, no tonsillar exudate, uvula midline  CV:  regular rhythm and rate, s1/s2 audible, no murmurs, rubs or gallops, peripheral pulses 2+ and symmetric  PULM: Tachypneic, crackles at bilateral lung bases  ABD:  non distended, non-tender, no rebound, no guarding, negative Mcgrath's sign, bowel sounds normal, no cvat  MSK:  no gross deformity, non-tender extremities and joints, range of motion grossly normal appearing, Bilateral lower extremity edema, extremities warm and well perfused   NEURO:  AAOx3, CN II-XII intact, motor 5/5 in upper and lower extremities bilaterally, sensation grossly intact in extremities and trunk  SKIN:  warm, dry, no rash or vesicles     A/P:  89-year-old female history of dementia presents for chest pain, shortness of breath, leg swelling for several days. Tachypneic, in respiratory distress but alert.  Crackles at lung bases.  EKG nonischemic.  Differential diagnosis includes heart failure exacerbation, pneumonia, viral syndrome.  Will send labs, chest x-ray, initiate BiPAP.  Disposition pending.

## 2024-02-28 NOTE — ED ADULT NURSE NOTE - OBJECTIVE STATEMENT
Patient presents to ED for SOB, chest pain. Was brought to room 19 by wheelchair. On 2L O2 by nasal canula. Family at bedside. She is A&Ox1, tachypneic, labored breathing, saturating 100%. Per family, patient has dementia. Family states she fell 2 weeks ago and since then, has been declining. Patient is poor historian. No pallor, no cyanosis noted. Bilateral lower leg edema noted. Small healing abrasion noted right forearm. On cardiac monitor showing ST in 100s bpm. 16G IV placed left AC. 18G IV placed right AC. Labs drawn and sent. Respiratory was paged for Bipap. Rectal temp 101.5 F.

## 2024-02-28 NOTE — ED PROVIDER NOTE - CLINICAL SUMMARY MEDICAL DECISION MAKING FREE TEXT BOX
89-year-old female history of dementia otherwise unknown past medical history patient is from [facility] presents with family secondary to chest pain shortness of breath and increased work of breathing.  Per family patient has been having shortness of breath cough and chest pain that has been worsening today.  Patient has been diaphoretic.  Per family patient fell 2 weeks ago falling onto her chest and hitting head and neck.  Did not seek medical care at that time.   patient has been having subjective fevers chills.  Patient A&O x 1 at baseline tachypneic with labored breathing however satting at 100%.  BiPAP was started due to increased work of breathing.  Rectal temp shows fever of 101.5.    plan  - labs  - blood cx  - ua/cx  - iv abx  - rvp  - cxr  - ct head/neck, chest

## 2024-02-28 NOTE — ED PROVIDER NOTE - CARE PLAN
1 Principal Discharge DX:	Fracture of sternum  Secondary Diagnosis:	Compression fracture of thoracic vertebra

## 2024-02-28 NOTE — ED ADULT NURSE NOTE - NSFALLHARMRISKINTERV_ED_ALL_ED
Assistance OOB with selected safe patient handling equipment if applicable/Assistance with ambulation/Communicate risk of Fall with Harm to all staff, patient, and family/Monitor gait and stability/Provide visual cue: red socks, yellow wristband, yellow gown, etc/Reinforce activity limits and safety measures with patient and family/Bed in lowest position, wheels locked, appropriate side rails in place/Call bell, personal items and telephone in reach/Instruct patient to call for assistance before getting out of bed/chair/stretcher/Non-slip footwear applied when patient is off stretcher/[place] to call system/Physically safe environment - no spills, clutter or unnecessary equipment/Purposeful Proactive Rounding/Room/bathroom lighting operational, light cord in reach

## 2024-02-28 NOTE — ED PROVIDER NOTE - MUSCULOSKELETAL, MLM
Chest wall with healing ecchymosis. TTP. No c-spine tenderness. No signs of head trauma. b/l LE edema.

## 2024-02-28 NOTE — ED PROVIDER NOTE - OBJECTIVE STATEMENT
89-year-old female history of dementia otherwise unknown past medical history patient is from [facility] presents with family secondary to chest pain shortness of breath and increased work of breathing.  Per family patient has been having shortness of breath cough and chest pain that has been worsening today.  Patient has been diaphoretic.  Per family patient fell 2 weeks ago falling onto her chest and hitting head and neck.  Did not seek medical care at that time.   patient has been having subjective fevers chills.  Patient A&O x 1 at baseline tachypneic with labored breathing however satting at 100%.  BiPAP was started due to increased work of breathing.  Rectal temp shows fever of 101.5. 89-year-old female history of dementia otherwise unknown past medical history patient is from [facility] presents with family secondary to chest pain shortness of breath and increased work of breathing.  Per family patient has been having shortness of breath cough and chest pain that has been worsening today.  Patient has been diaphoretic.  Per family patient fell 2 weeks ago falling onto her chest and hitting head and neck while in the shower.  Did not seek medical care at that time.   patient has been having subjective fevers chills.  Patient A&O x 1 at baseline tachypneic with labored breathing however satting at 100%.  BiPAP was started due to increased work of breathing.  Rectal temp shows fever of 101.5.

## 2024-02-29 ENCOUNTER — INPATIENT (INPATIENT)
Facility: HOSPITAL | Age: 89
LOS: 9 days | Discharge: HOME | DRG: 565 | End: 2024-03-10
Attending: SURGERY | Admitting: SURGERY
Payer: MEDICAID

## 2024-02-29 VITALS
WEIGHT: 149.91 LBS | DIASTOLIC BLOOD PRESSURE: 73 MMHG | SYSTOLIC BLOOD PRESSURE: 155 MMHG | HEART RATE: 70 BPM | TEMPERATURE: 98 F | OXYGEN SATURATION: 97 % | RESPIRATION RATE: 19 BRPM | HEIGHT: 64 IN

## 2024-02-29 VITALS
HEART RATE: 80 BPM | RESPIRATION RATE: 18 BRPM | DIASTOLIC BLOOD PRESSURE: 54 MMHG | OXYGEN SATURATION: 100 % | TEMPERATURE: 98 F | SYSTOLIC BLOOD PRESSURE: 152 MMHG

## 2024-02-29 DIAGNOSIS — N39.0 URINARY TRACT INFECTION, SITE NOT SPECIFIED: ICD-10-CM

## 2024-02-29 DIAGNOSIS — R56.9 UNSPECIFIED CONVULSIONS: ICD-10-CM

## 2024-02-29 DIAGNOSIS — Z29.9 ENCOUNTER FOR PROPHYLACTIC MEASURES, UNSPECIFIED: ICD-10-CM

## 2024-02-29 DIAGNOSIS — S22.20XA UNSPECIFIED FRACTURE OF STERNUM, INITIAL ENCOUNTER FOR CLOSED FRACTURE: ICD-10-CM

## 2024-02-29 DIAGNOSIS — D62 ACUTE POSTHEMORRHAGIC ANEMIA: ICD-10-CM

## 2024-02-29 DIAGNOSIS — I10 ESSENTIAL (PRIMARY) HYPERTENSION: ICD-10-CM

## 2024-02-29 DIAGNOSIS — F03.90 UNSPECIFIED DEMENTIA WITHOUT BEHAVIORAL DISTURBANCE: ICD-10-CM

## 2024-02-29 LAB
ALBUMIN SERPL ELPH-MCNC: 2.8 G/DL — LOW (ref 3.3–5)
ALP SERPL-CCNC: 80 U/L — SIGNIFICANT CHANGE UP (ref 40–120)
ALT FLD-CCNC: 12 U/L — SIGNIFICANT CHANGE UP (ref 10–45)
ANION GAP SERPL CALC-SCNC: 11 MMOL/L — SIGNIFICANT CHANGE UP (ref 5–17)
APPEARANCE UR: ABNORMAL
APPEARANCE UR: ABNORMAL
APTT BLD: 21.3 SEC — LOW (ref 24.5–35.6)
AST SERPL-CCNC: 14 U/L — SIGNIFICANT CHANGE UP (ref 10–40)
BACTERIA # UR AUTO: ABNORMAL /HPF
BACTERIA # UR AUTO: ABNORMAL /HPF
BILIRUB SERPL-MCNC: 0.5 MG/DL — SIGNIFICANT CHANGE UP (ref 0.2–1.2)
BILIRUB UR-MCNC: NEGATIVE — SIGNIFICANT CHANGE UP
BILIRUB UR-MCNC: NEGATIVE — SIGNIFICANT CHANGE UP
BUN SERPL-MCNC: 24 MG/DL — HIGH (ref 7–23)
CALCIUM SERPL-MCNC: 8.5 MG/DL — SIGNIFICANT CHANGE UP (ref 8.4–10.5)
CAST: 1 /LPF — SIGNIFICANT CHANGE UP (ref 0–4)
CAST: 1 /LPF — SIGNIFICANT CHANGE UP (ref 0–4)
CHLORIDE SERPL-SCNC: 102 MMOL/L — SIGNIFICANT CHANGE UP (ref 96–108)
CO2 SERPL-SCNC: 22 MMOL/L — SIGNIFICANT CHANGE UP (ref 22–31)
COLOR SPEC: SIGNIFICANT CHANGE UP
COLOR SPEC: SIGNIFICANT CHANGE UP
CREAT SERPL-MCNC: 0.72 MG/DL — SIGNIFICANT CHANGE UP (ref 0.5–1.3)
DIFF PNL FLD: ABNORMAL
DIFF PNL FLD: ABNORMAL
EGFR: 79 ML/MIN/1.73M2 — SIGNIFICANT CHANGE UP
GLUCOSE SERPL-MCNC: 113 MG/DL — HIGH (ref 70–99)
GLUCOSE UR QL: NEGATIVE MG/DL — SIGNIFICANT CHANGE UP
GLUCOSE UR QL: NEGATIVE MG/DL — SIGNIFICANT CHANGE UP
HCT VFR BLD CALC: 26.7 % — LOW (ref 34.5–45)
HGB BLD-MCNC: 8.4 G/DL — LOW (ref 11.5–15.5)
INR BLD: 1.07 RATIO — SIGNIFICANT CHANGE UP (ref 0.85–1.18)
KETONES UR-MCNC: NEGATIVE MG/DL — SIGNIFICANT CHANGE UP
KETONES UR-MCNC: NEGATIVE MG/DL — SIGNIFICANT CHANGE UP
LEUKOCYTE ESTERASE UR-ACNC: NEGATIVE — SIGNIFICANT CHANGE UP
LEUKOCYTE ESTERASE UR-ACNC: NEGATIVE — SIGNIFICANT CHANGE UP
MCHC RBC-ENTMCNC: 25.7 PG — LOW (ref 27–34)
MCHC RBC-ENTMCNC: 31.5 GM/DL — LOW (ref 32–36)
MCV RBC AUTO: 81.7 FL — SIGNIFICANT CHANGE UP (ref 80–100)
NITRITE UR-MCNC: POSITIVE
NITRITE UR-MCNC: POSITIVE
NRBC # BLD: 0 /100 WBCS — SIGNIFICANT CHANGE UP (ref 0–0)
PH UR: 5.5 — SIGNIFICANT CHANGE UP (ref 5–8)
PH UR: 5.5 — SIGNIFICANT CHANGE UP (ref 5–8)
PLATELET # BLD AUTO: 288 K/UL — SIGNIFICANT CHANGE UP (ref 150–400)
POTASSIUM SERPL-MCNC: 4.2 MMOL/L — SIGNIFICANT CHANGE UP (ref 3.5–5.3)
POTASSIUM SERPL-SCNC: 4.2 MMOL/L — SIGNIFICANT CHANGE UP (ref 3.5–5.3)
PROT SERPL-MCNC: 6.2 G/DL — SIGNIFICANT CHANGE UP (ref 6–8.3)
PROT UR-MCNC: 100 MG/DL
PROT UR-MCNC: 30 MG/DL
PROTHROM AB SERPL-ACNC: 11.8 SEC — SIGNIFICANT CHANGE UP (ref 9.5–13)
RBC # BLD: 3.27 M/UL — LOW (ref 3.8–5.2)
RBC # FLD: 16.2 % — HIGH (ref 10.3–14.5)
RBC CASTS # UR COMP ASSIST: 0 /HPF — SIGNIFICANT CHANGE UP (ref 0–4)
RBC CASTS # UR COMP ASSIST: 1 /HPF — SIGNIFICANT CHANGE UP (ref 0–4)
SODIUM SERPL-SCNC: 135 MMOL/L — SIGNIFICANT CHANGE UP (ref 135–145)
SP GR SPEC: 1.03 — SIGNIFICANT CHANGE UP (ref 1–1.03)
SP GR SPEC: 1.03 — SIGNIFICANT CHANGE UP (ref 1–1.03)
SQUAMOUS # UR AUTO: 11 /HPF — HIGH (ref 0–5)
SQUAMOUS # UR AUTO: 9 /HPF — HIGH (ref 0–5)
UROBILINOGEN FLD QL: 1 MG/DL — SIGNIFICANT CHANGE UP (ref 0.2–1)
UROBILINOGEN FLD QL: 1 MG/DL — SIGNIFICANT CHANGE UP (ref 0.2–1)
WBC # BLD: 9.94 K/UL — SIGNIFICANT CHANGE UP (ref 3.8–10.5)
WBC # FLD AUTO: 9.94 K/UL — SIGNIFICANT CHANGE UP (ref 3.8–10.5)
WBC UR QL: 2 /HPF — SIGNIFICANT CHANGE UP (ref 0–5)
WBC UR QL: 2 /HPF — SIGNIFICANT CHANGE UP (ref 0–5)

## 2024-02-29 PROCEDURE — 73502 X-RAY EXAM HIP UNI 2-3 VIEWS: CPT | Mod: 26,RT

## 2024-02-29 PROCEDURE — 73590 X-RAY EXAM OF LOWER LEG: CPT | Mod: 26,50

## 2024-02-29 PROCEDURE — 99223 1ST HOSP IP/OBS HIGH 75: CPT

## 2024-02-29 PROCEDURE — 70450 CT HEAD/BRAIN W/O DYE: CPT | Mod: 26,MC

## 2024-02-29 PROCEDURE — 93010 ELECTROCARDIOGRAM REPORT: CPT

## 2024-02-29 PROCEDURE — 71250 CT THORAX DX C-: CPT | Mod: 26,MC

## 2024-02-29 PROCEDURE — 73552 X-RAY EXAM OF FEMUR 2/>: CPT | Mod: 26,50

## 2024-02-29 PROCEDURE — 99223 1ST HOSP IP/OBS HIGH 75: CPT | Mod: 57

## 2024-02-29 PROCEDURE — 72125 CT NECK SPINE W/O DYE: CPT | Mod: 26,MC

## 2024-02-29 PROCEDURE — 99285 EMERGENCY DEPT VISIT HI MDM: CPT

## 2024-02-29 PROCEDURE — 99497 ADVNCD CARE PLAN 30 MIN: CPT | Mod: 25

## 2024-02-29 RX ORDER — FUROSEMIDE 40 MG
1 TABLET ORAL
Refills: 0 | DISCHARGE

## 2024-02-29 RX ORDER — LISINOPRIL 2.5 MG/1
1 TABLET ORAL
Qty: 0 | Refills: 0 | DISCHARGE

## 2024-02-29 RX ORDER — TRAMADOL HYDROCHLORIDE 50 MG/1
25 TABLET ORAL EVERY 6 HOURS
Refills: 0 | Status: DISCONTINUED | OUTPATIENT
Start: 2024-02-29 | End: 2024-03-05

## 2024-02-29 RX ORDER — ZONISAMIDE 100 MG
1 CAPSULE ORAL
Refills: 0 | DISCHARGE

## 2024-02-29 RX ORDER — ESCITALOPRAM OXALATE 10 MG/1
1 TABLET, FILM COATED ORAL
Refills: 0 | DISCHARGE

## 2024-02-29 RX ORDER — ATORVASTATIN CALCIUM 80 MG/1
1 TABLET, FILM COATED ORAL
Qty: 0 | Refills: 0 | DISCHARGE

## 2024-02-29 RX ORDER — LISINOPRIL 2.5 MG/1
2.5 TABLET ORAL DAILY
Refills: 0 | Status: DISCONTINUED | OUTPATIENT
Start: 2024-02-29 | End: 2024-03-10

## 2024-02-29 RX ORDER — ZONISAMIDE 100 MG
50 CAPSULE ORAL DAILY
Refills: 0 | Status: DISCONTINUED | OUTPATIENT
Start: 2024-02-29 | End: 2024-03-10

## 2024-02-29 RX ORDER — SODIUM CHLORIDE 9 MG/ML
1000 INJECTION INTRAMUSCULAR; INTRAVENOUS; SUBCUTANEOUS ONCE
Refills: 0 | Status: COMPLETED | OUTPATIENT
Start: 2024-02-29 | End: 2024-02-29

## 2024-02-29 RX ORDER — IBUPROFEN 200 MG
200 TABLET ORAL EVERY 4 HOURS
Refills: 0 | Status: DISCONTINUED | OUTPATIENT
Start: 2024-02-29 | End: 2024-03-10

## 2024-02-29 RX ORDER — METFORMIN HYDROCHLORIDE 850 MG/1
1 TABLET ORAL
Qty: 0 | Refills: 0 | DISCHARGE

## 2024-02-29 RX ORDER — MORPHINE SULFATE 50 MG/1
2 CAPSULE, EXTENDED RELEASE ORAL ONCE
Refills: 0 | Status: DISCONTINUED | OUTPATIENT
Start: 2024-02-29 | End: 2024-02-29

## 2024-02-29 RX ORDER — ENOXAPARIN SODIUM 100 MG/ML
40 INJECTION SUBCUTANEOUS EVERY 24 HOURS
Refills: 0 | Status: DISCONTINUED | OUTPATIENT
Start: 2024-02-29 | End: 2024-03-10

## 2024-02-29 RX ORDER — ASPIRIN/CALCIUM CARB/MAGNESIUM 324 MG
81 TABLET ORAL DAILY
Refills: 0 | Status: DISCONTINUED | OUTPATIENT
Start: 2024-02-29 | End: 2024-03-10

## 2024-02-29 RX ORDER — CEFTRIAXONE 500 MG/1
1000 INJECTION, POWDER, FOR SOLUTION INTRAMUSCULAR; INTRAVENOUS EVERY 24 HOURS
Refills: 0 | Status: COMPLETED | OUTPATIENT
Start: 2024-02-29 | End: 2024-03-02

## 2024-02-29 RX ORDER — LIDOCAINE 4 G/100G
1 CREAM TOPICAL EVERY 24 HOURS
Refills: 0 | Status: DISCONTINUED | OUTPATIENT
Start: 2024-02-29 | End: 2024-03-10

## 2024-02-29 RX ORDER — DEXTROSE MONOHYDRATE, SODIUM CHLORIDE, AND POTASSIUM CHLORIDE 50; .745; 4.5 G/1000ML; G/1000ML; G/1000ML
1000 INJECTION, SOLUTION INTRAVENOUS
Refills: 0 | Status: DISCONTINUED | OUTPATIENT
Start: 2024-02-29 | End: 2024-03-01

## 2024-02-29 RX ORDER — POLYETHYLENE GLYCOL 3350 17 G/17G
17 POWDER, FOR SOLUTION ORAL DAILY
Refills: 0 | Status: DISCONTINUED | OUTPATIENT
Start: 2024-02-29 | End: 2024-03-10

## 2024-02-29 RX ORDER — ESCITALOPRAM OXALATE 10 MG/1
10 TABLET, FILM COATED ORAL DAILY
Refills: 0 | Status: DISCONTINUED | OUTPATIENT
Start: 2024-02-29 | End: 2024-03-10

## 2024-02-29 RX ORDER — TRAMADOL HYDROCHLORIDE 50 MG/1
50 TABLET ORAL EVERY 6 HOURS
Refills: 0 | Status: DISCONTINUED | OUTPATIENT
Start: 2024-02-29 | End: 2024-03-05

## 2024-02-29 RX ORDER — ACETAMINOPHEN 500 MG
975 TABLET ORAL EVERY 8 HOURS
Refills: 0 | Status: COMPLETED | OUTPATIENT
Start: 2024-02-29 | End: 2024-03-02

## 2024-02-29 RX ORDER — LISINOPRIL 2.5 MG/1
2.5 TABLET ORAL DAILY
Refills: 0 | Status: DISCONTINUED | OUTPATIENT
Start: 2024-02-29 | End: 2024-02-29

## 2024-02-29 RX ORDER — GABAPENTIN 400 MG/1
1 CAPSULE ORAL
Qty: 0 | Refills: 0 | DISCHARGE

## 2024-02-29 RX ORDER — CHOLECALCIFEROL (VITAMIN D3) 125 MCG
2 CAPSULE ORAL
Refills: 0 | DISCHARGE

## 2024-02-29 RX ORDER — ACETAMINOPHEN 500 MG
1000 TABLET ORAL ONCE
Refills: 0 | Status: COMPLETED | OUTPATIENT
Start: 2024-02-29 | End: 2024-02-29

## 2024-02-29 RX ORDER — INFLUENZA VIRUS VACCINE 15; 15; 15; 15 UG/.5ML; UG/.5ML; UG/.5ML; UG/.5ML
0.7 SUSPENSION INTRAMUSCULAR ONCE
Refills: 0 | Status: DISCONTINUED | OUTPATIENT
Start: 2024-02-29 | End: 2024-03-10

## 2024-02-29 RX ORDER — SENNA PLUS 8.6 MG/1
2 TABLET ORAL AT BEDTIME
Refills: 0 | Status: DISCONTINUED | OUTPATIENT
Start: 2024-02-29 | End: 2024-03-10

## 2024-02-29 RX ORDER — ATORVASTATIN CALCIUM 80 MG/1
10 TABLET, FILM COATED ORAL AT BEDTIME
Refills: 0 | Status: DISCONTINUED | OUTPATIENT
Start: 2024-02-29 | End: 2024-03-10

## 2024-02-29 RX ADMIN — ESCITALOPRAM OXALATE 10 MILLIGRAM(S): 10 TABLET, FILM COATED ORAL at 17:19

## 2024-02-29 RX ADMIN — LISINOPRIL 2.5 MILLIGRAM(S): 2.5 TABLET ORAL at 17:19

## 2024-02-29 RX ADMIN — SENNA PLUS 2 TABLET(S): 8.6 TABLET ORAL at 22:10

## 2024-02-29 RX ADMIN — LIDOCAINE 1 PATCH: 4 CREAM TOPICAL at 13:10

## 2024-02-29 RX ADMIN — Medication 400 MILLIGRAM(S): at 08:29

## 2024-02-29 RX ADMIN — MORPHINE SULFATE 2 MILLIGRAM(S): 50 CAPSULE, EXTENDED RELEASE ORAL at 08:28

## 2024-02-29 RX ADMIN — ENOXAPARIN SODIUM 40 MILLIGRAM(S): 100 INJECTION SUBCUTANEOUS at 17:19

## 2024-02-29 RX ADMIN — POLYETHYLENE GLYCOL 3350 17 GRAM(S): 17 POWDER, FOR SOLUTION ORAL at 13:58

## 2024-02-29 RX ADMIN — LIDOCAINE 1 PATCH: 4 CREAM TOPICAL at 21:00

## 2024-02-29 RX ADMIN — Medication 975 MILLIGRAM(S): at 23:10

## 2024-02-29 RX ADMIN — Medication 81 MILLIGRAM(S): at 17:19

## 2024-02-29 RX ADMIN — ATORVASTATIN CALCIUM 10 MILLIGRAM(S): 80 TABLET, FILM COATED ORAL at 22:10

## 2024-02-29 RX ADMIN — Medication 975 MILLIGRAM(S): at 22:10

## 2024-02-29 RX ADMIN — CEFTRIAXONE 100 MILLIGRAM(S): 500 INJECTION, POWDER, FOR SOLUTION INTRAMUSCULAR; INTRAVENOUS at 13:08

## 2024-02-29 NOTE — CONSULT NOTE ADULT - ATTENDING COMMENTS
Patient seen and examined agree with above note as modified, where appropriate, by me. sternal fracture with mild displacement. No indication for plating in this high operative risk patient.

## 2024-02-29 NOTE — H&P ADULT - ATTENDING COMMENTS
Pt seen and examined. Agree with A/P. Admit to ACS, possible SICU secondary to poor inspiratory effort. Pain control, pulmonary toilet, PT, diet, F/u urine cx, start ceftriaxone.
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
## 2024-02-29 NOTE — H&P ADULT - NSHPLABSRESULTS_GEN_ALL_CORE
Patient arrived ambulatory for blood transfusion. Denies any other complaints or concerns. Orders reviewed. Pt was T/ C yesterday , lab called T/C was registered under [facility] and will need a new T/C for [facility]  Lab to floor to obtained T/C   IV started per protocol   1 unit PRBC transfused with no sign of adverse reaction; line flushed. Stable vitals as charted .   IV removed per protocol   Patient discharged LABS:                          8.4    9.94  )-----------( 288      ( 29 Feb 2024 07:53 )             26.7       02-29    135  |  102  |  24<H>  ----------------------------<  113<H>  4.2   |  22  |  0.72    Ca    8.5      29 Feb 2024 07:53    TPro  6.2  /  Alb  2.8<L>  /  TBili  0.5  /  DBili  x   /  AST  14  /  ALT  12  /  AlkPhos  80  02-29              Urinalysis Basic - ( 29 Feb 2024 07:53 )    Color: x / Appearance: x / SG: x / pH: x  Gluc: 113 mg/dL / Ketone: x  / Bili: x / Urobili: x   Blood: x / Protein: x / Nitrite: x   Leuk Esterase: x / RBC: x / WBC x   Sq Epi: x / Non Sq Epi: x / Bacteria: x        PT/INR - ( 29 Feb 2024 07:53 )   PT: 11.8 sec;   INR: 1.07 ratio         PTT - ( 29 Feb 2024 07:53 )  PTT:21.3 sec      _______________________________________  RADIOLOGY & ADDITIONAL STUDIES:    See [facility] chart - MRN [id number]

## 2024-02-29 NOTE — ED PROVIDER NOTE - OBJECTIVE STATEMENT
89F transferred here from [facility] after a fall multiple days ago.  At [facility], patient was found to have a white count of 12.7, a T5 compression fracture of indeterminate age (likely acute or subacute), a negative viral panel, a CT chest that shows small bilateral pleural effusions, a mildly displaced left anterior third rib fracture, a fracture of the upper sternal body with surrounding hematoma.  Trauma surgery has been consulted and will see patient.  Neurosurgery was paged for the T5 compression fracture, have not yet replied.

## 2024-02-29 NOTE — ED ADULT NURSE REASSESSMENT NOTE - NS ED NURSE REASSESS COMMENT FT1
Report received from CHETAN Cruz. PT is sleeping showing no signs of acute pain or distress, family member at bedside states that the pain meds are working and she is finally able to sleep. Family updated on plan of care currently waiting on bed assignment. Report received from CHETAN Cruz. PT is sleeping showing no signs of acute pain or distress, family member at bedside states that the pain meds are working and she is finally able to sleep. Incentive spirometer at bedside, pt educated on use 10 times per hour. Family updated on plan of care currently waiting on bed assignment.

## 2024-02-29 NOTE — CONSULT NOTE ADULT - CONVERSATION DETAILS
Brought up topic of HCP and advanced directives w/ granddaughter. They have not discussed this in the past nor have paperwork for MOLST form. She will review with her family about her CODE status and wishes. For now she remains FULL code.  16 min spent ACP.

## 2024-02-29 NOTE — CONSULT NOTE ADULT - PROBLEM SELECTOR RECOMMENDATION 2
due to sternal hematoma  hgb downtrended from 9.8>8.4  - c/w trending CBC closely  - transfuse for hgb<7 or active bleed

## 2024-02-29 NOTE — ED PROVIDER NOTE - PHYSICAL EXAMINATION
Exam:   General: cachectic, no distress  HENT: No pharyngeal erythema/exudate, external ear exam normal  Eyes: PEERL, EOMI  Lungs: CTA B/L, no wheezing, no rales, no ronchi  Chest: pos chest ttp  Abdomen: Soft, no tenderness, no rigidity, no guarding  MSK: FROM of joints, no tenderness to palpation  Skin: No new rashes or lesions  Neuro: Alert moves all extremities
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
## 2024-02-29 NOTE — H&P ADULT - ASSESSMENT
89y/o F w/ PMHx dementia (A&O x1 at baseline), erysipelas, HTN presents as trauma transfer from [facility] for sternal, rib and T5 compression fx s/p fall in shower 2 weeks ago, + HS. +UA    Injuries:  - Upper sternal body fx w/ posterior displacement and hematoma  - L anterior 3rd rib fx  - T5 compression fx    PLAN:  - Admit to Trauma Surgery, Dr. Leonard Soto  - SICU consult for advanced age and sternal/rib fractures - shallow respirations due to pain and unable to participate w/ incentive spirometry  - Thoracic consult for sternal fracture  - Regular diet  - Multimodal pain control  - CTX for UTI - f/u UCx  - PT consult  - AM CXR  - Med rec not completed - granddaughter unsure of med dosages, to talk with family about obtaining a list    Patient seen and examined with trauma surgery attending, Dr. Soto.    Corazon Coyle, PGY-2  Trauma Surgery  [id number]
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
## 2024-02-29 NOTE — CONSULT NOTE ADULT - PROBLEM SELECTOR RECOMMENDATION 9
due to mechanical fall in bathroom 2 weeks prior  - as per trauma surgery  - pain control tylenol standing, prn tramadol  - c/w bowel regimen  - IS 10x/hr

## 2024-02-29 NOTE — H&P ADULT - NSHPPHYSICALEXAM_GEN_ALL_CORE
Primary Survey:  A - airway intact  B - bilateral breath sounds and good chest rise  C - initial BP: 155/73 , HR: 70 , palpable pulses in all extremities  D - GCS 15 on arrival  Exposure obtained      Secondary Survey:  General: NAD  HEENT: Normocephalic, atraumatic, EOMI, PEERL.  Neck: Soft, midline trachea,   Chest: Anterior chest wall tenderness, raised anterior midline chest hematoma  Cardiac: S1, S2, RRR  Respiratory: Bilateral breath sounds, clear and equal bilaterally  Abdomen: Soft, non-distended, non-tender, no rebound, no guarding, no masses palpated  Pelvis: Stable, non-tender, no ecchymosis  Ext: motor and sensory grossly intact in all 4 extremities  Back: no TTP, no palpable stepoff, upper back soft tissue edema  Rectal: No sari blood, COREEN deferred

## 2024-02-29 NOTE — ED PROVIDER NOTE - CLINICAL SUMMARY MEDICAL DECISION MAKING FREE TEXT BOX
Attending Cindy:  89-year-old female history of dementia not reported on any blood thinners, from [place], was brought into the hospital at [facility] as she was found to have significant shortness of breath and was diaphoretic as per daughter.  Patient was reported to have falls over the past couple of weeks and difficulty breathing.  She was initially brought in on BiPAP.  She is found to have a fever there is concern for pneumonia she was given azithromycin and ceftriaxone.  She also had urinalysis positive for UTI.  She had a CT of her head C-spine as well as chest.  The chest did show that she had a sternal fracture also rib fracture.  Subacute T5 fracture.  She is currently afebrile, vitals are stable, she is 95% on room air she is not tachypneic, she is ill-appearing but seems that this is her chronic baseline.  She is mildly cachectic, her heart sounds are normal with no murmurs rubs or gallops, her lungs are clear to auscultation bilaterally with mild crackles just at the posterior bases.  Abdomen is soft nontender.  She has no tenderness over her hips.  Did x-rays of her right hip as well as her pelvis that were negative.  She was transferred to our facility to be evaluated by trauma.

## 2024-02-29 NOTE — ED ADULT NURSE REASSESSMENT NOTE - NS ED NURSE REASSESS COMMENT FT1
Straight cath performed as per Md order, 2 rns at bedside to maintain sterility. output of 550ml of dark yellow urine. Linens changed and removed.

## 2024-02-29 NOTE — PATIENT PROFILE ADULT - FALL HARM RISK - HARM RISK INTERVENTIONS
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
## 2024-02-29 NOTE — ED ADULT NURSE NOTE - OBJECTIVE STATEMENT
89YF A&OX1 Ambulatory PMHX of HTN and Dementia presents to the ED from [facility] for trauma consult. pt granddaughter at bedside reports that pt had a previous fall 2 weeks ago and did not get evaluated. pt went to PMD office and was sent to [facility] ED for evaluation of URI sxs and SOB. at [facility], pt was dx with T5 fx, L rib fx, sternum displacement. pt was febrile at [facility] and received abx for pneumonia. pt denies n/v/d 89YF A&OX1 Ambulatory PMHX of HTN and Dementia presents to the ED from [facility] for trauma consult. pt granddaughter at bedside reports that pt had a previous fall 2 weeks ago and did not get evaluated. pt went to PMD office and was sent to [facility] ED for evaluation of URI sxs and SOB. at [facility], pt was dx with T5 compression fx, L ant 3rd rib fx, post sternum displacement. pt was febrile at [facility] and received abx for pneumonia. pt denies n/v/d
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
## 2024-03-01 ENCOUNTER — TRANSCRIPTION ENCOUNTER (OUTPATIENT)
Age: 89
End: 2024-03-01

## 2024-03-01 LAB
A1C WITH ESTIMATED AVERAGE GLUCOSE RESULT: 5.5 % — SIGNIFICANT CHANGE UP (ref 4–5.6)
ADD ON TEST-SPECIMEN IN LAB: SIGNIFICANT CHANGE UP
ANION GAP SERPL CALC-SCNC: 8 MMOL/L — SIGNIFICANT CHANGE UP (ref 5–17)
BUN SERPL-MCNC: 24 MG/DL — HIGH (ref 7–23)
CALCIUM SERPL-MCNC: 7.7 MG/DL — LOW (ref 8.4–10.5)
CHLORIDE SERPL-SCNC: 104 MMOL/L — SIGNIFICANT CHANGE UP (ref 96–108)
CO2 SERPL-SCNC: 20 MMOL/L — LOW (ref 22–31)
CREAT SERPL-MCNC: 0.67 MG/DL — SIGNIFICANT CHANGE UP (ref 0.5–1.3)
CULTURE RESULTS: NO GROWTH — SIGNIFICANT CHANGE UP
EGFR: 82 ML/MIN/1.73M2 — SIGNIFICANT CHANGE UP
ESTIMATED AVERAGE GLUCOSE: 111 MG/DL — SIGNIFICANT CHANGE UP (ref 68–114)
GLUCOSE SERPL-MCNC: 262 MG/DL — HIGH (ref 70–99)
HCT VFR BLD CALC: 27 % — LOW (ref 34.5–45)
HGB BLD-MCNC: 8.3 G/DL — LOW (ref 11.5–15.5)
MAGNESIUM SERPL-MCNC: 1.9 MG/DL — SIGNIFICANT CHANGE UP (ref 1.6–2.6)
MCHC RBC-ENTMCNC: 25.5 PG — LOW (ref 27–34)
MCHC RBC-ENTMCNC: 30.7 GM/DL — LOW (ref 32–36)
MCV RBC AUTO: 82.8 FL — SIGNIFICANT CHANGE UP (ref 80–100)
NRBC # BLD: 0 /100 WBCS — SIGNIFICANT CHANGE UP (ref 0–0)
PHOSPHATE SERPL-MCNC: 3.4 MG/DL — SIGNIFICANT CHANGE UP (ref 2.5–4.5)
PLATELET # BLD AUTO: 354 K/UL — SIGNIFICANT CHANGE UP (ref 150–400)
POTASSIUM SERPL-MCNC: 5 MMOL/L — SIGNIFICANT CHANGE UP (ref 3.5–5.3)
POTASSIUM SERPL-SCNC: 5 MMOL/L — SIGNIFICANT CHANGE UP (ref 3.5–5.3)
RBC # BLD: 3.26 M/UL — LOW (ref 3.8–5.2)
RBC # FLD: 16.2 % — HIGH (ref 10.3–14.5)
SODIUM SERPL-SCNC: 132 MMOL/L — LOW (ref 135–145)
SPECIMEN SOURCE: SIGNIFICANT CHANGE UP
WBC # BLD: 8.37 K/UL — SIGNIFICANT CHANGE UP (ref 3.8–10.5)
WBC # FLD AUTO: 8.37 K/UL — SIGNIFICANT CHANGE UP (ref 3.8–10.5)

## 2024-03-01 PROCEDURE — 99233 SBSQ HOSP IP/OBS HIGH 50: CPT

## 2024-03-01 PROCEDURE — 99221 1ST HOSP IP/OBS SF/LOW 40: CPT

## 2024-03-01 PROCEDURE — 71045 X-RAY EXAM CHEST 1 VIEW: CPT | Mod: 26

## 2024-03-01 RX ADMIN — TRAMADOL HYDROCHLORIDE 50 MILLIGRAM(S): 50 TABLET ORAL at 20:57

## 2024-03-01 RX ADMIN — Medication 975 MILLIGRAM(S): at 22:29

## 2024-03-01 RX ADMIN — Medication 975 MILLIGRAM(S): at 23:29

## 2024-03-01 RX ADMIN — Medication 975 MILLIGRAM(S): at 13:56

## 2024-03-01 RX ADMIN — POLYETHYLENE GLYCOL 3350 17 GRAM(S): 17 POWDER, FOR SOLUTION ORAL at 11:56

## 2024-03-01 RX ADMIN — SENNA PLUS 2 TABLET(S): 8.6 TABLET ORAL at 22:29

## 2024-03-01 RX ADMIN — Medication 50 MILLIGRAM(S): at 12:56

## 2024-03-01 RX ADMIN — Medication 975 MILLIGRAM(S): at 12:56

## 2024-03-01 RX ADMIN — CEFTRIAXONE 100 MILLIGRAM(S): 500 INJECTION, POWDER, FOR SOLUTION INTRAMUSCULAR; INTRAVENOUS at 11:56

## 2024-03-01 RX ADMIN — LISINOPRIL 2.5 MILLIGRAM(S): 2.5 TABLET ORAL at 08:13

## 2024-03-01 RX ADMIN — Medication 975 MILLIGRAM(S): at 06:59

## 2024-03-01 RX ADMIN — Medication 975 MILLIGRAM(S): at 05:59

## 2024-03-01 RX ADMIN — DEXTROSE MONOHYDRATE, SODIUM CHLORIDE, AND POTASSIUM CHLORIDE 90 MILLILITER(S): 50; .745; 4.5 INJECTION, SOLUTION INTRAVENOUS at 02:39

## 2024-03-01 RX ADMIN — ATORVASTATIN CALCIUM 10 MILLIGRAM(S): 80 TABLET, FILM COATED ORAL at 22:28

## 2024-03-01 RX ADMIN — Medication 81 MILLIGRAM(S): at 11:56

## 2024-03-01 RX ADMIN — SODIUM CHLORIDE 1000 MILLILITER(S): 9 INJECTION INTRAMUSCULAR; INTRAVENOUS; SUBCUTANEOUS at 01:23

## 2024-03-01 RX ADMIN — ENOXAPARIN SODIUM 40 MILLIGRAM(S): 100 INJECTION SUBCUTANEOUS at 17:15

## 2024-03-01 RX ADMIN — LIDOCAINE 1 PATCH: 4 CREAM TOPICAL at 11:57

## 2024-03-01 RX ADMIN — ESCITALOPRAM OXALATE 10 MILLIGRAM(S): 10 TABLET, FILM COATED ORAL at 11:57
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
## 2024-03-01 NOTE — DIETITIAN INITIAL EVALUATION ADULT - PERTINENT LABORATORY DATA
03-01    132<L>  |  104  |  24<H>  ----------------------------<  262<H>  5.0   |  20<L>  |  0.67    Ca    7.7<L>      01 Mar 2024 04:30  Phos  3.4     03-01  Mg     1.9     03-01    TPro  6.2  /  Alb  2.8<L>  /  TBili  0.5  /  DBili  x   /  AST  14  /  ALT  12  /  AlkPhos  80  02-29
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
## 2024-03-01 NOTE — DIETITIAN INITIAL EVALUATION ADULT - ORAL INTAKE PTA/DIET HISTORY
Pt's daughter Nadia reports pt with good appetite and PO intake PTA; consuming >75% of most meals. Follows regular diet; prefers soft foods. Denies any known food allergies or intolerances. Denies any micronutrient supplementation at home. Reports some difficulty chewing/swallowing at this time s/p mechanical fall.

## 2024-03-01 NOTE — CHART NOTE - NSCHARTNOTEFT_GEN_A_CORE
Tertiary survey:      History of Present Illness (describe the events if not previously documented in H&P):    Review of patient's primary care physician, past medical/ surgical history, social and family history, and medication list must be done.  - If not previously documented on H&P, go back to H&P and update with this information. Perform Medication Reconciliation if applicable.    Complete physical examination (DO NOT COPY/PASTE FROM ANOTHER DOCUMENT)    General:  Neurologic:  GCS:  C-spine:  T/L/S spine:  Chest/back:  Respiratory:  Cardiac:   Abdomen:  Musculoskeletal:  Vascular:  Skin:      Did the patient have any lacerations repaired?  - If yes, what is the plan for the repair (suture/staple removal)?    Were the labs reviewed?  - Note any clinically significant abnormal values and plan.    What imaging did the patient have? (List what X-rays, CTs, etc)?    --------------------------------------------------------------------------------------------------------------------------  Injuries:    Injury list (please type list) and specify plan for each injury:      Incidental findings list (please type list - these will need to be addressed using the Incidental Findings note template):      ACC: [id number] EXAM:  XR HIP WITH PELV 2-3V RT   ORDERED BY: BIB CARLOS     ACC: [id number] EXAM:  XR FEMUR 2 VIEWS BI   ORDERED BY: BIB CARLOS     ACC: [id number] EXAM:  XR TIB FIB AP LAT 2 VIEWS BI   ORDERED BY:   BIB CARLOS     PROCEDURE DATE:  02/29/2024          INTERPRETATION:  CLINICAL INFORMATION: Fall 2 weeks ago.    TECHNIQUE: 2 views of the bilateral tibia and fibula, 2 views of the   bilateral femurs, single view of the pelvis and 2 views of the right hip    COMPARISON: No comparison available.    FINDINGS:    No acute fracture or dislocation.  Severe right hip osteoarthritis. Preserved left hip joint space. Mild   right knee tibiofemoral osteoarthritic changes. Preserved remaining   visualized bilateral joint spaces.  Generalized osteopenia otherwise no discrete lytic or blastic lesions.  Vascular calcifications.    IMPRESSION:  No acute fracture or dislocation.    --- End of Report ---    ACC: [id number] EXAM:  CT CERVICAL SPINE   ORDERED BY: BIB CARLOS     ACC: [id number] EXAM:  CT BRAIN   ORDERED BY: BIB CARLOS     PROCEDURE DATE:  02/29/2024          INTERPRETATION:  HISTORY: Fall with hit on head 2 weeks ago.    COMPARISON: None    TECHNIQUE: Axial noncontrast CT images of the head and cervical spine   were obtained and submitted for interpretation. Sagittal and coronal   reformatted images of the cervical spine were provided. Bone and soft   tissue windows were evaluated.    FINDINGS:    CT HEAD:  BRAIN: No apparent acute infarct, hemorrhage or mass. No hydrocephalus.   Chronic appearing white matter hypodensities and volume loss.  CALVARIUM: No skull fracture or concerning osseous lesions.  EXTRACRANIAL SOFT TISSUES: No acute findings.  VISUALIZED PORTIONS OF THE PARANASAL SINUSES AND MASTOID AIR CELLS: No   air fluid levels.    CT CERVICAL SPINE:  VERTEBRAE: No fracture or dislocation. Sagittal alignment anatomic. Mild   left scoliosis upper thoracicspine partially visible.  SPINAL CANAL AND FORAMINA: Multilevel degenerative changes with no   apparent high grade spinal canal narrowing.  PARASPINAL SOFT TISSUES: No paravertebral hematoma or acute finding.    IMPRESSION:  No acute intracranial findings.    No fracture or dislocation of the cervical spine.    --- End of Report ---      ACC: [id number] EXAM:  CT CHEST   ORDERED BY: BIB CARLOS     PROCEDURE DATE:  02/29/2024          INTERPRETATION:  CLINICAL INFORMATION: Fall 2 weeks ago. Chest pain.    COMPARISON: None.    CONTRAST/COMPLICATIONS:  IV Contrast: None  Oral Contrast: None  Complications: None reported at time of study    PROCEDURE:  CT of the Chest was performed.  Sagittal and coronal reformats were performed.    FINDINGS:    LUNGS AND AIRWAYS: Patent central airways.  Bilateral lower lobe   subsegmental atelectasis. No pneumonia, edema, or lung contusion.  PLEURA: Small bilateral pleural effusions. No pneumothorax.  MEDIASTINUM AND JAGRUTI: Small peristernal hematoma. No adenopathy.  VESSELS: Atherosclerosis including of the coronary arteries. No thoracic   aortic dissection.  HEART: Mild cardiomegaly. No pericardial effusion.  CHEST WALL AND LOWER NECK: Parasternal hematoma. Otherwise unremarkable.  VISUALIZED UPPER ABDOMEN: No acute finding. Left adrenal adenoma. Left   colonic diverticula.  BONES: Fracture of the upper sternal body with posterior displacement of   the more cephalad portion, likely acute or subacute, with surrounding   hematoma. Late subacute to chronic fracture through the sternal xiphoid   process. Acute versus subacute mildly displaced left anterior third rib   fracture. T5 vertebral body compression fracture, likely either subacute   or chronic. Several other chronic healed rib fractures.    IMPRESSION:  Fracture of the upper sternal body with posterior displacement ofthe   more cephalad portion, likely acute or subacute, with surrounding   hematoma.  Acute versus subacute mildly displaced left anterior third rib fracture.  T5 vertebral body compression fracture, likely either subacute or chronic.  Small bilateralpleural effusions.  Several chronic findings as above.    --- End of Report ---          Relevant medical problems and plan:      Other:    Does the patient have neurologic findings that are not explained by known injuries?  - If yes, was CTA head/neck OR “whole body CT” done to rule out blunt cerebrovascular injury?      Does the patient have significant maxillofacial/ head trauma (seatbelt sign across neck, direct blow to the neck, Lefort II or III basilar skull fracture, diffuse axonal injury)?  - If yes, was CTA head/neck or “whole body CT” done to rule out blunt cerebrovascular injury?      Does the patient have a cervical spine fracture?  - If yes, are they wearing a properly fitting rigid cervical spine collar (Tamy Kelley)? If not, either change the collar or call Orthotics to assist in appropriate collar fitting.  - If yes, was CTA head/neck or “whole body CT” done to rule out blunt cerebrovascular injury?      Does the patient have rib fractures?  - If yes, did they have a repeat CXR in 24 hours to rule out interval development of hemo/pneumothorax?      Does the patient have a pneumothorax and/or hemothorax?  - If the patient was observed with an occult PTX or small ASHWIN, was repeat CXR done in 24 hours?        Does the patient have a traumatic intracranial hemorrhage?  - If yes, was there a repeat head CT to demonstrate ICH stability done 12-24 hours after the initial head CT?  - Was it stable?  - If stable, was lovenox ordered as 40mg subcutaneous q24 hours to start at least 24 hours after the stable head CT?    				  Pain management:  - Is the patient on pain medication consistent with the ACS Pain Management- Acute Post-Traumatic clinical management guideline?      Chemical VTE prophylaxis:  - If not addressed above, is the patient on chemical VTE prophylaxis?  - If not, why?    High-energy mechanism?	  - If yes, was CTA head/neck AND CTA chest OR “whole body CT” done to rule out blunt cerebrovascular injury and blunt aortic injury?  		If not, discuss with Trauma Attending and document rationale.  - If yes, was EKG done to evaluate for blunt myocardial injury?  		If not, discuss with Trauma Attending and document rationale.  - If EKG shows unexplained sinus tachycardia, atrial/junctional/ventricular arrhythmia, conduction abnormalities, ST and T wave changes that are new, discuss possibility of BCI with Trauma Attending. Tertiary survey:      History of Present Illness:  89y/o F w/ PMHx dementia (A&O x1 at baseline), erysipelas, HTN presents as trauma transfer from [facility] for sternal, rib and T5 compression fx s/p fall in shower 2 weeks ago, + HS. +UA.    Complete physical examination:     General: No acute distress, A&O x1, dentures, daughter translating at bedside   Neurologic: Intact cranial nerves, no neurologic deficit   C-spine: No tenderness to palpation over bony prominences   T/L/S spine: No tenderness to palpation over bony prominences   Chest/back: Tenderness to palpation sternum   Respiratory: No acute distress, on RA, equal expansion   Cardiac: Regular rate   Abdomen: Soft, non tender, non distended   Musculoskeletal: Tense SCM on right (possible torticollis), no motor or sensory deficit in upper and lower extremities, mild b/l lower extremity ankle edema   Vascular: Bilateral upper extremity palpable radial pulses, Bilateral lower extremity   Skin: Right anterior shoulder scab, mild erythema       Imaging:     Incidental findings list (please type list - these will need to be addressed using the Incidental Findings note template):      ACC: [id number] EXAM:  XR HIP WITH PELV 2-3V RT   ORDERED BY: BIB CARLOS     ACC: [id number] EXAM:  XR FEMUR 2 VIEWS BI   ORDERED BY: BIB CARLOS     ACC: [id number] EXAM:  XR TIB FIB AP LAT 2 VIEWS BI   ORDERED BY:   BIB CARLOS     PROCEDURE DATE:  02/29/2024          INTERPRETATION:  CLINICAL INFORMATION: Fall 2 weeks ago.    TECHNIQUE: 2 views of the bilateral tibia and fibula, 2 views of the   bilateral femurs, single view of the pelvis and 2 views of the right hip    COMPARISON: No comparison available.    FINDINGS:    No acute fracture or dislocation.  Severe right hip osteoarthritis. Preserved left hip joint space. Mild   right knee tibiofemoral osteoarthritic changes. Preserved remaining   visualized bilateral joint spaces.  Generalized osteopenia otherwise no discrete lytic or blastic lesions.  Vascular calcifications.    IMPRESSION:  No acute fracture or dislocation.    --- End of Report ---    ACC: [id number] EXAM:  CT CERVICAL SPINE   ORDERED BY: BIB CARLOS     ACC: [id number] EXAM:  CT BRAIN   ORDERED BY: IBB CARLOS     PROCEDURE DATE:  02/29/2024          INTERPRETATION:  HISTORY: Fall with hit on head 2 weeks ago.    COMPARISON: None    TECHNIQUE: Axial noncontrast CT images of the head and cervical spine   were obtained and submitted for interpretation. Sagittal and coronal   reformatted images of the cervical spine were provided. Bone and soft   tissue windows were evaluated.    FINDINGS:    CT HEAD:  BRAIN: No apparent acute infarct, hemorrhage or mass. No hydrocephalus.   Chronic appearing white matter hypodensities and volume loss.  CALVARIUM: No skull fracture or concerning osseous lesions.  EXTRACRANIAL SOFT TISSUES: No acute findings.  VISUALIZED PORTIONS OF THE PARANASAL SINUSES AND MASTOID AIR CELLS: No   air fluid levels.    CT CERVICAL SPINE:  VERTEBRAE: No fracture or dislocation. Sagittal alignment anatomic. Mild   left scoliosis upper thoracicspine partially visible.  SPINAL CANAL AND FORAMINA: Multilevel degenerative changes with no   apparent high grade spinal canal narrowing.  PARASPINAL SOFT TISSUES: No paravertebral hematoma or acute finding.    IMPRESSION:  No acute intracranial findings.    No fracture or dislocation of the cervical spine.    --- End of Report ---      ACC: [id number] EXAM:  CT CHEST   ORDERED BY: BIB CARLOS     PROCEDURE DATE:  02/29/2024          INTERPRETATION:  CLINICAL INFORMATION: Fall 2 weeks ago. Chest pain.    COMPARISON: None.    CONTRAST/COMPLICATIONS:  IV Contrast: None  Oral Contrast: None  Complications: None reported at time of study    PROCEDURE:  CT of the Chest was performed.  Sagittal and coronal reformats were performed.    FINDINGS:    LUNGS AND AIRWAYS: Patent central airways.  Bilateral lower lobe   subsegmental atelectasis. No pneumonia, edema, or lung contusion.  PLEURA: Small bilateral pleural effusions. No pneumothorax.  MEDIASTINUM AND JAGRUTI: Small peristernal hematoma. No adenopathy.  VESSELS: Atherosclerosis including of the coronary arteries. No thoracic   aortic dissection.  HEART: Mild cardiomegaly. No pericardial effusion.  CHEST WALL AND LOWER NECK: Parasternal hematoma. Otherwise unremarkable.  VISUALIZED UPPER ABDOMEN: No acute finding. Left adrenal adenoma. Left   colonic diverticula.  BONES: Fracture of the upper sternal body with posterior displacement of   the more cephalad portion, likely acute or subacute, with surrounding   hematoma. Late subacute to chronic fracture through the sternal xiphoid   process. Acute versus subacute mildly displaced left anterior third rib   fracture. T5 vertebral body compression fracture, likely either subacute   or chronic. Several other chronic healed rib fractures.    IMPRESSION:  Fracture of the upper sternal body with posterior displacement ofthe   more cephalad portion, likely acute or subacute, with surrounding   hematoma.  Acute versus subacute mildly displaced left anterior third rib fracture.  T5 vertebral body compression fracture, likely either subacute or chronic.  Small bilateralpleural effusions.  Several chronic findings as above.    --- End of Report ---    Injuries:  - Upper sternal body fx w/ posterior displacement and hematoma  - L anterior 3rd rib fx  - T5 compression fx      Does the patient have rib fractures?  - If yes, repeat xray 24 hr no PTX      				  Pain management:  - Multimodal pain control (Tylenol,Motrin, Tramadol PRN)    Plan   f/u Thoracic surgery regrading sternal fracture  PT recs   f/u [facility]OP    ACS/Trauma   [id number]

## 2024-03-01 NOTE — DISCHARGE NOTE PROVIDER - NSDCCPCAREPLAN_GEN_ALL_CORE_FT
PRINCIPAL DISCHARGE DIAGNOSIS  Diagnosis: Sternal fracture  Assessment and Plan of Treatment:       SECONDARY DISCHARGE DIAGNOSES  Diagnosis: Encounter for wound care  Assessment and Plan of Treatment: Recommend:  1.) topical therapy: sacral/buttock injury - cleanse with incontinence cleanser, pat dry, apply Jens ointment twice a day and as needed for incontinent episodes  2.) Incontinence Management - incontinence cleanser, pads, pericare twice a day  3.) Maintain on an alternating air with low air loss surface  4.) Turn and reposition every 2 hours  5.) Nutrition optimization  6.) Offload heels/feet with complete cair air fluidized boots; ensure that the soles of the feet are not resting on the foot board of the bed.

## 2024-03-01 NOTE — DISCHARGE NOTE PROVIDER - CARE PROVIDERS DIRECT ADDRESSES
[email or web address] ,DirectAddress_[email or web address]

## 2024-03-01 NOTE — PHYSICAL THERAPY INITIAL EVALUATION ADULT - IMPAIRMENTS CONTRIBUTING IMPAIRED BED MOBILITY, REHAB EVAL
Pt with poor postural control, difficulty maintaining upright posture sitting at EOB, +retropulsion, trying to put self back into bed/impaired balance/cognition/impaired coordination/impaired motor control/pain/impaired postural control/decreased strength

## 2024-03-01 NOTE — CONSULT NOTE ADULT - REASON FOR ADMISSION
[facility] trauma transfer for sternal fracture

## 2024-03-01 NOTE — DISCHARGE NOTE PROVIDER - PROVIDER TOKENS
PROVIDER:[TOKEN:[[id number]]] FREE:[LAST:[Primary Care Provider],PHONE:[(   )    -],FAX:[(   )    -]]

## 2024-03-01 NOTE — CONSULT NOTE ADULT - ASSESSMENT
89 y/o F PMH dementia aaox1, erysipelas, chronic healed rib fx, HTN, seizures presents as trauma transfer from [facility] after fall 2 weeks ago adm with sternal body fx with posterior displacement and hematoma, L anterior 3rd rib fracture and T5 compression fracture.
ASSESSMENT: 89y/o F w/ PMHx dementia (A&Ox1 at baseline), erysipelas, HTN presents as trauma transfer from [facility] for sternal, rib and T5 compression fx s/p fall in shower 2 weeks ago. [facility] ED imaging with findings of upper sternal body fx with posterior displacement and hematoma, L anterior 3rd rib fracture and T5 compression fracture - all fractures noted to be acute vs subacute. Transferred to [facility] ED for trauma evaluation. Thoracic surgery consulted for sternal fracture.    PLAN:  - Thoracic surgery attending to review imaging  - Will follow pending further assessment      Patient discussed with thoracic attending surgeon, Dr. Thompson.    Corazon Coyle, PGY-2  Thoracic Surgery  [id number]
Impression:    Sacral/bilateral Buttocks deep tissue injury present on admission  Incontinence of bowel and bladder  Incontinence Dermatitis    Recommend:  1.) topical therapy: sacral/buttock injury - cleanse with incontinence cleanser, pat dry, apply Jens ointment BID and PRN for incontinent episodes  2.) Incontinence Management - incontinence cleanser, pads, pericare BID  3.) Maintain on an alternating air with low air loss surface  4.) Turn and reposition Q 2 hours  5.) Nutrition optimization  6.) Offload heels/feet with complete cair air fluidized boots; ensure that the soles of the feet are not resting on the foot board of the bed.    Care as per medicine. Will not actively follow but will remain available. Please recall for new issues or deterioration.  Upon discharge f/u as outpatient at [facility] [address] [place] [address] [phone]  Thank you for this consult  Corazon Trejo, NP-C, CWOCN via TEAMS

## 2024-03-01 NOTE — PHYSICAL THERAPY INITIAL EVALUATION ADULT - PASSIVE RANGE OF MOTION EXAMINATION, REHAB EVAL
2/2 difficulty following commands/bilateral upper extremity Passive ROM was WFL (within functional limits)/bilateral lower extremity Passive ROM was WFL (within functional limits)

## 2024-03-01 NOTE — DISCHARGE NOTE PROVIDER - CARE PROVIDER_API CALL
[facility]  [address] [place] [address], [facility] [address]  [place] [address]  Phone: [phone]  Fax: [phone]  Follow Up Time:    Primary Care Provider,   Phone: (   )    -  Fax: (   )    -  Follow Up Time:

## 2024-03-01 NOTE — DIETITIAN INITIAL EVALUATION ADULT - ADD RECOMMEND
1) Continue current diet order: no therapeutic diet restrictions. Defer textures and consistencies to team  2) Recommend Ensure Plus HP shakes BID   3) Monitor and encourage PO intake. Encourage use of daily menus. Honor dietary preferences as expressed as able.   4) If not medically contraindicated, recommend multivitamin and vitamin C 500 mg daily to promote wound healing.   5) Monitor PO intake/tolerance, weights, labs, hydration status, bowels, and skin integrity.

## 2024-03-01 NOTE — DIETITIAN INITIAL EVALUATION ADULT - PHYSCIAL ASSESSMENT
Weight Hx Per:  - Source: pt's daughter Nadia  - UBW: unknown   - Reported weight changes: Denies changes in weight     Weight Hx Per Aaron HIE: no available weights to assess     Current Admission Weights:  - Dosing weight: 149.2 pounds/68 kg (02/29)  - Daily weight: no weights available to assess     Weight Change:  - none     **  Will continue to monitor weight trends as available/able.     IBW: 120 pounds   %IBW: 124%

## 2024-03-01 NOTE — PHYSICAL THERAPY INITIAL EVALUATION ADULT - GENERAL OBSERVATIONS, REHAB EVAL
Pt rec'd semisupine in bed, +purewick, +IV, in NAD, daughter at bedside.  Pt cleared for PT session by CHETAN Gant.

## 2024-03-01 NOTE — DIETITIAN INITIAL EVALUATION ADULT - REASON INDICATOR FOR ASSESSMENT
Pharm fax request: Zolpidem 5 mg tabs   Nutrition consult warranted for: MST score 2 or more and pressure injury stage >/2   Information obtained from: electronic medical record and patient's daughter at bedside. Of note, per chart review, pt is German speaking; A&Ox1.   Chart reviewed, events noted.

## 2024-03-01 NOTE — DIETITIAN INITIAL EVALUATION ADULT - ENERGY INTAKE
Poor (<50%) - Pt currently ordered for a soft and bite sized diet; daughter requesting regular textured diet, states she can assist pt with feeding.   - Is amenable to pt receiving Ensure Plus HP 2x/day (vanilla flavor)

## 2024-03-01 NOTE — PHYSICAL THERAPY INITIAL EVALUATION ADULT - LEVEL OF INDEPENDENCE: SIT/STAND, REHAB EVAL
due to difficulty following commands/weakness/unable to perform pt unable to support body weight with BLE despite maxA/unable to perform

## 2024-03-01 NOTE — DISCHARGE NOTE PROVIDER - NSDCFUADDAPPT_GEN_ALL_CORE_FT
Please follow up with your primary care provider in 1-2 weeks after discharge.    Please call and make an appointment with would care for follow up after discharge.   [facility]  [address] [place] [address]   [phone]

## 2024-03-01 NOTE — PHYSICAL THERAPY INITIAL EVALUATION ADULT - PATIENT PROFILE REVIEW, REHAB EVAL
Activity Order: Ambulate with Assistance. Spoke with JEANNETTE Pozo prior to session, pt does not require TLSO brcae, as T5 compression fracture is subacute.  Pt cleared for PT evaluation./yes

## 2024-03-01 NOTE — PHYSICAL THERAPY INITIAL EVALUATION ADULT - ADDITIONAL COMMENTS
Social history obtained per grand-daughter over the phone during session.  Pt lives in an apartment with grand-daughter, as grand-daughter is 24/7 home attendant for pt.  Assists pt with ADLs.  PTA pt was able to ambulate using a rolling walker, and wheelchair for community ambulation. Social history obtained per grand-daughter over the phone during session.  Pt lives in an apartment with grand-daughter, as grand-daughter is 24/7 home attendant for pt.  Assists pt with ADLs.  PTA pt was able to ambulate using a rolling walker, and wheelchair for community ambulation.  Apartment with basement entry, no DIMA.

## 2024-03-01 NOTE — PHYSICAL THERAPY INITIAL EVALUATION ADULT - PERTINENT HX OF CURRENT PROBLEM, REHAB EVAL
Pt is a 89 year old female with PMH significant for dementia, erysipelas, HTN.  Pt presents as a trauma transfer from [facility].  Pt found to have sternal, rib and T5 compression fx s/p fall in shower 2 weeks ago.  Pt developed increased chest and back pain on 2/28 making it increasingly more difficult to walk with her walker so family brought patient to her PCP and the [facility] ED. At [facility] ED, patient found to have a leukocytosis of 17.77 and +UA. Imaging with negative CTH/Cspine but findings of upper sternal body fx with posterior displacement and hematoma, L anterior 3rd rib fracture and T5 compression fracture - all fractures noted to be acute vs subacute. Patient transferred to [facility] for further trauma evaluation. Thoracic surgery consulted for sternal fracture.

## 2024-03-01 NOTE — DISCHARGE NOTE PROVIDER - NSDCMRMEDTOKEN_GEN_ALL_CORE_FT
aspirin 81 mg oral tablet, chewable: 1 tab(s) orally once a day  atorvastatin 20 mg oral tablet: 1 tab(s) orally once a day  cholecalciferol 25 mcg (1000 intl units) oral capsule: 2 cap(s) orally once a day  Lasix 20 mg oral tablet: 1 tab(s) orally once a day  Lexapro 10 mg oral tablet: 1 tab(s) orally once a day  lisinopril 2.5 mg oral tablet: 1 tab(s) orally once a day  zonisamide 50 mg oral capsule: 1 cap(s) orally once a day

## 2024-03-01 NOTE — DIETITIAN INITIAL EVALUATION ADULT - PERTINENT MEDS FT
MEDICATIONS  (STANDING):  acetaminophen     Tablet .. 975 milliGRAM(s) Oral every 8 hours  aspirin  chewable 81 milliGRAM(s) Oral daily  atorvastatin 10 milliGRAM(s) Oral at bedtime  cefTRIAXone   IVPB 1000 milliGRAM(s) IV Intermittent every 24 hours  enoxaparin Injectable 40 milliGRAM(s) SubCutaneous every 24 hours  escitalopram 10 milliGRAM(s) Oral daily  influenza  Vaccine (HIGH DOSE) 0.7 milliLiter(s) IntraMuscular once  lidocaine   4% Patch 1 Patch Transdermal every 24 hours  lisinopril 2.5 milliGRAM(s) Oral daily  polyethylene glycol 3350 17 Gram(s) Oral daily  senna 2 Tablet(s) Oral at bedtime  zonisamide 50 milliGRAM(s) Oral daily    MEDICATIONS  (PRN):  ibuprofen  Tablet. 200 milliGRAM(s) Oral every 4 hours PRN Mild Pain (1 - 3)  traMADol 50 milliGRAM(s) Oral every 6 hours PRN Severe Pain (7 - 10)  traMADol 25 milliGRAM(s) Oral every 6 hours PRN Moderate Pain (4 - 6)

## 2024-03-02 LAB
-  BLOOD PCR PANEL: SIGNIFICANT CHANGE UP
ANION GAP SERPL CALC-SCNC: 7 MMOL/L — SIGNIFICANT CHANGE UP (ref 5–17)
BUN SERPL-MCNC: 16 MG/DL — SIGNIFICANT CHANGE UP (ref 7–23)
CALCIUM SERPL-MCNC: 8.4 MG/DL — SIGNIFICANT CHANGE UP (ref 8.4–10.5)
CHLORIDE SERPL-SCNC: 105 MMOL/L — SIGNIFICANT CHANGE UP (ref 96–108)
CO2 SERPL-SCNC: 24 MMOL/L — SIGNIFICANT CHANGE UP (ref 22–31)
CREAT SERPL-MCNC: 0.65 MG/DL — SIGNIFICANT CHANGE UP (ref 0.5–1.3)
EGFR: 83 ML/MIN/1.73M2 — SIGNIFICANT CHANGE UP
GLUCOSE SERPL-MCNC: 96 MG/DL — SIGNIFICANT CHANGE UP (ref 70–99)
GRAM STN FLD: ABNORMAL
HCT VFR BLD CALC: 30.2 % — LOW (ref 34.5–45)
HGB BLD-MCNC: 9.4 G/DL — LOW (ref 11.5–15.5)
MAGNESIUM SERPL-MCNC: 2 MG/DL — SIGNIFICANT CHANGE UP (ref 1.6–2.6)
MCHC RBC-ENTMCNC: 25.2 PG — LOW (ref 27–34)
MCHC RBC-ENTMCNC: 31.1 GM/DL — LOW (ref 32–36)
MCV RBC AUTO: 81 FL — SIGNIFICANT CHANGE UP (ref 80–100)
METHOD TYPE: SIGNIFICANT CHANGE UP
NRBC # BLD: 0 /100 WBCS — SIGNIFICANT CHANGE UP (ref 0–0)
PHOSPHATE SERPL-MCNC: 3.2 MG/DL — SIGNIFICANT CHANGE UP (ref 2.5–4.5)
PLATELET # BLD AUTO: 448 K/UL — HIGH (ref 150–400)
POTASSIUM SERPL-MCNC: 4.6 MMOL/L — SIGNIFICANT CHANGE UP (ref 3.5–5.3)
POTASSIUM SERPL-SCNC: 4.6 MMOL/L — SIGNIFICANT CHANGE UP (ref 3.5–5.3)
RBC # BLD: 3.73 M/UL — LOW (ref 3.8–5.2)
RBC # FLD: 15.8 % — HIGH (ref 10.3–14.5)
SODIUM SERPL-SCNC: 136 MMOL/L — SIGNIFICANT CHANGE UP (ref 135–145)
WBC # BLD: 7.79 K/UL — SIGNIFICANT CHANGE UP (ref 3.8–10.5)
WBC # FLD AUTO: 7.79 K/UL — SIGNIFICANT CHANGE UP (ref 3.8–10.5)

## 2024-03-02 RX ORDER — LIDOCAINE 4 G/100G
1 CREAM TOPICAL ONCE
Refills: 0 | Status: COMPLETED | OUTPATIENT
Start: 2024-03-02 | End: 2024-03-02

## 2024-03-02 RX ORDER — ACETAMINOPHEN 500 MG
975 TABLET ORAL EVERY 6 HOURS
Refills: 0 | Status: DISCONTINUED | OUTPATIENT
Start: 2024-03-02 | End: 2024-03-10

## 2024-03-02 RX ADMIN — LIDOCAINE 1 PATCH: 4 CREAM TOPICAL at 11:08

## 2024-03-02 RX ADMIN — LIDOCAINE 1 PATCH: 4 CREAM TOPICAL at 23:25

## 2024-03-02 RX ADMIN — TRAMADOL HYDROCHLORIDE 50 MILLIGRAM(S): 50 TABLET ORAL at 07:44

## 2024-03-02 RX ADMIN — LIDOCAINE 1 PATCH: 4 CREAM TOPICAL at 05:09

## 2024-03-02 RX ADMIN — TRAMADOL HYDROCHLORIDE 50 MILLIGRAM(S): 50 TABLET ORAL at 21:44

## 2024-03-02 RX ADMIN — Medication 975 MILLIGRAM(S): at 12:12

## 2024-03-02 RX ADMIN — Medication 975 MILLIGRAM(S): at 11:12

## 2024-03-02 RX ADMIN — LISINOPRIL 2.5 MILLIGRAM(S): 2.5 TABLET ORAL at 06:20

## 2024-03-02 RX ADMIN — ATORVASTATIN CALCIUM 10 MILLIGRAM(S): 80 TABLET, FILM COATED ORAL at 21:44

## 2024-03-02 RX ADMIN — Medication 975 MILLIGRAM(S): at 23:22

## 2024-03-02 RX ADMIN — LIDOCAINE 1 PATCH: 4 CREAM TOPICAL at 02:26

## 2024-03-02 RX ADMIN — Medication 975 MILLIGRAM(S): at 17:18

## 2024-03-02 RX ADMIN — Medication 975 MILLIGRAM(S): at 06:20

## 2024-03-02 RX ADMIN — SENNA PLUS 2 TABLET(S): 8.6 TABLET ORAL at 21:44

## 2024-03-02 RX ADMIN — LIDOCAINE 1 PATCH: 4 CREAM TOPICAL at 05:10

## 2024-03-02 RX ADMIN — Medication 81 MILLIGRAM(S): at 11:12

## 2024-03-02 RX ADMIN — TRAMADOL HYDROCHLORIDE 50 MILLIGRAM(S): 50 TABLET ORAL at 08:44

## 2024-03-02 RX ADMIN — LIDOCAINE 1 PATCH: 4 CREAM TOPICAL at 19:45

## 2024-03-02 RX ADMIN — ESCITALOPRAM OXALATE 10 MILLIGRAM(S): 10 TABLET, FILM COATED ORAL at 11:11

## 2024-03-02 RX ADMIN — Medication 50 MILLIGRAM(S): at 21:44

## 2024-03-02 RX ADMIN — Medication 975 MILLIGRAM(S): at 07:20

## 2024-03-02 RX ADMIN — LIDOCAINE 1 PATCH: 4 CREAM TOPICAL at 14:57

## 2024-03-02 RX ADMIN — POLYETHYLENE GLYCOL 3350 17 GRAM(S): 17 POWDER, FOR SOLUTION ORAL at 11:10

## 2024-03-02 RX ADMIN — CEFTRIAXONE 100 MILLIGRAM(S): 500 INJECTION, POWDER, FOR SOLUTION INTRAMUSCULAR; INTRAVENOUS at 11:13

## 2024-03-02 RX ADMIN — TRAMADOL HYDROCHLORIDE 50 MILLIGRAM(S): 50 TABLET ORAL at 22:38

## 2024-03-02 RX ADMIN — ENOXAPARIN SODIUM 40 MILLIGRAM(S): 100 INJECTION SUBCUTANEOUS at 16:20

## 2024-03-02 NOTE — ED POST DISCHARGE NOTE - RESULT SUMMARY
Received call from lab regarding + gram negative rods in blood culture in aerobic bottle.  Per EMR, pt was transferred to [facility].  Called [facility] at this time and was connected by  to pt's RN Rosio Graves.  Results given to CHETAN Graves verbally.  CHETAN Graves to notify pt's physician regarding + blood cultures.

## 2024-03-03 LAB
-  AMOXICILLIN/CLAVULANIC ACID: SIGNIFICANT CHANGE UP
-  AMOXICILLIN/CLAVULANIC ACID: SIGNIFICANT CHANGE UP
-  AMPICILLIN/SULBACTAM: SIGNIFICANT CHANGE UP
-  AMPICILLIN/SULBACTAM: SIGNIFICANT CHANGE UP
-  AMPICILLIN: SIGNIFICANT CHANGE UP
-  AMPICILLIN: SIGNIFICANT CHANGE UP
-  AZTREONAM: SIGNIFICANT CHANGE UP
-  AZTREONAM: SIGNIFICANT CHANGE UP
-  CEFAZOLIN: SIGNIFICANT CHANGE UP
-  CEFAZOLIN: SIGNIFICANT CHANGE UP
-  CEFEPIME: SIGNIFICANT CHANGE UP
-  CEFEPIME: SIGNIFICANT CHANGE UP
-  CEFOXITIN: SIGNIFICANT CHANGE UP
-  CEFOXITIN: SIGNIFICANT CHANGE UP
-  CEFTRIAXONE: SIGNIFICANT CHANGE UP
-  CEFTRIAXONE: SIGNIFICANT CHANGE UP
-  CEFUROXIME: SIGNIFICANT CHANGE UP
-  CEFUROXIME: SIGNIFICANT CHANGE UP
-  CIPROFLOXACIN: SIGNIFICANT CHANGE UP
-  CIPROFLOXACIN: SIGNIFICANT CHANGE UP
-  ERTAPENEM: SIGNIFICANT CHANGE UP
-  ERTAPENEM: SIGNIFICANT CHANGE UP
-  GENTAMICIN: SIGNIFICANT CHANGE UP
-  GENTAMICIN: SIGNIFICANT CHANGE UP
-  IMIPENEM: SIGNIFICANT CHANGE UP
-  IMIPENEM: SIGNIFICANT CHANGE UP
-  LEVOFLOXACIN: SIGNIFICANT CHANGE UP
-  LEVOFLOXACIN: SIGNIFICANT CHANGE UP
-  MEROPENEM: SIGNIFICANT CHANGE UP
-  MEROPENEM: SIGNIFICANT CHANGE UP
-  NITROFURANTOIN: SIGNIFICANT CHANGE UP
-  NITROFURANTOIN: SIGNIFICANT CHANGE UP
-  PIPERACILLIN/TAZOBACTAM: SIGNIFICANT CHANGE UP
-  PIPERACILLIN/TAZOBACTAM: SIGNIFICANT CHANGE UP
-  TOBRAMYCIN: SIGNIFICANT CHANGE UP
-  TOBRAMYCIN: SIGNIFICANT CHANGE UP
-  TRIMETHOPRIM/SULFAMETHOXAZOLE: SIGNIFICANT CHANGE UP
-  TRIMETHOPRIM/SULFAMETHOXAZOLE: SIGNIFICANT CHANGE UP
ANION GAP SERPL CALC-SCNC: 7 MMOL/L — SIGNIFICANT CHANGE UP (ref 5–17)
BUN SERPL-MCNC: 18 MG/DL — SIGNIFICANT CHANGE UP (ref 7–23)
CALCIUM SERPL-MCNC: 8.4 MG/DL — SIGNIFICANT CHANGE UP (ref 8.4–10.5)
CHLORIDE SERPL-SCNC: 103 MMOL/L — SIGNIFICANT CHANGE UP (ref 96–108)
CO2 SERPL-SCNC: 25 MMOL/L — SIGNIFICANT CHANGE UP (ref 22–31)
CREAT SERPL-MCNC: 0.66 MG/DL — SIGNIFICANT CHANGE UP (ref 0.5–1.3)
CULTURE RESULTS: ABNORMAL
EGFR: 83 ML/MIN/1.73M2 — SIGNIFICANT CHANGE UP
GLUCOSE SERPL-MCNC: 96 MG/DL — SIGNIFICANT CHANGE UP (ref 70–99)
HCT VFR BLD CALC: 28.5 % — LOW (ref 34.5–45)
HGB BLD-MCNC: 8.9 G/DL — LOW (ref 11.5–15.5)
MAGNESIUM SERPL-MCNC: 2 MG/DL — SIGNIFICANT CHANGE UP (ref 1.6–2.6)
MCHC RBC-ENTMCNC: 25.4 PG — LOW (ref 27–34)
MCHC RBC-ENTMCNC: 31.2 GM/DL — LOW (ref 32–36)
MCV RBC AUTO: 81.2 FL — SIGNIFICANT CHANGE UP (ref 80–100)
METHOD TYPE: SIGNIFICANT CHANGE UP
NRBC # BLD: 0 /100 WBCS — SIGNIFICANT CHANGE UP (ref 0–0)
ORGANISM # SPEC MICROSCOPIC CNT: ABNORMAL
PHOSPHATE SERPL-MCNC: 3.7 MG/DL — SIGNIFICANT CHANGE UP (ref 2.5–4.5)
PLATELET # BLD AUTO: 463 K/UL — HIGH (ref 150–400)
POTASSIUM SERPL-MCNC: 4.8 MMOL/L — SIGNIFICANT CHANGE UP (ref 3.5–5.3)
POTASSIUM SERPL-SCNC: 4.8 MMOL/L — SIGNIFICANT CHANGE UP (ref 3.5–5.3)
RBC # BLD: 3.51 M/UL — LOW (ref 3.8–5.2)
RBC # FLD: 15.9 % — HIGH (ref 10.3–14.5)
SODIUM SERPL-SCNC: 135 MMOL/L — SIGNIFICANT CHANGE UP (ref 135–145)
SPECIMEN SOURCE: SIGNIFICANT CHANGE UP
WBC # BLD: 8.16 K/UL — SIGNIFICANT CHANGE UP (ref 3.8–10.5)
WBC # FLD AUTO: 8.16 K/UL — SIGNIFICANT CHANGE UP (ref 3.8–10.5)

## 2024-03-03 RX ORDER — LANOLIN ALCOHOL/MO/W.PET/CERES
6 CREAM (GRAM) TOPICAL AT BEDTIME
Refills: 0 | Status: DISCONTINUED | OUTPATIENT
Start: 2024-03-03 | End: 2024-03-10

## 2024-03-03 RX ADMIN — Medication 975 MILLIGRAM(S): at 11:56

## 2024-03-03 RX ADMIN — Medication 975 MILLIGRAM(S): at 05:01

## 2024-03-03 RX ADMIN — LIDOCAINE 1 PATCH: 4 CREAM TOPICAL at 02:00

## 2024-03-03 RX ADMIN — LIDOCAINE 1 PATCH: 4 CREAM TOPICAL at 19:44

## 2024-03-03 RX ADMIN — Medication 975 MILLIGRAM(S): at 19:00

## 2024-03-03 RX ADMIN — Medication 50 MILLIGRAM(S): at 22:10

## 2024-03-03 RX ADMIN — Medication 975 MILLIGRAM(S): at 00:20

## 2024-03-03 RX ADMIN — Medication 975 MILLIGRAM(S): at 23:37

## 2024-03-03 RX ADMIN — SENNA PLUS 2 TABLET(S): 8.6 TABLET ORAL at 22:11

## 2024-03-03 RX ADMIN — POLYETHYLENE GLYCOL 3350 17 GRAM(S): 17 POWDER, FOR SOLUTION ORAL at 11:58

## 2024-03-03 RX ADMIN — LIDOCAINE 1 PATCH: 4 CREAM TOPICAL at 23:00

## 2024-03-03 RX ADMIN — LISINOPRIL 2.5 MILLIGRAM(S): 2.5 TABLET ORAL at 05:01

## 2024-03-03 RX ADMIN — Medication 81 MILLIGRAM(S): at 11:56

## 2024-03-03 RX ADMIN — ENOXAPARIN SODIUM 40 MILLIGRAM(S): 100 INJECTION SUBCUTANEOUS at 18:15

## 2024-03-03 RX ADMIN — LIDOCAINE 1 PATCH: 4 CREAM TOPICAL at 11:59

## 2024-03-03 RX ADMIN — Medication 975 MILLIGRAM(S): at 06:00

## 2024-03-03 RX ADMIN — ATORVASTATIN CALCIUM 10 MILLIGRAM(S): 80 TABLET, FILM COATED ORAL at 22:10

## 2024-03-03 RX ADMIN — ESCITALOPRAM OXALATE 10 MILLIGRAM(S): 10 TABLET, FILM COATED ORAL at 11:56

## 2024-03-03 RX ADMIN — Medication 975 MILLIGRAM(S): at 18:15

## 2024-03-03 RX ADMIN — Medication 975 MILLIGRAM(S): at 12:45

## 2024-03-04 LAB
ANION GAP SERPL CALC-SCNC: 8 MMOL/L — SIGNIFICANT CHANGE UP (ref 5–17)
BUN SERPL-MCNC: 19 MG/DL — SIGNIFICANT CHANGE UP (ref 7–23)
CALCIUM SERPL-MCNC: 8.7 MG/DL — SIGNIFICANT CHANGE UP (ref 8.4–10.5)
CHLORIDE SERPL-SCNC: 104 MMOL/L — SIGNIFICANT CHANGE UP (ref 96–108)
CO2 SERPL-SCNC: 27 MMOL/L — SIGNIFICANT CHANGE UP (ref 22–31)
CREAT SERPL-MCNC: 0.65 MG/DL — SIGNIFICANT CHANGE UP (ref 0.5–1.3)
EGFR: 83 ML/MIN/1.73M2 — SIGNIFICANT CHANGE UP
GLUCOSE SERPL-MCNC: 114 MG/DL — HIGH (ref 70–99)
HCT VFR BLD CALC: 26.8 % — LOW (ref 34.5–45)
HGB BLD-MCNC: 8.6 G/DL — LOW (ref 11.5–15.5)
MAGNESIUM SERPL-MCNC: 2.1 MG/DL — SIGNIFICANT CHANGE UP (ref 1.6–2.6)
MCHC RBC-ENTMCNC: 25.7 PG — LOW (ref 27–34)
MCHC RBC-ENTMCNC: 32.1 GM/DL — SIGNIFICANT CHANGE UP (ref 32–36)
MCV RBC AUTO: 80 FL — SIGNIFICANT CHANGE UP (ref 80–100)
NRBC # BLD: 0 /100 WBCS — SIGNIFICANT CHANGE UP (ref 0–0)
PHOSPHATE SERPL-MCNC: 3.5 MG/DL — SIGNIFICANT CHANGE UP (ref 2.5–4.5)
PLATELET # BLD AUTO: 447 K/UL — HIGH (ref 150–400)
POTASSIUM SERPL-MCNC: 4.1 MMOL/L — SIGNIFICANT CHANGE UP (ref 3.5–5.3)
POTASSIUM SERPL-SCNC: 4.1 MMOL/L — SIGNIFICANT CHANGE UP (ref 3.5–5.3)
RBC # BLD: 3.35 M/UL — LOW (ref 3.8–5.2)
RBC # FLD: 16 % — HIGH (ref 10.3–14.5)
SODIUM SERPL-SCNC: 139 MMOL/L — SIGNIFICANT CHANGE UP (ref 135–145)
WBC # BLD: 7.88 K/UL — SIGNIFICANT CHANGE UP (ref 3.8–10.5)
WBC # FLD AUTO: 7.88 K/UL — SIGNIFICANT CHANGE UP (ref 3.8–10.5)

## 2024-03-04 PROCEDURE — 99232 SBSQ HOSP IP/OBS MODERATE 35: CPT

## 2024-03-04 PROCEDURE — 99233 SBSQ HOSP IP/OBS HIGH 50: CPT

## 2024-03-04 RX ADMIN — LIDOCAINE 1 PATCH: 4 CREAM TOPICAL at 19:22

## 2024-03-04 RX ADMIN — Medication 975 MILLIGRAM(S): at 00:37

## 2024-03-04 RX ADMIN — ENOXAPARIN SODIUM 40 MILLIGRAM(S): 100 INJECTION SUBCUTANEOUS at 18:37

## 2024-03-04 RX ADMIN — LIDOCAINE 1 PATCH: 4 CREAM TOPICAL at 12:43

## 2024-03-04 RX ADMIN — Medication 975 MILLIGRAM(S): at 15:00

## 2024-03-04 RX ADMIN — Medication 975 MILLIGRAM(S): at 18:37

## 2024-03-04 RX ADMIN — Medication 6 MILLIGRAM(S): at 00:10

## 2024-03-04 RX ADMIN — Medication 975 MILLIGRAM(S): at 14:10

## 2024-03-04 RX ADMIN — POLYETHYLENE GLYCOL 3350 17 GRAM(S): 17 POWDER, FOR SOLUTION ORAL at 12:42

## 2024-03-04 RX ADMIN — LISINOPRIL 2.5 MILLIGRAM(S): 2.5 TABLET ORAL at 06:14

## 2024-03-04 RX ADMIN — ESCITALOPRAM OXALATE 10 MILLIGRAM(S): 10 TABLET, FILM COATED ORAL at 12:43

## 2024-03-04 RX ADMIN — Medication 10 MILLIGRAM(S): at 12:44

## 2024-03-04 RX ADMIN — Medication 975 MILLIGRAM(S): at 19:15

## 2024-03-04 RX ADMIN — Medication 81 MILLIGRAM(S): at 18:38

## 2024-03-04 RX ADMIN — Medication 975 MILLIGRAM(S): at 06:14

## 2024-03-04 RX ADMIN — ATORVASTATIN CALCIUM 10 MILLIGRAM(S): 80 TABLET, FILM COATED ORAL at 21:29

## 2024-03-04 RX ADMIN — Medication 50 MILLIGRAM(S): at 21:29

## 2024-03-05 LAB
CULTURE RESULTS: SIGNIFICANT CHANGE UP
SPECIMEN SOURCE: SIGNIFICANT CHANGE UP

## 2024-03-05 PROCEDURE — 99233 SBSQ HOSP IP/OBS HIGH 50: CPT

## 2024-03-05 PROCEDURE — 99232 SBSQ HOSP IP/OBS MODERATE 35: CPT

## 2024-03-05 RX ADMIN — Medication 200 MILLIGRAM(S): at 22:03

## 2024-03-05 RX ADMIN — LIDOCAINE 1 PATCH: 4 CREAM TOPICAL at 19:58

## 2024-03-05 RX ADMIN — Medication 975 MILLIGRAM(S): at 06:52

## 2024-03-05 RX ADMIN — LIDOCAINE 1 PATCH: 4 CREAM TOPICAL at 00:23

## 2024-03-05 RX ADMIN — Medication 200 MILLIGRAM(S): at 23:03

## 2024-03-05 RX ADMIN — Medication 50 MILLIGRAM(S): at 21:46

## 2024-03-05 RX ADMIN — LIDOCAINE 1 PATCH: 4 CREAM TOPICAL at 10:52

## 2024-03-05 RX ADMIN — Medication 975 MILLIGRAM(S): at 11:53

## 2024-03-05 RX ADMIN — ESCITALOPRAM OXALATE 10 MILLIGRAM(S): 10 TABLET, FILM COATED ORAL at 10:55

## 2024-03-05 RX ADMIN — Medication 975 MILLIGRAM(S): at 05:49

## 2024-03-05 RX ADMIN — ATORVASTATIN CALCIUM 10 MILLIGRAM(S): 80 TABLET, FILM COATED ORAL at 21:46

## 2024-03-05 RX ADMIN — LIDOCAINE 1 PATCH: 4 CREAM TOPICAL at 22:58

## 2024-03-05 RX ADMIN — SENNA PLUS 2 TABLET(S): 8.6 TABLET ORAL at 21:46

## 2024-03-05 RX ADMIN — Medication 6 MILLIGRAM(S): at 22:03

## 2024-03-05 RX ADMIN — LISINOPRIL 2.5 MILLIGRAM(S): 2.5 TABLET ORAL at 05:49

## 2024-03-05 RX ADMIN — Medication 975 MILLIGRAM(S): at 19:27

## 2024-03-05 RX ADMIN — ENOXAPARIN SODIUM 40 MILLIGRAM(S): 100 INJECTION SUBCUTANEOUS at 19:27

## 2024-03-05 RX ADMIN — Medication 81 MILLIGRAM(S): at 10:54

## 2024-03-05 RX ADMIN — Medication 975 MILLIGRAM(S): at 10:53

## 2024-03-05 NOTE — CHART NOTE - NSCHARTNOTEFT_GEN_A_CORE
Patient will require a polyfly wheelchair due to upper sternal body fx w/ posterior displacement and hematoma, L anterior 3rd rib fx, T5 compression fx. The beneficiary has a mobility limitation that significantly impairs his ability to participate in one or more MRADLs such as toileting, feeding, dressing, grooming, and bathing in customary locations in the home. The patient’s mobility limitation cannot be sufficiently resolved by the use of an appropriately fitted cane or walker. The patient is unable to ambulated with a walker. Use of a manual wheelchair will significantly improve the beneficiary’s ability to participate in MRADLs and the beneficiary will use it on a regular basis in the home. The beneficiary is able and willing to use the wheelchair in the home. The beneficiary cannot self-propel in a standard wheelchair. The patient can self-propel in a polyfly wheelchair. The beneficiary has sufficient upper extremity function and other physical and mental capabilities needed to safely self-propel the manual lightweight wheelchair that is provided in the home during a typical day.    Sophie Covarrubias PA-C

## 2024-03-05 NOTE — CHART NOTE - NSCHARTNOTEFT_GEN_A_CORE
Patient will require a semi electric hospital bed due to upper sternal body fx w/ posterior displacement and hematoma, L anterior 3rd rib fx, T5 compression fx  Patient requires the head of the bed to be elevated more than 30 degrees.  Pillows and wedges are not effective.  Patient requires positioning of the body in ways not feasible with an ordinary bed.  Patient requires frequent repositioning to alleviate pain.  The member can independently affect the adjustment by operating the control.    Sophie Covarrubias PA-C

## 2024-03-05 NOTE — CHART NOTE - NSCHARTNOTEFT_GEN_A_CORE
Patient requires a patient lift for transfers between bed and (chair, wheelchair, or commode).  Without the use of a lift, the patient would be bed confined.     Sophie Covarrubias PA-C

## 2024-03-06 PROCEDURE — 99232 SBSQ HOSP IP/OBS MODERATE 35: CPT

## 2024-03-06 RX ADMIN — Medication 200 MILLIGRAM(S): at 16:00

## 2024-03-06 RX ADMIN — Medication 975 MILLIGRAM(S): at 11:41

## 2024-03-06 RX ADMIN — Medication 975 MILLIGRAM(S): at 12:41

## 2024-03-06 RX ADMIN — ESCITALOPRAM OXALATE 10 MILLIGRAM(S): 10 TABLET, FILM COATED ORAL at 11:41

## 2024-03-06 RX ADMIN — Medication 975 MILLIGRAM(S): at 17:58

## 2024-03-06 RX ADMIN — Medication 200 MILLIGRAM(S): at 15:00

## 2024-03-06 RX ADMIN — SENNA PLUS 2 TABLET(S): 8.6 TABLET ORAL at 22:10

## 2024-03-06 RX ADMIN — Medication 975 MILLIGRAM(S): at 06:25

## 2024-03-06 RX ADMIN — LIDOCAINE 1 PATCH: 4 CREAM TOPICAL at 23:01

## 2024-03-06 RX ADMIN — LIDOCAINE 1 PATCH: 4 CREAM TOPICAL at 11:40

## 2024-03-06 RX ADMIN — Medication 81 MILLIGRAM(S): at 11:41

## 2024-03-06 RX ADMIN — LISINOPRIL 2.5 MILLIGRAM(S): 2.5 TABLET ORAL at 06:25

## 2024-03-06 RX ADMIN — ENOXAPARIN SODIUM 40 MILLIGRAM(S): 100 INJECTION SUBCUTANEOUS at 17:59

## 2024-03-06 RX ADMIN — ATORVASTATIN CALCIUM 10 MILLIGRAM(S): 80 TABLET, FILM COATED ORAL at 22:09

## 2024-03-06 RX ADMIN — LIDOCAINE 1 PATCH: 4 CREAM TOPICAL at 19:11

## 2024-03-06 RX ADMIN — Medication 975 MILLIGRAM(S): at 07:15

## 2024-03-06 RX ADMIN — Medication 6 MILLIGRAM(S): at 22:10

## 2024-03-06 RX ADMIN — Medication 50 MILLIGRAM(S): at 22:10

## 2024-03-06 NOTE — PROVIDER CONTACT NOTE (OTHER) - ASSESSMENT
Patient sitting up in bed eating lunch.
pt is awake - daughter at bedside in no apparent distress- pt is a+ox1 - daughter stays with pt at bedside- daughter stated pt has not slept- Rn asked daughter "do you want me to ask the doctor for sleeping medication?"  and daughter stated "yes"
Pt was due to void, was complaining of abdominal discomfort, unable to void, bladder scanned showed  75mls.

## 2024-03-06 NOTE — PROVIDER CONTACT NOTE (OTHER) - ACTION/TREATMENT ORDERED:
MD stated he will order melatonin
Provider notified. Bolus ordered, as per MD continue to monitor retention.
Reassess BP when patient finishes lunch   Will be on floor shortly  No new orders at this time

## 2024-03-06 NOTE — PROVIDER CONTACT NOTE (OTHER) - SITUATION
Urinary retention
pt's daughter stated that pt did not sleep last night and did not sleep in the day time
Electronic BP 97/55, manual /48. Pt symptomatic and states she feels dizzy, pt denies blurry vision.

## 2024-03-06 NOTE — PROVIDER CONTACT NOTE (OTHER) - REASON
pt hypotensive and symptomatic
Urinary retention
pt's daughter stated that pt did not sleep last night and did not sleep in the day time

## 2024-03-07 DIAGNOSIS — K59.00 CONSTIPATION, UNSPECIFIED: ICD-10-CM

## 2024-03-07 LAB
ANION GAP SERPL CALC-SCNC: 8 MMOL/L — SIGNIFICANT CHANGE UP (ref 5–17)
BUN SERPL-MCNC: 34 MG/DL — HIGH (ref 7–23)
CALCIUM SERPL-MCNC: 8.4 MG/DL — SIGNIFICANT CHANGE UP (ref 8.4–10.5)
CHLORIDE SERPL-SCNC: 106 MMOL/L — SIGNIFICANT CHANGE UP (ref 96–108)
CO2 SERPL-SCNC: 25 MMOL/L — SIGNIFICANT CHANGE UP (ref 22–31)
CREAT SERPL-MCNC: 0.77 MG/DL — SIGNIFICANT CHANGE UP (ref 0.5–1.3)
EGFR: 73 ML/MIN/1.73M2 — SIGNIFICANT CHANGE UP
GLUCOSE SERPL-MCNC: 104 MG/DL — HIGH (ref 70–99)
HCT VFR BLD CALC: 27.2 % — LOW (ref 34.5–45)
HGB BLD-MCNC: 8.6 G/DL — LOW (ref 11.5–15.5)
MCHC RBC-ENTMCNC: 25.8 PG — LOW (ref 27–34)
MCHC RBC-ENTMCNC: 31.6 GM/DL — LOW (ref 32–36)
MCV RBC AUTO: 81.7 FL — SIGNIFICANT CHANGE UP (ref 80–100)
NRBC # BLD: 0 /100 WBCS — SIGNIFICANT CHANGE UP (ref 0–0)
PLATELET # BLD AUTO: 469 K/UL — HIGH (ref 150–400)
POTASSIUM SERPL-MCNC: 3.9 MMOL/L — SIGNIFICANT CHANGE UP (ref 3.5–5.3)
POTASSIUM SERPL-SCNC: 3.9 MMOL/L — SIGNIFICANT CHANGE UP (ref 3.5–5.3)
RBC # BLD: 3.33 M/UL — LOW (ref 3.8–5.2)
RBC # FLD: 17 % — HIGH (ref 10.3–14.5)
SODIUM SERPL-SCNC: 139 MMOL/L — SIGNIFICANT CHANGE UP (ref 135–145)
WBC # BLD: 9.97 K/UL — SIGNIFICANT CHANGE UP (ref 3.8–10.5)
WBC # FLD AUTO: 9.97 K/UL — SIGNIFICANT CHANGE UP (ref 3.8–10.5)

## 2024-03-07 PROCEDURE — 99233 SBSQ HOSP IP/OBS HIGH 50: CPT

## 2024-03-07 PROCEDURE — 99232 SBSQ HOSP IP/OBS MODERATE 35: CPT

## 2024-03-07 RX ADMIN — Medication 975 MILLIGRAM(S): at 14:30

## 2024-03-07 RX ADMIN — ENOXAPARIN SODIUM 40 MILLIGRAM(S): 100 INJECTION SUBCUTANEOUS at 18:31

## 2024-03-07 RX ADMIN — Medication 975 MILLIGRAM(S): at 12:26

## 2024-03-07 RX ADMIN — LIDOCAINE 1 PATCH: 4 CREAM TOPICAL at 21:45

## 2024-03-07 RX ADMIN — Medication 50 MILLIGRAM(S): at 21:44

## 2024-03-07 RX ADMIN — POLYETHYLENE GLYCOL 3350 17 GRAM(S): 17 POWDER, FOR SOLUTION ORAL at 12:33

## 2024-03-07 RX ADMIN — Medication 975 MILLIGRAM(S): at 13:15

## 2024-03-07 RX ADMIN — LISINOPRIL 2.5 MILLIGRAM(S): 2.5 TABLET ORAL at 05:42

## 2024-03-07 RX ADMIN — Medication 975 MILLIGRAM(S): at 22:37

## 2024-03-07 RX ADMIN — ESCITALOPRAM OXALATE 10 MILLIGRAM(S): 10 TABLET, FILM COATED ORAL at 12:25

## 2024-03-07 RX ADMIN — Medication 81 MILLIGRAM(S): at 12:26

## 2024-03-07 RX ADMIN — Medication 975 MILLIGRAM(S): at 21:44

## 2024-03-07 RX ADMIN — ATORVASTATIN CALCIUM 10 MILLIGRAM(S): 80 TABLET, FILM COATED ORAL at 21:44

## 2024-03-07 RX ADMIN — LIDOCAINE 1 PATCH: 4 CREAM TOPICAL at 12:26

## 2024-03-07 NOTE — PROGRESS NOTE ADULT - ATTENDING COMMENTS
seen and examined 03-07-24 @ 0945    tolerating regular diet  no BM since Tues 3/5    no sternal tenderness  minimal left anterior chest wall tenderness  soft / NT / mildly distended / tympanitic in RUQ    WBC = 9  hgb - stable @ 8-9 g/dL since admission  BCx (3/5) - NGTD x 2 sets    CXR (3/1) - small bilateral effusions      left 3rd anterior nondisplaced rib fracture  transverse superior sternal fracture  -multimodal pain control    small bilateral pleural effusions  -seen on 2/29 0059 CT chest ([facility] MR# [id number])  -no indication for thoracentesis    constipation  -continue Senna / Miralax  -Dulcolax [place] qhs PRN no BM  -Fleet enema now    E coli UTI  -ceftriaxone (2/29 - 3/2)    r/o acute urinary retention  -check bladder scan    positive blood culture  -BCx (2/28) - Haemophilus haemolyticus (1/2 sets)  -this bacteria is very rarely pathologic, so will not restart ABx as this was most likely a contaminant    dispo  -her family is refusing [facility], so will plan discharge home w/ home PT when DME is available      I reviewed her labs.  plan discussed with her granddaughter at bedside

## 2024-03-08 ENCOUNTER — TRANSCRIPTION ENCOUNTER (OUTPATIENT)
Age: 89
End: 2024-03-08

## 2024-03-08 PROCEDURE — 99233 SBSQ HOSP IP/OBS HIGH 50: CPT

## 2024-03-08 PROCEDURE — 99232 SBSQ HOSP IP/OBS MODERATE 35: CPT

## 2024-03-08 RX ADMIN — LIDOCAINE 1 PATCH: 4 CREAM TOPICAL at 22:30

## 2024-03-08 RX ADMIN — LIDOCAINE 1 PATCH: 4 CREAM TOPICAL at 19:45

## 2024-03-08 RX ADMIN — ATORVASTATIN CALCIUM 10 MILLIGRAM(S): 80 TABLET, FILM COATED ORAL at 21:30

## 2024-03-08 RX ADMIN — Medication 975 MILLIGRAM(S): at 04:00

## 2024-03-08 RX ADMIN — Medication 975 MILLIGRAM(S): at 21:30

## 2024-03-08 RX ADMIN — LIDOCAINE 1 PATCH: 4 CREAM TOPICAL at 01:30

## 2024-03-08 RX ADMIN — ENOXAPARIN SODIUM 40 MILLIGRAM(S): 100 INJECTION SUBCUTANEOUS at 17:53

## 2024-03-08 RX ADMIN — POLYETHYLENE GLYCOL 3350 17 GRAM(S): 17 POWDER, FOR SOLUTION ORAL at 10:17

## 2024-03-08 RX ADMIN — Medication 50 MILLIGRAM(S): at 21:30

## 2024-03-08 RX ADMIN — LIDOCAINE 1 PATCH: 4 CREAM TOPICAL at 10:16

## 2024-03-08 RX ADMIN — SENNA PLUS 2 TABLET(S): 8.6 TABLET ORAL at 21:30

## 2024-03-08 RX ADMIN — Medication 975 MILLIGRAM(S): at 10:17

## 2024-03-08 RX ADMIN — LISINOPRIL 2.5 MILLIGRAM(S): 2.5 TABLET ORAL at 05:30

## 2024-03-08 RX ADMIN — Medication 81 MILLIGRAM(S): at 10:17

## 2024-03-08 RX ADMIN — ESCITALOPRAM OXALATE 10 MILLIGRAM(S): 10 TABLET, FILM COATED ORAL at 10:17

## 2024-03-08 RX ADMIN — Medication 975 MILLIGRAM(S): at 22:30

## 2024-03-08 RX ADMIN — Medication 975 MILLIGRAM(S): at 03:00

## 2024-03-08 RX ADMIN — Medication 975 MILLIGRAM(S): at 15:06

## 2024-03-08 NOTE — PROGRESS NOTE ADULT - PROBLEM SELECTOR PLAN 7
DVT ppx: lovenox  dispo: PT reccs SH[facility] ; family may want her discharged home with home PT  d/w grand daughter at bedside, all questions answered.
DVT ppx: lovenox  dispo: PT reccs [facility]  d/w daughter at bedside, all questions answered.
DVT ppx: lovenox  dispo: pending PT  d/w granddaughter over phone and daughter at bedside, all ques answered.
controlled  - c/w home lisinopril.
controlled  - c/w home lisinopril.
DVT ppx: lovenox  dispo: PT reccs [facility] ; family wants her discharged home with home PT, DME  Discharge planning in progress  d/w daughter at bedside

## 2024-03-08 NOTE — PROGRESS NOTE ADULT - PROBLEM SELECTOR PROBLEM 7
HTN (hypertension)
Prophylactic measure
HTN (hypertension)
Prophylactic measure

## 2024-03-08 NOTE — PROGRESS NOTE ADULT - PROBLEM SELECTOR PLAN 1
due to mechanical fall in bathroom 2 weeks prior  - as per trauma surgery  - pain control tylenol standing, prn tramadol  - c/w bowel regimen  - IS 10x/hr.
due to mechanical fall in bathroom 2 weeks prior  - as per trauma surgery  - pain control tylenol standing, prn tramadol  - c/w bowel regimen  - IS
due to mechanical fall in bathroom 2 weeks prior to presentation  - as per trauma surgery  - pain control tylenol standing, prn tramadol  - c/w bowel regimen  - IS
due to mechanical fall in bathroom 2 weeks prior  - as per trauma surgery  - pain control tylenol standing, prn tramadol  - c/w bowel regimen  - IS 10x/hr.
due to mechanical fall in bathroom 2 weeks prior  - as per trauma surgery  - pain control tylenol standing, prn tramadol  - c/w bowel regimen  - IS
due to mechanical fall in bathroom 2 weeks prior to presentation  - as per trauma surgery  - pain control tylenol standing, prn tramadol  - c/w bowel regimen  - IS

## 2024-03-08 NOTE — PROGRESS NOTE ADULT - NSPROGADDITIONALINFOA_GEN_ALL_CORE
time spent reviewing prior charts, meds, discussing plan with patient's grand daughter = 46 minutes    d/w Trauma team
time spent reviewing prior charts, meds, discussing plan with patient's grand daughter = 47 minutes    d/w Trauma team
time spent reviewing prior charts, meds, discussing plan with patient's daughter = 45 minutes    d/w Trauma team
d/w trauma team
time spent reviewing prior charts, meds, discussing plan with patient's daughter = 45 minutes    d/w Trauma team
time spent reviewing prior charts, meds, discussing plan with patient's daughter = 45 minutes    d/w Trauma team

## 2024-03-08 NOTE — PROGRESS NOTE ADULT - PROBLEM SELECTOR PLAN 3
- per grand daughter pt has not has not had a bm x 2 days  - reports abd pain  - on exam, abd is soft, nt, nd, bs+  - agree with dulcolax suppository and fleet enema   - c/w miralax and senna
w/ hx fever recently and foul smelling, decreased UOP  - c/w ceftriaxone x 3 days, f/u ucx.  - w/ episodes of urine retention, which per most recent bladder scan is now resolved. continue to monitor.
- resolved s/p  dulcolax suppository and fleet enema   - denies abd pain  - on exam, abd is soft, nt, nd, bs+  - c/w miralax and senna
w/ hx fever recently and foul smelling, decreased UOP  - s/p ceftriaxone x 3 days; urine cx ngtd  - w/ episodes of urine retention, which per most recent bladder scan is now resolved. continue to monitor.
w/ hx fever recently and foul smelling, decreased UOP  - s/p ceftriaxone x 3 days; urine cx ngtd
w/ hx fever recently and foul smelling, decreased UOP  - s/p ceftriaxone x 3 days; urine cx ngtd

## 2024-03-08 NOTE — PROGRESS NOTE ADULT - PROBLEM SELECTOR PLAN 6
aaox1  - frequent reorientation,  - high risk for delirium.  - fall precautions
controlled  - c/w home lisinopril.
aaox1  - frequent reorientation,  - high risk for delirium.
controlled  - c/w home lisinopril.

## 2024-03-08 NOTE — DISCHARGE NOTE NURSING/CASE MANAGEMENT/SOCIAL WORK - NSDCFUADDAPPT_GEN_ALL_CORE_FT
Please follow up with your primary care provider in 1-2 weeks after discharge.    Please call and make an appointment with would care for follow up after discharge.   [facility]  [address] [place] [address]   [phone]

## 2024-03-08 NOTE — PROGRESS NOTE ADULT - PROBLEM SELECTOR PLAN 5
aaox1  - frequent reorientation, at risk for delirium.
aaox1  - frequent reorientation,  - high risk for delirium.
aaox1  - frequent reorientation, at risk for delirium.
- c/w home zonisamide  - seizure precautions.
aaox1  - frequent reorientation,  - high risk for delirium.
- c/w home zonisamide  - seizure precautions.

## 2024-03-08 NOTE — PROGRESS NOTE ADULT - PROBLEM SELECTOR PLAN 4
w/ hx fever recently and foul smelling urine, decreased UOP  - s/p ceftriaxone x 3 days; urine cx ngtd  - bld cx from 3/5  NGTD  - pt is voiding well
- c/w home zonisamide  - seizure precautions.
w/ hx fever recently and foul smelling, decreased UOP  - s/p ceftriaxone x 3 days; urine cx ngtd  - bld cx from 3/5  NGTD

## 2024-03-08 NOTE — PROGRESS NOTE ADULT - PROBLEM SELECTOR PLAN 8
DVT ppx: lovenox  dispo: PT reccs [facility] ; family wants her discharged home with home PT, DME  Discharge planning in progress  d/w grand daughter at bedside
DVT ppx: lovenox  dispo: PT reccs [facility] ; family wants her discharged home with home PT  Discharge planning in progress ; pending DME delivery  d/w  daughter at bedside

## 2024-03-08 NOTE — DISCHARGE NOTE NURSING/CASE MANAGEMENT/SOCIAL WORK - PATIENT PORTAL LINK FT
You can access the FollowMyHealth Patient Portal offered by [facility] by registering at the following website: [email or web address]. By joining [employer]’s FollowMyHealth portal, you will also be able to view your health information using other applications (apps) compatible with our system.

## 2024-03-08 NOTE — PROGRESS NOTE ADULT - NS ATTEND AMEND GEN_ALL_CORE FT
seen and examined 03-04-24 @ 0943    no sternal tenderness  no left-sided chest wall tenderness  soft / NT / mildly distended / tympanitic      WBC = 7  hgb - stable @ 8-9 g/dL since admission    CXR (3/1) - small bilateral effusions      left 3rd anterior nondisplaced rib fracture  transverse superior sternal fracture  -multimodal pain control    small bilateral pleural effusions  -seen on 2/29 0059 CT chest ([facility] MR# [id number])  -no indication for thoracentesis    constipation  -continue Senna / Miralax  -give Fleet enema    dispo  -transfer to [facility] when available      I reviewed her labs and radiologic images.  plan discussed with her daughter at bedside
seen and examined 03-05-24 @ 1340    BM x 2 today after Dulcolax suppository and Fleet enema    no sternal tenderness  no left-sided chest wall tenderness  soft / NT / ND      CXR (3/1) - small bilateral effusions      left 3rd anterior nondisplaced rib fracture  transverse superior sternal fracture  -multimodal pain control    small bilateral pleural effusions  -seen on 2/29 0059 CT chest ([facility] MR# [id number])  -no indication for thoracentesis    constipation resolved  -continue Senna / Miralax    E coli UTI  -ceftriaxone (2/29 - 3/2)    positive blood culture  -BCx (2/28) - Haemophilus haemolyticus (1/2 sets)  -this bacteria is very rarely pathologic, so will not restart ABx as this was most likely a contaminant  -repeat BCx    dispo  -her family is refusing [facility], so will plan discharge home w/ home PT when DME is available      I reviewed her labs.  plan discussed with her daughter and granddaughter at bedside
seen and examined 03-08-24 @ 0843    tolerating regular diet  BM x 2 yesterday after Miralax    no sternal tenderness  no left-sided chest wall tenderness  soft / NT / ND    bladder scan (3/7) - 86 mL residual      hgb - stable @ 8-9 g/dL since admission  BCx (3/5) - NGTD x 2 sets    CXR (3/1) - small bilateral effusions      left 3rd anterior nondisplaced rib fracture  transverse superior sternal fracture  -multimodal pain control    small bilateral pleural effusions  -seen on 2/29 0059 CT chest ([facility] MR# [id number])  -no indication for thoracentesis    constipation  -continue Senna / Miralax  -Dulcolax [place] qhs PRN no BM    E coli UTI  -ceftriaxone (2/29 - 3/2)    positive blood culture  -BCx (2/28) - Haemophilus haemolyticus (1/2 sets)  -this bacteria is very rarely pathologic, so will not restart ABx as this was most likely a contaminant    dispo  -her family is refusing [facility], so will plan discharge home w/ home PT when DME is available      I reviewed her labs.  plan discussed with her daughter at bedside
seen and examined 03-06-24 @ 1130    no sternal tenderness  no left-sided chest wall tenderness  soft / NT / ND      CXR (3/1) - small bilateral effusions      left 3rd anterior nondisplaced rib fracture  transverse superior sternal fracture  -multimodal pain control    small bilateral pleural effusions  -seen on 2/29 0059 CT chest ([facility] MR# [id number])  -no indication for thoracentesis    constipation resolved  -continue Senna / Miralax    E coli UTI  -ceftriaxone (2/29 - 3/2)    positive blood culture  -BCx (2/28) - Haemophilus haemolyticus (1/2 sets)  -this bacteria is very rarely pathologic, so will not restart ABx as this was most likely a contaminant  -BCx (3/5) - pending x 2 sets    dispo  -her family is refusing [facility], so will plan discharge home w/ home PT when DME is available      I reviewed her labs.  plan discussed with her daughter at bedside and her granddaughter by speakerphone

## 2024-03-08 NOTE — PROGRESS NOTE ADULT - PROBLEM SELECTOR PROBLEM 1
Sternal fracture

## 2024-03-08 NOTE — PROGRESS NOTE ADULT - PROBLEM SELECTOR PLAN 2
due to sternal hematoma  hgb now stable ~8  - transfuse for hgb<7 or active bleed.
due to sternal hematoma  hgb now stable ~8  c/t monitor
due to sternal hematoma  hgb now stable ~8  c/t monitor
due to sternal hematoma  hgb now stable ~8  - transfuse for hgb<7 or active bleed.
due to sternal hematoma  hgb now stable ~8  c/t monitor
due to sternal hematoma  hgb now stable   c/t monitor

## 2024-03-09 LAB
AMORPH SED URNS QL MICRO: PRESENT
APPEARANCE UR: ABNORMAL
BACTERIA # UR AUTO: ABNORMAL /HPF
BILIRUB UR-MCNC: ABNORMAL
CAST: 0 /LPF — SIGNIFICANT CHANGE UP (ref 0–4)
COLOR SPEC: SIGNIFICANT CHANGE UP
DIFF PNL FLD: ABNORMAL
GLUCOSE UR QL: NEGATIVE MG/DL — SIGNIFICANT CHANGE UP
KETONES UR-MCNC: NEGATIVE MG/DL — SIGNIFICANT CHANGE UP
LEUKOCYTE ESTERASE UR-ACNC: ABNORMAL
NITRITE UR-MCNC: POSITIVE
PH UR: 7 — SIGNIFICANT CHANGE UP (ref 5–8)
PROT UR-MCNC: NEGATIVE MG/DL — SIGNIFICANT CHANGE UP
RBC CASTS # UR COMP ASSIST: 571 /HPF — HIGH (ref 0–4)
REVIEW: SIGNIFICANT CHANGE UP
SP GR SPEC: 1.02 — SIGNIFICANT CHANGE UP (ref 1–1.03)
SQUAMOUS # UR AUTO: 2 /HPF — SIGNIFICANT CHANGE UP (ref 0–5)
TRI-PHOS CRY UR QL COMP ASSIST: PRESENT
UROBILINOGEN FLD QL: 2 MG/DL (ref 0.2–1)
WBC UR QL: 5 /HPF — SIGNIFICANT CHANGE UP (ref 0–5)

## 2024-03-09 PROCEDURE — 99233 SBSQ HOSP IP/OBS HIGH 50: CPT

## 2024-03-09 RX ORDER — CEFTRIAXONE 500 MG/1
1000 INJECTION, POWDER, FOR SOLUTION INTRAMUSCULAR; INTRAVENOUS EVERY 24 HOURS
Refills: 0 | Status: DISCONTINUED | OUTPATIENT
Start: 2024-03-09 | End: 2024-03-10

## 2024-03-09 RX ADMIN — SENNA PLUS 2 TABLET(S): 8.6 TABLET ORAL at 21:26

## 2024-03-09 RX ADMIN — CEFTRIAXONE 100 MILLIGRAM(S): 500 INJECTION, POWDER, FOR SOLUTION INTRAMUSCULAR; INTRAVENOUS at 16:25

## 2024-03-09 RX ADMIN — Medication 975 MILLIGRAM(S): at 22:20

## 2024-03-09 RX ADMIN — ENOXAPARIN SODIUM 40 MILLIGRAM(S): 100 INJECTION SUBCUTANEOUS at 17:36

## 2024-03-09 RX ADMIN — LIDOCAINE 1 PATCH: 4 CREAM TOPICAL at 23:00

## 2024-03-09 RX ADMIN — Medication 975 MILLIGRAM(S): at 08:53

## 2024-03-09 RX ADMIN — Medication 975 MILLIGRAM(S): at 03:32

## 2024-03-09 RX ADMIN — LISINOPRIL 2.5 MILLIGRAM(S): 2.5 TABLET ORAL at 05:35

## 2024-03-09 RX ADMIN — Medication 81 MILLIGRAM(S): at 11:55

## 2024-03-09 RX ADMIN — Medication 975 MILLIGRAM(S): at 14:57

## 2024-03-09 RX ADMIN — POLYETHYLENE GLYCOL 3350 17 GRAM(S): 17 POWDER, FOR SOLUTION ORAL at 11:55

## 2024-03-09 RX ADMIN — Medication 975 MILLIGRAM(S): at 04:32

## 2024-03-09 RX ADMIN — Medication 50 MILLIGRAM(S): at 21:26

## 2024-03-09 RX ADMIN — LIDOCAINE 1 PATCH: 4 CREAM TOPICAL at 11:55

## 2024-03-09 RX ADMIN — Medication 975 MILLIGRAM(S): at 15:57

## 2024-03-09 RX ADMIN — Medication 975 MILLIGRAM(S): at 21:26

## 2024-03-09 RX ADMIN — ESCITALOPRAM OXALATE 10 MILLIGRAM(S): 10 TABLET, FILM COATED ORAL at 11:55

## 2024-03-09 RX ADMIN — LIDOCAINE 1 PATCH: 4 CREAM TOPICAL at 19:34

## 2024-03-09 RX ADMIN — ATORVASTATIN CALCIUM 10 MILLIGRAM(S): 80 TABLET, FILM COATED ORAL at 21:26

## 2024-03-09 NOTE — PROGRESS NOTE ADULT - ATTENDING COMMENTS
seen and examined 03-09-24 @ 1012    tolerating regular diet  +BM today    no sternal tenderness  no left-sided chest wall tenderness  soft / NT / ND    bladder scan (3/7) - 86 mL residual      BCx (3/5) - NGTD x 2 sets  U/A (3/9) - moderate leukocyte esterase, positive nitrite    CXR (3/1) - small bilateral effusions      left 3rd anterior nondisplaced rib fracture  transverse superior sternal fracture  -multimodal pain control    small bilateral pleural effusions  -seen on 2/29 0059 CT chest ([facility] MR# [id number])  -no indication for thoracentesis    constipation  -continue Senna / Miralax  -Dulcolax [place] qhs PRN no BM    E coli UTI  -ceftriaxone (2/29 - 3/2)  -ceftriaxone (3/9 - 3/11) for recurrent UTI    positive blood culture  -BCx (2/28) - Haemophilus haemolyticus (1/2 sets)  -this bacteria is very rarely pathologic, so will not restart ABx as this was most likely a contaminant    dispo  -her family is refusing [facility], so will plan discharge home w/ home PT when DME is available tomorrow      I reviewed her labs.  plan discussed with her other granddaughter at bedside

## 2024-03-10 VITALS
TEMPERATURE: 98 F | RESPIRATION RATE: 18 BRPM | DIASTOLIC BLOOD PRESSURE: 62 MMHG | OXYGEN SATURATION: 97 % | SYSTOLIC BLOOD PRESSURE: 120 MMHG | HEART RATE: 76 BPM

## 2024-03-10 PROCEDURE — 99285 EMERGENCY DEPT VISIT HI MDM: CPT | Mod: 25

## 2024-03-10 PROCEDURE — 99232 SBSQ HOSP IP/OBS MODERATE 35: CPT

## 2024-03-10 PROCEDURE — 83735 ASSAY OF MAGNESIUM: CPT

## 2024-03-10 PROCEDURE — 36415 COLL VENOUS BLD VENIPUNCTURE: CPT

## 2024-03-10 PROCEDURE — 84100 ASSAY OF PHOSPHORUS: CPT

## 2024-03-10 PROCEDURE — 80053 COMPREHEN METABOLIC PANEL: CPT

## 2024-03-10 PROCEDURE — 86850 RBC ANTIBODY SCREEN: CPT

## 2024-03-10 PROCEDURE — 71045 X-RAY EXAM CHEST 1 VIEW: CPT

## 2024-03-10 PROCEDURE — 85027 COMPLETE CBC AUTOMATED: CPT

## 2024-03-10 PROCEDURE — 86900 BLOOD TYPING SEROLOGIC ABO: CPT

## 2024-03-10 PROCEDURE — 87186 SC STD MICRODIL/AGAR DIL: CPT

## 2024-03-10 PROCEDURE — 85610 PROTHROMBIN TIME: CPT

## 2024-03-10 PROCEDURE — 96374 THER/PROPH/DIAG INJ IV PUSH: CPT

## 2024-03-10 PROCEDURE — 83036 HEMOGLOBIN GLYCOSYLATED A1C: CPT

## 2024-03-10 PROCEDURE — 97110 THERAPEUTIC EXERCISES: CPT

## 2024-03-10 PROCEDURE — 80048 BASIC METABOLIC PNL TOTAL CA: CPT

## 2024-03-10 PROCEDURE — 85730 THROMBOPLASTIN TIME PARTIAL: CPT

## 2024-03-10 PROCEDURE — 87077 CULTURE AEROBIC IDENTIFY: CPT

## 2024-03-10 PROCEDURE — 86901 BLOOD TYPING SEROLOGIC RH(D): CPT

## 2024-03-10 PROCEDURE — 87086 URINE CULTURE/COLONY COUNT: CPT

## 2024-03-10 PROCEDURE — 97112 NEUROMUSCULAR REEDUCATION: CPT

## 2024-03-10 PROCEDURE — 93005 ELECTROCARDIOGRAM TRACING: CPT

## 2024-03-10 PROCEDURE — 96375 TX/PRO/DX INJ NEW DRUG ADDON: CPT

## 2024-03-10 PROCEDURE — 97163 PT EVAL HIGH COMPLEX 45 MIN: CPT

## 2024-03-10 PROCEDURE — 81001 URINALYSIS AUTO W/SCOPE: CPT

## 2024-03-10 PROCEDURE — 87040 BLOOD CULTURE FOR BACTERIA: CPT

## 2024-03-10 RX ORDER — CEFUROXIME AXETIL 250 MG
1 TABLET ORAL
Qty: 8 | Refills: 0
Start: 2024-03-10 | End: 2024-03-13

## 2024-03-10 RX ADMIN — LISINOPRIL 2.5 MILLIGRAM(S): 2.5 TABLET ORAL at 05:37

## 2024-03-10 RX ADMIN — Medication 975 MILLIGRAM(S): at 08:26

## 2024-03-10 RX ADMIN — Medication 975 MILLIGRAM(S): at 09:26

## 2024-03-10 NOTE — PROGRESS NOTE ADULT - PROVIDER SPECIALTY LIST ADULT
Hospitalist
Hospitalist
Surgery
Trauma Surgery
Hospitalist
Hospitalist
Surgery
Surgery
Trauma Surgery
Surgery
Trauma Surgery
Trauma Surgery
Hospitalist
Hospitalist

## 2024-03-10 NOTE — PROGRESS NOTE ADULT - SUBJECTIVE AND OBJECTIVE BOX
SUBJECTIVE: Patient seen and examined on AM rounds. Patient reports that they're feeling well. Denies fever, chills. Reports pain as controlled. No complaints at this time.     Vital Signs Last 24 Hrs  T(C): 36.4 (09 Mar 2024 09:19), Max: 36.9 (08 Mar 2024 16:59)  T(F): 97.6 (09 Mar 2024 09:19), Max: 98.5 (08 Mar 2024 21:50)  HR: 79 (09 Mar 2024 09:19) (72 - 87)  BP: 152/83 (09 Mar 2024 09:19) (121/54 - 152/83)  BP(mean): --  RR: 18 (09 Mar 2024 09:19) (18 - 18)  SpO2: 96% (09 Mar 2024 09:19) (92% - 96%)    Parameters below as of 09 Mar 2024 09:19  Patient On (Oxygen Delivery Method): room air        General Appearance: Appears well, NAD  Neck: Supple  Chest: Equal expansion bilaterally  CV: Pulse regular presently  Abdomen: Soft, nontense  Extremities: WWP    I&O's Summary    08 Mar 2024 07:01  -  09 Mar 2024 07:00  --------------------------------------------------------  IN: 550 mL / OUT: 1150 mL / NET: -600 mL    09 Mar 2024 06:01  -  09 Mar 2024 11:23  --------------------------------------------------------  IN: 240 mL / OUT: 450 mL / NET: -210 mL      I&O's Detail    08 Mar 2024 07:01  -  09 Mar 2024 07:00  --------------------------------------------------------  IN:    Oral Fluid: 550 mL  Total IN: 550 mL    OUT:    Voided (mL): 1150 mL  Total OUT: 1150 mL    Total NET: -600 mL      09 Mar 2024 06:01  -  09 Mar 2024 11:24  --------------------------------------------------------  IN:    Oral Fluid: 240 mL  Total IN: 240 mL    OUT:    Voided (mL): 450 mL  Total OUT: 450 mL    Total NET: -210 mL          LABS:                RADIOLOGY & ADDITIONAL STUDIES:
SURGERY DAILY PROGRESS NOTE    SUBJECTIVE: Patient seen and evaluated on AM rounds.     Overnight Events:  No acute events overnight  -----------------------------------------------------------------------------------------------------------------------------------------------------------------------------------------------------------  OBJECTIVE:  Vital Signs Last 24 Hrs  T(C): 36.3 (10 Mar 2024 05:02), Max: 36.7 (09 Mar 2024 23:42)  T(F): 97.4 (10 Mar 2024 05:02), Max: 98.1 (09 Mar 2024 23:42)  HR: 77 (10 Mar 2024 05:02) (67 - 79)  BP: 149/70 (10 Mar 2024 05:02) (124/67 - 163/70)  BP(mean): --  RR: 18 (10 Mar 2024 05:02) (18 - 18)  SpO2: 98% (10 Mar 2024 05:02) (94% - 98%)    Parameters below as of 10 Mar 2024 05:02  Patient On (Oxygen Delivery Method): room air      I&O's Detail    09 Mar 2024 06:01  -  10 Mar 2024 07:00  --------------------------------------------------------  IN:    Oral Fluid: 700 mL  Total IN: 700 mL    OUT:    Voided (mL): 950 mL  Total OUT: 950 mL    Total NET: -250 mL        Daily     Daily   MEDICATIONS  (STANDING):  acetaminophen     Tablet .. 975 milliGRAM(s) Oral every 6 hours  aspirin  chewable 81 milliGRAM(s) Oral daily  atorvastatin 10 milliGRAM(s) Oral at bedtime  cefTRIAXone   IVPB 1000 milliGRAM(s) IV Intermittent every 24 hours  enoxaparin Injectable 40 milliGRAM(s) SubCutaneous every 24 hours  escitalopram 10 milliGRAM(s) Oral daily  influenza  Vaccine (HIGH DOSE) 0.7 milliLiter(s) IntraMuscular once  lidocaine   4% Patch 1 Patch Transdermal every 24 hours  lisinopril 2.5 milliGRAM(s) Oral daily  polyethylene glycol 3350 17 Gram(s) Oral daily  senna 2 Tablet(s) Oral at bedtime  zonisamide 50 milliGRAM(s) Oral daily    MEDICATIONS  (PRN):  bisacodyl Suppository 10 milliGRAM(s) Rectal at bedtime PRN no bowel movement during the day  ibuprofen  Tablet. 200 milliGRAM(s) Oral every 4 hours PRN Mild Pain (1 - 3)  melatonin 6 milliGRAM(s) Oral at bedtime PRN Insomnia      LABS:            Urinalysis Basic - ( 09 Mar 2024 14:46 )    Color: Dark Yellow / Appearance: Turbid / S.018 / pH: x  Gluc: x / Ketone: Negative mg/dL  / Bili: Small / Urobili: 2.0 mg/dL   Blood: x / Protein: Negative mg/dL / Nitrite: Positive   Leuk Esterase: Moderate / RBC: 571 /HPF / WBC 5 /HPF   Sq Epi: x / Non Sq Epi: 2 /HPF / Bacteria: Many /HPF      General Appearance: Appears well, NAD  Neck: Supple  Chest: Equal expansion bilaterally  CV: Pulse regular presently  Abdomen: Soft, nontense  Extremities: WWP    
TRAUMA SURGERY DAILY PROGRESS NOTE:     SUBJECTIVE/ROS: Patient feels well. Denies nausea, vomiting, chest pain, shortness of breath. Complaining of constipation.      MEDICATIONS  (STANDING):  acetaminophen     Tablet .. 975 milliGRAM(s) Oral every 6 hours  aspirin  chewable 81 milliGRAM(s) Oral daily  atorvastatin 10 milliGRAM(s) Oral at bedtime  bisacodyl Suppository 10 milliGRAM(s) Rectal once  enoxaparin Injectable 40 milliGRAM(s) SubCutaneous every 24 hours  escitalopram 10 milliGRAM(s) Oral daily  influenza  Vaccine (HIGH DOSE) 0.7 milliLiter(s) IntraMuscular once  lidocaine   4% Patch 1 Patch Transdermal every 24 hours  lisinopril 2.5 milliGRAM(s) Oral daily  polyethylene glycol 3350 17 Gram(s) Oral daily  senna 2 Tablet(s) Oral at bedtime  zonisamide 50 milliGRAM(s) Oral daily    MEDICATIONS  (PRN):  ibuprofen  Tablet. 200 milliGRAM(s) Oral every 4 hours PRN Mild Pain (1 - 3)  melatonin 6 milliGRAM(s) Oral at bedtime PRN Insomnia  traMADol 50 milliGRAM(s) Oral every 6 hours PRN Severe Pain (7 - 10)  traMADol 25 milliGRAM(s) Oral every 6 hours PRN Moderate Pain (4 - 6)      OBJECTIVE:    Vital Signs Last 24 Hrs  T(C): 36.8 (04 Mar 2024 05:07), Max: 37.4 (03 Mar 2024 21:17)  T(F): 98.3 (04 Mar 2024 05:07), Max: 99.3 (03 Mar 2024 21:17)  HR: 81 (04 Mar 2024 05:07) (79 - 95)  BP: 154/81 (04 Mar 2024 05:07) (100/64 - 154/81)  BP(mean): --  RR: 17 (04 Mar 2024 05:07) (16 - 17)  SpO2: 97% (04 Mar 2024 05:07) (92% - 97%)    Parameters below as of 04 Mar 2024 05:07  Patient On (Oxygen Delivery Method): room air            I&O's Detail    03 Mar 2024 07:01  -  04 Mar 2024 07:00  --------------------------------------------------------  IN:    Oral Fluid: 1020 mL  Total IN: 1020 mL    OUT:    Voided (mL): 900 mL  Total OUT: 900 mL    Total NET: 120 mL          Daily     Daily     LABS:                        8.9    8.16  )-----------( 463      ( 03 Mar 2024 07:48 )             28.5     03-03    135  |  103  |  18  ----------------------------<  96  4.8   |  25  |  0.66    Ca    8.4      03 Mar 2024 07:48  Phos  3.7     03-03  Mg     2.0     03-03        Urinalysis Basic - ( 03 Mar 2024 07:48 )    Color: x / Appearance: x / SG: x / pH: x  Gluc: 96 mg/dL / Ketone: x  / Bili: x / Urobili: x   Blood: x / Protein: x / Nitrite: x   Leuk Esterase: x / RBC: x / WBC x   Sq Epi: x / Non Sq Epi: x / Bacteria: x                PHYSICAL EXAM:  General Appearance: Appears well, NAD  Neck: Supple  Chest: Equal expansion bilaterally  CV: Pulse regular presently  Abdomen: Soft, nontense, nontender  Extremities: WWP      
SURGERY DAILY PROGRESS NOTE    SUBJECTIVE: Patient seen and evaluated.    Overnight Events:  No acute events overnight  -----------------------------------------------------------------------------------------------------------------------------------------------------------------------------------------------------------  OBJECTIVE:  Vital Signs Last 24 Hrs  T(C): 36.4 (02 Mar 2024 16:05), Max: 36.8 (01 Mar 2024 20:07)  T(F): 97.6 (02 Mar 2024 16:05), Max: 98.2 (01 Mar 2024 20:07)  HR: 69 (02 Mar 2024 16:05) (69 - 86)  BP: 138/74 (02 Mar 2024 16:05) (123/71 - 169/69)  BP(mean): --  RR: 18 (02 Mar 2024 16:05) (18 - 18)  SpO2: 95% (02 Mar 2024 16:05) (94% - 97%)    Parameters below as of 02 Mar 2024 16:05  Patient On (Oxygen Delivery Method): room air      I&O's Detail    01 Mar 2024 07:01  -  02 Mar 2024 07:00  --------------------------------------------------------  IN:    IV PiggyBack: 50 mL    Oral Fluid: 900 mL  Total IN: 950 mL    OUT:    Voided (mL): 2250 mL  Total OUT: 2250 mL    Total NET: -1300 mL      02 Mar 2024 07:01  -  02 Mar 2024 17:57  --------------------------------------------------------  IN:    IV PiggyBack: 50 mL    Oral Fluid: 780 mL  Total IN: 830 mL    OUT:    Voided (mL): 300 mL  Total OUT: 300 mL    Total NET: 530 mL        Daily     Daily   MEDICATIONS  (STANDING):  acetaminophen     Tablet .. 975 milliGRAM(s) Oral every 6 hours  aspirin  chewable 81 milliGRAM(s) Oral daily  atorvastatin 10 milliGRAM(s) Oral at bedtime  enoxaparin Injectable 40 milliGRAM(s) SubCutaneous every 24 hours  escitalopram 10 milliGRAM(s) Oral daily  influenza  Vaccine (HIGH DOSE) 0.7 milliLiter(s) IntraMuscular once  lidocaine   4% Patch 1 Patch Transdermal every 24 hours  lisinopril 2.5 milliGRAM(s) Oral daily  polyethylene glycol 3350 17 Gram(s) Oral daily  senna 2 Tablet(s) Oral at bedtime  zonisamide 50 milliGRAM(s) Oral daily    MEDICATIONS  (PRN):  ibuprofen  Tablet. 200 milliGRAM(s) Oral every 4 hours PRN Mild Pain (1 - 3)  traMADol 25 milliGRAM(s) Oral every 6 hours PRN Moderate Pain (4 - 6)  traMADol 50 milliGRAM(s) Oral every 6 hours PRN Severe Pain (7 - 10)      LABS:                        9.4    7.79  )-----------( 448      ( 02 Mar 2024 06:45 )             30.2     03-02    136  |  105  |  16  ----------------------------<  96  4.6   |  24  |  0.65    Ca    8.4      02 Mar 2024 06:46  Phos  3.2     03-02  Mg     2.0     03-02        Urinalysis Basic - ( 02 Mar 2024 06:46 )    Color: x / Appearance: x / SG: x / pH: x  Gluc: 96 mg/dL / Ketone: x  / Bili: x / Urobili: x   Blood: x / Protein: x / Nitrite: x   Leuk Esterase: x / RBC: x / WBC x   Sq Epi: x / Non Sq Epi: x / Bacteria: x    PHYSICAL EXAM   General: NAD  Neck: Soft, midline trachea, right SCM tense  Chest: Anterior chest wall tenderness, raised anterior midline chest hematoma  Cardiac: Hemodynamically stable  Respiratory: Bilateral breath sounds, clear and equal bilaterally  Abdomen: Soft, non-distended, non-tender  Ext: motor and sensory grossly intact in all 4 extremities  Back: no TTP, no palpable stepoff, upper back soft tissue edema  EXTREMITIES: Grossly symmetric   
TRAUMA SURGERY DAILY PROGRESS NOTE:         SUBJECTIVE/ROS: Patient seen and examined at bedside. No events overnight. States pain controlled with analgesia.   Denies nausea, vomiting, chest pain, shortness of breath.  Daughter at bedside.  Awaiting delivery of DME.          MEDICATIONS  (STANDING):  acetaminophen     Tablet .. 975 milliGRAM(s) Oral every 6 hours  aspirin  chewable 81 milliGRAM(s) Oral daily  atorvastatin 10 milliGRAM(s) Oral at bedtime  enoxaparin Injectable 40 milliGRAM(s) SubCutaneous every 24 hours  escitalopram 10 milliGRAM(s) Oral daily  influenza  Vaccine (HIGH DOSE) 0.7 milliLiter(s) IntraMuscular once  lidocaine   4% Patch 1 Patch Transdermal every 24 hours  lisinopril 2.5 milliGRAM(s) Oral daily  polyethylene glycol 3350 17 Gram(s) Oral daily  senna 2 Tablet(s) Oral at bedtime  zonisamide 50 milliGRAM(s) Oral daily    MEDICATIONS  (PRN):  ibuprofen  Tablet. 200 milliGRAM(s) Oral every 4 hours PRN Mild Pain (1 - 3)  melatonin 6 milliGRAM(s) Oral at bedtime PRN Insomnia      OBJECTIVE:    Vital Signs Last 24 Hrs  T(C): 37 (06 Mar 2024 08:32), Max: 37.2 (05 Mar 2024 21:43)  T(F): 98.6 (06 Mar 2024 08:32), Max: 99 (05 Mar 2024 21:43)  HR: 79 (06 Mar 2024 08:32) (72 - 93)  BP: 103/66 (06 Mar 2024 08:32) (103/66 - 132/69)  BP(mean): --  RR: 17 (06 Mar 2024 08:32) (17 - 17)  SpO2: 91% (06 Mar 2024 08:32) (91% - 95%)    Parameters below as of 06 Mar 2024 08:32  Patient On (Oxygen Delivery Method): room air            I&O's Detail    05 Mar 2024 07:01  -  06 Mar 2024 07:00  --------------------------------------------------------  IN:    Oral Fluid: 700 mL  Total IN: 700 mL    OUT:    Voided (mL): 600 mL  Total OUT: 600 mL    Total NET: 100 mL          PHYSICAL EXAM:  General: laying in bed, NAD  Respiratory: non labored breathing  Extremities: no gross deformities     LABS:                        8.6    7.88  )-----------( 447      ( 04 Mar 2024 10:41 )             26.8     03-04    139  |  104  |  19  ----------------------------<  114<H>  4.1   |  27  |  0.65    Ca    8.7      04 Mar 2024 10:39  Phos  3.5     03-04  Mg     2.1     03-04        Urinalysis Basic - ( 04 Mar 2024 10:39 )    Color: x / Appearance: x / SG: x / pH: x  Gluc: 114 mg/dL / Ketone: x  / Bili: x / Urobili: x   Blood: x / Protein: x / Nitrite: x   Leuk Esterase: x / RBC: x / WBC x   Sq Epi: x / Non Sq Epi: x / Bacteria: x            
SUBJECTIVE: Patient seen and examined on AM rounds. Patient reports that they're feeling well. Denies fever, chills. Reports pain as controlled. No complaints at this time.     Vital Signs Last 24 Hrs  T(C): 36.3 (03 Mar 2024 04:32), Max: 36.6 (02 Mar 2024 08:51)  T(F): 97.4 (03 Mar 2024 04:32), Max: 97.9 (02 Mar 2024 08:51)  HR: 75 (03 Mar 2024 04:32) (69 - 86)  BP: 132/72 (03 Mar 2024 04:32) (123/71 - 158/82)  BP(mean): --  RR: 18 (03 Mar 2024 04:32) (18 - 18)  SpO2: 95% (03 Mar 2024 04:32) (94% - 96%)    Parameters below as of 03 Mar 2024 04:32  Patient On (Oxygen Delivery Method): room air        General Appearance: Appears well, NAD  Neck: Supple  Chest: Equal expansion bilaterally  CV: Pulse regular presently  Abdomen: Soft, nontense, nontender  Extremities: WWP    I&O's Summary    01 Mar 2024 07:01  -  02 Mar 2024 07:00  --------------------------------------------------------  IN: 950 mL / OUT: 2250 mL / NET: -1300 mL    02 Mar 2024 07:01  -  03 Mar 2024 04:45  --------------------------------------------------------  IN: 830 mL / OUT: 900 mL / NET: -70 mL      I&O's Detail    01 Mar 2024 07:01  -  02 Mar 2024 07:00  --------------------------------------------------------  IN:    IV PiggyBack: 50 mL    Oral Fluid: 900 mL  Total IN: 950 mL    OUT:    Voided (mL): 2250 mL  Total OUT: 2250 mL    Total NET: -1300 mL      02 Mar 2024 07:01  -  03 Mar 2024 04:45  --------------------------------------------------------  IN:    IV PiggyBack: 50 mL    Oral Fluid: 780 mL  Total IN: 830 mL    OUT:    Voided (mL): 900 mL  Total OUT: 900 mL    Total NET: -70 mL          LABS:                        9.4    7.79  )-----------( 448      ( 02 Mar 2024 06:45 )             30.2     03-02    136  |  105  |  16  ----------------------------<  96  4.6   |  24  |  0.65    Ca    8.4      02 Mar 2024 06:46  Phos  3.2     03-02  Mg     2.0     03-02        Urinalysis Basic - ( 02 Mar 2024 06:46 )    Color: x / Appearance: x / SG: x / pH: x  Gluc: 96 mg/dL / Ketone: x  / Bili: x / Urobili: x   Blood: x / Protein: x / Nitrite: x   Leuk Esterase: x / RBC: x / WBC x   Sq Epi: x / Non Sq Epi: x / Bacteria: x        RADIOLOGY & ADDITIONAL STUDIES:
SUBJECTIVE: Patient seen and examined on AM rounds. Patient reports that they're feeling well. Denies fever, chills. Reports pain as controlled. No complaints at this time.     Vital Signs Last 24 Hrs  T(C): 36.5 (07 Mar 2024 08:13), Max: 37 (06 Mar 2024 12:18)  T(F): 97.7 (07 Mar 2024 08:13), Max: 98.6 (06 Mar 2024 12:18)  HR: 76 (07 Mar 2024 08:13) (74 - 85)  BP: 135/68 (07 Mar 2024 08:13) (92/53 - 135/68)  BP(mean): --  RR: 18 (07 Mar 2024 08:13) (17 - 18)  SpO2: 95% (07 Mar 2024 08:13) (92% - 95%)    Parameters below as of 07 Mar 2024 08:13  Patient On (Oxygen Delivery Method): room air        General Appearance: Appears well, NAD  Neck: Supple  Chest: Equal expansion bilaterally  CV: Pulse regular presently  Abdomen: Soft, nontense  Extremities: WWP    I&O's Summary    06 Mar 2024 07:01  -  07 Mar 2024 07:00  --------------------------------------------------------  IN: 200 mL / OUT: 50 mL / NET: 150 mL      I&O's Detail    06 Mar 2024 07:01  -  07 Mar 2024 07:00  --------------------------------------------------------  IN:    Oral Fluid: 200 mL  Total IN: 200 mL    OUT:    Voided (mL): 50 mL  Total OUT: 50 mL    Total NET: 150 mL          LABS:                        8.6    9.97  )-----------( 469      ( 07 Mar 2024 06:42 )             27.2     03-07    139  |  106  |  34<H>  ----------------------------<  104<H>  3.9   |  25  |  0.77    Ca    8.4      07 Mar 2024 06:42        Urinalysis Basic - ( 07 Mar 2024 06:42 )    Color: x / Appearance: x / SG: x / pH: x  Gluc: 104 mg/dL / Ketone: x  / Bili: x / Urobili: x   Blood: x / Protein: x / Nitrite: x   Leuk Esterase: x / RBC: x / WBC x   Sq Epi: x / Non Sq Epi: x / Bacteria: x        RADIOLOGY & ADDITIONAL STUDIES:
SURGERY DAILY PROGRESS NOTE    SUBJECTIVE: Patient seen and evaluated. Daughter at bedside providing translation. No acute distress.       Overnight Events:  No acute events overnight  -----------------------------------------------------------------------------------------------------------------------------------------------------------------------------------------------------------  OBJECTIVE:  Vital Signs Last 24 Hrs  T(C): 36.4 (01 Mar 2024 07:58), Max: 36.8 (29 Feb 2024 20:50)  T(F): 97.6 (01 Mar 2024 07:58), Max: 98.2 (29 Feb 2024 20:50)  HR: 78 (01 Mar 2024 07:58) (65 - 83)  BP: 156/76 (01 Mar 2024 07:58) (117/60 - 157/74)  BP(mean): --  RR: 18 (01 Mar 2024 07:58) (14 - 18)  SpO2: 97% (01 Mar 2024 07:58) (95% - 98%)    Parameters below as of 01 Mar 2024 07:58  Patient On (Oxygen Delivery Method): room air      I&O's Detail    29 Feb 2024 07:01  -  01 Mar 2024 07:00  --------------------------------------------------------  IN:    Oral Fluid: 120 mL  Total IN: 120 mL    OUT:  Total OUT: 0 mL    Total NET: 120 mL      01 Mar 2024 07:01  -  01 Mar 2024 12:07  --------------------------------------------------------  IN:  Total IN: 0 mL    OUT:    Voided (mL): 300 mL  Total OUT: 300 mL    Total NET: -300 mL        Daily     Daily   MEDICATIONS  (STANDING):  acetaminophen     Tablet .. 975 milliGRAM(s) Oral every 8 hours  aspirin  chewable 81 milliGRAM(s) Oral daily  atorvastatin 10 milliGRAM(s) Oral at bedtime  cefTRIAXone   IVPB 1000 milliGRAM(s) IV Intermittent every 24 hours  enoxaparin Injectable 40 milliGRAM(s) SubCutaneous every 24 hours  escitalopram 10 milliGRAM(s) Oral daily  influenza  Vaccine (HIGH DOSE) 0.7 milliLiter(s) IntraMuscular once  lidocaine   4% Patch 1 Patch Transdermal every 24 hours  lisinopril 2.5 milliGRAM(s) Oral daily  polyethylene glycol 3350 17 Gram(s) Oral daily  senna 2 Tablet(s) Oral at bedtime  zonisamide 50 milliGRAM(s) Oral daily    MEDICATIONS  (PRN):  ibuprofen  Tablet. 200 milliGRAM(s) Oral every 4 hours PRN Mild Pain (1 - 3)  traMADol 50 milliGRAM(s) Oral every 6 hours PRN Severe Pain (7 - 10)  traMADol 25 milliGRAM(s) Oral every 6 hours PRN Moderate Pain (4 - 6)      LABS:                        8.3    8.37  )-----------( 354      ( 01 Mar 2024 04:30 )             27.0     03-01    132<L>  |  104  |  24<H>  ----------------------------<  262<H>  5.0   |  20<L>  |  0.67    Ca    7.7<L>      01 Mar 2024 04:30  Phos  3.4     03-01  Mg     1.9     03-01    TPro  6.2  /  Alb  2.8<L>  /  TBili  0.5  /  DBili  x   /  AST  14  /  ALT  12  /  AlkPhos  80  02-29    PT/INR - ( 29 Feb 2024 07:53 )   PT: 11.8 sec;   INR: 1.07 ratio         PTT - ( 29 Feb 2024 07:53 )  PTT:21.3 sec  Urinalysis Basic - ( 01 Mar 2024 04:30 )    Color: x / Appearance: x / SG: x / pH: x  Gluc: 262 mg/dL / Ketone: x  / Bili: x / Urobili: x   Blood: x / Protein: x / Nitrite: x   Leuk Esterase: x / RBC: x / WBC x   Sq Epi: x / Non Sq Epi: x / Bacteria: x      PHYSICAL EXAM   General: NAD  Neck: Soft, midline trachea, right SCM tense  Chest: Anterior chest wall tenderness, raised anterior midline chest hematoma  Cardiac: S1, S2, RRR  Respiratory: Bilateral breath sounds, clear and equal bilaterally  Abdomen: Soft, non-distended, non-tender  Ext: motor and sensory grossly intact in all 4 extremities  Back: no TTP, no palpable stepoff, upper back soft tissue edema  EXTREMITIES: Grossly symmetric   
ACS SURGERY DAILY PROGRESS NOTE:     SUBJECTIVE/ROS: Patient seen and examined, no complaints, resting comfortably.    MEDICATIONS  (STANDING):  acetaminophen     Tablet .. 975 milliGRAM(s) Oral every 6 hours  aspirin  chewable 81 milliGRAM(s) Oral daily  atorvastatin 10 milliGRAM(s) Oral at bedtime  enoxaparin Injectable 40 milliGRAM(s) SubCutaneous every 24 hours  escitalopram 10 milliGRAM(s) Oral daily  influenza  Vaccine (HIGH DOSE) 0.7 milliLiter(s) IntraMuscular once  lidocaine   4% Patch 1 Patch Transdermal every 24 hours  lisinopril 2.5 milliGRAM(s) Oral daily  polyethylene glycol 3350 17 Gram(s) Oral daily  senna 2 Tablet(s) Oral at bedtime  zonisamide 50 milliGRAM(s) Oral daily    MEDICATIONS  (PRN):  ibuprofen  Tablet. 200 milliGRAM(s) Oral every 4 hours PRN Mild Pain (1 - 3)  melatonin 6 milliGRAM(s) Oral at bedtime PRN Insomnia  traMADol 25 milliGRAM(s) Oral every 6 hours PRN Moderate Pain (4 - 6)  traMADol 50 milliGRAM(s) Oral every 6 hours PRN Severe Pain (7 - 10)      OBJECTIVE:    Vital Signs Last 24 Hrs  T(C): 36.8 (05 Mar 2024 04:56), Max: 37.1 (04 Mar 2024 08:42)  T(F): 98.3 (05 Mar 2024 04:56), Max: 98.7 (04 Mar 2024 08:42)  HR: 90 (05 Mar 2024 04:56) (79 - 95)  BP: 131/70 (05 Mar 2024 04:56) (122/67 - 131/70)  BP(mean): --  RR: 17 (05 Mar 2024 04:56) (16 - 17)  SpO2: 97% (05 Mar 2024 04:56) (94% - 97%)    Parameters below as of 05 Mar 2024 04:56  Patient On (Oxygen Delivery Method): room air            I&O's Detail    04 Mar 2024 07:01  -  05 Mar 2024 07:00  --------------------------------------------------------  IN:    Oral Fluid: 260 mL  Total IN: 260 mL    OUT:    Voided (mL): 1010 mL  Total OUT: 1010 mL    Total NET: -750 mL          Daily     Daily     LABS:                        8.6    7.88  )-----------( 447      ( 04 Mar 2024 10:41 )             26.8     03-04    139  |  104  |  19  ----------------------------<  114<H>  4.1   |  27  |  0.65    Ca    8.7      04 Mar 2024 10:39  Phos  3.5     03-04  Mg     2.1     03-04        Urinalysis Basic - ( 04 Mar 2024 10:39 )    Color: x / Appearance: x / SG: x / pH: x  Gluc: 114 mg/dL / Ketone: x  / Bili: x / Urobili: x   Blood: x / Protein: x / Nitrite: x   Leuk Esterase: x / RBC: x / WBC x   Sq Epi: x / Non Sq Epi: x / Bacteria: x      PHYSICAL EXAM:  General Appearance: Appears well, NAD  Neck: Supple  Chest: Equal expansion bilaterally  CV: Pulse regular presently  Abdomen: Soft, nontense, nontender  Extremities: WWP        
TRAUMA SURGERY DAILY PROGRESS NOTE:         SUBJECTIVE/ROS: Patient seen and examined at bedside.  No events overnight. States pain controlled with analgesia.   Denies nausea, vomiting, chest pain, shortness of breath.  Tolerating regular diet.          MEDICATIONS  (STANDING):  acetaminophen     Tablet .. 975 milliGRAM(s) Oral every 6 hours  aspirin  chewable 81 milliGRAM(s) Oral daily  atorvastatin 10 milliGRAM(s) Oral at bedtime  enoxaparin Injectable 40 milliGRAM(s) SubCutaneous every 24 hours  escitalopram 10 milliGRAM(s) Oral daily  influenza  Vaccine (HIGH DOSE) 0.7 milliLiter(s) IntraMuscular once  lidocaine   4% Patch 1 Patch Transdermal every 24 hours  lisinopril 2.5 milliGRAM(s) Oral daily  polyethylene glycol 3350 17 Gram(s) Oral daily  senna 2 Tablet(s) Oral at bedtime  zonisamide 50 milliGRAM(s) Oral daily    MEDICATIONS  (PRN):  bisacodyl Suppository 10 milliGRAM(s) Rectal at bedtime PRN no bowel movement during the day  ibuprofen  Tablet. 200 milliGRAM(s) Oral every 4 hours PRN Mild Pain (1 - 3)  melatonin 6 milliGRAM(s) Oral at bedtime PRN Insomnia      OBJECTIVE:    Vital Signs Last 24 Hrs  T(C): 36.5 (08 Mar 2024 05:06), Max: 37.1 (07 Mar 2024 17:23)  T(F): 97.7 (08 Mar 2024 05:06), Max: 98.7 (07 Mar 2024 17:23)  HR: 70 (08 Mar 2024 05:06) (70 - 84)  BP: 148/71 (08 Mar 2024 05:06) (114/67 - 148/71)  BP(mean): --  RR: 18 (08 Mar 2024 05:06) (18 - 18)  SpO2: 95% (08 Mar 2024 05:06) (94% - 96%)    Parameters below as of 08 Mar 2024 05:06  Patient On (Oxygen Delivery Method): room air            I&O's Detail    07 Mar 2024 07:01  -  08 Mar 2024 07:00  --------------------------------------------------------  IN:    Oral Fluid: 640 mL  Total IN: 640 mL    OUT:    Voided (mL): 1500 mL  Total OUT: 1500 mL    Total NET: -860 mL        PHYSICAL EXAM:  General: laying in bed, NAD  Respiratory: non labored breathing  Extremities: no gross deformities         LABS:                        8.6    9.97  )-----------( 469      ( 07 Mar 2024 06:42 )             27.2     03-07    139  |  106  |  34<H>  ----------------------------<  104<H>  3.9   |  25  |  0.77    Ca    8.4      07 Mar 2024 06:42        Urinalysis Basic - ( 07 Mar 2024 06:42 )    Color: x / Appearance: x / SG: x / pH: x  Gluc: 104 mg/dL / Ketone: x  / Bili: x / Urobili: x   Blood: x / Protein: x / Nitrite: x   Leuk Esterase: x / RBC: x / WBC x   Sq Epi: x / Non Sq Epi: x / Bacteria: x        
Patient is a 89y old  Female who presents with a chief complaint of [facility] trauma transfer for sternal fracture (06 Mar 2024 09:19)      SUBJECTIVE / OVERNIGHT EVENTS:  Pt seen and examined w/daughter at bedside. No acute events overnight. Limited ROS on account of dementia.    MEDICATIONS  (STANDING):  acetaminophen     Tablet .. 975 milliGRAM(s) Oral every 6 hours  aspirin  chewable 81 milliGRAM(s) Oral daily  atorvastatin 10 milliGRAM(s) Oral at bedtime  enoxaparin Injectable 40 milliGRAM(s) SubCutaneous every 24 hours  escitalopram 10 milliGRAM(s) Oral daily  influenza  Vaccine (HIGH DOSE) 0.7 milliLiter(s) IntraMuscular once  lidocaine   4% Patch 1 Patch Transdermal every 24 hours  lisinopril 2.5 milliGRAM(s) Oral daily  polyethylene glycol 3350 17 Gram(s) Oral daily  senna 2 Tablet(s) Oral at bedtime  zonisamide 50 milliGRAM(s) Oral daily    MEDICATIONS  (PRN):  ibuprofen  Tablet. 200 milliGRAM(s) Oral every 4 hours PRN Mild Pain (1 - 3)  melatonin 6 milliGRAM(s) Oral at bedtime PRN Insomnia      Vital Signs Last 24 Hrs  T(C): 37 (06 Mar 2024 08:32), Max: 37.2 (05 Mar 2024 21:43)  T(F): 98.6 (06 Mar 2024 08:32), Max: 99 (05 Mar 2024 21:43)  HR: 79 (06 Mar 2024 08:32) (72 - 93)  BP: 103/66 (06 Mar 2024 08:32) (103/66 - 132/69)  BP(mean): --  RR: 17 (06 Mar 2024 08:32) (17 - 17)  SpO2: 91% (06 Mar 2024 08:32) (91% - 95%)    Parameters below as of 06 Mar 2024 08:32  Patient On (Oxygen Delivery Method): room air      CAPILLARY BLOOD GLUCOSE        I&O's Summary    05 Mar 2024 07:01  -  06 Mar 2024 07:00  --------------------------------------------------------  IN: 700 mL / OUT: 600 mL / NET: 100 mL    06 Mar 2024 07:01  -  06 Mar 2024 11:37  --------------------------------------------------------  IN: 150 mL / OUT: 0 mL / NET: 150 mL        PHYSICAL EXAM:  GENERAL:  Elderly lady,  lying in bed, in NAD  HEAD:  NCAT  EYES: conjunctiva clear  NECK: Supple, No JVD  CHEST/LUNG: CTA B/L. No w/r/r.  sternal edema  HEART: Reg rate. Normal S1, S2. No m/r/g.   ABDOMEN: Soft, NT, ND, . Bowel sounds present  EXTREMITIES:  2+ Peripheral Pulses, No clubbing, cyanosis. trace b/l pitting edema. chronic venous stasis of BLE  NEUROLOGY : AAOX 1, nonfocal    
Patient is a 89y old  Female who presents with a chief complaint of [facility] trauma transfer for sternal fracture (07 Mar 2024 10:05)      SUBJECTIVE / OVERNIGHT EVENTS:  Pt seen and examined w/grand daughter at bedside. No acute events overnight. Limited ROS on account of dementia. Pt c/o abd pain, sternal pain. Per grand daughter ; she has not had a bm x 2 days.       MEDICATIONS  (STANDING):  acetaminophen     Tablet .. 975 milliGRAM(s) Oral every 6 hours  aspirin  chewable 81 milliGRAM(s) Oral daily  atorvastatin 10 milliGRAM(s) Oral at bedtime  enoxaparin Injectable 40 milliGRAM(s) SubCutaneous every 24 hours  escitalopram 10 milliGRAM(s) Oral daily  influenza  Vaccine (HIGH DOSE) 0.7 milliLiter(s) IntraMuscular once  lidocaine   4% Patch 1 Patch Transdermal every 24 hours  lisinopril 2.5 milliGRAM(s) Oral daily  polyethylene glycol 3350 17 Gram(s) Oral daily  saline laxative (FLEET) Rectal Enema 1 Enema Rectal once  senna 2 Tablet(s) Oral at bedtime  zonisamide 50 milliGRAM(s) Oral daily    MEDICATIONS  (PRN):  bisacodyl Suppository 10 milliGRAM(s) Rectal at bedtime PRN no bowel movement during the day  ibuprofen  Tablet. 200 milliGRAM(s) Oral every 4 hours PRN Mild Pain (1 - 3)  melatonin 6 milliGRAM(s) Oral at bedtime PRN Insomnia      Vital Signs Last 24 Hrs  T(C): 36.4 (07 Mar 2024 12:25), Max: 37 (06 Mar 2024 21:27)  T(F): 97.5 (07 Mar 2024 12:25), Max: 98.6 (06 Mar 2024 21:27)  HR: 77 (07 Mar 2024 12:25) (76 - 85)  BP: 124/73 (07 Mar 2024 12:25) (92/53 - 135/68)  BP(mean): --  RR: 18 (07 Mar 2024 12:25) (18 - 18)  SpO2: 94% (07 Mar 2024 12:25) (92% - 95%)    Parameters below as of 07 Mar 2024 12:25  Patient On (Oxygen Delivery Method): room air      CAPILLARY BLOOD GLUCOSE        I&O's Summary    06 Mar 2024 07:01  -  07 Mar 2024 07:00  --------------------------------------------------------  IN: 200 mL / OUT: 50 mL / NET: 150 mL    07 Mar 2024 07:01  -  07 Mar 2024 14:10  --------------------------------------------------------  IN: 100 mL / OUT: 400 mL / NET: -300 mL        PHYSICAL EXAM:  GENERAL:  Elderly lady,  lying in bed, in NAD  HEAD:  NCAT  EYES: conjunctiva clear  NECK: Supple, No JVD  CHEST/LUNG: CTA B/L. No w/r/r.  sternal edema  HEART: Reg rate. Normal S1, S2. No m/r/g.   ABDOMEN: Soft, NT, ND,Bowel sounds present  EXTREMITIES:  2+ Peripheral Pulses, No clubbing, cyanosis.  chronic venous stasis of BLE  NEUROLOGY : AAOX 1, nonfocal      LABS:                        8.6    9.97  )-----------( 469      ( 07 Mar 2024 06:42 )             27.2     03-07    139  |  106  |  34<H>  ----------------------------<  104<H>  3.9   |  25  |  0.77    Ca    8.4      07 Mar 2024 06:42            Urinalysis Basic - ( 07 Mar 2024 06:42 )    Color: x / Appearance: x / SG: x / pH: x  Gluc: 104 mg/dL / Ketone: x  / Bili: x / Urobili: x   Blood: x / Protein: x / Nitrite: x   Leuk Esterase: x / RBC: x / WBC x   Sq Epi: x / Non Sq Epi: x / Bacteria: x        
Patient is a 89y old  Female who presents with a chief complaint of [facility] trauma transfer for sternal fracture (05 Mar 2024 10:26)      SUBJECTIVE / OVERNIGHT EVENTS: Pt seen and examined with family at bedside. No acute events overnight. She c/o chest wall pain.    MEDICATIONS  (STANDING):  acetaminophen     Tablet .. 975 milliGRAM(s) Oral every 6 hours  aspirin  chewable 81 milliGRAM(s) Oral daily  atorvastatin 10 milliGRAM(s) Oral at bedtime  enoxaparin Injectable 40 milliGRAM(s) SubCutaneous every 24 hours  escitalopram 10 milliGRAM(s) Oral daily  influenza  Vaccine (HIGH DOSE) 0.7 milliLiter(s) IntraMuscular once  lidocaine   4% Patch 1 Patch Transdermal every 24 hours  lisinopril 2.5 milliGRAM(s) Oral daily  polyethylene glycol 3350 17 Gram(s) Oral daily  senna 2 Tablet(s) Oral at bedtime  zonisamide 50 milliGRAM(s) Oral daily    MEDICATIONS  (PRN):  ibuprofen  Tablet. 200 milliGRAM(s) Oral every 4 hours PRN Mild Pain (1 - 3)  melatonin 6 milliGRAM(s) Oral at bedtime PRN Insomnia      Vital Signs Last 24 Hrs  T(C): 36.5 (05 Mar 2024 17:32), Max: 36.8 (05 Mar 2024 04:56)  T(F): 97.7 (05 Mar 2024 17:32), Max: 98.3 (05 Mar 2024 04:56)  HR: 72 (05 Mar 2024 17:32) (72 - 90)  BP: 105/62 (05 Mar 2024 17:32) (105/62 - 150/74)  BP(mean): --  RR: 17 (05 Mar 2024 17:32) (17 - 17)  SpO2: 95% (05 Mar 2024 17:32) (94% - 97%)    Parameters below as of 05 Mar 2024 17:32  Patient On (Oxygen Delivery Method): room air      CAPILLARY BLOOD GLUCOSE        I&O's Summary    04 Mar 2024 07:01  -  05 Mar 2024 07:00  --------------------------------------------------------  IN: 260 mL / OUT: 1010 mL / NET: -750 mL    05 Mar 2024 07:01  -  05 Mar 2024 20:29  --------------------------------------------------------  IN: 700 mL / OUT: 600 mL / NET: 100 mL        PHYSICAL EXAM:  GENERAL:  Elderly lady,  lying in bed, in NAD  HEAD:  NCAT  EYES: conjunctiva clear  NECK: Supple, No JVD  CHEST/LUNG: CTA B/L. No w/r/r.  sternal edema  HEART: Reg rate. Normal S1, S2. No m/r/g.   ABDOMEN: SNTND. Bowel sounds present  EXTREMITIES:  2+ Peripheral Pulses, No clubbing, cyanosis. trace b/l pitting edema. chronic venous stasis of BLE  NEUROLOGY : AAOX 1, nonfocal    LABS:                        8.6    7.88  )-----------( 447      ( 04 Mar 2024 10:41 )             26.8     03-04    139  |  104  |  19  ----------------------------<  114<H>  4.1   |  27  |  0.65    Ca    8.7      04 Mar 2024 10:39  Phos  3.5     03-04  Mg     2.1     03-04            Urinalysis Basic - ( 04 Mar 2024 10:39 )    Color: x / Appearance: x / SG: x / pH: x  Gluc: 114 mg/dL / Ketone: x  / Bili: x / Urobili: x   Blood: x / Protein: x / Nitrite: x   Leuk Esterase: x / RBC: x / WBC x   Sq Epi: x / Non Sq Epi: x / Bacteria: x        
Patient is a 89y old  Female who presents with a chief complaint of [facility] trauma transfer for sternal fracture (08 Mar 2024 08:11)      SUBJECTIVE / OVERNIGHT EVENTS: Pt seen and examined w/daughter at bedside. No acute events overnight. Limited ROS on account of dementia. Had 2 bms yesterday.      MEDICATIONS  (STANDING):  acetaminophen     Tablet .. 975 milliGRAM(s) Oral every 6 hours  aspirin  chewable 81 milliGRAM(s) Oral daily  atorvastatin 10 milliGRAM(s) Oral at bedtime  enoxaparin Injectable 40 milliGRAM(s) SubCutaneous every 24 hours  escitalopram 10 milliGRAM(s) Oral daily  influenza  Vaccine (HIGH DOSE) 0.7 milliLiter(s) IntraMuscular once  lidocaine   4% Patch 1 Patch Transdermal every 24 hours  lisinopril 2.5 milliGRAM(s) Oral daily  polyethylene glycol 3350 17 Gram(s) Oral daily  senna 2 Tablet(s) Oral at bedtime  zonisamide 50 milliGRAM(s) Oral daily    MEDICATIONS  (PRN):  bisacodyl Suppository 10 milliGRAM(s) Rectal at bedtime PRN no bowel movement during the day  ibuprofen  Tablet. 200 milliGRAM(s) Oral every 4 hours PRN Mild Pain (1 - 3)  melatonin 6 milliGRAM(s) Oral at bedtime PRN Insomnia      Vital Signs Last 24 Hrs  T(C): 36.5 (08 Mar 2024 05:06), Max: 37.1 (07 Mar 2024 17:23)  T(F): 97.7 (08 Mar 2024 05:06), Max: 98.7 (07 Mar 2024 17:23)  HR: 70 (08 Mar 2024 05:06) (70 - 84)  BP: 148/71 (08 Mar 2024 05:06) (114/67 - 148/71)  BP(mean): --  RR: 18 (08 Mar 2024 05:06) (18 - 18)  SpO2: 95% (08 Mar 2024 05:06) (95% - 96%)    Parameters below as of 08 Mar 2024 05:06  Patient On (Oxygen Delivery Method): room air      CAPILLARY BLOOD GLUCOSE        I&O's Summary    07 Mar 2024 07:01  -  08 Mar 2024 07:00  --------------------------------------------------------  IN: 640 mL / OUT: 1500 mL / NET: -860 mL    08 Mar 2024 07:01  -  08 Mar 2024 13:53  --------------------------------------------------------  IN: 350 mL / OUT: 850 mL / NET: -500 mL        PHYSICAL EXAM:  GENERAL:  Elderly lady,  lying in bed, in NAD  HEAD:  NCAT  EYES: conjunctiva clear  NECK: Supple, No JVD  CHEST/LUNG: CTA B/L. No w/r/r.  sternal edema  HEART: Reg rate. Normal S1, S2. No m/r/g.   ABDOMEN: Soft, NT, ND,Bowel sounds present  EXTREMITIES:  2+ Peripheral Pulses, No clubbing, cyanosis.  chronic venous stasis of BLE  NEUROLOGY : AAOX 1, nonfocal    LABS:                        8.6    9.97  )-----------( 469      ( 07 Mar 2024 06:42 )             27.2     03-07    139  |  106  |  34<H>  ----------------------------<  104<H>  3.9   |  25  |  0.77    Ca    8.4      07 Mar 2024 06:42            Urinalysis Basic - ( 07 Mar 2024 06:42 )    Color: x / Appearance: x / SG: x / pH: x  Gluc: 104 mg/dL / Ketone: x  / Bili: x / Urobili: x   Blood: x / Protein: x / Nitrite: x   Leuk Esterase: x / RBC: x / WBC x   Sq Epi: x / Non Sq Epi: x / Bacteria: x        
Patient is a 89y old  Female who presents with a chief complaint of [facility] trauma transfer for sternal fracture (04 Mar 2024 11:08)    Nepalese speaking ; Translation provided by daughter at bedside     SUBJECTIVE / OVERNIGHT EVENTS: Pt seen and examined with daughter at bedside. No acute events overnight. She c/o right shoulder pain.    MEDICATIONS  (STANDING):  acetaminophen     Tablet .. 975 milliGRAM(s) Oral every 6 hours  aspirin  chewable 81 milliGRAM(s) Oral daily  atorvastatin 10 milliGRAM(s) Oral at bedtime  enoxaparin Injectable 40 milliGRAM(s) SubCutaneous every 24 hours  escitalopram 10 milliGRAM(s) Oral daily  influenza  Vaccine (HIGH DOSE) 0.7 milliLiter(s) IntraMuscular once  lidocaine   4% Patch 1 Patch Transdermal every 24 hours  lisinopril 2.5 milliGRAM(s) Oral daily  polyethylene glycol 3350 17 Gram(s) Oral daily  senna 2 Tablet(s) Oral at bedtime  zonisamide 50 milliGRAM(s) Oral daily    MEDICATIONS  (PRN):  ibuprofen  Tablet. 200 milliGRAM(s) Oral every 4 hours PRN Mild Pain (1 - 3)  melatonin 6 milliGRAM(s) Oral at bedtime PRN Insomnia  traMADol 25 milliGRAM(s) Oral every 6 hours PRN Moderate Pain (4 - 6)  traMADol 50 milliGRAM(s) Oral every 6 hours PRN Severe Pain (7 - 10)      Vital Signs Last 24 Hrs  T(C): 37.1 (04 Mar 2024 14:23), Max: 37.4 (03 Mar 2024 21:17)  T(F): 98.7 (04 Mar 2024 14:23), Max: 99.3 (03 Mar 2024 21:17)  HR: 79 (04 Mar 2024 14:23) (79 - 91)  BP: 127/58 (04 Mar 2024 14:23) (100/64 - 154/81)  BP(mean): --  RR: 17 (04 Mar 2024 14:23) (16 - 17)  SpO2: 94% (04 Mar 2024 14:23) (92% - 97%)    Parameters below as of 04 Mar 2024 08:42  Patient On (Oxygen Delivery Method): room air      CAPILLARY BLOOD GLUCOSE        I&O's Summary    03 Mar 2024 07:01  -  04 Mar 2024 07:00  --------------------------------------------------------  IN: 1020 mL / OUT: 900 mL / NET: 120 mL    04 Mar 2024 07:01  -  04 Mar 2024 15:43  --------------------------------------------------------  IN: 0 mL / OUT: 110 mL / NET: -110 mL        PHYSICAL EXAM:  GENERAL:  Elderly lady,  lying in bed, in NAD  HEAD:  NCAT  EYES: conjunctiva clear  NECK: Supple, No JVD  CHEST/LUNG: CTA B/L. No w/r/r.  sternal edema  HEART: Reg rate. Normal S1, S2. No m/r/g.   ABDOMEN: SNTND. Bowel sounds present  EXTREMITIES:  2+ Peripheral Pulses, No clubbing, cyanosis. trace b/l pitting edema. chronic venous stasis of BLE  NEUROLOGY : AAOX 1, nonfocal    LABS:                        8.6    7.88  )-----------( 447      ( 04 Mar 2024 10:41 )             26.8     03-04    139  |  104  |  19  ----------------------------<  114<H>  4.1   |  27  |  0.65    Ca    8.7      04 Mar 2024 10:39  Phos  3.5     03-04  Mg     2.1     03-04            Urinalysis Basic - ( 04 Mar 2024 10:39 )    Color: x / Appearance: x / SG: x / pH: x  Gluc: 114 mg/dL / Ketone: x  / Bili: x / Urobili: x   Blood: x / Protein: x / Nitrite: x   Leuk Esterase: x / RBC: x / WBC x   Sq Epi: x / Non Sq Epi: x / Bacteria: x        
TRAUMA SURGERY [facility] MEDICINE PROGRESS NOTE    Shantell Fish MD  Division of Hospital Medicine  Available via MS teams    Patient is a 89y old  Female who presents with a chief complaint of [facility] trauma transfer for sternal fracture (01 Mar 2024 12:51)    SUBJECTIVE / OVERNIGHT EVENTS:  overnight no events  no n/v/f/chills, cp, sob, abd pain  seen w/ daughter at bedside  d/w granddaughter over phone, concerned about urine retention and swelling in arms     CAPILLARY BLOOD GLUCOSE        I&O's Summary    29 Feb 2024 07:01  -  01 Mar 2024 07:00  --------------------------------------------------------  IN: 120 mL / OUT: 0 mL / NET: 120 mL    01 Mar 2024 07:01  -  01 Mar 2024 12:52  --------------------------------------------------------  IN: 0 mL / OUT: 300 mL / NET: -300 mL        Vital Signs Last 24 Hrs  T(C): 36.3 (01 Mar 2024 11:57), Max: 36.8 (29 Feb 2024 20:50)  T(F): 97.3 (01 Mar 2024 11:57), Max: 98.2 (29 Feb 2024 20:50)  HR: 74 (01 Mar 2024 12:50) (65 - 83)  BP: 147/- (01 Mar 2024 12:50) (117/60 - 157/74)  BP(mean): --  RR: 18 (01 Mar 2024 11:57) (14 - 18)  SpO2: 97% (01 Mar 2024 12:50) (95% - 98%)    Parameters below as of 01 Mar 2024 12:50  Patient On (Oxygen Delivery Method): room air        PHYSICAL EXAM:  GENERAL:  Elderly f, lying in bed, in NAD  HEAD:  NCAT  EYES: conjunctiva clear  NECK: Supple, No JVD  CHEST/LUNG: CTA B/L. No w/r/r.  sternal edema  HEART: Reg rate. Normal S1, S2. No m/r/g.   ABDOMEN: SNTND. Bowel sounds present  EXTREMITIES:  2+ Peripheral Pulses, No clubbing, cyanosis. trace b/l pitting edema. chronic venous stasis of BLE    LABS:                        8.3    8.37  )-----------( 354      ( 01 Mar 2024 04:30 )             27.0     03-01    132<L>  |  104  |  24<H>  ----------------------------<  262<H>  5.0   |  20<L>  |  0.67    Ca    7.7<L>      01 Mar 2024 04:30  Phos  3.4     03-01  Mg     1.9     03-01    TPro  6.2  /  Alb  2.8<L>  /  TBili  0.5  /  DBili  x   /  AST  14  /  ALT  12  /  AlkPhos  80  02-29    PT/INR - ( 29 Feb 2024 07:53 )   PT: 11.8 sec;   INR: 1.07 ratio         PTT - ( 29 Feb 2024 07:53 )  PTT:21.3 sec      Urinalysis Basic - ( 01 Mar 2024 04:30 )    Color: x / Appearance: x / SG: x / pH: x  Gluc: 262 mg/dL / Ketone: x  / Bili: x / Urobili: x   Blood: x / Protein: x / Nitrite: x   Leuk Esterase: x / RBC: x / WBC x   Sq Epi: x / Non Sq Epi: x / Bacteria: x          MEDICATIONS  (STANDING):  acetaminophen     Tablet .. 975 milliGRAM(s) Oral every 8 hours  aspirin  chewable 81 milliGRAM(s) Oral daily  atorvastatin 10 milliGRAM(s) Oral at bedtime  cefTRIAXone   IVPB 1000 milliGRAM(s) IV Intermittent every 24 hours  enoxaparin Injectable 40 milliGRAM(s) SubCutaneous every 24 hours  escitalopram 10 milliGRAM(s) Oral daily  influenza  Vaccine (HIGH DOSE) 0.7 milliLiter(s) IntraMuscular once  lidocaine   4% Patch 1 Patch Transdermal every 24 hours  lisinopril 2.5 milliGRAM(s) Oral daily  polyethylene glycol 3350 17 Gram(s) Oral daily  senna 2 Tablet(s) Oral at bedtime  zonisamide 50 milliGRAM(s) Oral daily    MEDICATIONS  (PRN):  ibuprofen  Tablet. 200 milliGRAM(s) Oral every 4 hours PRN Mild Pain (1 - 3)  traMADol 25 milliGRAM(s) Oral every 6 hours PRN Moderate Pain (4 - 6)  traMADol 50 milliGRAM(s) Oral every 6 hours PRN Severe Pain (7 - 10)

## 2024-03-10 NOTE — PROGRESS NOTE ADULT - ATTENDING COMMENTS
seen and examined 03-10-24 @ 0742    tolerating regular diet  +BM today    no sternal tenderness  no left-sided chest wall tenderness  soft / NT / ND    bladder scan (3/7) - 86 mL residual      BCx (3/5) - NGTD x 2 sets  U/A (3/9) - moderate leukocyte esterase, positive nitrite  UCx (3/9) - pending    CXR (3/1) - small bilateral effusions      left 3rd anterior nondisplaced rib fracture  transverse superior sternal fracture  -multimodal pain control    small bilateral pleural effusions  -seen on 2/29 0059 CT chest ([facility] MR# [id number])  -no indication for thoracentesis    constipation  -continue Senna / Miralax  -Dulcolax [place] qhs PRN no BM    E coli UTI  -ceftriaxone (2/29 - 3/2)  -ceftriaxone (3/9 - 3/11) for recurrent UTI  -f/u UCx results    positive blood culture  -BCx (2/28) - Haemophilus haemolyticus (1/2 sets)  -this bacteria is very rarely pathologic, so will not restart ABx as this was most likely a contaminant    dispo  -her family is refusing [facility], so will plan discharge home w/ home PT today      I reviewed her labs. seen and examined 03-10-24 @ 0842    tolerating regular diet  +BM today    no sternal tenderness  no left-sided chest wall tenderness  soft / NT / ND    bladder scan (3/7) - 86 mL residual      BCx (3/5) - NGTD x 2 sets  U/A (3/9) - moderate leukocyte esterase, positive nitrite  UCx (3/9) - pending    CXR (3/1) - small bilateral effusions      left 3rd anterior nondisplaced rib fracture  transverse superior sternal fracture  -multimodal pain control    small bilateral pleural effusions  -seen on 2/29 0059 CT chest ([facility] MR# [id number])  -no indication for thoracentesis    constipation  -continue Senna / Miralax  -Dulcolax [place] qhs PRN no BM    E coli UTI  -ceftriaxone (2/29 - 3/2)  -ceftriaxone (3/9 - 3/11) for recurrent UTI  -f/u UCx results    positive blood culture  -BCx (2/28) - Haemophilus haemolyticus (1/2 sets)  -this bacteria is very rarely pathologic, so will not restart ABx as this was most likely a contaminant    dispo  -her family is refusing [facility], so will plan discharge home w/ home PT today      I reviewed her labs.

## 2024-03-10 NOTE — PROGRESS NOTE ADULT - REASON FOR ADMISSION
[facility] trauma transfer for sternal fracture

## 2024-03-10 NOTE — PROGRESS NOTE ADULT - ASSESSMENT
89y/o F w/ PMHx dementia (A&O x1 at baseline), erysipelas, HTN presents as trauma transfer from [facility] for sternal, rib and T5 compression fx s/p fall in shower 2 weeks ago, + HS. +UA    Injuries:  - Upper sternal body fx w/ posterior displacement and hematoma  - L anterior 3rd rib fx  - T5 compression fx      PLAN:  - SICU consulted for advanced age and sternal/rib fractures - shallow respirations due to pain and unable to participate w/ incentive spirometry. No SICU need at this time.   - f/u Thoracic consult for sternal fracture  - Regular diet  - Multimodal pain control  - Monitor UOP   - CTX for UTI - on ceftriaxone   - PT consult > pending final recs  - Tertiary performed     ACS/Trauma   [id number]
89y/o F w/ PMHx dementia (A&O x1 at baseline), erysipelas, HTN presents as trauma transfer from [facility] for sternal, rib and T5 compression fx s/p fall in shower 2 weeks ago, + HS. +UA    Injuries:  - Upper sternal body fx w/ posterior displacement and hematoma  - L anterior 3rd rib fx  - T5 compression fx      PLAN:  - SICU consulted for advanced age and sternal/rib fractures - shallow respirations due to pain and unable to participate w/ incentive spirometry. No SICU need at this time.   - f/u Thoracic consult for sternal fracture  - Regular diet  - Multimodal pain control  - Monitor UOP   - CTX for UTI - on ceftriaxone   - PT consult  - Tertiary performed     ACS/Trauma   [id number]  
89y/o F w/ PMHx dementia (A&O x1 at baseline), erysipelas, HTN presents as trauma transfer from [facility] for sternal, rib and T5 compression fx s/p fall in shower 2 weeks ago, + HS. +UA    Injuries:  - Upper sternal body fx w/ posterior displacement and hematoma  - L anterior 3rd rib fx  - T5 compression fx    PLAN:  - Regular diet  - Multimodal pain control  - Monitor UOP   - CTX for UTI - s/p ceftriaxone   - Blood cultures NGTD  - PT consult -> SHARON  - Tertiary performed   - bowel regimen (miralax, dulcolax and enema ordered)   - dispo: discharge home pending DME delivery        ACS/Trauma   [id number]
89y/o F w/ PMHx dementia (A&O x1 at baseline), erysipelas, HTN presents as trauma transfer from [facility] for sternal, rib and T5 compression fx s/p fall in shower 2 weeks ago, + HS. +UA    Injuries:  - Upper sternal body fx w/ posterior displacement and hematoma  - L anterior 3rd rib fx  - T5 compression fx    PLAN:  - Regular diet  - Multimodal pain control  - Monitor UOP   - CTX for UTI - s/p ceftriaxone   - PT consult -> SHARON  - Tertiary performed   - bowel regimen (miralax, dulcolax and enema ordered)     ACS/Trauma   [id number]
89y/o F w/ PMHx dementia (A&O x1 at baseline), erysipelas, HTN presents as trauma transfer from [facility] for sternal, rib and T5 compression fx s/p fall in shower 2 weeks ago, + HS. +UA    Injuries:  - Upper sternal body fx w/ posterior displacement and hematoma  - L anterior 3rd rib fx  - T5 compression fx    PLAN:  - Regular diet  - Multimodal pain control  - Monitor UOP   - CTX for UTI - s/p ceftriaxone   - PT consult -> SHARON  - Tertiary performed   - bowel regimen (miralax, dulcolax and enema ordered)   - dispo: discharge home pending DME delivery and prelim BCx        ACS/Trauma   [id number]
89y/o F w/ PMHx dementia (A&O x1 at baseline), erysipelas, HTN presents as trauma transfer from [facility] for sternal, rib and T5 compression fx s/p fall in shower 2 weeks ago, + HS. +UA    Injuries:  - Upper sternal body fx w/ posterior displacement and hematoma  - L anterior 3rd rib fx  - T5 compression fx      PLAN:  - Regular diet  - Multimodal pain control  - Monitor UOP   - CTX for UTI - s/p ceftriaxone   - PT consult -> SHARON  - Tertiary performed   - bowel regimen (miralax, dulcolax and enema ordered)     ACS/Trauma   [id number]      
89y/o F w/ PMHx dementia (A&O x1 at baseline), erysipelas, HTN presents as trauma transfer from [facility] for sternal, rib and T5 compression fx s/p fall in shower 2 weeks ago, + HS. +UA    Injuries:  - Upper sternal body fx w/ posterior displacement and hematoma  - L anterior 3rd rib fx  - T5 compression fx    PLAN:  - Regular diet  - Multimodal pain control  - Monitor UOP   - CTX for UTI - s/p ceftriaxone   - PT consult -> SHARON  - Tertiary performed   - bowel regimen (miralax, dulcolax and enema ordered)   - dispo: discharge home with HPT tomorrow        ACS/Trauma   [id number]  
89y/o F w/ PMHx dementia (A&O x1 at baseline), erysipelas, HTN presents as trauma transfer from [facility] for sternal, rib and T5 compression fx s/p fall in shower 2 weeks ago, + HS. +UA    Injuries:  - Upper sternal body fx w/ posterior displacement and hematoma  - L anterior 3rd rib fx  - T5 compression fx    PLAN:  - Regular diet  - Multimodal pain control  - Monitor UOP   - CTX for UTI - s/p ceftriaxone   - PT consult -> SHARON  - Tertiary performed   - bowel regimen (miralax, dulcolax and enema ordered)   - dispo: discharge home with HPT pending DME delivery         ACS/Trauma   [id number]  
· Assessment	  89y/o F w/ PMHx dementia (A&O x1 at baseline), erysipelas, HTN presents as trauma transfer from [facility] for sternal, rib and T5 compression fx s/p fall in shower 2 weeks ago, + HS. +UA    Injuries:  - Upper sternal body fx w/ posterior displacement and hematoma  - L anterior 3rd rib fx  - T5 compression fx    PLAN:  - Regular diet  - Multimodal pain control  - Monitor UOP   - CTX for UTI - s/p ceftriaxone 1 dose, c/w cefuroxime PO BID 4days    - PT consult -> SHARON  - Tertiary performed   - bowel regimen (miralax, dulcolax and enema ordered)   - dispo: CM confirmed DME delivery, DC home today         ACS/Trauma   [id number]    
89y/o F w/ PMHx dementia (A&O x1 at baseline), erysipelas, HTN presents as trauma transfer from [facility] for sternal, rib and T5 compression fx s/p fall in shower 2 weeks ago, + HS. +UA    Injuries:  - Upper sternal body fx w/ posterior displacement and hematoma  - L anterior 3rd rib fx  - T5 compression fx      PLAN:  - Regular diet  - Multimodal pain control  - Monitor UOP   - CTX for UTI - s/p ceftriaxone   - PT consult -> SHARON  - Tertiary performed     ACS/Trauma   [id number]
89 y/o F PMH dementia aaox1, erysipelas, chronic healed rib fx, HTN, seizures presents as trauma transfer from [facility] after fall 2 weeks ago adm with sternal body fx with posterior displacement and hematoma, L anterior 3rd rib fracture and T5 compression fracture.
89 y/o F PMH dementia aaox1, erysipelas, chronic healed rib fx, HTN, seizures presents as trauma transfer from [facility] after fall 2 weeks ago adm with sternal body fx with posterior displacement and hematoma, L anterior 3rd rib fracture and T5 compression fracture.
89 y/o F PMH dementia aaox1, erysipelas, chronic healed rib fx, HTN, seizures presents as trauma transfer from [facility] after fall 2 weeks ago adm with sternal body fx with posterior displacement and hematoma, L anterior 3rd rib fracture and T5 compression fracture.
89 y/o F PMH dementia aaox1, erysipelas, chronic healed rib fx, HTN, seizures presents as trauma transfer from [facility] after fall 2 weeks ago adm with sternal body fx with posterior displacement and hematoma, L anterior 3rd rib fracture and T5 compression fracture.
89 y/o F PMH dementia aaox1, erysipelas, chronic healed rib fx, HTN, seizures presents as trauma transfer from [facility] after fall 2 weeks ago adm with sternal body fx with posterior displacement and hematoma, L anterior 3rd rib fracture and T5 compression fracture.
89 y/o F PMH dementia aaox1, erysipelas, chronic healed rib fx, HTN, seizures presents as trauma transfer from [facility] after fall 2 weeks ago adm with sternal body fx with posterior displacement and hematoma, L anterior 3rd rib fracture and T5 compression fracture.

## 2024-03-11 ENCOUNTER — EMERGENCY (EMERGENCY)
Facility: HOSPITAL | Age: 89
LOS: 1 days | Discharge: HOME | End: 2024-03-11
Attending: STUDENT IN AN ORGANIZED HEALTH CARE EDUCATION/TRAINING PROGRAM
Payer: MEDICAID

## 2024-03-11 VITALS
SYSTOLIC BLOOD PRESSURE: 113 MMHG | OXYGEN SATURATION: 96 % | TEMPERATURE: 98 F | DIASTOLIC BLOOD PRESSURE: 61 MMHG | HEART RATE: 86 BPM | RESPIRATION RATE: 18 BRPM

## 2024-03-11 VITALS
RESPIRATION RATE: 18 BRPM | TEMPERATURE: 98 F | SYSTOLIC BLOOD PRESSURE: 140 MMHG | HEIGHT: 64 IN | DIASTOLIC BLOOD PRESSURE: 48 MMHG | OXYGEN SATURATION: 99 % | HEART RATE: 77 BPM

## 2024-03-11 LAB
ALBUMIN SERPL ELPH-MCNC: 3 G/DL — LOW (ref 3.3–5)
ALP SERPL-CCNC: 122 U/L — HIGH (ref 40–120)
ALT FLD-CCNC: 17 U/L — SIGNIFICANT CHANGE UP (ref 10–45)
ANION GAP SERPL CALC-SCNC: 8 MMOL/L — SIGNIFICANT CHANGE UP (ref 5–17)
AST SERPL-CCNC: 18 U/L — SIGNIFICANT CHANGE UP (ref 10–40)
BASE EXCESS BLDV CALC-SCNC: -0.1 MMOL/L — SIGNIFICANT CHANGE UP (ref -2–3)
BASOPHILS # BLD AUTO: 0.03 K/UL — SIGNIFICANT CHANGE UP (ref 0–0.2)
BASOPHILS NFR BLD AUTO: 0.3 % — SIGNIFICANT CHANGE UP (ref 0–2)
BILIRUB SERPL-MCNC: 0.3 MG/DL — SIGNIFICANT CHANGE UP (ref 0.2–1.2)
BUN SERPL-MCNC: 29 MG/DL — HIGH (ref 7–23)
CA-I SERPL-SCNC: 1.2 MMOL/L — SIGNIFICANT CHANGE UP (ref 1.15–1.33)
CALCIUM SERPL-MCNC: 8.5 MG/DL — SIGNIFICANT CHANGE UP (ref 8.4–10.5)
CHLORIDE BLDV-SCNC: 107 MMOL/L — SIGNIFICANT CHANGE UP (ref 96–108)
CHLORIDE SERPL-SCNC: 104 MMOL/L — SIGNIFICANT CHANGE UP (ref 96–108)
CO2 BLDV-SCNC: 27 MMOL/L — HIGH (ref 22–26)
CO2 SERPL-SCNC: 26 MMOL/L — SIGNIFICANT CHANGE UP (ref 22–31)
CREAT SERPL-MCNC: 0.48 MG/DL — LOW (ref 0.5–1.3)
CULTURE RESULTS: SIGNIFICANT CHANGE UP
CULTURE RESULTS: SIGNIFICANT CHANGE UP
EGFR: 89 ML/MIN/1.73M2 — SIGNIFICANT CHANGE UP
EOSINOPHIL # BLD AUTO: 0.04 K/UL — SIGNIFICANT CHANGE UP (ref 0–0.5)
EOSINOPHIL NFR BLD AUTO: 0.3 % — SIGNIFICANT CHANGE UP (ref 0–6)
FLUAV AG NPH QL: SIGNIFICANT CHANGE UP
FLUBV AG NPH QL: SIGNIFICANT CHANGE UP
GAS PNL BLDV: 136 MMOL/L — SIGNIFICANT CHANGE UP (ref 136–145)
GAS PNL BLDV: SIGNIFICANT CHANGE UP
GAS PNL BLDV: SIGNIFICANT CHANGE UP
GLUCOSE BLDV-MCNC: 99 MG/DL — SIGNIFICANT CHANGE UP (ref 70–99)
GLUCOSE SERPL-MCNC: 109 MG/DL — HIGH (ref 70–99)
HCO3 BLDV-SCNC: 26 MMOL/L — SIGNIFICANT CHANGE UP (ref 22–29)
HCT VFR BLD CALC: 27.5 % — LOW (ref 34.5–45)
HCT VFR BLDA CALC: 26 % — LOW (ref 34.5–46.5)
HGB BLD CALC-MCNC: 8.5 G/DL — LOW (ref 11.7–16.1)
HGB BLD-MCNC: 8.2 G/DL — LOW (ref 11.5–15.5)
IMM GRANULOCYTES NFR BLD AUTO: 1.4 % — HIGH (ref 0–0.9)
LACTATE BLDV-MCNC: 1.3 MMOL/L — SIGNIFICANT CHANGE UP (ref 0.5–2)
LYMPHOCYTES # BLD AUTO: 1.72 K/UL — SIGNIFICANT CHANGE UP (ref 1–3.3)
LYMPHOCYTES # BLD AUTO: 14.6 % — SIGNIFICANT CHANGE UP (ref 13–44)
MCHC RBC-ENTMCNC: 25.5 PG — LOW (ref 27–34)
MCHC RBC-ENTMCNC: 29.8 GM/DL — LOW (ref 32–36)
MCV RBC AUTO: 85.7 FL — SIGNIFICANT CHANGE UP (ref 80–100)
MONOCYTES # BLD AUTO: 0.63 K/UL — SIGNIFICANT CHANGE UP (ref 0–0.9)
MONOCYTES NFR BLD AUTO: 5.4 % — SIGNIFICANT CHANGE UP (ref 2–14)
NEUTROPHILS # BLD AUTO: 9.19 K/UL — HIGH (ref 1.8–7.4)
NEUTROPHILS NFR BLD AUTO: 78 % — HIGH (ref 43–77)
NRBC # BLD: 0 /100 WBCS — SIGNIFICANT CHANGE UP (ref 0–0)
PCO2 BLDV: 48 MMHG — HIGH (ref 39–42)
PH BLDV: 7.34 — SIGNIFICANT CHANGE UP (ref 7.32–7.43)
PLATELET # BLD AUTO: 503 K/UL — HIGH (ref 150–400)
PO2 BLDV: 22 MMHG — LOW (ref 25–45)
POTASSIUM BLDV-SCNC: 4.3 MMOL/L — SIGNIFICANT CHANGE UP (ref 3.5–5.1)
POTASSIUM SERPL-MCNC: 4.1 MMOL/L — SIGNIFICANT CHANGE UP (ref 3.5–5.3)
POTASSIUM SERPL-SCNC: 4.1 MMOL/L — SIGNIFICANT CHANGE UP (ref 3.5–5.3)
PROT SERPL-MCNC: 6.6 G/DL — SIGNIFICANT CHANGE UP (ref 6–8.3)
RBC # BLD: 3.21 M/UL — LOW (ref 3.8–5.2)
RBC # FLD: 18 % — HIGH (ref 10.3–14.5)
RSV RNA NPH QL NAA+NON-PROBE: SIGNIFICANT CHANGE UP
SAO2 % BLDV: 33.5 % — LOW (ref 67–88)
SARS-COV-2 RNA SPEC QL NAA+PROBE: SIGNIFICANT CHANGE UP
SODIUM SERPL-SCNC: 138 MMOL/L — SIGNIFICANT CHANGE UP (ref 135–145)
SPECIMEN SOURCE: SIGNIFICANT CHANGE UP
SPECIMEN SOURCE: SIGNIFICANT CHANGE UP
WBC # BLD: 11.77 K/UL — HIGH (ref 3.8–10.5)
WBC # FLD AUTO: 11.77 K/UL — HIGH (ref 3.8–10.5)

## 2024-03-11 PROCEDURE — 84295 ASSAY OF SERUM SODIUM: CPT

## 2024-03-11 PROCEDURE — 73706 CT ANGIO LWR EXTR W/O&W/DYE: CPT | Mod: 26,RT,MC

## 2024-03-11 PROCEDURE — 82947 ASSAY GLUCOSE BLOOD QUANT: CPT

## 2024-03-11 PROCEDURE — 99284 EMERGENCY DEPT VISIT MOD MDM: CPT | Mod: 25

## 2024-03-11 PROCEDURE — 84100 ASSAY OF PHOSPHORUS: CPT

## 2024-03-11 PROCEDURE — 72125 CT NECK SPINE W/O DYE: CPT | Mod: 26,MC

## 2024-03-11 PROCEDURE — 73706 CT ANGIO LWR EXTR W/O&W/DYE: CPT | Mod: MC

## 2024-03-11 PROCEDURE — 87637 SARSCOV2&INF A&B&RSV AMP PRB: CPT

## 2024-03-11 PROCEDURE — 85025 COMPLETE CBC W/AUTO DIFF WBC: CPT

## 2024-03-11 PROCEDURE — 85014 HEMATOCRIT: CPT

## 2024-03-11 PROCEDURE — 83605 ASSAY OF LACTIC ACID: CPT

## 2024-03-11 PROCEDURE — 70450 CT HEAD/BRAIN W/O DYE: CPT | Mod: MC

## 2024-03-11 PROCEDURE — 82550 ASSAY OF CK (CPK): CPT

## 2024-03-11 PROCEDURE — 70450 CT HEAD/BRAIN W/O DYE: CPT | Mod: 26,MC

## 2024-03-11 PROCEDURE — 96375 TX/PRO/DX INJ NEW DRUG ADDON: CPT | Mod: XU

## 2024-03-11 PROCEDURE — 82330 ASSAY OF CALCIUM: CPT

## 2024-03-11 PROCEDURE — 80053 COMPREHEN METABOLIC PANEL: CPT

## 2024-03-11 PROCEDURE — 85018 HEMOGLOBIN: CPT

## 2024-03-11 PROCEDURE — 84132 ASSAY OF SERUM POTASSIUM: CPT

## 2024-03-11 PROCEDURE — 96374 THER/PROPH/DIAG INJ IV PUSH: CPT | Mod: XU

## 2024-03-11 PROCEDURE — 82435 ASSAY OF BLOOD CHLORIDE: CPT

## 2024-03-11 PROCEDURE — 72125 CT NECK SPINE W/O DYE: CPT | Mod: MC

## 2024-03-11 PROCEDURE — 83735 ASSAY OF MAGNESIUM: CPT

## 2024-03-11 PROCEDURE — 82803 BLOOD GASES ANY COMBINATION: CPT

## 2024-03-11 PROCEDURE — 99285 EMERGENCY DEPT VISIT HI MDM: CPT

## 2024-03-11 PROCEDURE — 76377 3D RENDER W/INTRP POSTPROCES: CPT

## 2024-03-11 RX ORDER — ACETAMINOPHEN 500 MG
1000 TABLET ORAL ONCE
Refills: 0 | Status: COMPLETED | OUTPATIENT
Start: 2024-03-11 | End: 2024-03-11

## 2024-03-11 RX ORDER — MORPHINE SULFATE 50 MG/1
2 CAPSULE, EXTENDED RELEASE ORAL ONCE
Refills: 0 | Status: DISCONTINUED | OUTPATIENT
Start: 2024-03-11 | End: 2024-03-11

## 2024-03-11 RX ORDER — SODIUM CHLORIDE 9 MG/ML
1000 INJECTION, SOLUTION INTRAVENOUS ONCE
Refills: 0 | Status: COMPLETED | OUTPATIENT
Start: 2024-03-11 | End: 2024-03-11

## 2024-03-11 RX ADMIN — SODIUM CHLORIDE 1000 MILLILITER(S): 9 INJECTION, SOLUTION INTRAVENOUS at 15:40

## 2024-03-11 RX ADMIN — MORPHINE SULFATE 2 MILLIGRAM(S): 50 CAPSULE, EXTENDED RELEASE ORAL at 19:19

## 2024-03-11 RX ADMIN — Medication 400 MILLIGRAM(S): at 15:40

## 2024-03-11 NOTE — ED PROVIDER NOTE - PROGRESS NOTE DETAILS
Serge: the CT shows a leg hematoma without active extravasation. I spoke with the granddaughter who states that the leg has looked like that for several weeks and that the finding of the hematoma was likely not new. This is likely true given her H/H is stable and her VS are stable. Her exam here in the ED is otherwise also unchanged. We offered admission for re-placement into rehab but the family would prefer to bring her home. All lab and imaging results reviewed with the patient and her daughter and granddaughter. They were provided an opportunity to ask questions and voice their concerns, all of which were addressed at length. Strict return precautions discussed with them. The patient verbalized understanding of the plan and agrees to follow through with it. The patient is safe for discharge at this time with close outpatient follow up.

## 2024-03-11 NOTE — ED ADULT NURSE NOTE - DRUG PRE-SCREENING (DAST -1)
Soy Horvath called from cardiology office patient pacemaker is not compatible with MRI.   Dinesh Fisher NP will  Add addedum to  note Statement Selected

## 2024-03-11 NOTE — ED ADULT NURSE NOTE - NSFALLHARMRISKINTERV_ED_ALL_ED

## 2024-03-11 NOTE — ED PROVIDER NOTE - OBJECTIVE STATEMENT
89Y F PMH HTN, HLD, dc'd from [facility]  yesterday after trauma admission s/p mechanical fall presenting after fall at home. Patient was reportedly being transferred from bed by family member and home aide when she slipped through their arms, landing on the floor. No reported head strike or LOC. Noted to have pain/swelling to R thigh, which has been present since time of prior fall per  family at bedside. Patient has no acute complaints at this time.
25-Apr-2022

## 2024-03-11 NOTE — ED ADULT NURSE NOTE - OBJECTIVE STATEMENT
89 y/o female BIB EMS with complaints of right leg pain and swelling, "assisted/lowered/fall" to floor. Denies CP, SOB, numbness, tingling, headache, dizziness. Discharged home from hospital yesterday, non ambulatory.

## 2024-03-11 NOTE — ED PROVIDER NOTE - PHYSICAL EXAMINATION
GENERAL: Awake, alert, NAD  HEAD: NC/AT, neck supple, moist mucous membranes  EYES: PERRL, EOM grossly intact, sclera anicteric  LUNGS: normal WOB on RA, CTAB, no wheezes or crackles   CARDIAC: RRR, no m/r/g  ABDOMEN: Soft, non tender, non distended, no rebound, no guarding  BACK: No midline spinal tenderness, no CVA tenderness  EXT: R thigh tender and firm to palpation, no bony deformity or skin changes. distal pulses intact.  NEURO: A&Ox3. Moving all extremities.  SKIN: Warm and dry. No rash.  PSYCH: Normal affect.

## 2024-03-11 NOTE — ED PROVIDER NOTE - CLINICAL SUMMARY MEDICAL DECISION MAKING FREE TEXT BOX
Hx of HTN, HLD recent fall with multiple traumatic injuries presenting with R thigh swelling and pain. This thigh has been swollen for weeks as per the daughter and granddaughter. Patient denies any fever, chills, headache, blurry vision, chest pain, dyspnea, abdominal pain, n/v/d. On exam, NAD, non-toxic appearing, VS wnl, awake, alert, oriented x 2-3 in Japanese, neurologically intact, breathing comfortably, lungs CTAB, no crackles or wheezing, no murmurs, abdomen nondistended, no ttp, no rebound or guarding, no rashes, no peripheral edema, R upper thigh hematoma palpable, R leg neurovascularly intact, compartments soft, no joint swelling, warm to touch. Will check labs, CT leg, symptom management, disposition pending work-up and response to treatment.

## 2024-03-11 NOTE — ED PROVIDER NOTE - PATIENT PORTAL LINK FT
You can access the FollowMyHealth Patient Portal offered by [facility] by registering at the following website: [email or web address]. By joining [employer]’s FollowMyHealth portal, you will also be able to view your health information using other applications (apps) compatible with our system.

## 2024-03-12 LAB
-  AMOXICILLIN/CLAVULANIC ACID: SIGNIFICANT CHANGE UP
-  AMPICILLIN/SULBACTAM: SIGNIFICANT CHANGE UP
-  AMPICILLIN: SIGNIFICANT CHANGE UP
-  AMPICILLIN: SIGNIFICANT CHANGE UP
-  AZTREONAM: SIGNIFICANT CHANGE UP
-  CEFAZOLIN: SIGNIFICANT CHANGE UP
-  CEFEPIME: SIGNIFICANT CHANGE UP
-  CEFTRIAXONE: SIGNIFICANT CHANGE UP
-  CEFUROXIME: SIGNIFICANT CHANGE UP
-  CIPROFLOXACIN: SIGNIFICANT CHANGE UP
-  CIPROFLOXACIN: SIGNIFICANT CHANGE UP
-  ERTAPENEM: SIGNIFICANT CHANGE UP
-  GENTAMICIN: SIGNIFICANT CHANGE UP
-  IMIPENEM: SIGNIFICANT CHANGE UP
-  LEVOFLOXACIN: SIGNIFICANT CHANGE UP
-  LEVOFLOXACIN: SIGNIFICANT CHANGE UP
-  MEROPENEM: SIGNIFICANT CHANGE UP
-  NITROFURANTOIN: SIGNIFICANT CHANGE UP
-  NITROFURANTOIN: SIGNIFICANT CHANGE UP
-  PIPERACILLIN/TAZOBACTAM: SIGNIFICANT CHANGE UP
-  TETRACYCLINE: SIGNIFICANT CHANGE UP
-  TOBRAMYCIN: SIGNIFICANT CHANGE UP
-  TRIMETHOPRIM/SULFAMETHOXAZOLE: SIGNIFICANT CHANGE UP
-  VANCOMYCIN: SIGNIFICANT CHANGE UP
CULTURE RESULTS: ABNORMAL
METHOD TYPE: SIGNIFICANT CHANGE UP
METHOD TYPE: SIGNIFICANT CHANGE UP
ORGANISM # SPEC MICROSCOPIC CNT: ABNORMAL
SPECIMEN SOURCE: SIGNIFICANT CHANGE UP

## 2024-03-13 DIAGNOSIS — Z16.12 URINARY TRACT INFECTION, SITE NOT SPECIFIED: ICD-10-CM

## 2024-03-13 DIAGNOSIS — N39.0 URINARY TRACT INFECTION, SITE NOT SPECIFIED: ICD-10-CM

## 2024-03-13 DIAGNOSIS — B96.29 URINARY TRACT INFECTION, SITE NOT SPECIFIED: ICD-10-CM

## 2024-03-13 RX ORDER — NITROFURANTOIN MACROCRYSTALS 100 MG/1
100 CAPSULE ORAL TWICE DAILY
Qty: 10 | Refills: 0 | Status: ACTIVE | COMMUNITY
Start: 2024-03-13 | End: 1900-01-01

## 2024-04-16 NOTE — H&P ADULT - NSHPSOURCEINFORD_GEN_ALL_CORE
Pt/family given discharge instructions and follow up information. Prescription provided. Patient awake and alert, resting comfortably at time of discharge. Pt/family denies further questions. Patient discharged home by this RN.   Chart(s)/Other Family Member

## 2024-07-24 ENCOUNTER — EMERGENCY (EMERGENCY)
Facility: HOSPITAL | Age: 88
LOS: 1 days | Discharge: HOME | End: 2024-07-24
Attending: STUDENT IN AN ORGANIZED HEALTH CARE EDUCATION/TRAINING PROGRAM
Payer: MEDICAID

## 2024-07-24 VITALS
SYSTOLIC BLOOD PRESSURE: 147 MMHG | HEART RATE: 72 BPM | RESPIRATION RATE: 19 BRPM | OXYGEN SATURATION: 97 % | DIASTOLIC BLOOD PRESSURE: 70 MMHG | TEMPERATURE: 98 F

## 2024-07-24 PROCEDURE — 99053 MED SERV 10PM-8AM 24 HR FAC: CPT

## 2024-07-24 PROCEDURE — 99285 EMERGENCY DEPT VISIT HI MDM: CPT

## 2024-07-24 RX ORDER — ACETAMINOPHEN 500 MG/5ML
1000 LIQUID (ML) ORAL ONCE
Refills: 0 | Status: COMPLETED | OUTPATIENT
Start: 2024-07-24 | End: 2024-07-24

## 2024-07-25 VITALS
DIASTOLIC BLOOD PRESSURE: 79 MMHG | OXYGEN SATURATION: 98 % | TEMPERATURE: 98 F | SYSTOLIC BLOOD PRESSURE: 162 MMHG | RESPIRATION RATE: 18 BRPM | HEART RATE: 78 BPM

## 2024-07-25 PROBLEM — F03.90 UNSPECIFIED DEMENTIA, UNSPECIFIED SEVERITY, WITHOUT BEHAVIORAL DISTURBANCE, PSYCHOTIC DISTURBANCE, MOOD DISTURBANCE, AND ANXIETY: Chronic | Status: ACTIVE | Noted: 2024-02-28

## 2024-07-25 LAB
ALBUMIN SERPL ELPH-MCNC: 2.7 G/DL — LOW (ref 3.5–5)
ALP SERPL-CCNC: 80 U/L — SIGNIFICANT CHANGE UP (ref 40–120)
ALT FLD-CCNC: 19 U/L DA — SIGNIFICANT CHANGE UP (ref 10–60)
ANION GAP SERPL CALC-SCNC: 4 MMOL/L — LOW (ref 5–17)
AST SERPL-CCNC: 10 U/L — SIGNIFICANT CHANGE UP (ref 10–40)
BASOPHILS # BLD AUTO: 0.02 K/UL — SIGNIFICANT CHANGE UP (ref 0–0.2)
BASOPHILS NFR BLD AUTO: 0.3 % — SIGNIFICANT CHANGE UP (ref 0–2)
BILIRUB SERPL-MCNC: 0.3 MG/DL — SIGNIFICANT CHANGE UP (ref 0.2–1.2)
BUN SERPL-MCNC: 15 MG/DL — SIGNIFICANT CHANGE UP (ref 7–18)
CALCIUM SERPL-MCNC: 8.6 MG/DL — SIGNIFICANT CHANGE UP (ref 8.4–10.5)
CHLORIDE SERPL-SCNC: 112 MMOL/L — HIGH (ref 96–108)
CO2 SERPL-SCNC: 28 MMOL/L — SIGNIFICANT CHANGE UP (ref 22–31)
CREAT SERPL-MCNC: 0.48 MG/DL — LOW (ref 0.5–1.3)
EGFR: 89 ML/MIN/1.73M2 — SIGNIFICANT CHANGE UP
EGFR: 89 ML/MIN/1.73M2 — SIGNIFICANT CHANGE UP
EOSINOPHIL # BLD AUTO: 0.1 K/UL — SIGNIFICANT CHANGE UP (ref 0–0.5)
EOSINOPHIL NFR BLD AUTO: 1.3 % — SIGNIFICANT CHANGE UP (ref 0–6)
GLUCOSE SERPL-MCNC: 123 MG/DL — HIGH (ref 70–99)
HCT VFR BLD CALC: 35.6 % — SIGNIFICANT CHANGE UP (ref 34.5–45)
HGB BLD-MCNC: 11.8 G/DL — SIGNIFICANT CHANGE UP (ref 11.5–15.5)
IMM GRANULOCYTES NFR BLD AUTO: 0.9 % — SIGNIFICANT CHANGE UP (ref 0–0.9)
LYMPHOCYTES # BLD AUTO: 2.11 K/UL — SIGNIFICANT CHANGE UP (ref 1–3.3)
LYMPHOCYTES # BLD AUTO: 27.3 % — SIGNIFICANT CHANGE UP (ref 13–44)
MAGNESIUM SERPL-MCNC: 2.1 MG/DL — SIGNIFICANT CHANGE UP (ref 1.6–2.6)
MCHC RBC-ENTMCNC: 27.5 PG — SIGNIFICANT CHANGE UP (ref 27–34)
MCHC RBC-ENTMCNC: 33.1 GM/DL — SIGNIFICANT CHANGE UP (ref 32–36)
MCV RBC AUTO: 83 FL — SIGNIFICANT CHANGE UP (ref 80–100)
MONOCYTES # BLD AUTO: 0.67 K/UL — SIGNIFICANT CHANGE UP (ref 0–0.9)
MONOCYTES NFR BLD AUTO: 8.7 % — SIGNIFICANT CHANGE UP (ref 2–14)
NEUTROPHILS # BLD AUTO: 4.76 K/UL — SIGNIFICANT CHANGE UP (ref 1.8–7.4)
NEUTROPHILS NFR BLD AUTO: 61.5 % — SIGNIFICANT CHANGE UP (ref 43–77)
NRBC # BLD: 0 /100 WBCS — SIGNIFICANT CHANGE UP (ref 0–0)
NRBC BLD-RTO: 0 /100 WBCS — SIGNIFICANT CHANGE UP (ref 0–0)
PLATELET # BLD AUTO: 290 K/UL — SIGNIFICANT CHANGE UP (ref 150–400)
POTASSIUM SERPL-MCNC: 2.9 MMOL/L — CRITICAL LOW (ref 3.5–5.3)
POTASSIUM SERPL-SCNC: 2.9 MMOL/L — CRITICAL LOW (ref 3.5–5.3)
PROT SERPL-MCNC: 6.1 G/DL — SIGNIFICANT CHANGE UP (ref 6–8.3)
RBC # BLD: 4.29 M/UL — SIGNIFICANT CHANGE UP (ref 3.8–5.2)
RBC # FLD: 16.4 % — HIGH (ref 10.3–14.5)
SODIUM SERPL-SCNC: 144 MMOL/L — SIGNIFICANT CHANGE UP (ref 135–145)
WBC # BLD: 7.73 K/UL — SIGNIFICANT CHANGE UP (ref 3.8–10.5)
WBC # FLD AUTO: 7.73 K/UL — SIGNIFICANT CHANGE UP (ref 3.8–10.5)

## 2024-07-25 PROCEDURE — 93971 EXTREMITY STUDY: CPT | Mod: 26,RT

## 2024-07-25 PROCEDURE — 73201 CT UPPER EXTREMITY W/DYE: CPT | Mod: 26,RT,MC

## 2024-07-25 PROCEDURE — 80053 COMPREHEN METABOLIC PANEL: CPT

## 2024-07-25 PROCEDURE — 36415 COLL VENOUS BLD VENIPUNCTURE: CPT

## 2024-07-25 PROCEDURE — 99284 EMERGENCY DEPT VISIT MOD MDM: CPT | Mod: 25

## 2024-07-25 PROCEDURE — 83735 ASSAY OF MAGNESIUM: CPT

## 2024-07-25 PROCEDURE — 85025 COMPLETE CBC W/AUTO DIFF WBC: CPT

## 2024-07-25 PROCEDURE — 96374 THER/PROPH/DIAG INJ IV PUSH: CPT | Mod: XU

## 2024-07-25 PROCEDURE — 73201 CT UPPER EXTREMITY W/DYE: CPT | Mod: MC

## 2024-07-25 PROCEDURE — 93971 EXTREMITY STUDY: CPT

## 2024-07-25 RX ADMIN — Medication 40 MILLIEQUIVALENT(S): at 02:25

## 2024-07-25 RX ADMIN — Medication 1000 MILLIGRAM(S): at 00:37

## 2024-07-25 RX ADMIN — Medication 100 MILLIEQUIVALENT(S): at 02:28

## 2024-07-25 RX ADMIN — Medication 100 MILLIEQUIVALENT(S): at 04:55

## 2024-07-25 RX ADMIN — Medication 400 MILLIGRAM(S): at 00:07

## 2024-08-05 NOTE — ED ADULT TRIAGE NOTE - HEART RATE (BEATS/MIN)
Writer reached out to patient via preferred line and left a vm regarding contacting us about refill updates.  Writer too left a vm on ID (spouse) preferred line for patient to return call or message via live well nichol.     Note:  Live Well e-message has not been read from our clinic although patient was last seen on live well on 08/01/24      See patient response below.     77

## 2024-08-15 ENCOUNTER — INPATIENT (INPATIENT)
Facility: HOSPITAL | Age: 89
LOS: 4 days | Discharge: HOME | DRG: 389 | End: 2024-08-20
Attending: STUDENT IN AN ORGANIZED HEALTH CARE EDUCATION/TRAINING PROGRAM | Admitting: STUDENT IN AN ORGANIZED HEALTH CARE EDUCATION/TRAINING PROGRAM
Payer: MEDICAID

## 2024-08-15 PROCEDURE — 99285 EMERGENCY DEPT VISIT HI MDM: CPT

## 2024-08-16 VITALS
TEMPERATURE: 99 F | DIASTOLIC BLOOD PRESSURE: 73 MMHG | SYSTOLIC BLOOD PRESSURE: 177 MMHG | OXYGEN SATURATION: 96 % | HEART RATE: 71 BPM | RESPIRATION RATE: 18 BRPM

## 2024-08-16 DIAGNOSIS — Z29.9 ENCOUNTER FOR PROPHYLACTIC MEASURES, UNSPECIFIED: ICD-10-CM

## 2024-08-16 DIAGNOSIS — K56.7 ILEUS, UNSPECIFIED: ICD-10-CM

## 2024-08-16 DIAGNOSIS — R56.9 UNSPECIFIED CONVULSIONS: ICD-10-CM

## 2024-08-16 DIAGNOSIS — K59.00 CONSTIPATION, UNSPECIFIED: ICD-10-CM

## 2024-08-16 DIAGNOSIS — E78.5 HYPERLIPIDEMIA, UNSPECIFIED: ICD-10-CM

## 2024-08-16 DIAGNOSIS — F03.90 UNSPECIFIED DEMENTIA, UNSPECIFIED SEVERITY, WITHOUT BEHAVIORAL DISTURBANCE, PSYCHOTIC DISTURBANCE, MOOD DISTURBANCE, AND ANXIETY: ICD-10-CM

## 2024-08-16 DIAGNOSIS — E87.8 OTHER DISORDERS OF ELECTROLYTE AND FLUID BALANCE, NOT ELSEWHERE CLASSIFIED: ICD-10-CM

## 2024-08-16 DIAGNOSIS — R19.7 DIARRHEA, UNSPECIFIED: ICD-10-CM

## 2024-08-16 DIAGNOSIS — I10 ESSENTIAL (PRIMARY) HYPERTENSION: ICD-10-CM

## 2024-08-16 LAB
ALBUMIN SERPL ELPH-MCNC: 2.9 G/DL — LOW (ref 3.5–5)
ALP SERPL-CCNC: 83 U/L — SIGNIFICANT CHANGE UP (ref 40–120)
ALT FLD-CCNC: 19 U/L DA — SIGNIFICANT CHANGE UP (ref 10–60)
ANION GAP SERPL CALC-SCNC: 3 MMOL/L — LOW (ref 5–17)
APPEARANCE UR: CLEAR — SIGNIFICANT CHANGE UP
AST SERPL-CCNC: 14 U/L — SIGNIFICANT CHANGE UP (ref 10–40)
BASOPHILS # BLD AUTO: 0.02 K/UL — SIGNIFICANT CHANGE UP (ref 0–0.2)
BASOPHILS NFR BLD AUTO: 0.3 % — SIGNIFICANT CHANGE UP (ref 0–2)
BILIRUB SERPL-MCNC: 0.4 MG/DL — SIGNIFICANT CHANGE UP (ref 0.2–1.2)
BILIRUB UR-MCNC: NEGATIVE — SIGNIFICANT CHANGE UP
BUN SERPL-MCNC: 20 MG/DL — HIGH (ref 7–18)
C DIFF GDH STL QL: NEGATIVE — SIGNIFICANT CHANGE UP
C DIFF GDH STL QL: SIGNIFICANT CHANGE UP
CALCIUM SERPL-MCNC: 8.9 MG/DL — SIGNIFICANT CHANGE UP (ref 8.4–10.5)
CHLORIDE SERPL-SCNC: 108 MMOL/L — SIGNIFICANT CHANGE UP (ref 96–108)
CO2 SERPL-SCNC: 30 MMOL/L — SIGNIFICANT CHANGE UP (ref 22–31)
COLOR SPEC: YELLOW — SIGNIFICANT CHANGE UP
CREAT SERPL-MCNC: 0.45 MG/DL — LOW (ref 0.5–1.3)
DIFF PNL FLD: NEGATIVE — SIGNIFICANT CHANGE UP
EGFR: 91 ML/MIN/1.73M2 — SIGNIFICANT CHANGE UP
EOSINOPHIL # BLD AUTO: 0.15 K/UL — SIGNIFICANT CHANGE UP (ref 0–0.5)
EOSINOPHIL NFR BLD AUTO: 2.2 % — SIGNIFICANT CHANGE UP (ref 0–6)
FLUAV AG NPH QL: SIGNIFICANT CHANGE UP
FLUBV AG NPH QL: SIGNIFICANT CHANGE UP
GLUCOSE SERPL-MCNC: 110 MG/DL — HIGH (ref 70–99)
GLUCOSE UR QL: NEGATIVE MG/DL — SIGNIFICANT CHANGE UP
HCT VFR BLD CALC: 33.6 % — LOW (ref 34.5–45)
HGB BLD-MCNC: 11 G/DL — LOW (ref 11.5–15.5)
IMM GRANULOCYTES NFR BLD AUTO: 1.4 % — HIGH (ref 0–0.9)
KETONES UR-MCNC: NEGATIVE MG/DL — SIGNIFICANT CHANGE UP
LACTATE SERPL-SCNC: 1 MMOL/L — SIGNIFICANT CHANGE UP (ref 0.7–2)
LEUKOCYTE ESTERASE UR-ACNC: NEGATIVE — SIGNIFICANT CHANGE UP
LIDOCAIN IGE QN: 25 U/L — SIGNIFICANT CHANGE UP (ref 13–75)
LYMPHOCYTES # BLD AUTO: 2.09 K/UL — SIGNIFICANT CHANGE UP (ref 1–3.3)
LYMPHOCYTES # BLD AUTO: 30.2 % — SIGNIFICANT CHANGE UP (ref 13–44)
MAGNESIUM SERPL-MCNC: 2.1 MG/DL — SIGNIFICANT CHANGE UP (ref 1.6–2.6)
MCHC RBC-ENTMCNC: 28.3 PG — SIGNIFICANT CHANGE UP (ref 27–34)
MCHC RBC-ENTMCNC: 32.7 GM/DL — SIGNIFICANT CHANGE UP (ref 32–36)
MCV RBC AUTO: 86.4 FL — SIGNIFICANT CHANGE UP (ref 80–100)
MONOCYTES # BLD AUTO: 0.47 K/UL — SIGNIFICANT CHANGE UP (ref 0–0.9)
MONOCYTES NFR BLD AUTO: 6.8 % — SIGNIFICANT CHANGE UP (ref 2–14)
NEUTROPHILS # BLD AUTO: 4.09 K/UL — SIGNIFICANT CHANGE UP (ref 1.8–7.4)
NEUTROPHILS NFR BLD AUTO: 59.1 % — SIGNIFICANT CHANGE UP (ref 43–77)
NITRITE UR-MCNC: NEGATIVE — SIGNIFICANT CHANGE UP
NRBC # BLD: 0 /100 WBCS — SIGNIFICANT CHANGE UP (ref 0–0)
PH UR: 6.5 — SIGNIFICANT CHANGE UP (ref 5–8)
PHOSPHATE SERPL-MCNC: 3.4 MG/DL — SIGNIFICANT CHANGE UP (ref 2.5–4.5)
PLATELET # BLD AUTO: 313 K/UL — SIGNIFICANT CHANGE UP (ref 150–400)
POTASSIUM SERPL-MCNC: 3 MMOL/L — LOW (ref 3.5–5.3)
POTASSIUM SERPL-SCNC: 3 MMOL/L — LOW (ref 3.5–5.3)
PROT SERPL-MCNC: 6.3 G/DL — SIGNIFICANT CHANGE UP (ref 6–8.3)
PROT UR-MCNC: NEGATIVE MG/DL — SIGNIFICANT CHANGE UP
RBC # BLD: 3.89 M/UL — SIGNIFICANT CHANGE UP (ref 3.8–5.2)
RBC # FLD: 15.7 % — HIGH (ref 10.3–14.5)
SARS-COV-2 RNA SPEC QL NAA+PROBE: SIGNIFICANT CHANGE UP
SODIUM SERPL-SCNC: 141 MMOL/L — SIGNIFICANT CHANGE UP (ref 135–145)
SP GR SPEC: 1.01 — SIGNIFICANT CHANGE UP (ref 1–1.03)
UROBILINOGEN FLD QL: 1 MG/DL — SIGNIFICANT CHANGE UP (ref 0.2–1)
WBC # BLD: 6.92 K/UL — SIGNIFICANT CHANGE UP (ref 3.8–10.5)
WBC # FLD AUTO: 6.92 K/UL — SIGNIFICANT CHANGE UP (ref 3.8–10.5)

## 2024-08-16 PROCEDURE — 99223 1ST HOSP IP/OBS HIGH 75: CPT

## 2024-08-16 PROCEDURE — 74177 CT ABD & PELVIS W/CONTRAST: CPT | Mod: 26,MC

## 2024-08-16 RX ORDER — LISINOPRIL 10 MG/1
2.5 TABLET ORAL DAILY
Refills: 0 | Status: DISCONTINUED | OUTPATIENT
Start: 2024-08-16 | End: 2024-08-16

## 2024-08-16 RX ORDER — ENOXAPARIN SODIUM 100 MG/ML
40 INJECTION SUBCUTANEOUS EVERY 24 HOURS
Refills: 0 | Status: DISCONTINUED | OUTPATIENT
Start: 2024-08-16 | End: 2024-08-20

## 2024-08-16 RX ORDER — POTASSIUM CHLORIDE 10 MEQ
10 TABLET, EXT RELEASE, PARTICLES/CRYSTALS ORAL
Refills: 0 | Status: COMPLETED | OUTPATIENT
Start: 2024-08-16 | End: 2024-08-16

## 2024-08-16 RX ORDER — SENNA 187 MG
2 TABLET ORAL AT BEDTIME
Refills: 0 | Status: DISCONTINUED | OUTPATIENT
Start: 2024-08-16 | End: 2024-08-20

## 2024-08-16 RX ORDER — ZONISAMIDE 100 MG/1
50 CAPSULE ORAL DAILY
Refills: 0 | Status: DISCONTINUED | OUTPATIENT
Start: 2024-08-16 | End: 2024-08-20

## 2024-08-16 RX ORDER — ESCITALOPRAM OXALATE 10 MG/1
10 TABLET ORAL DAILY
Refills: 0 | Status: DISCONTINUED | OUTPATIENT
Start: 2024-08-16 | End: 2024-08-20

## 2024-08-16 RX ORDER — HYDRALAZINE HCL 50 MG
5 TABLET ORAL ONCE
Refills: 0 | Status: COMPLETED | OUTPATIENT
Start: 2024-08-16 | End: 2024-08-16

## 2024-08-16 RX ORDER — POTASSIUM CHLORIDE 10 MEQ
40 TABLET, EXT RELEASE, PARTICLES/CRYSTALS ORAL ONCE
Refills: 0 | Status: COMPLETED | OUTPATIENT
Start: 2024-08-16 | End: 2024-08-16

## 2024-08-16 RX ORDER — POTASSIUM CHLORIDE 10 MEQ
10 TABLET, EXT RELEASE, PARTICLES/CRYSTALS ORAL ONCE
Refills: 0 | Status: COMPLETED | OUTPATIENT
Start: 2024-08-16 | End: 2024-08-16

## 2024-08-16 RX ORDER — SODIUM CHLORIDE 9 MG/ML
1000 INJECTION INTRAMUSCULAR; INTRAVENOUS; SUBCUTANEOUS
Refills: 0 | Status: DISCONTINUED | OUTPATIENT
Start: 2024-08-16 | End: 2024-08-20

## 2024-08-16 RX ORDER — ACETAMINOPHEN 325 MG/1
650 TABLET ORAL EVERY 6 HOURS
Refills: 0 | Status: DISCONTINUED | OUTPATIENT
Start: 2024-08-16 | End: 2024-08-20

## 2024-08-16 RX ORDER — ESCITALOPRAM OXALATE 10 MG/1
1 TABLET ORAL
Refills: 0 | DISCHARGE

## 2024-08-16 RX ORDER — LISINOPRIL 10 MG/1
5 TABLET ORAL DAILY
Refills: 0 | Status: DISCONTINUED | OUTPATIENT
Start: 2024-08-17 | End: 2024-08-18

## 2024-08-16 RX ORDER — POLYETHYLENE GLYCOL 3350 17 G/17G
17 POWDER, FOR SOLUTION ORAL DAILY
Refills: 0 | Status: DISCONTINUED | OUTPATIENT
Start: 2024-08-16 | End: 2024-08-16

## 2024-08-16 RX ORDER — ACETAMINOPHEN 325 MG/1
1000 TABLET ORAL ONCE
Refills: 0 | Status: COMPLETED | OUTPATIENT
Start: 2024-08-16 | End: 2024-08-16

## 2024-08-16 RX ORDER — SODIUM CHLORIDE 9 MG/ML
500 INJECTION INTRAMUSCULAR; INTRAVENOUS; SUBCUTANEOUS ONCE
Refills: 0 | Status: COMPLETED | OUTPATIENT
Start: 2024-08-16 | End: 2024-08-16

## 2024-08-16 RX ADMIN — ACETAMINOPHEN 650 MILLIGRAM(S): 325 TABLET ORAL at 17:50

## 2024-08-16 RX ADMIN — Medication 5 MILLIGRAM(S): at 15:06

## 2024-08-16 RX ADMIN — ACETAMINOPHEN 400 MILLIGRAM(S): 325 TABLET ORAL at 01:16

## 2024-08-16 RX ADMIN — Medication 100 GRAM(S): at 04:23

## 2024-08-16 RX ADMIN — ESCITALOPRAM OXALATE 10 MILLIGRAM(S): 10 TABLET ORAL at 12:33

## 2024-08-16 RX ADMIN — Medication 40 MILLIEQUIVALENT(S): at 04:23

## 2024-08-16 RX ADMIN — ZONISAMIDE 50 MILLIGRAM(S): 100 CAPSULE ORAL at 12:33

## 2024-08-16 RX ADMIN — SODIUM CHLORIDE 500 MILLILITER(S): 9 INJECTION INTRAMUSCULAR; INTRAVENOUS; SUBCUTANEOUS at 01:05

## 2024-08-16 RX ADMIN — Medication 100 MILLIEQUIVALENT(S): at 04:49

## 2024-08-16 RX ADMIN — Medication 3 MILLIGRAM(S): at 21:13

## 2024-08-16 RX ADMIN — Medication 100 MILLIEQUIVALENT(S): at 07:34

## 2024-08-16 RX ADMIN — ENOXAPARIN SODIUM 40 MILLIGRAM(S): 100 INJECTION SUBCUTANEOUS at 21:12

## 2024-08-16 RX ADMIN — LISINOPRIL 2.5 MILLIGRAM(S): 10 TABLET ORAL at 12:33

## 2024-08-16 RX ADMIN — Medication 100 MILLIEQUIVALENT(S): at 06:05

## 2024-08-16 RX ADMIN — ACETAMINOPHEN 1000 MILLIGRAM(S): 325 TABLET ORAL at 07:30

## 2024-08-16 RX ADMIN — ACETAMINOPHEN 650 MILLIGRAM(S): 325 TABLET ORAL at 19:00

## 2024-08-16 RX ADMIN — Medication 5 MILLIGRAM(S): at 05:28

## 2024-08-16 RX ADMIN — Medication 1 GRAM(S): at 05:06

## 2024-08-16 RX ADMIN — Medication 100 MILLIEQUIVALENT(S): at 06:00

## 2024-08-16 RX ADMIN — Medication 20 MILLIGRAM(S): at 21:11

## 2024-08-16 NOTE — PATIENT PROFILE ADULT - FUNCTIONAL ASSESSMENT - BASIC MOBILITY SCORE.
"Requested Prescriptions   Pending Prescriptions Disp Refills     Contour Next EZ (CONTOUR NEXT EZ W/DEVICE KIT) w/Device KIT [Pharmacy Med Name: Contour Next EZ Meter]  0     Sig: AS DIRECTED       Diabetic Supplies Protocol Failed - 12/22/2020  6:05 PM        Failed - Medication is active on med list        Failed - Recent (6 mo) or future (30 days) visit within the authorizing provider's specialty     Patient had office visit in the last 6 months or has a visit in the next 30 days with authorizing provider.  See \"Patient Info\" tab in inbasket, or \"Choose Columns\" in Meds & Orders section of the refill encounter.            Passed - Patient is 18 years of age or older           "
6

## 2024-08-16 NOTE — ED PROVIDER NOTE - PHYSICAL EXAMINATION
CONSTITUTIONAL: non-toxic, NAD  SKIN: no rash, no petechiae.  EYES: pink conjunctiva, anicteric  ENT: tongue and uvular midline, no exudates, mildly dry mucous membranes  NECK: Supple; no meningismus, no JVD  CARD: RRR, no murmurs, equal radial pulses bilaterally 2+  RESP: CTAB, no respiratory distress  ABD: Soft, non-tender, mildly distended, no peritoneal signs  EXT: Normal ROM x4. No edema.   NEURO: Alert, oriented. Neuro exam nonfocal  PSYCH: Cooperative, appropriate.

## 2024-08-16 NOTE — H&P ADULT - ATTENDING COMMENTS
Vital Signs Last 24 Hrs  T(C): 37.3 (16 Aug 2024 00:00), Max: 37.3 (16 Aug 2024 00:00)  T(F): 99.2 (16 Aug 2024 00:00), Max: 99.2 (16 Aug 2024 00:00)  HR: 71 (16 Aug 2024 00:00) (71 - 71)  BP: 177/73 (16 Aug 2024 00:00) (177/73 - 177/73)  RR: 18 (16 Aug 2024 00:00) (18 - 18)  SpO2: 96% (16 Aug 2024 00:00) (96% - 96%)  Parameters below as of 16 Aug 2024 00:00  Patient On (Oxygen Delivery Method): room air    Labs  reviewed  stable hgb  Na 141  K 3  BUN/Cr stable at baseline  UA - unremarkable      CT abdomen  Findings compatible with colonic ileus, with evidence of diarrhea superimposed upon more proximal constipation.    Skin thickening and subcutaneous edema superficial to the distal sacrum and coccyx, new. Adjacent presacral stranding as well. A developing sacral ulcer or cellulitis could both appear this way.    Impression   89 year old bedbound woman with medical hx including HTN, HLD, DM2,  dementia presenting with a few days of abdominal pain, non bloody emesis and multiple episodes of watery stooling but noted on abdominal imaging to have ileus with proximal constipation with reactive diarrhea likely from bacterial breakdown of impacted stool.     Problems   Ileus   Severe constipation   Hypokalemia  CKD   Chronic medical conditions Vital Signs Last 24 Hrs  T(C): 37.3 (16 Aug 2024 00:00), Max: 37.3 (16 Aug 2024 00:00)  T(F): 99.2 (16 Aug 2024 00:00), Max: 99.2 (16 Aug 2024 00:00)  HR: 71 (16 Aug 2024 00:00) (71 - 71)  BP: 177/73 (16 Aug 2024 00:00) (177/73 - 177/73)  RR: 18 (16 Aug 2024 00:00) (18 - 18)  SpO2: 96% (16 Aug 2024 00:00) (96% - 96%)  Parameters below as of 16 Aug 2024 00:00  Patient On (Oxygen Delivery Method): room air    Labs  reviewed  stable hgb  Na 141  K 3  BUN/Cr stable at baseline  UA - unremarkable      CT abdomen  Findings compatible with colonic ileus, with evidence of diarrhea superimposed upon more proximal constipation.    Skin thickening and subcutaneous edema superficial to the distal sacrum and coccyx, new. Adjacent presacral stranding as well. A developing sacral ulcer or cellulitis could both appear this way.    Impression   89 year old bedbound woman with medical hx including HTN, HLD, DM2,  dementia presenting with a few days of abdominal pain, non bloody emesis and multiple episodes of watery stooling but noted on abdominal imaging to have ileus with proximal constipation with reactive diarrhea likely from bacterial breakdown of constipated stool.     Problems   Ileus likely related to electrolyte deficits  Severe constipation   Hypokalemia  CKD   Chronic medical conditions    Plan   Admit to Medicine   K+ correction   Gentle IV hydration  Laxative for constipation  Medication reconciliation   Other plans as above

## 2024-08-16 NOTE — H&P ADULT - PROBLEM SELECTOR PLAN 7
hx of dementia aaox1  family states that takes escitalopram 10 which helps her  -c/w home meds c/w home meds

## 2024-08-16 NOTE — ED PROVIDER NOTE - PROGRESS NOTE DETAILS
Lucks-DO: potassium 3.0, repletion ordered, added on magnesium and phosphorus. Stool studies ordered, no BMs in ED. CT showing ileus, ?secondary to metabolic derangement. Discussed with hospitalist and MAR re: admission.

## 2024-08-16 NOTE — ED ADULT TRIAGE NOTE - CHIEF COMPLAINT QUOTE
she was off the BP meds for 3 weeks , she has been having diarrhea for the past 3 days , family called ems for high /80

## 2024-08-16 NOTE — ED PROVIDER NOTE - OBJECTIVE STATEMENT
Patient declined , daughter at bedside assisting with interpretation.    89-year-old female with past medical history hypertension, hyperlipidemia, dementia, bedbound presents with diarrhea x 3 to 4 days.  Patient with daughter, reports 6+ episodes of watery diarrhea daily, occasional vomiting, and elevated blood pressure.  Daughter states patient has not taken any of her home medications with the past 3 weeks as she is bedbound and unable to see her primary care doctor for refills.  Denies any fevers, difficulty breathing, chest pain, bloody stools, black tarry stools, change in urination, or rash.  Denies any recent hospitalizations, recent travel, or ill contacts.  Denies any additional complaints.

## 2024-08-16 NOTE — ED ADULT NURSE NOTE - NSFALLHARMRISKINTERV_ED_ALL_ED
Assistance OOB with selected safe patient handling equipment if applicable/Assistance with ambulation/Communicate risk of Fall with Harm to all staff, patient, and family/Monitor gait and stability/Monitor for mental status changes and reorient to person, place, and time, as needed/Move patient closer to nursing station/within visual sight of ED staff/Provide patient with walking aids/Provide visual cue: red socks, yellow wristband, yellow gown, etc/Reinforce activity limits and safety measures with patient and family/Toileting schedule using arm’s reach rule for commode and bathroom/Use of alarms - bed, stretcher, chair and/or video monitoring/Bed in lowest position, wheels locked, appropriate side rails in place/Call bell, personal items and telephone in reach/Instruct patient to call for assistance before getting out of bed/chair/stretcher/Non-slip footwear applied when patient is off stretcher/[place] to call system/Physically safe environment - no spills, clutter or unnecessary equipment/Purposeful Proactive Rounding/Room/bathroom lighting operational, light cord in reach

## 2024-08-16 NOTE — H&P ADULT - PROBLEM SELECTOR PLAN 2
on labs K at 3.0  likely from Gi losses  in the ED 50 meq of K given  will give additional riders due to GI losses  -f/u bmp

## 2024-08-16 NOTE — ED PROVIDER NOTE - CLINICAL SUMMARY MEDICAL DECISION MAKING FREE TEXT BOX
Joel: 89-year-old female with past medical history hypertension, hyperlipidemia, dementia, bedbound presents with diarrhea x 3 to 4 days.  Patient with daughter, reports 6+ episodes of watery diarrhea daily, occasional vomiting, and elevated blood pressure.  Daughter states patient has not taken any of her home medications with the past 3 weeks as she is bedbound and unable to see her primary care doctor for refills.  Denies any fevers, difficulty breathing, chest pain, bloody stools, black tarry stools, change in urination, or rash.  Denies any recent hospitalizations, recent travel, or ill contacts.  Physical exam per above. BP on arrival 177/73, abdomen mildly distended. Will obtain labs r/o electrolyte abnormality r/o HUGO, imaging r/o colitis r/o obstruction, provide supportive treatment with dispo pending workup.

## 2024-08-16 NOTE — PROGRESS NOTE ADULT - PROBLEM SELECTOR PLAN 6
Hx of HTN on lisinopril at home  --180s in ED   -S/P Hydralazine 5mg in ED   -C/w home meds Hx of HTN on lisinopril 2.5mg at home  --180s in ED   -S/P Hydralazine 5mg x2 doses given  -B/p remains elevated  -Increase lisinopril to 5mg daily

## 2024-08-16 NOTE — H&P ADULT - PROBLEM SELECTOR PLAN 1
-patient comes with a 3-4 day hx of abdominal pain  -has had multiple episodes of diarrhea and no solid stool in the past week  -took 4 pills of amoxicillin for the diarrhea episodes   -CT a/p Findings compatible with colonic ileus, with evidence of diarrhea superimposed upon more proximal constipation. No obstruction noted  -ileus could be in the setting of proximal contipation, electrolyte imbalance or C.diff due to recent abx  -will keep NPO except meds   -IV hydration -patient comes with a 3-4 day hx of abdominal pain  -has had multiple episodes of diarrhea and no solid stool in the past week  -took 4 pills of amoxicillin for the diarrhea episodes   -CT a/p Findings compatible with colonic ileus, with evidence of diarrhea superimposed upon more proximal constipation. No obstruction noted  -ileus could be in the setting of proximal contipation, electrolyte imbalance or C.diff due to recent abx  -will keep NPO except meds   -IV hydration  -senna miralax for BMs -patient comes with a 3-4 day hx of abdominal pain  -has had multiple episodes of diarrhea and no solid stool in the past week  -took 4 pills of amoxicillin for the diarrhea episodes   -CT a/p Findings compatible with colonic ileus, with evidence of diarrhea superimposed upon more proximal constipation. No obstruction noted  -ileus could be in the setting of proximal constipation, electrolyte imbalance or C.diff due to recent abx  -will keep NPO except meds   -IV hydration  -senna Miralax for BMs

## 2024-08-16 NOTE — PHYSICAL THERAPY INITIAL EVALUATION ADULT - GENERAL OBSERVATIONS, REHAB EVAL
elderly female pt, in care of daughter bedside, social info obtain non amb 7 months, presented b knee limited joint mobility, assists required for bedmobility activities

## 2024-08-16 NOTE — H&P ADULT - PROBLEM SELECTOR PLAN 6
c/w home meds   dash diet c/w home meds in the ED BP in the 170s  will give hydralazine 5mg   c/w home meds

## 2024-08-16 NOTE — ED PROVIDER NOTE - NSICDXPASTMEDICALHX_GEN_ALL_CORE_FT
PAST MEDICAL HISTORY:  Dementia     DM (diabetes mellitus)     HLD (hyperlipidemia)     HTN (hypertension)

## 2024-08-16 NOTE — PATIENT PROFILE ADULT - FALL HARM RISK - HARM RISK INTERVENTIONS

## 2024-08-16 NOTE — ED ADULT NURSE NOTE - OBJECTIVE STATEMENT
Pt c/o diarrhea x 3 days , A&O x2. As per daughter pt has high B/P. Denies pain, N&V, dizziness, & SOB.

## 2024-08-16 NOTE — H&P ADULT - PROBLEM SELECTOR PLAN 5
in the ED BP in the 170s  will give hydralazine 5mg   c/w home meds   dash diet in the ED BP in the 170s  will give hydralazine 5mg   c/w home meds c/w home meds

## 2024-08-16 NOTE — H&P ADULT - HISTORY OF PRESENT ILLNESS
Patient is a 89 yo F with hx of dementia AAOx1, HTN, HLD, seizures that for the past 3-4 days has been having abdominal pain and diarrhea. Family at bedside also endorsing that has vomitated in the past couple of days. Per family they see that the pain is intermittent in nature and for the past couple of days has not been eating or sleeping. Patient has not had a solid BM since 2 weeks ago. Family endorses that patient got 4 pills of amoxicillin that was given by a family friend for the diarrhea. Family states also that when patient started taking this abx started having bubbly vaginal discahrge that stopped once stopped taking amoxicillin. Patient is a poor historian and denies any other symtpoms. Family also endorsing that BP at home was taken and BP was 200/80.

## 2024-08-16 NOTE — H&P ADULT - NSHPPHYSICALEXAM_GEN_ALL_CORE
PHYSICAL EXAMINATION:  GENERAL: NAD  HEAD:  Atraumatic, Normocephalic  EYES:  conjunctiva and sclera clear  NECK: Supple, No JVD, Normal thyroid  CHEST/LUNG: Clear to auscultation. Clear to percussion bilaterally; No rales, rhonchi, wheezing, or rubs  HEART: Regular rate and rhythm; No murmurs, rubs, or gallops  ABDOMEN: mildly distended, tender in all quadrants, peristalsis was minimal  NERVOUS SYSTEM:  Alert & Oriented X1  EXTREMITIES:  2+ Peripheral Pulses, No clubbing, cyanosis, or edema  SKIN: superficial ulcer noted on right shoulder and sacrum

## 2024-08-16 NOTE — H&P ADULT - PROBLEM SELECTOR PLAN 4
c/w home meds -patient comes with a 3-4 day hx of abdominal pain  -has had multiple episodes of diarrhea and no solid stool in the past week  Likely reactive  -took 4 pills of amoxicillin for the diarrhea episodes  minimal stool studies  No need for isolation

## 2024-08-16 NOTE — H&P ADULT - PROBLEM SELECTOR PLAN 8
DVT prophy  lovenox hx of dementia aaox1  family states that takes escitalopram 10 which helps her  -c/w home meds

## 2024-08-16 NOTE — H&P ADULT - PROBLEM SELECTOR PLAN 3
-patient comes with a 3-4 day hx of abdominal pain  -has had multiple episodes of diarrhea and no solid stool in the past week  -took 4 pills of amoxicillin for the diarrhea episodes   -will r/o C.diff  -isolation precautions  -Stool studies -patient comes with a 3-4 day hx of abdominal pain  -has had multiple episodes of diarrhea and no solid stool in the past week  Likely reactive  -took 4 pills of amoxicillin for the diarrhea episodes   -Stool studies Proximal constipation   likely related to immobility and dehydration   laxative ordered

## 2024-08-17 ENCOUNTER — TRANSCRIPTION ENCOUNTER (OUTPATIENT)
Age: 89
End: 2024-08-17

## 2024-08-17 DIAGNOSIS — G81.91 HEMIPLEGIA, UNSPECIFIED AFFECTING RIGHT DOMINANT SIDE: ICD-10-CM

## 2024-08-17 DIAGNOSIS — S30.810A ABRASION OF LOWER BACK AND PELVIS, INITIAL ENCOUNTER: ICD-10-CM

## 2024-08-17 DIAGNOSIS — L08.9 LOCAL INFECTION OF THE SKIN AND SUBCUTANEOUS TISSUE, UNSPECIFIED: ICD-10-CM

## 2024-08-17 LAB
ALBUMIN SERPL ELPH-MCNC: 2.9 G/DL — LOW (ref 3.5–5)
ALP SERPL-CCNC: 87 U/L — SIGNIFICANT CHANGE UP (ref 40–120)
ALT FLD-CCNC: 16 U/L DA — SIGNIFICANT CHANGE UP (ref 10–60)
ANION GAP SERPL CALC-SCNC: 6 MMOL/L — SIGNIFICANT CHANGE UP (ref 5–17)
ANION GAP SERPL CALC-SCNC: 7 MMOL/L — SIGNIFICANT CHANGE UP (ref 5–17)
APPEARANCE UR: ABNORMAL
APTT BLD: 31.5 SEC — SIGNIFICANT CHANGE UP (ref 24.5–35.6)
AST SERPL-CCNC: 10 U/L — SIGNIFICANT CHANGE UP (ref 10–40)
BACTERIA # UR AUTO: ABNORMAL /HPF
BASOPHILS # BLD AUTO: 0.03 K/UL — SIGNIFICANT CHANGE UP (ref 0–0.2)
BASOPHILS NFR BLD AUTO: 0.5 % — SIGNIFICANT CHANGE UP (ref 0–2)
BILIRUB SERPL-MCNC: 0.6 MG/DL — SIGNIFICANT CHANGE UP (ref 0.2–1.2)
BILIRUB UR-MCNC: NEGATIVE — SIGNIFICANT CHANGE UP
BUN SERPL-MCNC: 14 MG/DL — SIGNIFICANT CHANGE UP (ref 7–18)
BUN SERPL-MCNC: 14 MG/DL — SIGNIFICANT CHANGE UP (ref 7–18)
CALCIUM SERPL-MCNC: 8.6 MG/DL — SIGNIFICANT CHANGE UP (ref 8.4–10.5)
CALCIUM SERPL-MCNC: 8.7 MG/DL — SIGNIFICANT CHANGE UP (ref 8.4–10.5)
CHLORIDE SERPL-SCNC: 109 MMOL/L — HIGH (ref 96–108)
CHLORIDE SERPL-SCNC: 110 MMOL/L — HIGH (ref 96–108)
CO2 SERPL-SCNC: 27 MMOL/L — SIGNIFICANT CHANGE UP (ref 22–31)
CO2 SERPL-SCNC: 27 MMOL/L — SIGNIFICANT CHANGE UP (ref 22–31)
COLOR SPEC: YELLOW — SIGNIFICANT CHANGE UP
COMMENT - URINE: SIGNIFICANT CHANGE UP
CREAT SERPL-MCNC: 0.38 MG/DL — LOW (ref 0.5–1.3)
CREAT SERPL-MCNC: 0.45 MG/DL — LOW (ref 0.5–1.3)
DIFF PNL FLD: ABNORMAL
EGFR: 91 ML/MIN/1.73M2 — SIGNIFICANT CHANGE UP
EGFR: 95 ML/MIN/1.73M2 — SIGNIFICANT CHANGE UP
EOSINOPHIL # BLD AUTO: 0.13 K/UL — SIGNIFICANT CHANGE UP (ref 0–0.5)
EOSINOPHIL NFR BLD AUTO: 2 % — SIGNIFICANT CHANGE UP (ref 0–6)
EPI CELLS # UR: PRESENT
GLUCOSE BLDC GLUCOMTR-MCNC: 127 MG/DL — HIGH (ref 70–99)
GLUCOSE SERPL-MCNC: 109 MG/DL — HIGH (ref 70–99)
GLUCOSE SERPL-MCNC: 99 MG/DL — SIGNIFICANT CHANGE UP (ref 70–99)
GLUCOSE UR QL: NEGATIVE MG/DL — SIGNIFICANT CHANGE UP
HCT VFR BLD CALC: 33.7 % — LOW (ref 34.5–45)
HCT VFR BLD CALC: 33.9 % — LOW (ref 34.5–45)
HGB BLD-MCNC: 10.7 G/DL — LOW (ref 11.5–15.5)
HGB BLD-MCNC: 11.2 G/DL — LOW (ref 11.5–15.5)
IMM GRANULOCYTES NFR BLD AUTO: 1.1 % — HIGH (ref 0–0.9)
INR BLD: 0.98 RATIO — SIGNIFICANT CHANGE UP (ref 0.85–1.18)
KETONES UR-MCNC: NEGATIVE MG/DL — SIGNIFICANT CHANGE UP
LACTATE SERPL-SCNC: 1.9 MMOL/L — SIGNIFICANT CHANGE UP (ref 0.7–2)
LEUKOCYTE ESTERASE UR-ACNC: ABNORMAL
LYMPHOCYTES # BLD AUTO: 1.61 K/UL — SIGNIFICANT CHANGE UP (ref 1–3.3)
LYMPHOCYTES # BLD AUTO: 25 % — SIGNIFICANT CHANGE UP (ref 13–44)
MAGNESIUM SERPL-MCNC: 2.2 MG/DL — SIGNIFICANT CHANGE UP (ref 1.6–2.6)
MCHC RBC-ENTMCNC: 27.6 PG — SIGNIFICANT CHANGE UP (ref 27–34)
MCHC RBC-ENTMCNC: 28.6 PG — SIGNIFICANT CHANGE UP (ref 27–34)
MCHC RBC-ENTMCNC: 31.8 GM/DL — LOW (ref 32–36)
MCHC RBC-ENTMCNC: 33 GM/DL — SIGNIFICANT CHANGE UP (ref 32–36)
MCV RBC AUTO: 86.7 FL — SIGNIFICANT CHANGE UP (ref 80–100)
MCV RBC AUTO: 86.9 FL — SIGNIFICANT CHANGE UP (ref 80–100)
MONOCYTES # BLD AUTO: 0.39 K/UL — SIGNIFICANT CHANGE UP (ref 0–0.9)
MONOCYTES NFR BLD AUTO: 6.1 % — SIGNIFICANT CHANGE UP (ref 2–14)
NEUTROPHILS # BLD AUTO: 4.21 K/UL — SIGNIFICANT CHANGE UP (ref 1.8–7.4)
NEUTROPHILS NFR BLD AUTO: 65.3 % — SIGNIFICANT CHANGE UP (ref 43–77)
NITRITE UR-MCNC: NEGATIVE — SIGNIFICANT CHANGE UP
NRBC # BLD: 0 /100 WBCS — SIGNIFICANT CHANGE UP (ref 0–0)
NRBC # BLD: 0 /100 WBCS — SIGNIFICANT CHANGE UP (ref 0–0)
PH UR: 7.5 — SIGNIFICANT CHANGE UP (ref 5–8)
PHOSPHATE SERPL-MCNC: 3.6 MG/DL — SIGNIFICANT CHANGE UP (ref 2.5–4.5)
PLATELET # BLD AUTO: 309 K/UL — SIGNIFICANT CHANGE UP (ref 150–400)
PLATELET # BLD AUTO: 331 K/UL — SIGNIFICANT CHANGE UP (ref 150–400)
POTASSIUM SERPL-MCNC: 4.1 MMOL/L — SIGNIFICANT CHANGE UP (ref 3.5–5.3)
POTASSIUM SERPL-MCNC: 4.3 MMOL/L — SIGNIFICANT CHANGE UP (ref 3.5–5.3)
POTASSIUM SERPL-SCNC: 4.1 MMOL/L — SIGNIFICANT CHANGE UP (ref 3.5–5.3)
POTASSIUM SERPL-SCNC: 4.3 MMOL/L — SIGNIFICANT CHANGE UP (ref 3.5–5.3)
PROT SERPL-MCNC: 6.2 G/DL — SIGNIFICANT CHANGE UP (ref 6–8.3)
PROT UR-MCNC: NEGATIVE MG/DL — SIGNIFICANT CHANGE UP
PROTHROM AB SERPL-ACNC: 11.2 SEC — SIGNIFICANT CHANGE UP (ref 9.5–13)
RBC # BLD: 3.88 M/UL — SIGNIFICANT CHANGE UP (ref 3.8–5.2)
RBC # BLD: 3.91 M/UL — SIGNIFICANT CHANGE UP (ref 3.8–5.2)
RBC # FLD: 15.9 % — HIGH (ref 10.3–14.5)
RBC # FLD: 16 % — HIGH (ref 10.3–14.5)
RBC CASTS # UR COMP ASSIST: 6 /HPF — HIGH (ref 0–4)
SODIUM SERPL-SCNC: 143 MMOL/L — SIGNIFICANT CHANGE UP (ref 135–145)
SODIUM SERPL-SCNC: 143 MMOL/L — SIGNIFICANT CHANGE UP (ref 135–145)
SP GR SPEC: 1.01 — SIGNIFICANT CHANGE UP (ref 1–1.03)
UROBILINOGEN FLD QL: 2 MG/DL (ref 0.2–1)
WBC # BLD: 6.25 K/UL — SIGNIFICANT CHANGE UP (ref 3.8–10.5)
WBC # BLD: 6.44 K/UL — SIGNIFICANT CHANGE UP (ref 3.8–10.5)
WBC # FLD AUTO: 6.25 K/UL — SIGNIFICANT CHANGE UP (ref 3.8–10.5)
WBC # FLD AUTO: 6.44 K/UL — SIGNIFICANT CHANGE UP (ref 3.8–10.5)
WBC UR QL: 7 /HPF — HIGH (ref 0–5)

## 2024-08-17 PROCEDURE — 99233 SBSQ HOSP IP/OBS HIGH 50: CPT

## 2024-08-17 PROCEDURE — 74019 RADEX ABDOMEN 2 VIEWS: CPT | Mod: 26

## 2024-08-17 RX ORDER — LISINOPRIL 10 MG/1
2.5 TABLET ORAL ONCE
Refills: 0 | Status: COMPLETED | OUTPATIENT
Start: 2024-08-17 | End: 2024-08-17

## 2024-08-17 RX ORDER — VANCOMYCIN/0.9 % SOD CHLORIDE 1.75G/25
1000 PLASTIC BAG, INJECTION (ML) INTRAVENOUS ONCE
Refills: 0 | Status: COMPLETED | OUTPATIENT
Start: 2024-08-17 | End: 2024-08-17

## 2024-08-17 RX ADMIN — LISINOPRIL 5 MILLIGRAM(S): 10 TABLET ORAL at 05:16

## 2024-08-17 RX ADMIN — ESCITALOPRAM OXALATE 10 MILLIGRAM(S): 10 TABLET ORAL at 11:39

## 2024-08-17 RX ADMIN — Medication 3 MILLIGRAM(S): at 22:03

## 2024-08-17 RX ADMIN — ENOXAPARIN SODIUM 40 MILLIGRAM(S): 100 INJECTION SUBCUTANEOUS at 22:05

## 2024-08-17 RX ADMIN — Medication 1000 MILLILITER(S): at 13:32

## 2024-08-17 RX ADMIN — Medication 2 TABLET(S): at 22:03

## 2024-08-17 RX ADMIN — ACETAMINOPHEN 650 MILLIGRAM(S): 325 TABLET ORAL at 11:39

## 2024-08-17 RX ADMIN — Medication 20 MILLIGRAM(S): at 22:03

## 2024-08-17 RX ADMIN — ZONISAMIDE 50 MILLIGRAM(S): 100 CAPSULE ORAL at 11:38

## 2024-08-17 RX ADMIN — LISINOPRIL 2.5 MILLIGRAM(S): 10 TABLET ORAL at 22:05

## 2024-08-17 RX ADMIN — Medication 250 MILLIGRAM(S): at 14:14

## 2024-08-17 NOTE — DISCHARGE NOTE PROVIDER - NSDCMRMEDTOKEN_GEN_ALL_CORE_FT
atorvastatin 20 mg oral tablet: 1 tab(s) orally once a day  escitalopram 10 mg oral tablet: 1 tab(s) orally once a day  lisinopril 2.5 mg oral tablet: 1 tab(s) orally once a day  zonisamide 50 mg oral capsule: 1 cap(s) orally once a day   atorvastatin 20 mg oral tablet: 1 tab(s) orally once a day  escitalopram 10 mg oral tablet: 1 tab(s) orally once a day  lactulose 10 g/15 mL oral syrup: 15 milliliter(s) orally 2 times a day  lisinopril 20 mg oral tablet: 1 tab(s) orally once a day  senna leaf extract oral tablet: 2 tab(s) orally once a day (at bedtime)  zonisamide 50 mg oral capsule: 1 cap(s) orally once a day

## 2024-08-17 NOTE — DISCHARGE NOTE PROVIDER - NSDCFUADDAPPT_GEN_ALL_CORE_FT
APPTS ARE READY TO BE MADE: [X] YES    Best Family or Patient Contact (if needed):    Additional Information about above appointments (if needed):    1: [facility] MD with Dr. Nunez  Other comments or requests:    APPTS ARE READY TO BE MADE: [X] YES    Best Family or Patient Contact (if needed):    Additional Information about above appointments (if needed):    1: [facility] MD with Dr. Nunez  Other comments or requests:           8/22- patients' granddaughter Sanjuana stated they are  following -up with their insurance company, who already have a home visit doctors for the patient.

## 2024-08-17 NOTE — DISCHARGE NOTE PROVIDER - CARE PROVIDER_API CALL
demetria raya  [id number]  [facility] [address]  Phone: [phone]  Fax: (   )    -  Follow Up Time: 1 week   [facility] Call[facility], MD Dr. Enrique Chavez [facility]  [phone]  Phone: (   )    -  Fax: (   )    -  Follow Up Time: 1 week

## 2024-08-17 NOTE — DIETITIAN INITIAL EVALUATION ADULT - NSFNSPHYEXAMSKINFT_GEN_A_CORE
Pressure Injury 1: sacrum, Stage II  Pressure Injury 2: Right:, shoulder, Suspected deep tissue injury

## 2024-08-17 NOTE — DISCHARGE NOTE PROVIDER - NSDCCPCAREPLAN_GEN_ALL_CORE_FT
PRINCIPAL DISCHARGE DIAGNOSIS  Diagnosis: Ileus  Assessment and Plan of Treatment: you initially presented to the hospital for abdominal pain and diarrhea  this was likely caused by an ileus and you were found to have +Enteropathogenic E. Coli bacteria in your stool   What is an ileus? An ileus is a condition that develops when the muscles of your intestines stop maryana. This causes a blockage that prevents food and waste from passing through normally.  What are the signs and symptoms of an ileus?  Decreased or no passage of gas or bowel movements  Abdominal pain and bloating  Nausea or vomiting  Decreased appetite or inability to eat  Laxatives may help your intestines work properly again. They may also help soften your bowel movement to make it easier to pass.  continue taking senna 2 tabs at bedtime  continue taking lactulose 15 ml twice a day  follow up with your Primary Care Doctor in 1 week  When should I seek immediate care?  You have a fever and severe abdominal pain or bloating.  You have blood in your bowel movement.  Your heart is pounding or racing.        SECONDARY DISCHARGE DIAGNOSES  Diagnosis: Enteropathogenic Escherichia coli infection  Assessment and Plan of Treatment: Your stool studies showed that you had Enteropathogenic E. Coli which can cause diarrhea  this bacteria can be contracted in contaminated food and water or dirty objects  stay hydrated and drink plenty of water 6-8 glasses a day  please wash your hands with soap and water after toileting  this will slowly resolve on its own    Diagnosis: Uncontrolled hypertension  Assessment and Plan of Treatment: you were found to have elevated blood pressures in the hospital   your lisinopril was increased to 20 mg daily  follow up with your primary care doctor or cardiologist.  try to take your blood pressure readings twice a day, morning and night time.   Notify your doctor if you have any of the following symptoms:   Dizziness, Lightheadedness, Blurry vision, Headache, Chest pain, Shortness of breath      Diagnosis: Electrolyte imbalance  Assessment and Plan of Treatment: you were found to have recent low potassium levels likely due to diarrhea  you were given potassium replacements and now back to normal.    Diagnosis: Seizures  Assessment and Plan of Treatment: continue taking your home medication zonisamide 50 mg daily    Diagnosis: HLD (hyperlipidemia)  Assessment and Plan of Treatment: continue taking your home medication atorvastatin 20 mg at bedtime    Diagnosis: Dementia  Assessment and Plan of Treatment: continue taking your home medication escitalopram 10 mg daily     PRINCIPAL DISCHARGE DIAGNOSIS  Diagnosis: Ileus  Assessment and Plan of Treatment: you initially presented to the hospital for abdominal pain and diarrhea  this was likely caused by an ileus and you were found to have +Enteropathogenic E. Coli bacteria in your stool   What is an ileus? An ileus is a condition that develops when the muscles of your intestines stop maryana. This causes a blockage that prevents food and waste from passing through normally.  What are the signs and symptoms of an ileus?  Decreased or no passage of gas or bowel movements  Abdominal pain and bloating  Nausea or vomiting  Decreased appetite or inability to eat  Laxatives may help your intestines work properly again. They may also help soften your bowel movement to make it easier to pass.  continue taking senna 2 tabs at bedtime  continue taking lactulose 15 ml twice a day  follow up with your Primary Care Doctor in 1 week  When should I seek immediate care?  You have a fever and severe abdominal pain or bloating.  You have blood in your bowel movement.  Your heart is pounding or racing.        SECONDARY DISCHARGE DIAGNOSES  Diagnosis: Enteropathogenic Escherichia coli infection  Assessment and Plan of Treatment: Your stool studies showed that you had Enteropathogenic E. Coli which can cause diarrhea  this bacteria can be contracted in contaminated food and water or dirty objects  stay hydrated and drink plenty of water 6-8 glasses a day  please wash your hands with soap and water after toileting  this will slowly resolve on its own    Diagnosis: Uncontrolled hypertension  Assessment and Plan of Treatment: you were found to have elevated blood pressures in the hospital   your lisinopril was increased to 20 mg daily  follow up with your primary care doctor or cardiologist.  try to take your blood pressure readings twice a day, morning and night time.   Notify your doctor if you have any of the following symptoms:   Dizziness, Lightheadedness, Blurry vision, Headache, Chest pain, Shortness of breath      Diagnosis: Electrolyte imbalance  Assessment and Plan of Treatment: you were found to have recent low potassium levels likely due to diarrhea  you were given potassium replacements and now back to normal.    Diagnosis: Seizures  Assessment and Plan of Treatment: continue taking your home medication zonisamide 50 mg daily    Diagnosis: HLD (hyperlipidemia)  Assessment and Plan of Treatment: continue taking your home medication atorvastatin 20 mg at bedtime    Diagnosis: Dementia  Assessment and Plan of Treatment: continue taking your home medication escitalopram 10 mg daily    Diagnosis: Functional quadriplegia  Assessment and Plan of Treatment: you are bedbound  Functional quadriplegia is the complete inability to move due to severe disability or frailty caused by another medical condition without physical injury or damage to the spinal cord, this is likely due to your advanced dementia.  to prevent skin breakdown due to laying in bed for prolonged periods of time, try to turn and reposition frequently every 2 hours, clean with water and soap after toileting.  you are at risk of falls and aspiration      Diagnosis: Pressure ulcer of sacral region, stage 2  Assessment and Plan of Treatment: clean the wound daily  you can take tylenol 650 mg every 6 hours as needed for pain  follow up with your primary care doctor  see above     PRINCIPAL DISCHARGE DIAGNOSIS  Diagnosis: Ileus  Assessment and Plan of Treatment: you initially presented to the hospital for abdominal pain and diarrhea  this was likely caused by an ileus and you were found to have +Enteropathogenic E. Coli bacteria in your stool   this is now resolved with laxatives in the hospital  What is an ileus? An ileus is a condition that develops when the muscles of your intestines stop maryana. This causes a blockage that prevents food and waste from passing through normally.  What are the signs and symptoms of an ileus?  Decreased or no passage of gas or bowel movements  Abdominal pain and bloating  Nausea or vomiting  Decreased appetite or inability to eat  Laxatives may help your intestines work properly again. They may also help soften your bowel movement to make it easier to pass.  continue taking senna 2 tabs at bedtime  continue taking lactulose 15 ml twice a day  you reported that due to being bed bound you are unable to visit primary care doctor  you are now referred to a [facility] Doctor Program   When should I seek immediate care?  You have a fever and severe abdominal pain or bloating.  You have blood in your bowel movement.  Your heart is pounding or racing.        SECONDARY DISCHARGE DIAGNOSES  Diagnosis: Enteropathogenic Escherichia coli infection  Assessment and Plan of Treatment: Your stool studies showed that you had Enteropathogenic E. Coli which can cause diarrhea  this bacteria can be contracted in contaminated food and water or dirty objects  stay hydrated and drink plenty of water 6-8 glasses a day  please wash your hands with soap and water after toileting  this will slowly resolve on its own    Diagnosis: Uncontrolled hypertension  Assessment and Plan of Treatment: you were found to have elevated blood pressures in the hospital   your lisinopril was increased to 20 mg daily  follow up with your primary care doctor or cardiologist.  try to take your blood pressure readings twice a day, morning and night time.   Notify your doctor if you have any of the following symptoms:   Dizziness, Lightheadedness, Blurry vision, Headache, Chest pain, Shortness of breath      Diagnosis: Electrolyte imbalance  Assessment and Plan of Treatment: you were found to have recent low potassium levels likely due to diarrhea  you were given potassium replacements and now back to normal.    Diagnosis: Seizures  Assessment and Plan of Treatment: continue taking your home medication zonisamide 50 mg daily  a 3 month prescription was sent with refills  you reported that due to being bed bound you are unable to visit primary care doctor  you are now referred to a [facility] Doctor Program    Diagnosis: HLD (hyperlipidemia)  Assessment and Plan of Treatment: continue taking your home medication atorvastatin 20 mg at bedtime    Diagnosis: Dementia  Assessment and Plan of Treatment: continue taking your home medication escitalopram 10 mg daily    Diagnosis: Functional quadriplegia  Assessment and Plan of Treatment: you are bedbound  Functional quadriplegia is the complete inability to move due to severe disability or frailty caused by another medical condition without physical injury or damage to the spinal cord, this is likely due to your advanced dementia.  to prevent skin breakdown due to laying in bed for prolonged periods of time, try to turn and reposition frequently every 2 hours, clean with water and soap after toileting.  you are at risk of falls and aspiration      Diagnosis: Pressure ulcer of sacral region, stage 2  Assessment and Plan of Treatment: clean the wound daily  you can take tylenol 650 mg every 6 hours as needed for pain  follow up with your primary care doctor  you will be provided with home nursing care from [facility]  see above     PRINCIPAL DISCHARGE DIAGNOSIS  Diagnosis: Ileus  Assessment and Plan of Treatment: you initially presented to the hospital for abdominal pain and diarrhea  this was likely caused by an ileus and you were found to have +Enteropathogenic E. Coli bacteria in your stool   this is now resolved with laxatives in the hospital  What is an ileus? An ileus is a condition that develops when the muscles of your intestines stop maryana. This causes a blockage that prevents food and waste from passing through normally.  What are the signs and symptoms of an ileus?  Decreased or no passage of gas or bowel movements  Abdominal pain and bloating  Nausea or vomiting  Decreased appetite or inability to eat  Laxatives may help your intestines work properly again. They may also help soften your bowel movement to make it easier to pass.  continue taking senna 2 tabs at bedtime  continue taking lactulose 15 ml twice a day  you reported that due to being bed bound you are unable to visit primary care doctor  you are now referred to a [facility] Doctor Program   When should I seek immediate care?  You have a fever and severe abdominal pain or bloating.  You have blood in your bowel movement.  Your heart is pounding or racing.        SECONDARY DISCHARGE DIAGNOSES  Diagnosis: Uncontrolled hypertension  Assessment and Plan of Treatment: you were found to have elevated blood pressures in the hospital   your lisinopril was increased to 20 mg daily  follow up with your primary care doctor or cardiologist.  try to take your blood pressure readings twice a day, morning and night time.   Notify your doctor if you have any of the following symptoms:   Dizziness, Lightheadedness, Blurry vision, Headache, Chest pain, Shortness of breath      Diagnosis: Electrolyte imbalance  Assessment and Plan of Treatment: you were found to have recent low potassium levels likely due to diarrhea  you were given potassium replacements and now back to normal.    Diagnosis: Seizures  Assessment and Plan of Treatment: continue taking your home medication zonisamide 50 mg daily  a 3 month prescription was sent with refills  you reported that due to being bed bound you are unable to visit primary care doctor  you are now referred to a [facility] Doctor Program    Diagnosis: HLD (hyperlipidemia)  Assessment and Plan of Treatment: continue taking your home medication atorvastatin 20 mg at bedtime    Diagnosis: Dementia  Assessment and Plan of Treatment: continue taking your home medication escitalopram 10 mg daily    Diagnosis: Enteropathogenic Escherichia coli infection  Assessment and Plan of Treatment: Your stool studies showed that you had Enteropathogenic E. Coli which can cause diarrhea  this bacteria can be contracted in contaminated food and water or dirty objects  stay hydrated and drink plenty of water 6-8 glasses a day    Diagnosis: Functional quadriplegia  Assessment and Plan of Treatment: you are bedbound  Functional quadriplegia is the complete inability to move due to severe disability or frailty caused by another medical condition without physical injury or damage to the spinal cord, this is likely due to your advanced dementia.  to prevent skin breakdown due to laying in bed for prolonged periods of time, try to turn and reposition frequently every 2 hours, clean with water and soap after toileting.  you are at risk of falls and aspiration      Diagnosis: Pressure ulcer of sacral region, stage 2  Assessment and Plan of Treatment: clean the wound daily  you can take tylenol 650 mg every 6 hours as needed for pain  follow up with your primary care doctor  you will be provided with home nursing care from [facility] care  see above

## 2024-08-17 NOTE — DIETITIAN INITIAL EVALUATION ADULT - PERTINENT MEDS FT
MEDICATIONS  (STANDING):  atorvastatin 20 milliGRAM(s) Oral at bedtime  enoxaparin Injectable 40 milliGRAM(s) SubCutaneous every 24 hours  escitalopram 10 milliGRAM(s) Oral daily  lisinopril 5 milliGRAM(s) Oral daily  melatonin 3 milliGRAM(s) Oral at bedtime  senna 2 Tablet(s) Oral at bedtime  sodium chloride 0.9%. 1000 milliLiter(s) (60 mL/Hr) IV Continuous <Continuous>  zonisamide 50 milliGRAM(s) Oral daily    MEDICATIONS  (PRN):  acetaminophen     Tablet .. 650 milliGRAM(s) Oral every 6 hours PRN Mild Pain (1 - 3)

## 2024-08-17 NOTE — DIETITIAN INITIAL EVALUATION ADULT - PERTINENT LABORATORY DATA
08-17    143  |  110<H>  |  14  ----------------------------<  99  4.1   |  27  |  0.38<L>    Ca    8.7      17 Aug 2024 05:44  Phos  3.6     08-17  Mg     2.2     08-17    TPro  6.3  /  Alb  2.9<L>  /  TBili  0.4  /  DBili  x   /  AST  14  /  ALT  19  /  AlkPhos  83  08-16  A1C with Estimated Average Glucose Result: 5.5 % (03-01-24 @ 12:10)

## 2024-08-17 NOTE — DIETITIAN INITIAL EVALUATION ADULT - ORAL NUTRITION SUPPLEMENTS
Recommend Ensure Clear (provides 240 kcal, 8 gm protein per 8oz serving) 2 x day to aid in Clear liquid diet + pressure injury

## 2024-08-17 NOTE — DISCHARGE NOTE PROVIDER - ATTENDING DISCHARGE PHYSICAL EXAMINATION:
Constitutional/General:  elderly, nad, vitals reviewed  EYE: Symmetrical pupils, conjunctiva clear   ENT: Good dentition, oropharynx clear  NECK: No visual masses, no JVD  CHEST: No respiratory distress, bilateral symmetrical chest rise  ABDOMEN: Nondistended, no visual masses  SKIN: No rash, warm, dry  NEURO: A+Ox3, Cranial nerves grossly intact

## 2024-08-17 NOTE — DIETITIAN INITIAL EVALUATION ADULT - LITERATURE/VIDEOS GIVEN
Encouraged pt's daughter to continue encouraging pt to eat as able. Emphasized the importance of eating adequate protein/calorie through PO meal intake/PO supplement intake.

## 2024-08-17 NOTE — DIETITIAN INITIAL EVALUATION ADULT - PROBLEM SELECTOR PLAN 4
-patient comes with a 3-4 day hx of abdominal pain  -has had multiple episodes of diarrhea and no solid stool in the past week  Likely reactive  -took 4 pills of amoxicillin for the diarrhea episodes  minimal stool studies  No need for isolation

## 2024-08-17 NOTE — DIETITIAN INITIAL EVALUATION ADULT - OTHER INFO
Pt is AAOx1 with hx of dementia. Pt is Czech speaking, daughter at bedside, spoke to daughter in english to attain diet history of pt. Pt's PO intake was good PTA, Pt does not take any vitamins or supplements PTA. Does not know pt's UBW however reported her weight has been stable with no recent significant weight changes. No recent episodes of vomiting, or constipation per pt. Pt reported feeling nauseous yesterday and having a diarrhea episode yesterday. Last BM noted on 8/17 per pt. Pt is on clear liquids, reported to be tolerating them "ok" right now. Food preferences explored and forwarded to dietary.

## 2024-08-17 NOTE — DISCHARGE NOTE PROVIDER - PROVIDER TOKENS
FREE:[LAST:[elver],FIRST:[demetria],PHONE:[phone]],FAX:[(   )    -],ADDRESS:[[address]  [place] [id number]],FOLLOWUP:[1 week]] FREE:[LAST:[[facility]],FIRST:[MD],PHONE:[(   )    -],FAX:[(   )    -],ADDRESS:[Dr. Enrique Chavez [facility]  [phone]],FOLLOWUP:[1 week]]

## 2024-08-17 NOTE — DISCHARGE NOTE PROVIDER - HOSPITAL COURSE
89 y/o F, from home with HHA 9hr/7days a week, with hx of dementia AAOx1, HTN, HLD, seizures that for the past 3-4 days has been having abdominal pain and diarrhea. Patient to be admitted for ileus and electrolyte imbalances. GI PCR was +Enteropathogenic E. Coli.     Constipation resolved on 8/20, pt had a bowel movement this morning. Pt is now medically optimized for discharge. Patient continues to have hypertension 140-180 systolic, now adjusted lisinopril to 20 mg daily.    89 y/o F, from home with HHA 9hr/7days a week, with hx of dementia AAOx1, HTN, HLD, seizures that for the past 3-4 days has been having abdominal pain and diarrhea. Patient to be admitted for ileus and electrolyte imbalances. GI PCR was +Enteropathogenic E. Coli.     Constipation resolved on 8/20, pt had a bowel movement this morning. Pt is now medically optimized for discharge. Patient continues to have hypertension 140-180 systolic, now adjusted lisinopril to 20 mg daily.     pt is bedbound, does not need home physical therapy, daughter uses [facility]. 89 y/o F, from home with [facility]HA 9hr/7days a week, with hx of dementia AAOx1, HTN, HLD, seizures that for the past 3-4 days has been having abdominal pain and diarrhea. Patient to be admitted for ileus and electrolyte imbalances. GI PCR was +Enteropathogenic E. Coli.     Constipation resolved on 8/20, pt had a bowel movement this morning. Pt is now medically optimized for discharge. Patient continues to have hypertension 140-180 systolic, now adjusted lisinopril to 20 mg daily.     pt is bedbound, does not need home physical therapy, daughter uses [facility].   Additionally family requesting to refill home med prescription zonisamide due to patient running out and PCP unable to renew due to bedbound status. 89 y/o F, from home with [facility]HA 9hr/7days a week, with hx of dementia AAOx1, HTN, HLD, seizures that for the past 3-4 days has been having abdominal pain and diarrhea. Patient to be admitted for ileus and electrolyte imbalances. GI PCR was +Enteropathogenic E. Coli.     Constipation resolved on 8/20, pt had a bowel movement this morning. Pt is now medically optimized for discharge. Patient continues to have hypertension 140-180 systolic, now adjusted lisinopril to 20 mg daily.     pt is bedbound, does not need home physical therapy, daughter uses [facility].   Additionally family requesting to refill home med prescription zonisamide due to patient running out and PCP unable to renew due to bedbound status.   Pt to be referred to [facility] - discussed with .   For the stage 2 sacral ulcer and right shoulder ulcer - pt is setup with home nursing care.    89 y/o F, from home with HHA 9hr/7days a week, with hx of dementia AAOx1, HTN, HLD, seizures that for the past 3-4 days has been having abdominal pain and diarrhea. Patient to be admitted for ileus and electrolyte imbalances. GI PCR was +Enteropathogenic E. Coli.     Constipation resolved on 8/20, pt had a bowel movement this morning. Pt is now medically optimized for discharge. Patient continues to have hypertension 140-180 systolic, now adjusted lisinopril to 20 mg daily.     pt is bedbound, does not need home physical therapy, daughter uses [facility].   Additionally family requesting to refill home med prescription zonisamide due to patient running out and PCP unable to renew due to bedbound status.   Pt to be referred to [facility] ([facility] [phone] with Dr. Nunez) - discussed with .   For the stage 2 sacral ulcer and right shoulder ulcer - pt is setup with home nursing care.

## 2024-08-17 NOTE — DIETITIAN INITIAL EVALUATION ADULT - ADD RECOMMEND
1) Advance current diet as medically feasible.  2) Encourage PO intake and honor food preferences as able.  3) Monitor PO intake, labs, weights, BM's, and skin integrity.   4) Recommend ensure clear 2 x day

## 2024-08-17 NOTE — DISCHARGE NOTE PROVIDER - NSDCHHNEEDSERVICE_GEN_ALL_CORE
Medication teaching and assessment/Rehabilitation services/Teaching and training Medication teaching and assessment/Wound care and assessment/Other, specify...

## 2024-08-17 NOTE — DIETITIAN INITIAL EVALUATION ADULT - PROBLEM SELECTOR PLAN 1
-patient comes with a 3-4 day hx of abdominal pain  -has had multiple episodes of diarrhea and no solid stool in the past week  -took 4 pills of amoxicillin for the diarrhea episodes   -CT a/p Findings compatible with colonic ileus, with evidence of diarrhea superimposed upon more proximal constipation. No obstruction noted  -ileus could be in the setting of proximal constipation, electrolyte imbalance or C.diff due to recent abx  -will keep NPO except meds   -IV hydration  -senna Miralax for BMs

## 2024-08-18 LAB
ANION GAP SERPL CALC-SCNC: 6 MMOL/L — SIGNIFICANT CHANGE UP (ref 5–17)
BUN SERPL-MCNC: 12 MG/DL — SIGNIFICANT CHANGE UP (ref 7–18)
CALCIUM SERPL-MCNC: 8.9 MG/DL — SIGNIFICANT CHANGE UP (ref 8.4–10.5)
CHLORIDE SERPL-SCNC: 110 MMOL/L — HIGH (ref 96–108)
CO2 SERPL-SCNC: 28 MMOL/L — SIGNIFICANT CHANGE UP (ref 22–31)
CREAT SERPL-MCNC: 0.41 MG/DL — LOW (ref 0.5–1.3)
EGFR: 93 ML/MIN/1.73M2 — SIGNIFICANT CHANGE UP
EPEC DNA STL QL NAA+PROBE: DETECTED
GI PCR PANEL: DETECTED
GLUCOSE SERPL-MCNC: 89 MG/DL — SIGNIFICANT CHANGE UP (ref 70–99)
HCT VFR BLD CALC: 32.3 % — LOW (ref 34.5–45)
HGB BLD-MCNC: 10.5 G/DL — LOW (ref 11.5–15.5)
MAGNESIUM SERPL-MCNC: 2.3 MG/DL — SIGNIFICANT CHANGE UP (ref 1.6–2.6)
MCHC RBC-ENTMCNC: 28.7 PG — SIGNIFICANT CHANGE UP (ref 27–34)
MCHC RBC-ENTMCNC: 32.5 GM/DL — SIGNIFICANT CHANGE UP (ref 32–36)
MCV RBC AUTO: 88.3 FL — SIGNIFICANT CHANGE UP (ref 80–100)
MRSA PCR RESULT.: SIGNIFICANT CHANGE UP
NRBC # BLD: 0 /100 WBCS — SIGNIFICANT CHANGE UP (ref 0–0)
PHOSPHATE SERPL-MCNC: 4.1 MG/DL — SIGNIFICANT CHANGE UP (ref 2.5–4.5)
PLATELET # BLD AUTO: 293 K/UL — SIGNIFICANT CHANGE UP (ref 150–400)
POTASSIUM SERPL-MCNC: 4 MMOL/L — SIGNIFICANT CHANGE UP (ref 3.5–5.3)
POTASSIUM SERPL-SCNC: 4 MMOL/L — SIGNIFICANT CHANGE UP (ref 3.5–5.3)
RBC # BLD: 3.66 M/UL — LOW (ref 3.8–5.2)
RBC # FLD: 15.8 % — HIGH (ref 10.3–14.5)
S AUREUS DNA NOSE QL NAA+PROBE: DETECTED
SODIUM SERPL-SCNC: 144 MMOL/L — SIGNIFICANT CHANGE UP (ref 135–145)
WBC # BLD: 6.33 K/UL — SIGNIFICANT CHANGE UP (ref 3.8–10.5)
WBC # FLD AUTO: 6.33 K/UL — SIGNIFICANT CHANGE UP (ref 3.8–10.5)

## 2024-08-18 PROCEDURE — 99232 SBSQ HOSP IP/OBS MODERATE 35: CPT

## 2024-08-18 RX ORDER — LISINOPRIL 10 MG/1
2.5 TABLET ORAL ONCE
Refills: 0 | Status: COMPLETED | OUTPATIENT
Start: 2024-08-18 | End: 2024-08-18

## 2024-08-18 RX ORDER — LISINOPRIL 10 MG/1
10 TABLET ORAL DAILY
Refills: 0 | Status: DISCONTINUED | OUTPATIENT
Start: 2024-08-18 | End: 2024-08-20

## 2024-08-18 RX ADMIN — ENOXAPARIN SODIUM 40 MILLIGRAM(S): 100 INJECTION SUBCUTANEOUS at 21:48

## 2024-08-18 RX ADMIN — Medication 3 MILLIGRAM(S): at 21:48

## 2024-08-18 RX ADMIN — Medication 2 TABLET(S): at 21:48

## 2024-08-18 RX ADMIN — ESCITALOPRAM OXALATE 10 MILLIGRAM(S): 10 TABLET ORAL at 11:29

## 2024-08-18 RX ADMIN — Medication 20 MILLIGRAM(S): at 21:48

## 2024-08-18 RX ADMIN — LISINOPRIL 5 MILLIGRAM(S): 10 TABLET ORAL at 06:44

## 2024-08-18 RX ADMIN — LISINOPRIL 10 MILLIGRAM(S): 10 TABLET ORAL at 17:24

## 2024-08-18 RX ADMIN — LISINOPRIL 2.5 MILLIGRAM(S): 10 TABLET ORAL at 21:48

## 2024-08-18 RX ADMIN — ZONISAMIDE 50 MILLIGRAM(S): 100 CAPSULE ORAL at 11:29

## 2024-08-19 DIAGNOSIS — I10 ESSENTIAL (PRIMARY) HYPERTENSION: ICD-10-CM

## 2024-08-19 DIAGNOSIS — A49.8 OTHER BACTERIAL INFECTIONS OF UNSPECIFIED SITE: ICD-10-CM

## 2024-08-19 DIAGNOSIS — Z75.8 OTHER PROBLEMS RELATED TO MEDICAL FACILITIES AND OTHER HEALTH CARE: ICD-10-CM

## 2024-08-19 LAB
ANION GAP SERPL CALC-SCNC: 6 MMOL/L — SIGNIFICANT CHANGE UP (ref 5–17)
BUN SERPL-MCNC: 18 MG/DL — SIGNIFICANT CHANGE UP (ref 7–18)
CALCIUM SERPL-MCNC: 8.3 MG/DL — LOW (ref 8.4–10.5)
CHLORIDE SERPL-SCNC: 110 MMOL/L — HIGH (ref 96–108)
CO2 SERPL-SCNC: 28 MMOL/L — SIGNIFICANT CHANGE UP (ref 22–31)
CREAT SERPL-MCNC: 0.47 MG/DL — LOW (ref 0.5–1.3)
CULTURE RESULTS: SIGNIFICANT CHANGE UP
EGFR: 90 ML/MIN/1.73M2 — SIGNIFICANT CHANGE UP
GLUCOSE SERPL-MCNC: 99 MG/DL — SIGNIFICANT CHANGE UP (ref 70–99)
HCT VFR BLD CALC: 32.7 % — LOW (ref 34.5–45)
HGB BLD-MCNC: 10.3 G/DL — LOW (ref 11.5–15.5)
MAGNESIUM SERPL-MCNC: 2.3 MG/DL — SIGNIFICANT CHANGE UP (ref 1.6–2.6)
MCHC RBC-ENTMCNC: 28 PG — SIGNIFICANT CHANGE UP (ref 27–34)
MCHC RBC-ENTMCNC: 31.5 GM/DL — LOW (ref 32–36)
MCV RBC AUTO: 88.9 FL — SIGNIFICANT CHANGE UP (ref 80–100)
NRBC # BLD: 0 /100 WBCS — SIGNIFICANT CHANGE UP (ref 0–0)
PHOSPHATE SERPL-MCNC: 3.7 MG/DL — SIGNIFICANT CHANGE UP (ref 2.5–4.5)
PLATELET # BLD AUTO: 304 K/UL — SIGNIFICANT CHANGE UP (ref 150–400)
POTASSIUM SERPL-MCNC: 3.5 MMOL/L — SIGNIFICANT CHANGE UP (ref 3.5–5.3)
POTASSIUM SERPL-SCNC: 3.5 MMOL/L — SIGNIFICANT CHANGE UP (ref 3.5–5.3)
RBC # BLD: 3.68 M/UL — LOW (ref 3.8–5.2)
RBC # FLD: 15.8 % — HIGH (ref 10.3–14.5)
SODIUM SERPL-SCNC: 144 MMOL/L — SIGNIFICANT CHANGE UP (ref 135–145)
SPECIMEN SOURCE: SIGNIFICANT CHANGE UP
WBC # BLD: 6.07 K/UL — SIGNIFICANT CHANGE UP (ref 3.8–10.5)
WBC # FLD AUTO: 6.07 K/UL — SIGNIFICANT CHANGE UP (ref 3.8–10.5)

## 2024-08-19 PROCEDURE — 74019 RADEX ABDOMEN 2 VIEWS: CPT | Mod: 26

## 2024-08-19 PROCEDURE — 99232 SBSQ HOSP IP/OBS MODERATE 35: CPT

## 2024-08-19 RX ORDER — L.ACID,PARA/B.BIFIDUM/S.THERM 8B CELL
1 CAPSULE ORAL DAILY
Refills: 0 | Status: DISCONTINUED | OUTPATIENT
Start: 2024-08-19 | End: 2024-08-20

## 2024-08-19 RX ORDER — CHLORHEXIDINE GLUCONATE 40 MG/ML
1 SOLUTION TOPICAL
Refills: 0 | Status: DISCONTINUED | OUTPATIENT
Start: 2024-08-19 | End: 2024-08-20

## 2024-08-19 RX ORDER — GUAIFENESIN 100 MG/5ML
200 LIQUID ORAL EVERY 6 HOURS
Refills: 0 | Status: DISCONTINUED | OUTPATIENT
Start: 2024-08-19 | End: 2024-08-20

## 2024-08-19 RX ORDER — MUPIROCIN 2 %
1 OINTMENT (GRAM) TOPICAL
Refills: 0 | Status: DISCONTINUED | OUTPATIENT
Start: 2024-08-19 | End: 2024-08-20

## 2024-08-19 RX ORDER — LACTULOSE 10 G
10 PACKET (EA) ORAL
Refills: 0 | Status: DISCONTINUED | OUTPATIENT
Start: 2024-08-19 | End: 2024-08-20

## 2024-08-19 RX ORDER — SODIUM PHOSPHATE, DIBASIC AND SODIUM PHOSPHATE, MONOBASIC 7; 19 G/230ML; G/230ML
1 ENEMA RECTAL ONCE
Refills: 0 | Status: COMPLETED | OUTPATIENT
Start: 2024-08-19 | End: 2024-08-19

## 2024-08-19 RX ADMIN — Medication 20 MILLIGRAM(S): at 21:19

## 2024-08-19 RX ADMIN — Medication 2 TABLET(S): at 21:18

## 2024-08-19 RX ADMIN — GUAIFENESIN 200 MILLIGRAM(S): 100 LIQUID ORAL at 18:20

## 2024-08-19 RX ADMIN — Medication 1 APPLICATION(S): at 18:21

## 2024-08-19 RX ADMIN — ENOXAPARIN SODIUM 40 MILLIGRAM(S): 100 INJECTION SUBCUTANEOUS at 21:20

## 2024-08-19 RX ADMIN — Medication 10 GRAM(S): at 18:18

## 2024-08-19 RX ADMIN — SODIUM PHOSPHATE, DIBASIC AND SODIUM PHOSPHATE, MONOBASIC 1 ENEMA: 7; 19 ENEMA RECTAL at 14:22

## 2024-08-19 RX ADMIN — LISINOPRIL 10 MILLIGRAM(S): 10 TABLET ORAL at 05:51

## 2024-08-19 RX ADMIN — CHLORHEXIDINE GLUCONATE 1 APPLICATION(S): 40 SOLUTION TOPICAL at 12:59

## 2024-08-19 RX ADMIN — Medication 3 MILLIGRAM(S): at 21:19

## 2024-08-19 RX ADMIN — ESCITALOPRAM OXALATE 10 MILLIGRAM(S): 10 TABLET ORAL at 12:58

## 2024-08-19 RX ADMIN — ZONISAMIDE 50 MILLIGRAM(S): 100 CAPSULE ORAL at 12:58

## 2024-08-19 NOTE — PROGRESS NOTE ADULT - PROBLEM SELECTOR PLAN 7
C/w home med Atorvastatin 20mg at HS
hx of dementia aaox1  -c/w home med - escitalopram 10 mg qd
c/w home meds
c/w home meds

## 2024-08-19 NOTE — PROGRESS NOTE ADULT - PROBLEM SELECTOR PLAN 4
-patient comes with a 3-4 day hx of abdominal pain  -has had multiple episodes of diarrhea and no solid stool in the past week  Likely reactive  -took 4 pills of amoxicillin for the diarrhea episodes  minimal stool studies  No need for isolation
recent hypokalemia due to diarrhea  s/p replacement  resolved
-patient comes with a 3-4 day hx of abdominal pain  -has had multiple episodes of diarrhea and no solid stool in the past week  Likely reactive  -took 4 pills of amoxicillin for the diarrhea episodes  minimal stool studies  No need for isolation
P/w 3-4 day hx of abdominal pain w/ multiple episodes of diarrhea and no solid stool in the past week  -took 4 pills of amoxicillin at home for the diarrhea   -C-diff negative d/c for isolation  -Supportive measures

## 2024-08-19 NOTE — PROGRESS NOTE ADULT - PROBLEM SELECTOR PLAN 8
hx of dementia A&Ox1  -C/w home med escitalopram 10mg
hx of dementia aaox1  family states that takes escitalopram 10 which helps her  -c/w home meds
DVT prophylaxis - lovenox
hx of dementia aaox1  family states that takes escitalopram 10 which helps her  -c/w home meds

## 2024-08-19 NOTE — PROGRESS NOTE ADULT - ASSESSMENT
Patient is a 89 yo F with hx of dementia AAOx1, HTN, HLD, seizures that for the past 3-4 days has been having abdominal pain and diarrhea. Patient to be admitted for Illeus and electrolyte imbalances. 
Patient is a 89 yo F with hx of dementia AAOx1, HTN, HLD, seizures that for the past 3-4 days has been having abdominal pain and diarrhea. Patient to be admitted for Illeus and electrolyte imbalances. 
Patient is a 89 yo F with hx of dementia AAOx1, HTN, HLD, seizures that for the past 3-4 days has been having abdominal pain and diarrhea. Patient to be admitted for Illeus and electrolyte imbalances.  C-Diff negative 
89 y/o F, from home with HHA 9hr/7days a week, with hx of dementia AAOx1, HTN, HLD, seizures that for the past 3-4 days has been having abdominal pain and diarrhea. Patient to be admitted for ileus and electrolyte imbalances. GI PCR was +Enteropathogenic E. Coli.   Pt still without improvement of constipation. Bowel Regimen adjusted.

## 2024-08-19 NOTE — PROGRESS NOTE ADULT - PROBLEM SELECTOR PROBLEM 3
Refill Routing Note   Medication(s) are not appropriate for processing by [facility] for the following reason(s):     - Outside of protocol  [facility] action(s):  Route        Medication reconciliation completed: No   Automatic Epic Generated Protocol Data:        Requested Prescriptions   Pending Prescriptions Disp Refills    meclizine (ANTIVERT) 25 mg tablet [Pharmacy Med Name: MECLIZINE 25 MG TABLET] 90 tablet 3     Sig: TAKE 1 TABLET (25 MG TOTAL) BY MOUTH 3 (THREE) TIMES DAILY AS NEEDED FOR DIZZINESS.       There is no refill protocol information for this order           Appointments  past 12m or future 3m with PCP    Date Provider   Last Visit   10/10/2019 Yakov Barrett MD   Next Visit   Visit date not found Yakov aBrrett MD   ED visits in past 90 days: 0        Note composed:9:34 PM 12/17/2020                  
Uncontrolled hypertension
Constipation

## 2024-08-19 NOTE — PROGRESS NOTE ADULT - PROBLEM SELECTOR PLAN 5
c/w home meds - zonisamide 50 mg qd
c/w home meds
c/w home meds
Hx of seizures on zonisamide at home  -C/w home med

## 2024-08-19 NOTE — PROGRESS NOTE ADULT - ATTENDING COMMENTS
Patient continues to c/o abdominal cramping and diarrhea. She also c/o chills.  Rectal temp is 99.2 one hour after Tylenol was given.     GENERAL: Alert, cooperative 89 yo woman in moderate distress because of abdominal cramping and diarrhea  Vital Signs Last 24 Hrs  T(C): 37.3 (17 Aug 2024 13:15), Max: 37.3 (17 Aug 2024 13:15)  T(F): 99.2 (17 Aug 2024 13:15), Max: 99.2 (17 Aug 2024 13:15)  HR: 83 (17 Aug 2024 13:15) (72 - 89)  BP: 161/70 (17 Aug 2024 13:15) (160/76 - 179/68)  BP(mean): --  RR: 17 (17 Aug 2024 13:15) (16 - 18)  SpO2: 94% (17 Aug 2024 13:15) (94% - 96%)    Parameters below as of 17 Aug 2024 13:15  Patient On (Oxygen Delivery Method): room air      HEENT: Normocephalic;  conjunctivae and sclerae clear; moist mucous membranes;   NECK : supple  CHEST/LUNG: Clear to auscultation bilaterally with good air entry   HEART: S1 S2  regular; no murmurs, gallops or rubs  ABDOMEN: Soft, Nontender, Nondistended; Bowel sounds present  EXTREMITIES: no cyanosis; no edema; no calf tenderness  SKIN: purulent discharge from an open pustule over the right clavicle.  Hyperpigmented, hypertrophic area surrounding the pustule.  superficial crack within dry, crusted skin in the gluteal fold  NERVOUS SYSTEM:  Awake and alert; Oriented  to place, person; , right hemiparesis, no new deficits  Rectal vault has no bulky stool.  Exam stimulates release of soft, brown stool.                         11.2   6.44  )-----------( 331      ( 17 Aug 2024 12:45 )             33.9   08-17    143  |  109<H>  |  14  ----------------------------<  109<H>  4.3   |  27  |  0.45<L>    Ca    8.6      17 Aug 2024 12:45  Phos  3.6     08-17  Mg     2.2     08-17    TPro  6.2  /  Alb  2.9<L>  /  TBili  0.6  /  DBili  x   /  AST  10  /  ALT  16  /  AlkPhos  87  08-17  cClostridium difficile GDH Toxins A&B, EIA:     CT abdomen  Findings compatible with colonic ileus, with evidence of diarrhea superimposed upon more proximal constipation.    Skin thickening and subcutaneous edema superficial to the distal sacrum and coccyx, new. Adjacent presacral stranding as well. A developing sacral ulcer or cellulitis could both appear this way.      Impression   89 year old bedbound woman with medical hx including HTN, HLD, DM2,  dementia presenting with a few days of abdominal pain, non bloody emesis and multiple episodes of watery stooling but noted on abdominal imaging to have ileus with proximal constipation with reactive diarrhea likely from bacterial breakdown of constipated stool.     Problems   Ileus likely related to electrolyte deficits  Severe constipation with overflow diarrhea.  No evidence of C. diff. r/o EPEC, pathogens, parasites.   Hypokalemia, corrected  HUGO, resolved  Bedbound secondary to right hemiparesis    Plan   Sepsis w/u.  Empirical antibiotics  GI PCR, O & P collected by me on exam  Abdominal x-ray to f/u on stool in rectum  I have explained current status to patient and daughters.
Patient continues to c/o abdominal cramping and diarrhea. She also c/o chills.  Rectal temp is 99.2 one hour after Tylenol was given.     GENERAL: Alert, cooperative 89 yo woman in moderate distress because of abdominal cramping and diarrhea  Vital Signs Last 24 Hrs  T(C): 37.3 (17 Aug 2024 13:15), Max: 37.3 (17 Aug 2024 13:15)  T(F): 99.2 (17 Aug 2024 13:15), Max: 99.2 (17 Aug 2024 13:15)  HR: 83 (17 Aug 2024 13:15) (72 - 89)  BP: 161/70 (17 Aug 2024 13:15) (160/76 - 179/68)  BP(mean): --  RR: 17 (17 Aug 2024 13:15) (16 - 18)  SpO2: 94% (17 Aug 2024 13:15) (94% - 96%)    Parameters below as of 17 Aug 2024 13:15  Patient On (Oxygen Delivery Method): room air      HEENT: Normocephalic;  conjunctivae and sclerae clear; moist mucous membranes;   NECK : supple  CHEST/LUNG: Clear to auscultation bilaterally with good air entry   HEART: S1 S2  regular; no murmurs, gallops or rubs  ABDOMEN: Soft, Nontender, Nondistended; Bowel sounds present  EXTREMITIES: no cyanosis; no edema; no calf tenderness  SKIN: purulent discharge from an open pustule over the right clavicle.  Hyperpigmented, hypertrophic area surrounding the pustule.  superficial crack within dry, crusted skin in the gluteal fold  NERVOUS SYSTEM:  Awake and alert; Oriented  to place, person; , right hemiparesis, no new deficits  Rectal vault has no bulky stool.  Exam stimulates release of soft, brown stool.                         11.2   6.44  )-----------( 331      ( 17 Aug 2024 12:45 )             33.9   08-17    143  |  109<H>  |  14  ----------------------------<  109<H>  4.3   |  27  |  0.45<L>    Ca    8.6      17 Aug 2024 12:45  Phos  3.6     08-17  Mg     2.2     08-17    TPro  6.2  /  Alb  2.9<L>  /  TBili  0.6  /  DBili  x   /  AST  10  /  ALT  16  /  AlkPhos  87  08-17  cClostridium difficile GDH Toxins A&B, EIA:     CT abdomen  Findings compatible with colonic ileus, with evidence of diarrhea superimposed upon more proximal constipation.    Skin thickening and subcutaneous edema superficial to the distal sacrum and coccyx, new. Adjacent presacral stranding as well. A developing sacral ulcer or cellulitis could both appear this way.    Enteropathogenic E. coli (EPEC): Detected (08.17.24 @ 18:15)   < from: Xray Abdomen 2 Views (08.17.24 @ 17:38) >    IMPRESSION:  Paucity of rectal gas.  Stable colonic ileus - correlates with CT scan findings.  Findings may be due to colonic pseudoobstruction.    < end of copied text >    Impression   89 year old bedbound woman with medical hx including HTN, HLD, DM2,  dementia presenting with a few days of abdominal pain, non bloody emesis and multiple episodes of watery stooling but noted on abdominal imaging to have ileus with proximal constipation with reactive diarrhea likely from bacterial breakdown of constipated stool.     IMP:   Ileus likely related to electrolyte deficits, slowly improving  Severe constipation with overflow diarrhea.  No evidence of C. diff. EPEC is positive  Hypokalemia, corrected  HUGO, resolved  Bedbound secondary to right hemiparesis  Hypertension, better controlled    Plan   Discontinue antibiotics  Increase lisinopril  Possible discharge home tomorrow.
Patient continues to c/o abdominal cramping and diarrhea. She also c/o chills.  Rectal temp is 99.2 one hour after Tylenol was given.     GENERAL: Alert, cooperative 89 yo woman in NAD  Vital Signs Last 24 Hrs  T(C): 36.8 (19 Aug 2024 20:26), Max: 36.8 (19 Aug 2024 13:14)  T(F): 98.2 (19 Aug 2024 20:26), Max: 98.2 (19 Aug 2024 13:14)  HR: 82 (19 Aug 2024 20:26) (77 - 82)  BP: 146/61 (19 Aug 2024 20:26) (142/64 - 162/72)  BP(mean): --  RR: 16 (19 Aug 2024 20:26) (16 - 18)  SpO2: 98% (19 Aug 2024 20:26) (95% - 98%)    Parameters below as of 19 Aug 2024 20:26  Patient On (Oxygen Delivery Method): room air      HEENT: Normocephalic;  conjunctivae and sclerae clear; moist mucous membranes;   NECK : supple  CHEST/LUNG: Clear to auscultation bilaterally with good air entry   HEART: S1 S2  regular; no murmurs, gallops or rubs  ABDOMEN: Soft, Nontender, Nondistended; Bowel sounds present  EXTREMITIES: no cyanosis; no edema; no calf tenderness  SKIN: purulent discharge from an open pustule over the right clavicle.  Hyperpigmented, hypertrophic area surrounding the pustule.  superficial crack within dry, crusted skin in the gluteal fold  NERVOUS SYSTEM:  Awake and alert; Oriented  to place, person; , right hemiparesis, no new deficits                          10.3   6.07  )-----------( 304      ( 19 Aug 2024 05:35 )             32.7   08-19    144  |  110<H>  |  18  ----------------------------<  99  3.5   |  28  |  0.47<L>    Ca    8.3<L>      19 Aug 2024 05:35  Phos  3.7     08-19  Mg     2.3     08-19    Clostridium difficile [facility]DH Toxins A&B, EIA:     CT abdomen  Findings compatible with colonic ileus, with evidence of diarrhea superimposed upon more proximal constipation.    Skin thickening and subcutaneous edema superficial to the distal sacrum and coccyx, new. Adjacent presacral stranding as well. A developing sacral ulcer or cellulitis could both appear this way.    Enteropathogenic E. coli (EPEC): Detected (08.17.24 @ 18:15)     Repeat AXR shows less distention of colon on my read.  No official read yet.     Impression   89 year old bedbound woman with medical hx including HTN, HLD, DM2,  dementia presenting with a few days of abdominal pain, non bloody emesis and multiple episodes of watery stooling but noted on abdominal imaging to have ileus with proximal constipation with reactive diarrhea likely from bacterial breakdown of constipated stool.     IMP:   Ileus likely related to electrolyte deficits, slowly improving  Severe constipation with overflow diarrhea.  No evidence of C. diff. EPEC is positive  Hypokalemia, corrected  HUGO, resolved  Bedbound secondary to right hemiparesis  Hypertension, better controlled on increased dose of lisinopril    Plan   Increase lisinopril as needed to control BP  Possible discharge home tomorrow.

## 2024-08-19 NOTE — PROGRESS NOTE ADULT - PROBLEM SELECTOR PLAN 1
-patient comes with a 3-4 day hx of abdominal pain  -has had multiple episodes of diarrhea and no solid stool in the past week  -took 4 pills of amoxicillin for the diarrhea episodes   -CT a/p Findings compatible with colonic ileus, with evidence of diarrhea superimposed upon more proximal constipation. No obstruction noted  -ileus could be in the setting of proximal constipation, electrolyte imbalance or C.diff due to recent abx  -will keep NPO except meds   -IV hydration  -senna Miralax for BMs
-patient comes with a 3-4 day hx of abdominal pain  -has had multiple episodes of diarrhea and no solid stool in the past week  -took 4 pills of amoxicillin for the diarrhea episodes   -CT a/p Findings compatible with colonic ileus, with evidence of diarrhea superimposed upon more proximal constipation. No obstruction noted  -xray abd 8/19 - still showing Stable colonic ileus  -c/w senna daily  -add lactulose 10g bid  -fleet enema x1 dose today  -monitor for improvement
-patient comes with a 3-4 day hx of abdominal pain  -has had multiple episodes of diarrhea and no solid stool in the past week  -took 4 pills of amoxicillin for the diarrhea episodes   -CT a/p Findings compatible with colonic ileus, with evidence of diarrhea superimposed upon more proximal constipation. No obstruction noted  -ileus could be in the setting of proximal constipation, electrolyte imbalance or C.diff due to recent abx  -will keep NPO except meds   -IV hydration  -senna Miralax for BMs
-patient comes with a 3-4 day hx of abdominal pain  -has had multiple episodes of diarrhea and no solid stool in the past week  -took 4 pills of amoxicillin for the diarrhea episodes   -CT a/p Findings compatible with colonic ileus, with evidence of diarrhea superimposed upon more proximal constipation. No obstruction noted  -ileus could be in the setting of proximal constipation, electrolyte imbalance or C.diff due to recent abx  -will keep NPO except meds   -IV hydration  -senna Miralax for BMs

## 2024-08-19 NOTE — PROGRESS NOTE ADULT - SUBJECTIVE AND OBJECTIVE BOX
Patient is a 89y old  Female who presents with a chief complaint of Abdominal Pain (16 Aug 2024 05:02)      INTERVAL HPI/OVERNIGHT EVENTS: Pt seen at bedside with family member present,  used Melany ID [id number], pt c/o mild abd discomfort and continued watery stool.    MEDICATIONS  (STANDING):  atorvastatin 20 milliGRAM(s) Oral at bedtime  enoxaparin Injectable 40 milliGRAM(s) SubCutaneous every 24 hours  escitalopram 10 milliGRAM(s) Oral daily  lisinopril 2.5 milliGRAM(s) Oral daily  senna 2 Tablet(s) Oral at bedtime  sodium chloride 0.9%. 1000 milliLiter(s) (60 mL/Hr) IV Continuous <Continuous>  zonisamide 50 milliGRAM(s) Oral daily    MEDICATIONS  (PRN):  acetaminophen     Tablet .. 650 milliGRAM(s) Oral every 6 hours PRN Mild Pain (1 - 3)  melatonin 1 milliGRAM(s) Oral at bedtime PRN Insomnia    __________________________________________________  REVIEW OF SYSTEMS:    CONSTITUTIONAL: No fever,   EYES: no acute visual disturbances  NECK: No pain or stiffness  RESPIRATORY: No cough; No shortness of breath  CARDIOVASCULAR: No chest pain, no palpitations  GASTROINTESTINAL: No pain. No nausea or vomiting; No diarrhea   NEUROLOGICAL: No headache or numbness, no tremors  MUSCULOSKELETAL: No joint pain, no muscle pain  GENITOURINARY: no dysuria, no frequency, no hesitancy  PSYCHIATRY: no depression , no anxiety  ALL OTHER  ROS negative      Vital Signs Last 24 Hrs  T(C): 36.9 (16 Aug 2024 13:14), Max: 37.3 (16 Aug 2024 00:00)  T(F): 98.5 (16 Aug 2024 13:14), Max: 99.2 (16 Aug 2024 00:00)  HR: 72 (16 Aug 2024 13:14) (68 - 80)  BP: 186/72 (16 Aug 2024 13:14) (166/72 - 197/78)  BP(mean): --  RR: 17 (16 Aug 2024 13:14) (17 - 18)  SpO2: 96% (16 Aug 2024 13:14) (96% - 98%)    Parameters below as of 16 Aug 2024 13:14  Patient On (Oxygen Delivery Method): room air  ________________________________________________  PHYSICAL EXAM:  GENERAL: NAD  HEENT: Normocephalic;  conjunctivae and sclerae clear; moist mucous membranes;   NECK : supple  CHEST/LUNG: Clear to auscultation bilaterally with good air entry   HEART: S1 S2  regular; no murmurs, gallops or rubs  ABDOMEN: Soft, Nontender, Nondistended; Bowel sounds present  EXTREMITIES: no cyanosis; no edema; no calf tenderness  SKIN: warm and dry; no rash  NERVOUS SYSTEM:  Awake and alert; Oriented to person; no new deficits  _________________________________________________  LABS:                        11.0   6.92  )-----------( 313      ( 16 Aug 2024 01:05 )             33.6     08-16    141  |  108  |  20<H>  ----------------------------<  110<H>  3.0<L>   |  30  |  0.45<L>    Ca    8.9      16 Aug 2024 01:05  Phos  3.4     08-16  Mg     2.1     08-16    TPro  6.3  /  Alb  2.9<L>  /  TBili  0.4  /  DBili  x   /  AST  14  /  ALT  19  /  AlkPhos  83  08-16      Urinalysis Basic - ( 16 Aug 2024 02:40 )    Color: Yellow / Appearance: Clear / S.012 / pH: x  Gluc: x / Ketone: Negative mg/dL  / Bili: Negative / Urobili: 1.0 mg/dL   Blood: x / Protein: Negative mg/dL / Nitrite: Negative   Leuk Esterase: Negative / RBC: x / WBC x   Sq Epi: x / Non Sq Epi: x / Bacteria: x    RADIOLOGY & ADDITIONAL TESTS:  < from: CT Abdomen and Pelvis w/ IV Cont (24 @ 03:24) >  ACC: [id number] EXAM:  CT ABDOMEN AND PELVIS IC   ORDERED BY: MATHIEU SMITHKS     PROCEDURE DATE:  2024          INTERPRETATION:  CLINICAL INFORMATION: 89-year-old female with past   medical history hypertension, hyperlipidemia, dementia, bedbound presents   with diarrhea x 3 to 4 days.    COMPARISON: 2019 CT abdomen pelvis.    CONTRAST/COMPLICATIONS:  IV Contrast: Omnipaque 350  90 cc administered   10 cc discarded  Oral Contrast: NONE  Complications: None reported at time of study completion    PROCEDURE:  CT of the Abdomen and Pelvis was performed.  Sagittal and coronal reformats were performed.    FINDINGS:  LOWER CHEST: Mild chronic subpleural interstitial prominence lung bases.   Coronary artery atherosclerosis, upper normal heart size.    LIVER: No concerning lesions. Small cyst medial aspect right lobe.  BILE DUCTS: Normal caliber.  GALLBLADDER: Within normal limits.  SPLEEN: Within normal limits.  PANCREAS: Within normal limits.  ADRENALS: Left adrenal adenoma again demonstrated. Right unremarkable.  KIDNEYS/URETERS: Bilateral renal pelvic cysts. No hydronephrosis or stone   or acute abnormality.    BLADDER: Within normal limits.  REPRODUCTIVE ORGANS: Hysterectomy.    BOWEL: No bowel obstruction. Appendix is normal. Diffuse colonic   distention. Prominent stool in the colonic lumen from the cecum to the   proximal sigmoid colon. The remainder the rectosigmoid is dilated with   air and fluid. Left colonic diverticula, no diverticulitis.  PERITONEUM/RETROPERITONEUM: No ascites. No extraluminal air.  VESSELS: Atherosclerotic changes. No abdominal aortic aneurysm. Patent   portal, mesenteric, and splenic veins.  LYMPH NODES: No lymphadenopathy.  ABDOMINAL WALL: Skin thickening and subcutaneous edema superficial to the   distal sacrum and coccyx, new. Adjacent presacral stranding as well.  BONES: Bony demineralization. Marked right hip osteoarthritis. No acute   fracture or aggressive osseous lesions.    IMPRESSION:  Findings compatible with colonic ileus,with evidence of diarrhea   superimposed upon more proximal constipation.    Skin thickening and subcutaneous edema superficial to the distal sacrum   and coccyx, new. Adjacent presacral stranding as well. A developing   sacral ulcer or cellulitis could both appear this way. Please correlate   with physical examination.        --- End of Report ---    Imaging Personally Reviewed:  YES    Consultant(s) Notes Reviewed:   YES      Plan of care was discussed with patient and /or primary care giver; all questions and concerns were addressed and care was aligned with patient's wishes.    
Patient is a 89y old  Female who presents with a chief complaint of Abdominal Pain (18 Aug 2024 15:02)    OVERNIGHT EVENTS: no acute changes.     Pt is aox2, comfortable appearing, tolerating PO, remains in bed due to weakness.     REVIEW OF SYSTEMS:  limited exam due to mental status  daughter at bedside, reports patient having 4 episodes of diarrhea yesterday and once this morning, also productive cough.     T(C): 36.8 (08-19-24 @ 13:14), Max: 37 (08-18-24 @ 20:45)  HR: 80 (08-19-24 @ 13:14) (77 - 80)  BP: 142/64 (08-19-24 @ 13:14) (142/64 - 177/78)  RR: 16 (08-19-24 @ 13:14) (16 - 18)  SpO2: 95% (08-19-24 @ 13:14) (94% - 97%)  Wt(kg): --Vital Signs Last 24 Hrs  T(C): 36.8 (19 Aug 2024 13:14), Max: 37 (18 Aug 2024 20:45)  T(F): 98.2 (19 Aug 2024 13:14), Max: 98.6 (18 Aug 2024 20:45)  HR: 80 (19 Aug 2024 13:14) (77 - 80)  BP: 142/64 (19 Aug 2024 13:14) (142/64 - 177/78)  BP(mean): --  RR: 16 (19 Aug 2024 13:14) (16 - 18)  SpO2: 95% (19 Aug 2024 13:14) (94% - 97%)    Parameters below as of 19 Aug 2024 13:14  Patient On (Oxygen Delivery Method): room air        MEDICATIONS  (STANDING):  atorvastatin 20 milliGRAM(s) Oral at bedtime  chlorhexidine 2% Cloths 1 Application(s) Topical <User Schedule>  enoxaparin Injectable 40 milliGRAM(s) SubCutaneous every 24 hours  escitalopram 10 milliGRAM(s) Oral daily  guaiFENesin Oral Liquid (Sugar-Free) 200 milliGRAM(s) Oral every 6 hours  lactulose Syrup 10 Gram(s) Oral two times a day  lisinopril 10 milliGRAM(s) Oral daily  melatonin 3 milliGRAM(s) Oral at bedtime  mupirocin 2% Ointment 1 Application(s) Both Nostrils two times a day  senna 2 Tablet(s) Oral at bedtime  sodium chloride 0.9%. 1000 milliLiter(s) (60 mL/Hr) IV Continuous <Continuous>  zonisamide 50 milliGRAM(s) Oral daily    MEDICATIONS  (PRN):  acetaminophen     Tablet .. 650 milliGRAM(s) Oral every 6 hours PRN Mild Pain (1 - 3)      PHYSICAL EXAM:  GENERAL: NAD  EYES: clear conjunctiva  ENMT: Moist mucous membranes  NECK: Supple, No JVD, Normal thyroid  CHEST/LUNG: Clear to auscultation bilaterally; No rales, rhonchi, wheezing, or rubs  HEART: S1, S2, Regular rate and rhythm  ABDOMEN: Soft, Nontender, +distended; Bowel sounds present  NEURO: Alert & Oriented X2 (not time)  EXTREMITIES: No LE edema, no calf tenderness  LYMPH: No lymphadenopathy noted  SKIN: +stage 2 sacral ulcer 2x3 cm and stage 2 right shoulder ulcer 1x1 cm.     Consultant(s) Notes Reviewed:  [x ] YES  [ ] NO  Care Discussed with Consultants/Other Providers [ x] YES  [ ] NO    LABS:                        10.3   6.07  )-----------( 304      ( 19 Aug 2024 05:35 )             32.7     08-19    144  |  110<H>  |  18  ----------------------------<  99  3.5   |  28  |  0.47<L>    Ca    8.3<L>      19 Aug 2024 05:35  Phos  3.7     08-19  Mg     2.3     08-19        CAPILLARY BLOOD GLUCOSE            Urinalysis Basic - ( 19 Aug 2024 05:35 )    Color: x / Appearance: x / SG: x / pH: x  Gluc: 99 mg/dL / Ketone: x  / Bili: x / Urobili: x   Blood: x / Protein: x / Nitrite: x   Leuk Esterase: x / RBC: x / WBC x   Sq Epi: x / Non Sq Epi: x / Bacteria: x        RADIOLOGY & ADDITIONAL TESTS:  < from: Xray Abdomen 2 Views (08.17.24 @ 17:38) >    ACC: [id number] EXAM:  XR ABDOMEN 2V   ORDERED BY: CHRISTIAN LYNN     PROCEDURE DATE:  08/17/2024          INTERPRETATION:  DATE OF EXAM: 8/17/24    COMPARISON: 8/16/24 CT scan of the abdomen and pelvis    CLINICAL INDICATION: Diarrhea. Assess for ileus.    TECHNIQUE: Flat and erect abdominal films.    FINDINGS:  Lower lungs are clear.  Redemonstration of diffuse colonic gaseous distention. Paucity of rectal   gas.  Redemonstration of prominent stool - most pronounced in the descending   colon and proximal sigmoid colon.  No free intraperitoneal air.    IMPRESSION:  Paucity of rectal gas.  Stable colonic ileus - correlates with CT scan findings.  Findings may be due to colonic pseudoobstruction.    --- End of Report ---            YOMAIRA EVANS MD; Attending Radiologist  This document has been electronically signed. Aug 18 2024 12:30PM    < end of copied text >    Imaging Personally Reviewed:  [ ] YES  [ ] NO  
  MEDICAL ATTENDING NOTE  Patient is a 89y old  Female who presents with a chief complaint of Abdominal Pain (17 Aug 2024 19:59)      HPI:  Patient is a 89 yo F with hx of dementia AAOx1, HTN, HLD, seizures that for the past 3-4 days has been having abdominal pain and diarrhea. Family at bedside also endorsing that has vomitated in the past couple of days. Per family they see that the pain is intermittent in nature and for the past couple of days has not been eating or sleeping. Patient has not had a solid BM since 2 weeks ago. Family endorses that patient got 4 pills of amoxicillin that was given by a family friend for the diarrhea. Family states also that when patient started taking this abx started having bubbly vaginal discahrge that stopped once stopped taking amoxicillin. Patient is a poor historian and denies any other symtpoms. Family also endorsing that BP at home was taken and BP was 200/80. (16 Aug 2024 05:02)      INTERVAL HPI/OVERNIGHT EVENTS: no new complaints    MEDICATIONS  (STANDING):  atorvastatin 20 milliGRAM(s) Oral at bedtime  enoxaparin Injectable 40 milliGRAM(s) SubCutaneous every 24 hours  escitalopram 10 milliGRAM(s) Oral daily  lisinopril 10 milliGRAM(s) Oral daily  melatonin 3 milliGRAM(s) Oral at bedtime  senna 2 Tablet(s) Oral at bedtime  sodium chloride 0.9%. 1000 milliLiter(s) (60 mL/Hr) IV Continuous <Continuous>  zonisamide 50 milliGRAM(s) Oral daily    MEDICATIONS  (PRN):  acetaminophen     Tablet .. 650 milliGRAM(s) Oral every 6 hours PRN Mild Pain (1 - 3)      __________________________________________________  REVIEW OF SYSTEMS:    CONSTITUTIONAL: No fever,   EYES: no acute visual disturbances  NECK: No pain or stiffness  RESPIRATORY: No cough; No shortness of breath  CARDIOVASCULAR: No chest pain, no palpitations  GASTROINTESTINAL: No pain. No nausea or vomiting; No diarrhea   NEUROLOGICAL: No headache or numbness, no tremors  MUSCULOSKELETAL: No joint pain, no muscle pain  GENITOURINARY: no dysuria, no frequency, no hesitancy  PSYCHIATRY: no depression , no anxiety  ALL OTHER  ROS negative        Vital Signs Last 24 Hrs  T(C): 36.8 (18 Aug 2024 13:14), Max: 36.9 (18 Aug 2024 05:25)  T(F): 98.2 (18 Aug 2024 13:14), Max: 98.4 (18 Aug 2024 05:25)  HR: 75 (18 Aug 2024 13:14) (75 - 85)  BP: 160/69 (18 Aug 2024 13:14) (152/68 - 175/76)  BP(mean): --  RR: 18 (18 Aug 2024 13:14) (18 - 18)  SpO2: 96% (18 Aug 2024 13:14) (95% - 96%)    Parameters below as of 18 Aug 2024 13:14  Patient On (Oxygen Delivery Method): room air        ________________________________________________  PHYSICAL EXAM:  GENERAL: NAD  HEENT: Normocephalic;  conjunctivae and sclerae clear; moist mucous membranes;   NECK : supple  CHEST/LUNG: Clear to auscultation bilaterally with good air entry   HEART: S1 S2  regular; no murmurs, gallops or rubs  ABDOMEN: Soft, Nontender, Nondistended; Bowel sounds present  EXTREMITIES: no cyanosis; no edema; no calf tenderness  SKIN: warm and dry; no rash  NERVOUS SYSTEM:  Awake and alert; Oriented  to place, person and time ; no new deficits    _________________________________________________  LABS:                        10.5   6.33  )-----------( 293      ( 18 Aug 2024 10:23 )             32.3     08-18    144  |  110<H>  |  12  ----------------------------<  89  4.0   |  28  |  0.41<L>    Ca    8.9      18 Aug 2024 09:00  Phos  4.1     08-18  Mg     2.3     08-18    TPro  6.2  /  Alb  2.9<L>  /  TBili  0.6  /  DBili  x   /  AST  10  /  ALT  16  /  AlkPhos  87  08-17    PT/INR - ( 17 Aug 2024 12:45 )   PT: 11.2 sec;   INR: 0.98 ratio         PTT - ( 17 Aug 2024 12:45 )  PTT:31.5 sec  Urinalysis Basic - ( 18 Aug 2024 09:00 )    Color: x / Appearance: x / SG: x / pH: x  Gluc: 89 mg/dL / Ketone: x  / Bili: x / Urobili: x   Blood: x / Protein: x / Nitrite: x   Leuk Esterase: x / RBC: x / WBC x   Sq Epi: x / Non Sq Epi: x / Bacteria: x      CAPILLARY BLOOD GLUCOSE              
  MEDICAL ATTENDING NOTE  Patient is a 89y old  Female who presents with a chief complaint of Ileus of unspecified type     (17 Aug 2024 12:34)      HPI:  Patient is a 89 yo F with hx of dementia AAOx1, HTN, HLD, seizures that for the past 3-4 days has been having abdominal pain and diarrhea. Family at bedside also endorsing that has vomitated in the past couple of days. Per family they see that the pain is intermittent in nature and for the past couple of days has not been eating or sleeping. Patient has not had a solid BM since 2 weeks ago. Family endorses that patient got 4 pills of amoxicillin that was given by a family friend for the diarrhea. Family states also that when patient started taking this abx started having bubbly vaginal discahrge that stopped once stopped taking amoxicillin. Patient is a poor historian and denies any other symtpoms. Family also endorsing that BP at home was taken and BP was 200/80. (16 Aug 2024 05:02)      INTERVAL HPI/OVERNIGHT EVENTS: patient still c/o intermittent abdominal pain and diarrhea    MEDICATIONS  (STANDING):  atorvastatin 20 milliGRAM(s) Oral at bedtime  enoxaparin Injectable 40 milliGRAM(s) SubCutaneous every 24 hours  escitalopram 10 milliGRAM(s) Oral daily  lisinopril 5 milliGRAM(s) Oral daily  melatonin 3 milliGRAM(s) Oral at bedtime  senna 2 Tablet(s) Oral at bedtime  sodium chloride 0.9%. 1000 milliLiter(s) (60 mL/Hr) IV Continuous <Continuous>  zonisamide 50 milliGRAM(s) Oral daily    MEDICATIONS  (PRN):  acetaminophen     Tablet .. 650 milliGRAM(s) Oral every 6 hours PRN Mild Pain (1 - 3)      Vital Signs Last 24 Hrs  T(C): 37.3 (17 Aug 2024 13:15), Max: 37.3 (17 Aug 2024 13:15)  T(F): 99.2 (17 Aug 2024 13:15), Max: 99.2 (17 Aug 2024 13:15)  HR: 83 (17 Aug 2024 13:15) (72 - 89)  BP: 161/70 (17 Aug 2024 13:15) (160/76 - 179/68)  BP(mean): --  RR: 17 (17 Aug 2024 13:15) (16 - 18)  SpO2: 94% (17 Aug 2024 13:15) (94% - 96%)    Parameters below as of 17 Aug 2024 13:15  Patient On (Oxygen Delivery Method): room air        ________________________________________________  PHYSICAL EXAM:  GENERAL: Alert, cooperative 89 yo woman in moderate distress because of abdominal cramping and diarrhea  HEENT: Normocephalic;  conjunctivae and sclerae clear; moist mucous membranes;   NECK : supple  CHEST/LUNG: Clear to auscultation bilaterally with good air entry   HEART: S1 S2  regular; no murmurs, gallops or rubs  ABDOMEN: Soft, Nontender, Nondistended; Bowel sounds present  EXTREMITIES: no cyanosis; no edema; no calf tenderness  SKIN: purulent discharge from an open pustule over the right clavicle.  Hyperpigmented, hypertrophic area surrounding the pustule.  superficial crack within dry, crusted skin in the gluteal fold  NERVOUS SYSTEM:  Awake and alert; Oriented  to place, person; , right hemiparesis, no new deficits  Rectal vault has no bulky stool.  Exam stimulates release of soft, brown stool.     _________________________________________________  LABS:                        11.2   6.44  )-----------( 331      ( 17 Aug 2024 12:45 )             33.9     -    143  |  109<H>  |  14  ----------------------------<  109<H>  4.3   |  27  |  0.45<L>    Ca    8.6      17 Aug 2024 12:45  Phos  3.6       Mg     2.2         TPro  6.2  /  Alb  2.9<L>  /  TBili  0.6  /  DBili  x   /  AST  10  /  ALT  16  /  AlkPhos  87      PT/INR - ( 17 Aug 2024 12:45 )   PT: 11.2 sec;   INR: 0.98 ratio         PTT - ( 17 Aug 2024 12:45 )  PTT:31.5 sec  Urinalysis Basic - ( 17 Aug 2024 18:15 )    Color: Yellow / Appearance: Turbid / S.008 / pH: x  Gluc: x / Ketone: Negative mg/dL  / Bili: Negative / Urobili: 2.0 mg/dL   Blood: x / Protein: Negative mg/dL / Nitrite: Negative   Leuk Esterase: Small / RBC: 6 /HPF / WBC 7 /HPF   Sq Epi: x / Non Sq Epi: x / Bacteria: Too Numerous to count /HPF      CAPILLARY BLOOD GLUCOSE      POCT Blood Glucose.: 127 mg/dL (17 Aug 2024 12:47)

## 2024-08-19 NOTE — PROGRESS NOTE ADULT - PROBLEM SELECTOR PLAN 3
pt found to have uncontrolled HTN  with recent -170s systolic   lisinopril was increased to 10 mg qd  BP goal <150/90   monitor BP
Proximal constipation   likely related to immobility and dehydration   C/w bowel regimen  -NPO except meds
Proximal constipation   likely related to immobility and dehydration   laxative ordered
Proximal constipation   likely related to immobility and dehydration   laxative ordered

## 2024-08-19 NOTE — PROGRESS NOTE ADULT - PROBLEM SELECTOR PLAN 2
on labs K at 3.0  likely from Gi losses  in the ED 50 meq of K given  will give additional riders due to GI losses  -f/u bmp
In ED K 2.9  -likely 2/2 diarrhea   - Repleted   -Monitor BMP
on labs K at 3.0  likely from Gi losses  in the ED 50 meq of K given  will give additional riders due to GI losses  -f/u bmp
-patient comes with a 3-4 day hx of abdominal pain  -has had multiple episodes of diarrhea and no solid stool in the past week  Likely reactive  -took 4 pills of amoxicillin for the diarrhea episodes  minimal stool studies  No need for isolation  - diarrhea likely due to ileus   - GI PCR was +enteropathogenic e. coli   - add lactobacilus daily  - monitor for improvement

## 2024-08-20 ENCOUNTER — TRANSCRIPTION ENCOUNTER (OUTPATIENT)
Age: 89
End: 2024-08-20

## 2024-08-20 VITALS
SYSTOLIC BLOOD PRESSURE: 145 MMHG | TEMPERATURE: 98 F | RESPIRATION RATE: 16 BRPM | DIASTOLIC BLOOD PRESSURE: 67 MMHG | OXYGEN SATURATION: 95 % | HEART RATE: 81 BPM

## 2024-08-20 LAB
CULTURE RESULTS: SIGNIFICANT CHANGE UP
SPECIMEN SOURCE: SIGNIFICANT CHANGE UP

## 2024-08-20 PROCEDURE — 87045 FECES CULTURE AEROBIC BACT: CPT

## 2024-08-20 PROCEDURE — 97161 PT EVAL LOW COMPLEX 20 MIN: CPT

## 2024-08-20 PROCEDURE — 87086 URINE CULTURE/COLONY COUNT: CPT

## 2024-08-20 PROCEDURE — 84100 ASSAY OF PHOSPHORUS: CPT

## 2024-08-20 PROCEDURE — 85730 THROMBOPLASTIN TIME PARTIAL: CPT

## 2024-08-20 PROCEDURE — 96375 TX/PRO/DX INJ NEW DRUG ADDON: CPT

## 2024-08-20 PROCEDURE — 83690 ASSAY OF LIPASE: CPT

## 2024-08-20 PROCEDURE — 83735 ASSAY OF MAGNESIUM: CPT

## 2024-08-20 PROCEDURE — 87640 STAPH A DNA AMP PROBE: CPT

## 2024-08-20 PROCEDURE — 74019 RADEX ABDOMEN 2 VIEWS: CPT

## 2024-08-20 PROCEDURE — 96372 THER/PROPH/DIAG INJ SC/IM: CPT | Mod: XU

## 2024-08-20 PROCEDURE — 99285 EMERGENCY DEPT VISIT HI MDM: CPT | Mod: 25

## 2024-08-20 PROCEDURE — 96376 TX/PRO/DX INJ SAME DRUG ADON: CPT

## 2024-08-20 PROCEDURE — 87507 IADNA-DNA/RNA PROBE TQ 12-25: CPT

## 2024-08-20 PROCEDURE — 81001 URINALYSIS AUTO W/SCOPE: CPT

## 2024-08-20 PROCEDURE — 83605 ASSAY OF LACTIC ACID: CPT

## 2024-08-20 PROCEDURE — 80048 BASIC METABOLIC PNL TOTAL CA: CPT

## 2024-08-20 PROCEDURE — 81003 URINALYSIS AUTO W/O SCOPE: CPT

## 2024-08-20 PROCEDURE — 74177 CT ABD & PELVIS W/CONTRAST: CPT | Mod: MC

## 2024-08-20 PROCEDURE — 87641 MR-STAPH DNA AMP PROBE: CPT

## 2024-08-20 PROCEDURE — 87324 CLOSTRIDIUM AG IA: CPT

## 2024-08-20 PROCEDURE — 36415 COLL VENOUS BLD VENIPUNCTURE: CPT

## 2024-08-20 PROCEDURE — 87636 SARSCOV2 & INF A&B AMP PRB: CPT

## 2024-08-20 PROCEDURE — 85610 PROTHROMBIN TIME: CPT

## 2024-08-20 PROCEDURE — 99239 HOSP IP/OBS DSCHRG MGMT >30: CPT

## 2024-08-20 PROCEDURE — 85025 COMPLETE CBC W/AUTO DIFF WBC: CPT

## 2024-08-20 PROCEDURE — 87046 STOOL CULTR AEROBIC BACT EA: CPT

## 2024-08-20 PROCEDURE — 87077 CULTURE AEROBIC IDENTIFY: CPT

## 2024-08-20 PROCEDURE — 87449 NOS EACH ORGANISM AG IA: CPT

## 2024-08-20 PROCEDURE — 85027 COMPLETE CBC AUTOMATED: CPT

## 2024-08-20 PROCEDURE — 96365 THER/PROPH/DIAG IV INF INIT: CPT

## 2024-08-20 PROCEDURE — 87040 BLOOD CULTURE FOR BACTERIA: CPT

## 2024-08-20 PROCEDURE — 82962 GLUCOSE BLOOD TEST: CPT

## 2024-08-20 PROCEDURE — 80053 COMPREHEN METABOLIC PANEL: CPT

## 2024-08-20 RX ORDER — LACTULOSE 10 G
15 PACKET (EA) ORAL
Qty: 900 | Refills: 0
Start: 2024-08-20 | End: 2024-09-18

## 2024-08-20 RX ORDER — SENNA 187 MG
2 TABLET ORAL
Qty: 60 | Refills: 0
Start: 2024-08-20 | End: 2024-09-18

## 2024-08-20 RX ORDER — LISINOPRIL 10 MG/1
1 TABLET ORAL
Qty: 30 | Refills: 0
Start: 2024-08-20 | End: 2024-09-18

## 2024-08-20 RX ORDER — ZONISAMIDE 100 MG/1
1 CAPSULE ORAL
Qty: 30 | Refills: 3
Start: 2024-08-20 | End: 2024-12-17

## 2024-08-20 RX ORDER — LISINOPRIL 10 MG/1
10 TABLET ORAL ONCE
Refills: 0 | Status: COMPLETED | OUTPATIENT
Start: 2024-08-20 | End: 2024-08-20

## 2024-08-20 RX ADMIN — Medication 10 GRAM(S): at 05:42

## 2024-08-20 RX ADMIN — LISINOPRIL 10 MILLIGRAM(S): 10 TABLET ORAL at 05:42

## 2024-08-20 RX ADMIN — LISINOPRIL 10 MILLIGRAM(S): 10 TABLET ORAL at 12:55

## 2024-08-20 RX ADMIN — CHLORHEXIDINE GLUCONATE 1 APPLICATION(S): 40 SOLUTION TOPICAL at 05:47

## 2024-08-20 RX ADMIN — Medication 1 APPLICATION(S): at 05:42

## 2024-08-20 RX ADMIN — GUAIFENESIN 200 MILLIGRAM(S): 100 LIQUID ORAL at 12:55

## 2024-08-20 RX ADMIN — Medication 1 TABLET(S): at 12:55

## 2024-08-20 RX ADMIN — GUAIFENESIN 200 MILLIGRAM(S): 100 LIQUID ORAL at 00:13

## 2024-08-20 RX ADMIN — GUAIFENESIN 200 MILLIGRAM(S): 100 LIQUID ORAL at 05:42

## 2024-08-20 RX ADMIN — ESCITALOPRAM OXALATE 10 MILLIGRAM(S): 10 TABLET ORAL at 12:55

## 2024-08-20 RX ADMIN — ZONISAMIDE 50 MILLIGRAM(S): 100 CAPSULE ORAL at 12:55

## 2024-08-20 NOTE — DISCHARGE NOTE NURSING/CASE MANAGEMENT/SOCIAL WORK - PATIENT PORTAL LINK FT
You can access the FollowMyHealth Patient Portal offered by [facility] by registering at the following website: [email or web address]. By joining [facility]’s FollowMyHealth portal, you will also be able to view your health information using other applications (apps) compatible with our system.

## 2024-08-21 NOTE — CHART NOTE - NSCHARTNOTEFT_GEN_A_CORE
8/21-Patient was outreached but did not answer. A voicemail was left for the patient to return our call.

## 2024-08-22 LAB
CULTURE RESULTS: SIGNIFICANT CHANGE UP
CULTURE RESULTS: SIGNIFICANT CHANGE UP
SPECIMEN SOURCE: SIGNIFICANT CHANGE UP
SPECIMEN SOURCE: SIGNIFICANT CHANGE UP
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
## 2024-10-08 NOTE — PROGRESS NOTE ADULT - ASSESSMENT
REJI Espitia  Obstetric Progress Note      Subjective   Doing well. Back pain she had yesterday improved with heating pad      Objective     Vital signs in last 24 hours:    Vital Signs Range for the last 24 hours  Temperature: Temp:  [97.7 °F (36.5 °C)-97.8 °F (36.6 °C)] 97.7 °F (36.5 °C)   Temp Source: Temp src: Axillary   BP: BP: (125-131)/(78-84) 131/84   Pulse: Heart Rate:  [103-128] 103   Respirations: Resp:  [18] 18   SPO2: SpO2:  [99 %] 99 %   O2 Amount (l/min):     O2 Devices Device (Oxygen Therapy): room air   Weight:           Intake/Output last 24 hours:    No intake or output data in the 24 hours ending 10/08/24 1447    Intake/Output this shift:    No intake/output data recorded.  Exam  Physical Exam:  General: Patient is comfortable and well appearing       Fetal Heart Rate Assessment   Method: Fetal HR Assessment Method: external   Beats/min: Fetal HR (beats/min): 135   Baseline: Fetal HR Baseline: normal range   Variability: Fetal HR Variability: moderate (amplitude range 6 to 25 bpm)   Accels: Fetal HR Accelerations: greater than/equal to 15 bpm, lasting at least 15 seconds   Decels: Fetal HR Decelerations: absent   Tracing Category:       Uterine Assessment   Method: Method: external tocotransducer, per patient report, palpation   Frequency (min):     Ctx Count in 10 min:     Duration:     Intensity: Contraction Intensity: no contractions   Intensity by IUPC:     Resting Tone: Uterine Resting Tone: soft by palpation   Resting Tone by IUPC:     [place]aver Units:       Assessment & Plan       Dehiscence (without extension) of old uterine scar before onset of labor, antepartum condition or complication, third trimester    29 weeks gestation of pregnancy    Trichomonal vaginitis during pregnancy in third trimester    Bipolar disease during pregnancy in third trimester    GBS (group B Streptococcus carrier), +RV culture, currently pregnant    Dehiscence (without extension) of old uterine scar before onset  of labor, antepartum condition or complication    Assessment & Plan    Assessment:   at 30.3 weeks with uterine dehisence seen on ultrasound  Bipolar  H/o seizure disorder  Reactive NST    Plan:  Continue current plan with plan to deliver by repeat section at 32 weeks per M  Cont risperidone  Cont. Keppra  NST BID      Quirino Padilla DO  10/8/2024  14:47 CDT       85 F, from home lives w/ family ambulates independently, w/ PMH HTN, DM and HLD c/o headache and chest pain since this AM.    CXR - Platelike atelectasis or small infiltrate in the left lower lobe. Follow-up PA and lateral views recommended.    Pt was admitted for management of PNA and r/o ACS. 85 F, from home lives w/ family ambulates independently, w/ PMH HTN, DM and HLD c/o headache and chest pain.     CXR - Platelike atelectasis or small infiltrate in the left lower lobe. Follow-up PA and lateral views recommended.    Pt was admitted for management of PNA and r/o ACS.

## 2024-11-05 ENCOUNTER — INPATIENT (INPATIENT)
Facility: HOSPITAL | Age: 89
LOS: 7 days | Discharge: HOME | DRG: 534 | End: 2024-11-13
Attending: INTERNAL MEDICINE | Admitting: INTERNAL MEDICINE
Payer: MEDICAID

## 2024-11-05 VITALS
WEIGHT: 147.71 LBS | TEMPERATURE: 99 F | HEART RATE: 83 BPM | OXYGEN SATURATION: 96 % | HEIGHT: 62 IN | RESPIRATION RATE: 18 BRPM | SYSTOLIC BLOOD PRESSURE: 112 MMHG | DIASTOLIC BLOOD PRESSURE: 64 MMHG

## 2024-11-05 PROCEDURE — 99285 EMERGENCY DEPT VISIT HI MDM: CPT

## 2024-11-05 RX ORDER — MORPHINE SULFATE 30 MG/1
2 TABLET, EXTENDED RELEASE ORAL ONCE
Refills: 0 | Status: DISCONTINUED | OUTPATIENT
Start: 2024-11-05 | End: 2024-11-05

## 2024-11-05 RX ADMIN — MORPHINE SULFATE 2 MILLIGRAM(S): 30 TABLET, EXTENDED RELEASE ORAL at 23:55

## 2024-11-05 NOTE — ED PROVIDER NOTE - CLINICAL SUMMARY MEDICAL DECISION MAKING FREE TEXT BOX
89-year-old female hx of HTN, HLD, presenting with R knee pain/swelling after fall 2 days ago. Will check CTH/Cspine/Pelvis and XR lower extremity. Pain meds provided. Baseline nonambulatory. Will reassess.

## 2024-11-05 NOTE — ED PROVIDER NOTE - OBJECTIVE STATEMENT
89-year-old female hx of HTN, HLD, presenting with R knee pain/swelling after fall 2 days ago. Slid out of her wheelchair and landed on her right knee, and since then she has had knee pain and swelling and difficulty ranging her knee. Did not have lightheadedness/palpitations/chest pain/SOB or any prodromal symptoms. No head injury or LOC. No other symptoms.

## 2024-11-05 NOTE — ED ADULT TRIAGE NOTE - CHIEF COMPLAINT QUOTE
BIBA for right leg edema/ pain since sunday from sliding out her w/c landing on her right knee, no LOC BIBA for right leg edema/ pain since Sunday from sliding out her w/c landing on her right knee, no LOC

## 2024-11-05 NOTE — ED PROVIDER NOTE - PROGRESS NOTE DETAILS
CTH/Cspine/Pelvis without acute findings.   XR with distal femur fracture.   Will admit, Ortho followup advised.

## 2024-11-06 DIAGNOSIS — R56.9 UNSPECIFIED CONVULSIONS: ICD-10-CM

## 2024-11-06 DIAGNOSIS — M25.561 PAIN IN RIGHT KNEE: ICD-10-CM

## 2024-11-06 DIAGNOSIS — F41.9 ANXIETY DISORDER, UNSPECIFIED: ICD-10-CM

## 2024-11-06 DIAGNOSIS — S72.91XA UNSPECIFIED FRACTURE OF RIGHT FEMUR, INITIAL ENCOUNTER FOR CLOSED FRACTURE: ICD-10-CM

## 2024-11-06 DIAGNOSIS — I10 ESSENTIAL (PRIMARY) HYPERTENSION: ICD-10-CM

## 2024-11-06 DIAGNOSIS — D64.9 ANEMIA, UNSPECIFIED: ICD-10-CM

## 2024-11-06 DIAGNOSIS — E78.5 HYPERLIPIDEMIA, UNSPECIFIED: ICD-10-CM

## 2024-11-06 DIAGNOSIS — S72.401A UNSPECIFIED FRACTURE OF LOWER END OF RIGHT FEMUR, INITIAL ENCOUNTER FOR CLOSED FRACTURE: ICD-10-CM

## 2024-11-06 DIAGNOSIS — M25.551 PAIN IN RIGHT HIP: ICD-10-CM

## 2024-11-06 DIAGNOSIS — S72.001A FRACTURE OF UNSPECIFIED PART OF NECK OF RIGHT FEMUR, INITIAL ENCOUNTER FOR CLOSED FRACTURE: ICD-10-CM

## 2024-11-06 DIAGNOSIS — Z29.9 ENCOUNTER FOR PROPHYLACTIC MEASURES, UNSPECIFIED: ICD-10-CM

## 2024-11-06 LAB
ALBUMIN SERPL ELPH-MCNC: 2.7 G/DL — LOW (ref 3.5–5)
ALP SERPL-CCNC: 70 U/L — SIGNIFICANT CHANGE UP (ref 40–120)
ALT FLD-CCNC: 11 U/L DA — SIGNIFICANT CHANGE UP (ref 10–60)
ANION GAP SERPL CALC-SCNC: 4 MMOL/L — LOW (ref 5–17)
ANION GAP SERPL CALC-SCNC: 6 MMOL/L — SIGNIFICANT CHANGE UP (ref 5–17)
AST SERPL-CCNC: 12 U/L — SIGNIFICANT CHANGE UP (ref 10–40)
BASOPHILS # BLD AUTO: 0.02 K/UL — SIGNIFICANT CHANGE UP (ref 0–0.2)
BASOPHILS # BLD AUTO: 0.03 K/UL — SIGNIFICANT CHANGE UP (ref 0–0.2)
BASOPHILS NFR BLD AUTO: 0.2 % — SIGNIFICANT CHANGE UP (ref 0–2)
BASOPHILS NFR BLD AUTO: 0.3 % — SIGNIFICANT CHANGE UP (ref 0–2)
BILIRUB SERPL-MCNC: 0.5 MG/DL — SIGNIFICANT CHANGE UP (ref 0.2–1.2)
BUN SERPL-MCNC: 26 MG/DL — HIGH (ref 7–18)
BUN SERPL-MCNC: 26 MG/DL — HIGH (ref 7–18)
CALCIUM SERPL-MCNC: 8.3 MG/DL — LOW (ref 8.4–10.5)
CALCIUM SERPL-MCNC: 8.3 MG/DL — LOW (ref 8.4–10.5)
CHLORIDE SERPL-SCNC: 110 MMOL/L — HIGH (ref 96–108)
CHLORIDE SERPL-SCNC: 111 MMOL/L — HIGH (ref 96–108)
CO2 SERPL-SCNC: 25 MMOL/L — SIGNIFICANT CHANGE UP (ref 22–31)
CO2 SERPL-SCNC: 27 MMOL/L — SIGNIFICANT CHANGE UP (ref 22–31)
CREAT SERPL-MCNC: 0.5 MG/DL — SIGNIFICANT CHANGE UP (ref 0.5–1.3)
CREAT SERPL-MCNC: 0.54 MG/DL — SIGNIFICANT CHANGE UP (ref 0.5–1.3)
EGFR: 87 ML/MIN/1.73M2 — SIGNIFICANT CHANGE UP
EGFR: 88 ML/MIN/1.73M2 — SIGNIFICANT CHANGE UP
EOSINOPHIL # BLD AUTO: 0.1 K/UL — SIGNIFICANT CHANGE UP (ref 0–0.5)
EOSINOPHIL # BLD AUTO: 0.15 K/UL — SIGNIFICANT CHANGE UP (ref 0–0.5)
EOSINOPHIL NFR BLD AUTO: 1.1 % — SIGNIFICANT CHANGE UP (ref 0–6)
EOSINOPHIL NFR BLD AUTO: 1.5 % — SIGNIFICANT CHANGE UP (ref 0–6)
GLUCOSE SERPL-MCNC: 135 MG/DL — HIGH (ref 70–99)
GLUCOSE SERPL-MCNC: 138 MG/DL — HIGH (ref 70–99)
HCT VFR BLD CALC: 24.8 % — LOW (ref 34.5–45)
HCT VFR BLD CALC: 25.7 % — LOW (ref 34.5–45)
HGB BLD-MCNC: 8.1 G/DL — LOW (ref 11.5–15.5)
HGB BLD-MCNC: 8.4 G/DL — LOW (ref 11.5–15.5)
IMM GRANULOCYTES NFR BLD AUTO: 0.5 % — SIGNIFICANT CHANGE UP (ref 0–0.9)
IMM GRANULOCYTES NFR BLD AUTO: 0.7 % — SIGNIFICANT CHANGE UP (ref 0–0.9)
LYMPHOCYTES # BLD AUTO: 1.36 K/UL — SIGNIFICANT CHANGE UP (ref 1–3.3)
LYMPHOCYTES # BLD AUTO: 1.77 K/UL — SIGNIFICANT CHANGE UP (ref 1–3.3)
LYMPHOCYTES # BLD AUTO: 15.2 % — SIGNIFICANT CHANGE UP (ref 13–44)
LYMPHOCYTES # BLD AUTO: 17.2 % — SIGNIFICANT CHANGE UP (ref 13–44)
MCHC RBC-ENTMCNC: 28.2 PG — SIGNIFICANT CHANGE UP (ref 27–34)
MCHC RBC-ENTMCNC: 28.8 PG — SIGNIFICANT CHANGE UP (ref 27–34)
MCHC RBC-ENTMCNC: 32.7 G/DL — SIGNIFICANT CHANGE UP (ref 32–36)
MCHC RBC-ENTMCNC: 32.7 G/DL — SIGNIFICANT CHANGE UP (ref 32–36)
MCV RBC AUTO: 86.4 FL — SIGNIFICANT CHANGE UP (ref 80–100)
MCV RBC AUTO: 88 FL — SIGNIFICANT CHANGE UP (ref 80–100)
MONOCYTES # BLD AUTO: 0.73 K/UL — SIGNIFICANT CHANGE UP (ref 0–0.9)
MONOCYTES # BLD AUTO: 0.86 K/UL — SIGNIFICANT CHANGE UP (ref 0–0.9)
MONOCYTES NFR BLD AUTO: 8.1 % — SIGNIFICANT CHANGE UP (ref 2–14)
MONOCYTES NFR BLD AUTO: 8.3 % — SIGNIFICANT CHANGE UP (ref 2–14)
NEUTROPHILS # BLD AUTO: 6.68 K/UL — SIGNIFICANT CHANGE UP (ref 1.8–7.4)
NEUTROPHILS # BLD AUTO: 7.46 K/UL — HIGH (ref 1.8–7.4)
NEUTROPHILS NFR BLD AUTO: 72.3 % — SIGNIFICANT CHANGE UP (ref 43–77)
NEUTROPHILS NFR BLD AUTO: 74.6 % — SIGNIFICANT CHANGE UP (ref 43–77)
NRBC # BLD: 0 /100 WBCS — SIGNIFICANT CHANGE UP (ref 0–0)
NRBC # BLD: 0 /100 WBCS — SIGNIFICANT CHANGE UP (ref 0–0)
PLATELET # BLD AUTO: 223 K/UL — SIGNIFICANT CHANGE UP (ref 150–400)
PLATELET # BLD AUTO: 233 K/UL — SIGNIFICANT CHANGE UP (ref 150–400)
POTASSIUM SERPL-MCNC: 3.3 MMOL/L — LOW (ref 3.5–5.3)
POTASSIUM SERPL-MCNC: 3.7 MMOL/L — SIGNIFICANT CHANGE UP (ref 3.5–5.3)
POTASSIUM SERPL-SCNC: 3.3 MMOL/L — LOW (ref 3.5–5.3)
POTASSIUM SERPL-SCNC: 3.7 MMOL/L — SIGNIFICANT CHANGE UP (ref 3.5–5.3)
PROT SERPL-MCNC: 6.2 G/DL — SIGNIFICANT CHANGE UP (ref 6–8.3)
RBC # BLD: 2.87 M/UL — LOW (ref 3.8–5.2)
RBC # BLD: 2.92 M/UL — LOW (ref 3.8–5.2)
RBC # FLD: 14.2 % — SIGNIFICANT CHANGE UP (ref 10.3–14.5)
RBC # FLD: 14.5 % — SIGNIFICANT CHANGE UP (ref 10.3–14.5)
SODIUM SERPL-SCNC: 141 MMOL/L — SIGNIFICANT CHANGE UP (ref 135–145)
SODIUM SERPL-SCNC: 142 MMOL/L — SIGNIFICANT CHANGE UP (ref 135–145)
WBC # BLD: 10.31 K/UL — SIGNIFICANT CHANGE UP (ref 3.8–10.5)
WBC # BLD: 8.96 K/UL — SIGNIFICANT CHANGE UP (ref 3.8–10.5)
WBC # FLD AUTO: 10.31 K/UL — SIGNIFICANT CHANGE UP (ref 3.8–10.5)
WBC # FLD AUTO: 8.96 K/UL — SIGNIFICANT CHANGE UP (ref 3.8–10.5)

## 2024-11-06 PROCEDURE — 99223 1ST HOSP IP/OBS HIGH 75: CPT | Mod: GC

## 2024-11-06 PROCEDURE — 72192 CT PELVIS W/O DYE: CPT | Mod: 26,MC

## 2024-11-06 PROCEDURE — 73552 X-RAY EXAM OF FEMUR 2/>: CPT | Mod: 26,RT

## 2024-11-06 PROCEDURE — 70450 CT HEAD/BRAIN W/O DYE: CPT | Mod: 26,MC

## 2024-11-06 PROCEDURE — 99254 IP/OBS CNSLTJ NEW/EST MOD 60: CPT

## 2024-11-06 PROCEDURE — 72125 CT NECK SPINE W/O DYE: CPT | Mod: 26,MC

## 2024-11-06 PROCEDURE — 99222 1ST HOSP IP/OBS MODERATE 55: CPT

## 2024-11-06 PROCEDURE — 73562 X-RAY EXAM OF KNEE 3: CPT | Mod: 26,RT

## 2024-11-06 PROCEDURE — 73590 X-RAY EXAM OF LOWER LEG: CPT | Mod: 26,RT

## 2024-11-06 RX ORDER — ZONISAMIDE 100 MG/1
50 CAPSULE ORAL DAILY
Refills: 0 | Status: DISCONTINUED | OUTPATIENT
Start: 2024-11-06 | End: 2024-11-13

## 2024-11-06 RX ORDER — MORPHINE SULFATE 30 MG/1
2 TABLET, EXTENDED RELEASE ORAL ONCE
Refills: 0 | Status: DISCONTINUED | OUTPATIENT
Start: 2024-11-06 | End: 2024-11-06

## 2024-11-06 RX ORDER — POTASSIUM CHLORIDE 10 MEQ
40 TABLET, EXTENDED RELEASE ORAL ONCE
Refills: 0 | Status: COMPLETED | OUTPATIENT
Start: 2024-11-06 | End: 2024-11-06

## 2024-11-06 RX ORDER — ACETAMINOPHEN 500 MG
1000 TABLET ORAL ONCE
Refills: 0 | Status: COMPLETED | OUTPATIENT
Start: 2024-11-07 | End: 2024-11-07

## 2024-11-06 RX ORDER — LISINOPRIL 40 MG
20 TABLET ORAL DAILY
Refills: 0 | Status: DISCONTINUED | OUTPATIENT
Start: 2024-11-06 | End: 2024-11-13

## 2024-11-06 RX ORDER — ACETAMINOPHEN 500 MG
650 TABLET ORAL EVERY 6 HOURS
Refills: 0 | Status: DISCONTINUED | OUTPATIENT
Start: 2024-11-06 | End: 2024-11-06

## 2024-11-06 RX ORDER — ESCITALOPRAM 10 MG/1
10 TABLET, FILM COATED ORAL DAILY
Refills: 0 | Status: DISCONTINUED | OUTPATIENT
Start: 2024-11-06 | End: 2024-11-13

## 2024-11-06 RX ORDER — ACETAMINOPHEN 500 MG
1000 TABLET ORAL ONCE
Refills: 0 | Status: COMPLETED | OUTPATIENT
Start: 2024-11-06 | End: 2024-11-06

## 2024-11-06 RX ORDER — LACTULOSE 10 G/15 ML
10 SOLUTION, ORAL ORAL
Refills: 0 | Status: DISCONTINUED | OUTPATIENT
Start: 2024-11-06 | End: 2024-11-11

## 2024-11-06 RX ORDER — HEPARIN SODIUM 10000 [USP'U]/ML
5000 INJECTION INTRAVENOUS; SUBCUTANEOUS EVERY 8 HOURS
Refills: 0 | Status: DISCONTINUED | OUTPATIENT
Start: 2024-11-06 | End: 2024-11-13

## 2024-11-06 RX ORDER — MORPHINE SULFATE 30 MG/1
2 TABLET, EXTENDED RELEASE ORAL EVERY 4 HOURS
Refills: 0 | Status: DISCONTINUED | OUTPATIENT
Start: 2024-11-06 | End: 2024-11-07

## 2024-11-06 RX ORDER — MORPHINE SULFATE 30 MG/1
1 TABLET, EXTENDED RELEASE ORAL EVERY 4 HOURS
Refills: 0 | Status: DISCONTINUED | OUTPATIENT
Start: 2024-11-06 | End: 2024-11-07

## 2024-11-06 RX ORDER — KETOROLAC TROMETHAMINE 30 MG/ML
15 INJECTION INTRAMUSCULAR; INTRAVENOUS EVERY 6 HOURS
Refills: 0 | Status: DISCONTINUED | OUTPATIENT
Start: 2024-11-06 | End: 2024-11-06

## 2024-11-06 RX ORDER — SENNA 187 MG
2 TABLET ORAL AT BEDTIME
Refills: 0 | Status: DISCONTINUED | OUTPATIENT
Start: 2024-11-06 | End: 2024-11-13

## 2024-11-06 RX ADMIN — Medication 20 MILLIGRAM(S): at 21:26

## 2024-11-06 RX ADMIN — MORPHINE SULFATE 2 MILLIGRAM(S): 30 TABLET, EXTENDED RELEASE ORAL at 14:08

## 2024-11-06 RX ADMIN — HEPARIN SODIUM 5000 UNIT(S): 10000 INJECTION INTRAVENOUS; SUBCUTANEOUS at 13:10

## 2024-11-06 RX ADMIN — Medication 400 MILLIGRAM(S): at 18:05

## 2024-11-06 RX ADMIN — ZONISAMIDE 50 MILLIGRAM(S): 100 CAPSULE ORAL at 11:45

## 2024-11-06 RX ADMIN — Medication 20 MILLIGRAM(S): at 11:45

## 2024-11-06 RX ADMIN — Medication 1000 MILLIGRAM(S): at 12:43

## 2024-11-06 RX ADMIN — Medication 2 TABLET(S): at 21:26

## 2024-11-06 RX ADMIN — HEPARIN SODIUM 5000 UNIT(S): 10000 INJECTION INTRAVENOUS; SUBCUTANEOUS at 21:27

## 2024-11-06 RX ADMIN — Medication 40 MILLIEQUIVALENT(S): at 13:07

## 2024-11-06 RX ADMIN — ESCITALOPRAM 10 MILLIGRAM(S): 10 TABLET, FILM COATED ORAL at 11:45

## 2024-11-06 RX ADMIN — MORPHINE SULFATE 2 MILLIGRAM(S): 30 TABLET, EXTENDED RELEASE ORAL at 08:27

## 2024-11-06 RX ADMIN — MORPHINE SULFATE 2 MILLIGRAM(S): 30 TABLET, EXTENDED RELEASE ORAL at 04:47

## 2024-11-06 RX ADMIN — Medication 1000 MILLIGRAM(S): at 19:30

## 2024-11-06 RX ADMIN — Medication 400 MILLIGRAM(S): at 11:45

## 2024-11-06 RX ADMIN — MORPHINE SULFATE 2 MILLIGRAM(S): 30 TABLET, EXTENDED RELEASE ORAL at 14:55

## 2024-11-06 NOTE — H&P ADULT - NSHPPHYSICALEXAM_GEN_ALL_CORE
PHYSICAL EXAM:  GENERAL: +in mild distress.   HEAD:  Atraumatic, Normocephalic  EYES: EOMI, PERRLA, conjunctiva and sclera clear  NECK: Supple, No JVD  CHEST/LUNG: Clear to auscultation bilaterally; No wheeze; No crackles; No accessory muscles used  HEART: Regular rate and rhythm; No murmurs;   ABDOMEN: Soft, Nontender, Nondistended; Bowel sounds present; No guarding  EXTREMITIES:  2+ Peripheral Pulses, + R thigh pain and erythema   PSYCH: AAOx1-2  NEUROLOGY: non-focal  SKIN: No rashes or lesions

## 2024-11-06 NOTE — H&P ADULT - PROBLEM SELECTOR PLAN 1
- R knee fracture/pain  - f.u official read  - Ortho consult  - pt unliekly to be a candidate for surgery (prev told 4 years ago too high risk for hip surgery at her age)  - pain control PRN- Tylenol, Toradol, Morphine  -osteopenia  -Palliative? - p/w R knee pain 2/2 injury on Sunday  - bedbound/wheelchair bound for the past 7 months; no longer ambulatory  - pt was told 4 years ago she is too high a surgical risk for hip surgery  -p/w 10 /10 severe pain in ED    - Consult Ortho in AM  - Consult Pain Medicine  - Follow up x-rays official read  - Tylenol prn for mild pain, IV Toradol prn for moderate pain, IV Morphine prn for severe pain

## 2024-11-06 NOTE — ED ADULT NURSE NOTE - OBJECTIVE STATEMENT
Pt BIBA accompanied by daughter who reports patient has leg pain/swelling after falling out of wheelchair at home on Sunday. Per daughter she denies any head strike or LOC

## 2024-11-06 NOTE — H&P ADULT - HISTORY OF PRESENT ILLNESS
Pt is a 89 y/o F with PMHx of HTN, HLD, anxiety, dementia (AAO1-2 at baseline), anxiety, seizures, who is wheelchair/bedbound, sacral pressure ulcer who presented to the ER due to right knee pain/swelling. Per daughter at bedside who provided history, on Sunday, patient/s family was moving the patient with a device to lift patient from bed to wheelchair but patient's leg got stuck and bent in machine.The family straightened the leg and put the patient in the wheelchair and brought her to [facility]. Once home, pt began to complain of new onset R knee and thigh pain. Pain was severe and patient was unable to sleep and continued to scream in pain. On Monday, family noticed increased swelling and mild erythema on R knee which prompted them to come to the ED. Patient has been wheelchair/bedbound for the past 7 months. In the past she used to walk with a walker but could not walk more than a block without having to sit down. She cannot climb stairs due to hip problems. She went to a doctor 4 years ago but was told she is too high a risk for surgery to fix her hips. Daughter does endorse decreased PO intake and generalized weakness since Sunday. Pt admitted to medicine s/p injury to right knee.

## 2024-11-06 NOTE — CHART NOTE - NSCHARTNOTEFT_GEN_A_CORE
Patient seen and examined.   [id number], daughter at bedside.  She has pain to R knee.  On exam, A&Ox3, moderate acute distress, CTAB, RRR, R knee TTP.  Imaging showed  Spiral fracture of the R distal femur extending to the   intercondylar region. Ortho consulted.  Pain management consulted.

## 2024-11-06 NOTE — H&P ADULT - ATTENDING COMMENTS
IMAGING  CT Head  CT C-Spine  IMPRESSION:  CT HEAD:  No acute intracranial hemorrhage, mass effect, or acute osseous fracture.  CT CERVICAL SPINE:  No acute cervical spine fracture or evidence of traumatic malalignment. Cervical spondylosis.    HPI  89 year old female patient with pmhx HTN, HLD, wheelchair/bedbound, sacral pressure ulcer who presented to the ER due to right knee pain/swelling.  Patient's family used a device to lift patient from bed to wheelchair but patient's leg got stuck and bent. The family straightened the leg and put the patient in the wheelchair and brought [facility] to [facility]. On the way back after [facility] patient started complaining of new onset right thigh/knee pain. X-Ray in the ER with femur fracture. Per family, patient without heart problems, without lung problems, without thyroid problems, without liver problems, no aneurysms. Patient has been wheelchair/bedbound for the past 7 months. In the past she used to walk with a walker but could not walk more than a block without having to sit down. She cannot climb stairs due to hip problems. She went to a doctor 4 years ago but was told she is too high a risk for surgery to fix her hips.    Review Of Systems included: + right thigh pain, + loss of appetite, + poor oral intake, + vaginal bleeding,   No loss of consciousness, no head injury, no chest pain, no shortness of breath, no lightheadedness, no palpitations, no bloody stool, no melena     #[id number]  Physical Exam  General: Awake, Alert, Oriented x2  Cardiac: RRR  Pulmonary: CTA b/l  Abdominal: Soft, ND, NT  Extremities: Right thigh swollen and very tender with patient crying out in pain    A/P  # Right Femur Fracture  # Intractable Right Thigh Pain  > Patient is bedbound/wheelchair bound for the past 7 months; no longer ambulatory  > Patient was told 4 years ago she is too high a surgical risk for hip surgery  > 10/10 pain  - Consult Ortho  - Consult Pain Medicine  - Follow up x-rays  - Tylenol prn for mild pain, IV Toradol prn for moderate pain, IV Morphine prn for severe pain    # Anemia  > No bloody stool, no melena; was previously with vaginal bleeding but went to ObGyn who told her she has an infection and that everything is okay; no vaginal bleeding currently either  - Monitor Hgb    # Hx of HTN, HLD  - Continue home medications    # DVT PPx  - Heparin    # FEN  - DASH Diet  - Monitor and replete electrolytes as needed  - Aspiration Precautions    Previous Admissions Included  8/16/2024 - 8/20/2024: Diarrhea/Abdominal Pain, + Enteropathogenic E Coli. pt is bedbound, does not need home physical therapy, daughter uses [facility] lift. For the stage 2 sacral ulcer and right shoulder ulcer - pt is setup with home nursing care.    Patient case and management was discussed with ER Attending. Patient in severe pain. Will order IV opioids prn given severity of pain. Will have Orthopedics and Pain medicine see her; although, surgery is probably unlikely as she does not ambulate at baseline and is higher risk due to her advanced age.  I did examine all labs (including CBC, CMP), imaging, prior notes

## 2024-11-06 NOTE — PATIENT PROFILE ADULT - FALL HARM RISK - HARM RISK INTERVENTIONS
Assistance with ambulation/Assistance OOB with selected safe patient handling equipment/Communicate Risk of Fall with Harm to all staff/Discuss with provider need for PT consult/Monitor gait and stability/Reinforce activity limits and safety measures with patient and family/Tailored Fall Risk Interventions/Visual Cue: Yellow wristband and red socks/Bed in lowest position, wheels locked, appropriate side rails in place/Call bell, personal items and telephone in reach/Instruct patient to call for assistance before getting out of bed or chair/Non-slip footwear when patient is out of bed/[place] to call system/Physically safe environment - no spills, clutter or unnecessary equipment/Purposeful Proactive Rounding/Room/bathroom lighting operational, light cord in reach

## 2024-11-06 NOTE — H&P ADULT - ASSESSMENT
89-year-old female hx of HTN, HLD, presenting with R knee pain/swelling after fall 2 days ago. Slid out of her wheelchair and landed on her right knee, and since then she has had knee pain and swelling and difficulty ranging her knee. Did not have lightheadedness/palpitations/chest pain/SOB or any prodromal symptoms. No head injury or LOC. No other symptoms.   Pt is a 89 y/o F with PMHx of HTN, HLD, anxiety, dementia (AAO1-2 at baseline), anxiety, seizures, who is wheelchair/bedbound, sacral pressure ulcer who presented to the ER due to right knee pain/swelling. Per daughter at bedside who provided history, on Sunday, patient/s family was moving the patient with a device to lift patient from bed to wheelchair but patient's leg got stuck and bent in machine.The family straightened the leg and put the patient in the wheelchair and brought her to [facility]. Once home, pt began to complain of new onset R knee and thigh pain. Pain was severe and patient was unable to sleep and continued to scream in pain. On Monday, family noticed increased swelling and mild erythema on R knee which prompted them to come to the ED. Patient has been wheelchair/bedbound for the past 7 months. In the past she used to walk with a walker but could not walk more than a block without having to sit down. She cannot climb stairs due to hip problems. She went to a doctor 4 years ago but was told she is too high a risk for surgery to fix her hips. Daughter does endorse decreased PO intake and generalized weakness since Sunday. Pt admitted to medicine s/p injury to right knee.

## 2024-11-06 NOTE — H&P ADULT - PROBLEM SELECTOR PLAN 4
c/w home med Lisinopril 10 mg qd w parameters No bloody stool, no melena; was previously with vaginal bleeding but went to ObGyn who told her she has an infection and that everything is okay; no vaginal bleeding currently either  - Monitor Hgb

## 2024-11-06 NOTE — ED ADULT NURSE NOTE - CHIEF COMPLAINT QUOTE
BIBA for right leg edema/ pain since Sunday from sliding out her w/c landing on her right knee, no LOC

## 2024-11-06 NOTE — PATIENT PROFILE ADULT - NSPROPTRIGHTREPPHONE_GEN_A_NUR
Baseline Hgb seems around 9-11 with limited data  Likely related to CKD, history of iron deficiency on labs; likely with acute component related to marrow suppression in setting of acute infection and antibiotics  Hgb 8.2=8.2<8.7  Continue to monitor on routine labs  FOBT ordered   Continue iron supplements   Monitor for acute bleed - no present signs  Consider further workup, for persistent/worsening  Transfuse PRN Hgb <7  Continue to monitor for acute changes  Follow up with PCP as appropriate    [phone]

## 2024-11-07 ENCOUNTER — TRANSCRIPTION ENCOUNTER (OUTPATIENT)
Age: 89
End: 2024-11-07

## 2024-11-07 DIAGNOSIS — S72.401A UNSPECIFIED FRACTURE OF LOWER END OF RIGHT FEMUR, INITIAL ENCOUNTER FOR CLOSED FRACTURE: ICD-10-CM

## 2024-11-07 PROCEDURE — 99231 SBSQ HOSP IP/OBS SF/LOW 25: CPT

## 2024-11-07 PROCEDURE — 99232 SBSQ HOSP IP/OBS MODERATE 35: CPT

## 2024-11-07 PROCEDURE — 99233 SBSQ HOSP IP/OBS HIGH 50: CPT | Mod: GC

## 2024-11-07 RX ORDER — ACETAMINOPHEN 500 MG
1000 TABLET ORAL EVERY 8 HOURS
Refills: 0 | Status: COMPLETED | OUTPATIENT
Start: 2024-11-07 | End: 2024-11-10

## 2024-11-07 RX ORDER — OXYCODONE HYDROCHLORIDE 30 MG/1
2.5 TABLET ORAL EVERY 4 HOURS
Refills: 0 | Status: DISCONTINUED | OUTPATIENT
Start: 2024-11-07 | End: 2024-11-08

## 2024-11-07 RX ADMIN — Medication 1000 MILLIGRAM(S): at 17:44

## 2024-11-07 RX ADMIN — Medication 1000 MILLIGRAM(S): at 19:43

## 2024-11-07 RX ADMIN — OXYCODONE HYDROCHLORIDE 2.5 MILLIGRAM(S): 30 TABLET ORAL at 21:22

## 2024-11-07 RX ADMIN — HEPARIN SODIUM 5000 UNIT(S): 10000 INJECTION INTRAVENOUS; SUBCUTANEOUS at 21:22

## 2024-11-07 RX ADMIN — Medication 1000 MILLIGRAM(S): at 12:10

## 2024-11-07 RX ADMIN — Medication 1000 MILLIGRAM(S): at 13:00

## 2024-11-07 RX ADMIN — ESCITALOPRAM 10 MILLIGRAM(S): 10 TABLET, FILM COATED ORAL at 12:10

## 2024-11-07 RX ADMIN — Medication 20 MILLIGRAM(S): at 21:22

## 2024-11-07 RX ADMIN — HEPARIN SODIUM 5000 UNIT(S): 10000 INJECTION INTRAVENOUS; SUBCUTANEOUS at 06:00

## 2024-11-07 RX ADMIN — Medication 10 GRAM(S): at 05:59

## 2024-11-07 RX ADMIN — MORPHINE SULFATE 2 MILLIGRAM(S): 30 TABLET, EXTENDED RELEASE ORAL at 06:00

## 2024-11-07 RX ADMIN — Medication 1000 MILLIGRAM(S): at 15:41

## 2024-11-07 RX ADMIN — Medication 20 MILLIGRAM(S): at 06:00

## 2024-11-07 RX ADMIN — Medication 400 MILLIGRAM(S): at 05:57

## 2024-11-07 RX ADMIN — HEPARIN SODIUM 5000 UNIT(S): 10000 INJECTION INTRAVENOUS; SUBCUTANEOUS at 12:10

## 2024-11-07 RX ADMIN — ZONISAMIDE 50 MILLIGRAM(S): 100 CAPSULE ORAL at 12:10

## 2024-11-07 NOTE — PHYSICAL THERAPY INITIAL EVALUATION ADULT - RANGE OF MOTION EXAMINATION, REHAB EVAL
Pt. limited in A/PROM due to guarding, contractures and altered mental status/confusion./deficits as listed below

## 2024-11-07 NOTE — PHYSICAL THERAPY INITIAL EVALUATION ADULT - PLANNED THERAPY INTERVENTIONS, PT EVAL
balance training/bed mobility training/ROM/strengthening/stretching/transfer training bed mobility training/ROM/transfer training bed mobility training/ROM

## 2024-11-07 NOTE — DISCHARGE NOTE PROVIDER - NSDCMRMEDTOKEN_GEN_ALL_CORE_FT
atorvastatin 20 mg oral tablet: 1 tab(s) orally once a day  escitalopram 10 mg oral tablet: 1 tab(s) orally once a day  lactulose 10 g/15 mL oral syrup: 15 milliliter(s) orally 2 times a day  lisinopril 20 mg oral tablet: 1 tab(s) orally once a day  senna leaf extract oral tablet: 2 tab(s) orally once a day (at bedtime)  zonisamide 50 mg oral capsule: 1 cap(s) orally once a day   acetaminophen 500 mg oral tablet: 2 tab(s) orally every 6 hours as needed for  mild pain to be taken every 6 hours for mild pain  atorvastatin 20 mg oral tablet: 1 tab(s) orally once a day  escitalopram 10 mg oral tablet: 1 tab(s) orally once a day  gabapentin 100 mg oral capsule: 2 cap(s) orally once a day (at bedtime)  lisinopril 20 mg oral tablet: 1 tab(s) orally once a day  oxyCODONE 5 mg oral tablet: 1 tab(s) orally every 4 hours as needed for Severe Pain (7 - 10) Oxycodone 5 mg to be taken three times a day for severe pain [DO NOT exceed more than q4 hours] - increased risk of desaturation, hypotension MDD: 30  polyethylene glycol 3350 oral powder for reconstitution: 17 gram(s) orally every 48 hours to be taken every 48 hours ONLY AS NEEDED if no bowel movement  senna leaf extract oral tablet: 2 tab(s) orally once a day (at bedtime)  Slow Fe (as elemental iron) 45 mg oral tablet, extended release: 1 tab(s) orally once a day  zonisamide 50 mg oral capsule: 1 cap(s) orally once a day   acetaminophen 500 mg oral tablet: 2 tab(s) orally every 6 hours as needed for  mild pain to be taken every 6 hours for mild pain  atorvastatin 20 mg oral tablet: 1 tab(s) orally once a day  escitalopram 10 mg oral tablet: 1 tab(s) orally once a day  gabapentin 100 mg oral capsule: 2 cap(s) orally once a day (at bedtime)  lisinopril 20 mg oral tablet: 1 tab(s) orally once a day  Narcan 4 mg/0.1 mL nasal spray: 1 spray(s) intranasally as needed for opioid overdose  Narcan 4 mg/0.1 mL nasal spray: 1 spray(s) intranasally as needed for opioid overdose  oxyCODONE 5 mg oral tablet: 1 tab(s) orally every 4 hours as needed for Severe Pain (7 - 10) Oxycodone 5 mg to be taken every 4 hours as needed for severe pain [DO NOT exceed more than q4 hours] - increased risk of desaturation, hypotension MDD: 30  polyethylene glycol 3350 oral powder for reconstitution: 17 gram(s) orally every 48 hours to be taken every 48 hours ONLY AS NEEDED if no bowel movement  senna leaf extract oral tablet: 2 tab(s) orally once a day (at bedtime)  Slow Fe (as elemental iron) 45 mg oral tablet, extended release: 1 tab(s) orally once a day  zonisamide 50 mg oral capsule: 1 cap(s) orally once a day

## 2024-11-07 NOTE — PHYSICAL THERAPY INITIAL EVALUATION ADULT - IMPAIRMENTS FOUND, PT EVAL
aerobic capacity/endurance/arousal, attention, and cognition/cognitive impairment/gait, locomotion, and balance/gross motor/muscle strength/poor safety awareness/posture/ROM/tone arousal, attention, and cognition/cognitive impairment/gait, locomotion, and balance/gross motor/muscle strength/poor safety awareness/posture/ROM/tone

## 2024-11-07 NOTE — PROGRESS NOTE ADULT - SUBJECTIVE AND OBJECTIVE BOX
MARIA REYESDEJIMENEZ [id number]  89yFemale    FOLLOW-UP PROGRESS NOTE    Diagnosis:  89y Female with right distal femur fracture      89 y/o F HPI:  Patient was seen and evaluated at bedside with Dr. Kuhn. Patient with hx of dementia and AOx1-2 at baseline, therefore poor historian. Patient's daughter at bedside to aid in history and discuss plan with.   Patient appeared to be comfortable lying in bed, no apparent distress or pain, knee immobilizer in tact.  Unable to obtain ROS      Vital Signs Last 24 Hrs  T(C): 36.5 (07 Nov 2024 12:54), Max: 37.3 (06 Nov 2024 20:52)  T(F): 97.7 (07 Nov 2024 12:54), Max: 99.1 (06 Nov 2024 20:52)  HR: 98 (07 Nov 2024 12:54) (80 - 100)  BP: 131/90 (07 Nov 2024 12:54) (113/53 - 144/60)  BP(mean): 100 (07 Nov 2024 12:54) (100 - 100)  ABP: --  ABP(mean): --  RR: 18 (07 Nov 2024 12:54) (16 - 18)  SpO2: 94% (07 Nov 2024 12:54) (94% - 95%)    O2 Parameters below as of 07 Nov 2024 12:54  Patient On (Oxygen Delivery Method): room air      I&O's Detail      Physical Exam:  General: AAOx1-2, NAD, resting in bed, Left knee immobilizer in good position, RLE in baseline knee flexion.   RLE: Moderate swelling noted to the right knee, no ecchymosis or erythema.  Moving all toes and able to dorsi/plantarflex. Skin is pink and warm.  SILT. NVI.   LLE: Appears to have flexion contracture, able to passively extend the left knee to about 10 degrees. Daughter states that is baseline.   Lower extremity bilaterally:  No calf tenderness, calves are soft. 2+pulses. NVI. Good capillary refill. Warm, well-perfused.                             8.1    8.96  )-----------( 233      ( 06 Nov 2024 10:20 )             24.8   11-06    142  |  111[H]  |  26[H]  ----------------------------<  138[H]  3.7   |  25  |  0.50    Ca    8.3[L]      06 Nov 2024 10:20    TPro  6.2  /  Alb  2.7[L]  /  TBili  0.5  /  DBili  x   /  AST  12  /  ALT  11  /  AlkPhos  70  11-06        Impression:  89yFemale with right distal femur fracture    Plan:    - Continue with conservative management. No surgical intervention at this time.    - We discussed the risk and benefits of operative and nonoperative treatment options with the patient's daughter. Risks of surgery were discussed and include, but are not limited to, pain, infection, bleeding,  symptomatic hardware, loss of motion, loss of strength, loss of function, need for revision surgery, damage to nerves and/or blood vessels, anesthetic risks, DVT, PE, MI, CVA, and even death. All questions were answered.   - After long discussion, the patient's daughter decided that she does not wish to have surgery for her mother at this time. Patient's daughter explained that due to her mother's age and bedbound status, she would like to continue with conservative management for her mother's care.     -  Patient placed in bulky mckinley dressing and knee immobilizer to aid in bone healing position and also alleviate pain.   -  Daily PT- NWB to RLE with knee immobilizer in place at all times  -  Pain management  -  DVT prophylaxis with Venodynes and chemical DVT ppx as per medicine team  -  Case d/w Dr. Kuhn

## 2024-11-07 NOTE — PROGRESS NOTE ADULT - PROBLEM SELECTOR PLAN 1
Pt with acute right hip pain which is somatic in nature due to femur fracture. Per orho- not a surgical candidate.    X-ray right femur demonstrates spiral fracture of the distal femur extending to the intercondylar region. Osteopenia. Regional vascular calcifications present. 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   Opioid pain recommendations   - Discontinue Morphine 1 - 2 mg IVP q 4hrs PRN for moderate/severe pain.  - Start oxycodone 2.5mg PO q4h PRN severe pain. Monitor for respiratory depression/sedation   Non-opioid pain recommendations   - Tylenol 1G PO q8h x 3 days, then PRN moderate pain.   Bowel Regimen  - Continue lactulose per medicine team   - Continue Senna 2 tablets at bedtime for constipation  Mild pain (pain score 1-3)  - Non-pharmacological pain treatment recommendations  - Warm/ Cool packs PRN   - Repositioning extremity, guided imagery, relaxation, distraction.  - Physical therapy OOB if no contraindications   Recommendations discussed with primary team and RN. Pain team will sign off at this time, please reconsult if needed.

## 2024-11-07 NOTE — PHYSICAL THERAPY INITIAL EVALUATION ADULT - IMPAIRMENTS CONTRIBUTING IMPAIRED BED MOBILITY, REHAB EVAL
impaired balance/cognition/decreased flexibility/impaired motor control/abnormal muscle tone/pain/impaired postural control/decreased ROM/decreased strength

## 2024-11-07 NOTE — PROGRESS NOTE ADULT - ATTENDING COMMENTS
CC: R knee pain  S:   [id number]  Daughter at bedside  O:  Vital Signs Last 24 Hrs  T(C): 36.5 (11-07-24 @ 12:54), Max: 37.3 (11-06-24 @ 20:52)  T(F): 97.7 (11-07-24 @ 12:54), Max: 99.1 (11-06-24 @ 20:52)  HR: 98 (11-07-24 @ 12:54) (80 - 100)  BP: 131/90 (11-07-24 @ 12:54) (113/53 - 144/60)  BP(mean): 100 (11-07-24 @ 12:54) (100 - 100)  RR: 18 (11-07-24 @ 12:54) (16 - 18)  SpO2: 94% (11-07-24 @ 12:54) (94% - 95%)    PE:  Gen: Orientedx2, alert, No acute distress Eyes: conjunctivae and lid normal, sclera clear with no icterus ENT: nose and throat exam normal CVS: S1, S2, RRR; no murmur, no rubs, no gallops Pulm: Good air exchange, Breath sounds equal bilaterally, no rales rhonchi,  no wheezes Chest: nontender, no chest deformity, chest movement symmetrical Gl: abdomen soft, nontender, nondistended, bowel sounds normoactive, no masses palpated Musk: RLE with dressing and immobilizer Skin: no skin lesions, skin turgor normal, warm and well perfused Neuro: Awake, alert,Psych: normal affect, insight        A/P:  Maria Reyesdejimenez is a 89 yo bedbound/wheelchair bound woman with PMH significant for HTN, HLD, sacral pressure ulcer, dementia, anxiety, seizures who presents with R knee pain, swelling found to have R distal femur fracture.    #R distal femur fracture  #HTN  #HLD  #Dementia  #Sacral pressure ulcers  -ortho consulted, conservative management, no surgical intervention at this time  -PT , NWB to RLE with knee immobilizer  -pain management  consulted  -Continue tylenol, oxycodone  -continue atorvastatin, lisinopril, lexapro, zonisamide    Labs reviewed:                         8.1    8.96  )-----------( 233      ( 06 Nov 2024 10:20 )             24.8   11-06    142  |  111[H]  |  26[H]  ----------------------------<  138[H]  3.7   |  25  |  0.50    Ca    8.3[L]      06 Nov 2024 10:20    TPro  6.2  /  Alb  2.7[L]  /  TBili  0.5  /  DBili  x   /  AST  12  /  ALT  11  /  AlkPhos  70  11-06    Imaging reviewed:   IMPRESSION:    Right femur: Spiral fracture of the distal femur extending to the   intercondylar region. Osteopenia. Follow-up suggested. Regional vascular   calcifications present. 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    Right knee: Exam is limited due to fracture spiral of the distal third of   the femur extending to the condylar region. Edematous changes noted.   Regional vascular calcifications prominent.    Right tibia-fibula: Distal right femur fracture seen better on   accompanying exams. No tibia or fibula fracture. Regional vascular   calcifications present. Osteopenia.        Total Time Spent 50 minutes  This includes chart review, patient assessment, discussion and collaboration with interdisciplinary team members, excluding ACP. CC: R knee pain  S:   [id number]  Daughter at bedside  her pain is not well controlled  O:  Vital Signs Last 24 Hrs  T(C): 36.7 (11-08-24 @ 14:34), Max: 36.9 (11-08-24 @ 05:07)  T(F): 98.1 (11-08-24 @ 14:34), Max: 98.5 (11-08-24 @ 05:07)  HR: 93 (11-08-24 @ 14:34) (78 - 93)  BP: 122/96 (11-08-24 @ 14:34) (122/96 - 131/50)  BP(mean): 83 (11-08-24 @ 05:07) (83 - 83)  RR: 17 (11-08-24 @ 14:34) (16 - 17)  SpO2: 93% (11-08-24 @ 14:34) (93% - 96%)        PE:  Gen: Orientedx2, alert, No acute distress Eyes: conjunctivae and lid normal, sclera clear with no icterus ENT: nose and throat exam normal CVS: S1, S2, RRR; no murmur, no rubs, no gallops Pulm: Good air exchange, Breath sounds equal bilaterally, no rales rhonchi,  no wheezes Chest: nontender, no chest deformity, chest movement symmetrical Gl: abdomen soft, nontender, nondistended, bowel sounds normoactive, no masses palpated Musk: RLE with dressing and immobilizer, RLE edematous Skin: no skin lesions, skin turgor normal, warm and well perfused Neuro: Awake, alert,Psych: normal affect, insight        A/P:  Maria Reyesdejimenez is a 89 yo bedbound/wheelchair bound woman with PMH significant for HTN, HLD, sacral pressure ulcer, dementia, anxiety, seizures who presents with R knee pain, swelling found to have R distal femur fracture.    #R distal femur fracture  #HTN  #HLD  #Dementia  #Sacral pressure ulcers  -ortho consulted, conservative management, no surgical intervention at this time  -PT , NWB to RLE with knee immobilizer  -pain management  consulted, increasing oxycodone to 5 mg q4h PRN, monitor for respiratory depression  -RTC tylenol  -continue atorvastatin, lisinopril, lexapro, zonisamide  -palliative care consult for [facility] discussion  -history obtained from daughter  Labs reviewed:                                 8.2    6.73  )-----------( 288      ( 08 Nov 2024 05:25 )             24.9   11-08    145  |  116[H]  |  26[H]  ----------------------------<  113[H]  3.6   |  24  |  0.37[L]    Ca    8.5      08 Nov 2024 05:25  Phos  3.0     11-08  Mg     2.3     11-08      Imaging reviewed:   IMPRESSION:    Right femur: Spiral fracture of the distal femur extending to the   intercondylar region. Osteopenia. Follow-up suggested. Regional vascular   calcifications present. 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    Right knee: Exam is limited due to fracture spiral of the distal third of   the femur extending to the condylar region. Edematous changes noted.   Regional vascular calcifications prominent.    Right tibia-fibula: Distal right femur fracture seen better on   accompanying exams. No tibia or fibula fracture. Regional vascular   calcifications present. Osteopenia.        Total Time Spent 50 minutes  This includes chart review, patient assessment, discussion and collaboration with interdisciplinary team members, excluding ACP. CC: R knee pain  S:   [id number]  Daughter at bedside  O:  Vital Signs Last 24 Hrs  T(C): 36.5 (11-07-24 @ 12:54), Max: 37.3 (11-06-24 @ 20:52)  T(F): 97.7 (11-07-24 @ 12:54), Max: 99.1 (11-06-24 @ 20:52)  HR: 98 (11-07-24 @ 12:54) (80 - 100)  BP: 131/90 (11-07-24 @ 12:54) (113/53 - 144/60)  BP(mean): 100 (11-07-24 @ 12:54) (100 - 100)  RR: 18 (11-07-24 @ 12:54) (16 - 18)  SpO2: 94% (11-07-24 @ 12:54) (94% - 95%)    PE:  Gen: Orientedx2, alert, No acute distress Eyes: conjunctivae and lid normal, sclera clear with no icterus ENT: nose and throat exam normal CVS: S1, S2, RRR; no murmur, no rubs, no gallops Pulm: Good air exchange, Breath sounds equal bilaterally, no rales rhonchi,  no wheezes Chest: nontender, no chest deformity, chest movement symmetrical Gl: abdomen soft, nontender, nondistended, bowel sounds normoactive, no masses palpated Musk: RLE with dressing and immobilizer Skin: no skin lesions, skin turgor normal, warm and well perfused Neuro: Awake, alert,Psych: normal affect, insight        A/P:  Maria Reyesdejimenez is a 89 yo bedbound/wheelchair bound woman with PMH significant for HTN, HLD, sacral pressure ulcer, dementia, anxiety, seizures who presents with R knee pain, swelling found to have R distal femur fracture.    #R distal femur fracture  #HTN  #HLD  #Dementia  #Sacral pressure ulcers  -ortho consulted, conservative management, no surgical intervention at this time  -PT , NWB to RLE with knee immobilizer  -pain management  consulted  -Continue tylenol, oxycodone  -continue atorvastatin, lisinopril, lexapro, zonisamide    Labs reviewed:                         8.1    8.96  )-----------( 233      ( 06 Nov 2024 10:20 )             24.8   11-06    142  |  111[H]  |  26[H]  ----------------------------<  138[H]  3.7   |  25  |  0.50    Ca    8.3[L]      06 Nov 2024 10:20    TPro  6.2  /  Alb  2.7[L]  /  TBili  0.5  /  DBili  x   /  AST  12  /  ALT  11  /  AlkPhos  70  11-06    Imaging reviewed:   IMPRESSION:    Right femur: Spiral fracture of the distal femur extending to the   intercondylar region. Osteopenia. Follow-up suggested. Regional vascular   calcifications present. 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    Right knee: Exam is limited due to fracture spiral of the distal third of   the femur extending to the condylar region. Edematous changes noted.   Regional vascular calcifications prominent.    Right tibia-fibula: Distal right femur fracture seen better on   accompanying exams. No tibia or fibula fracture. Regional vascular   calcifications present. Osteopenia.        Total Time Spent 50 minutes  This includes chart review, patient assessment, discussion and collaboration with interdisciplinary team members, excluding ACP.

## 2024-11-07 NOTE — PHYSICAL THERAPY INITIAL EVALUATION ADULT - DIAGNOSIS, PT EVAL
Pt is a 89 y/o Iraqi speaking F s/p R distal Femur fx presenting with altered mental status (A&Ox1) due to dementia, increased UE+LE weakness, decreased UE+LE A/PROM, increased pain in her R LE, contracture of L knee and R hip, and R foot pitting edema (3+). Pt limited in all functional mobility, dependent on others for ADL management.

## 2024-11-07 NOTE — DISCHARGE NOTE PROVIDER - PROVIDER TOKENS
PROVIDER:[TOKEN:[[id number]],FOLLOWUP:[1 week],ESTABLISHEDPATIENT:[T]] PROVIDER:[TOKEN:[[id number]],FOLLOWUP:[1 week],ESTABLISHEDPATIENT:[T]],FREE:[LAST:[Housecalls],PHONE:[phone]],FAX:[(   )    -]]

## 2024-11-07 NOTE — DISCHARGE NOTE PROVIDER - HOSPITAL COURSE
Pt is a 89 y/o F with PMHx of HTN, HLD, anxiety, dementia (AAO1-2 at baseline), anxiety, seizures, who is wheelchair/bedbound, sacral pressure ulcer who presented to the ER due to right knee pain/swelling. Per daughter at bedside who provided history, on Sunday, patient's family was moving the patient with a device to lift patient from bed to wheelchair but patient's leg got stuck and bent in machine. The family straightened the leg and put the patient in the wheelchair and brought her to [place]. Once home, pt began to complain of new onset R knee and thigh pain. Pain was severe and patient was unable to sleep and continued to scream in pain. On Monday, family noticed increased swelling and mild erythema on R knee which prompted them to come to the ED. Patient has been wheelchair/bedbound for the past 7 months. In the past she used to walk with a walker but could not walk more than a block without having to sit down. She cannot climb stairs due to hip problems. She went to a doctor 4 years ago but was told she is too high a risk for surgery to fix her hips. Daughter endorsed decreased PO intake and generalized weakness since Sunday. Pt admitted to medicine s/p injury to right knee.     X ray of right leg showed spiral fracture at distal end of femur extending to the intercondylar region and osteopenia with prominent osteoarthritic changes in the right hip. Patient was seen by ortho who discussed the risk and benefits of surgical intervention with patient and daughter. Conservative management with bulky mckinley dressing and knee immobilizer to aid in bone healing and pain alleviation was agreed upon as patient is a poor candidate for surgical intervention given her bedbound status, comorbidities, and age. Patient was also seen by pain management who recommended starting patient on Morphine 1-2mg IVP every 4 hours as needed for severe pain and ofirmex every 6 hours for 4 doses. Patient was transitioned to oxycodone PO every 4 hours as needed for severe pain and 1 G tylenol PO every 8 hours for 3 days for moderate pain. Patient was also seen by PT who recommended home PT. Patient's chronic conditions were managed with home medications.    Given patient's improved clinical status and current hemodynamic stability, decision was made to discharge. Discussed with attending. Please refer to complete medical records for a full hospital course, as this is only a brief summary.   Pt is a 89 y/o F with PMHx of HTN, HLD, anxiety, dementia (AAO1-2 at baseline), anxiety, seizures, who is wheelchair/bedbound, sacral pressure ulcer who presented to the ER due to right knee pain/swelling. Per daughter at bedside who provided history, on Sunday, patient's family was moving the patient with a device to lift patient from bed to wheelchair but patient's leg got stuck and bent in machine. The family straightened the leg and put the patient in the wheelchair and brought her to [place]. Once home, pt began to complain of new onset R knee and thigh pain. Pain was severe and patient was unable to sleep and continued to scream in pain. On Monday, family noticed increased swelling and mild erythema on R knee which prompted them to come to the ED. Patient has been wheelchair/bedbound for the past 7 months. In the past she used to walk with a walker but could not walk more than a block without having to sit down. She cannot climb stairs due to hip problems. She went to a doctor 4 years ago but was told she is too high a risk for surgery to fix her hips. Daughter endorsed decreased PO intake and generalized weakness since Sunday. Pt admitted to medicine s/p injury to right knee.     X ray of right leg showed spiral fracture at distal end of femur extending to the intercondylar region and osteopenia with prominent osteoarthritic changes in the right hip. Patient was seen by ortho who discussed the risk and benefits of surgical intervention with patient and daughter. Conservative management with bulky mckinley dressing and knee immobilizer to aid in bone healing and pain alleviation was agreed upon as patient is a poor candidate for surgical intervention given her bedbound status, comorbidities, and age. Patient was also seen by pain management who recommended starting patient on Morphine 1-2mg IVP every 4 hours as needed for severe pain and ofirmex every 6 hours for 4 doses. Patient was transitioned to oxycodone PO every 4 hours as needed for severe pain and 1 G tylenol PO every 8 hours for 3 days for moderate pain. Palliative Care was also consulted and patient remained full code. Acetaminphen 1000mg every 6 hours and Gabapentin 200mg at bedtime was added and patient's pain was better controlled. Patient remained full code. Patient was also seen by PT who recommended home PT. Patient's chronic conditions were managed with home medications.    Given patient's improved clinical status and current hemodynamic stability, decision was made to discharge. Discussed with attending. Please refer to complete medical records for a full hospital course, as this is only a brief summary.

## 2024-11-07 NOTE — PROGRESS NOTE ADULT - PROBLEM SELECTOR PLAN 2
- p/w R knee pain 2/2 injury on Sunday  - bedbound/wheelchair bound for the past 7 months; no longer ambulatory  - pt was told 4 years ago she is too high a surgical risk for hip surgery  -p/w 10 /10 severe pain in ED    - Consult Ortho in AM  - Consult Pain Medicine  - Follow up x-rays official read  - Tylenol prn for mild pain, IV Toradol prn for moderate pain, IV Morphine prn for severe pain - Consult Ortho in AM  - Consult Pain Medicine  - Follow up x-rays official read  - Tylenol prn for mild pain, IV Toradol prn for moderate pain, IV Morphine prn for severe pain wheelchair bound 7 mo ago  aaox1-2 at baseline  HHA 10 hours daily

## 2024-11-07 NOTE — DISCHARGE NOTE PROVIDER - CARE PROVIDER_API CALL
BRIJESH ROSEN  [address] [place] [address]  Phone: ()-  Fax: ()-  Established Patient  Follow Up Time: 1 week   BRIJESH ROSEN  [address] [place] [address]  Phone: ()-  Fax: ()-  Established Patient  Follow Up Time: 1 week    Housecalls,   Phone: [phone]  Fax: (   )    -  Follow Up Time:

## 2024-11-07 NOTE — PHYSICAL THERAPY INITIAL EVALUATION ADULT - NSACTIVITYREC_GEN_A_PT
Pt should receive HPT for only 2 weeks for education on contracture management, P/AROM exercise and mobility, mechanical transfers and equipment handling. Pt should receive HPT for only a few sessions for education and maintenance on contracture management, body positioning, P/AROM exercise and mobility of bi LE, and for safe mechanical transfers and equipment handling. Pt will benefit with HPT for a few sessions for education and training on maintenance activities for contracture management, body positioning, P/AROM exercise and mobility of bi LE, and for safe mechanical transfers and equipment handling. No range of motion on right knee until cleared by orthopedic physician.

## 2024-11-07 NOTE — DISCHARGE NOTE PROVIDER - ATTENDING DISCHARGE PHYSICAL EXAMINATION:
Elderly bed confined patient s/p fall with fracture of distal right femur seen by Ortho and conservative mgmt and no operative fixation due to age and underlying dementia   Pain mamangement with some pain control with tylenol and opiates; seen by Palliative care team and family wishes to be full code and resuscitative attempts    P/E:  elderly female mild distress due to pain Elderly bed confined patient s/p fall with fracture of distal right femur seen by Ortho and conservative mgmt and no operative fixation due to age and underlying dementia   Pain management with some pain control with Tylenol and opiates; seen by Palliative care team and family wishes to be full code and resuscitative attempts    P/E:  elderly female mild distress due to pain  Dementia with anxiety  Neuro: No gross focal deficits; limited exam  CVS: S1S2 present, regular, no edema  Resp: BLAE+, No wheeze or Rhonchi  GI: Soft, BS+, Non tender, non distended  Extr: RLE immobilizer in place  Skin: Warm and moist without any rashes    Plan:  D/C Home on pain meds including opiates with Bowel regimen  Patient has Housecalls with NP visiting monthly  Tried to reach NP multiple attempts made  Daughter wll call and schedule NP visit  See discharge instructions

## 2024-11-07 NOTE — PHYSICAL THERAPY INITIAL EVALUATION ADULT - PERSONAL SAFETY AND JUDGMENT, REHAB EVAL
Refill requested: Nifedipine ER 30 mg     Passed protocol - Nifedipine ER 30 mg       Last refill: 2/2/18 #90   Relevant Labs: [facility] 4/4/18    BP Readings from Last 3 Encounters:  02/02/18 : 136/90  12/05/17 : 122/76  05/12/17 : 128/88      Last OV / RTC adv impaired

## 2024-11-07 NOTE — PHYSICAL THERAPY INITIAL EVALUATION ADULT - PERTINENT HX OF CURRENT PROBLEM, REHAB EVAL
Pt is a 89 y/o [place] speaking F admitted to hospital s/p R leg injury that occurred several months ago during a mechanical transfer incident fall where there were multiple ortho injuries. Pt has been bed bound since, completely dependent on daughter/family for presenting with increased pain to the R leg/knee, and increased pain with PROM of the L leg. Pt PMH of Dementia made following commands difficult and was confused. X-Ray imaging in ER positive for distal femur fx.

## 2024-11-07 NOTE — PHYSICAL THERAPY INITIAL EVALUATION ADULT - GENERAL OBSERVATIONS, REHAB EVAL
Pt. received lying in bed, daughter present, Bulgarian speaking, A&Ox1, confused, L leg in contracture, R leg brace intact, R foot edema, no lines/drains/tubes. Pt. received lying in bed, daughter present, Ukrainian speaking, A&Ox1, confused, + L leg in contracture, R LE knee immobilizer intact, R foot edema, no lines/drains/tubes.

## 2024-11-07 NOTE — PROGRESS NOTE ADULT - PROBLEM SELECTOR PLAN 5
No bloody stool, no melena; was previously with vaginal bleeding but went to ObGyn who told her she has an infection and that everything is okay; no vaginal bleeding currently either  - Monitor Hgb c/w home med Zonisamide 50 mg qd

## 2024-11-07 NOTE — DISCHARGE NOTE PROVIDER - NSDCCPCAREPLAN_GEN_ALL_CORE_FT
PRINCIPAL DISCHARGE DIAGNOSIS  Diagnosis: Femur fracture, right  Assessment and Plan of Treatment: You came in with 10/10 right leg pain and were found to have a spiral fracture at the distal end of your femur. You were seen by orthopedic surgery who explained the risks and benefits of surgical intervention. You were treated conservatively given your comrbodities and bedbound status with -bulky mckinley dressing and knee immobilizer to aid in bone healing position and also alleviate pain. Your pain was managed with oxycodone and tylenol. You were also seen by physical therapy, who recommended home PT. Please continue to avoid putting weight on your right leg and follow up with your PCP after 1 week.         SECONDARY DISCHARGE DIAGNOSES  Diagnosis: Hypertension  Assessment and Plan of Treatment: You have a history of high blood pressure on home lisinopril 10mg daily. Continue to take your home meds as prescribed. Eat a diet low in salt, added sugars, and unhealthy fats. Check your blood pressure regularly at home, aim for target BP of less than 130/80. If your BP is elevated over 180/110, please seek urgent medical attention. Follow up with your primary care doctor in 1 week.      Diagnosis: Hyperlipidemia  Assessment and Plan of Treatment: You have a history of high cholesterol. Please continue to take your meds as prescribed. Eat a diet that is low in bad fats, and low in added sugars. If you are physically able to, please exercise at least 3 times a week. Elevated fats in the blood are associated with increased risk of many health complications, including heart attacks and stroke. Please follow up with your primary care doctor in 1 week.      Diagnosis: Anemia  Assessment and Plan of Treatment: You have history of anemia. You were diagnosed with iron deficiency anemia.  On admission, you were found to have Hb of 8.4. You did not receive transfusion. You are recommended to eat green leafy vegetables and take Iron and folic acid supplements and maintain hemoglobin more than 9 to 10 gms. Please follow up with your PCP in a week from discharge for further recommendations.      Diagnosis: Anxiety  Assessment and Plan of Treatment: Please continue your home medications and follow up with your PCP 1 week after discharge. You have a history of Generalized Anxiety Disorder which means you have significant anxiety that intereferes with daily activities. You take Lexapro 10mg daily AT HOME.. Please continue to take your HOME medications and follow up with your PCP / Psychiatrist in a week from discharge to adjust medications as needed.      Diagnosis: Seizures  Assessment and Plan of Treatment: You have a history of seizures which is a burst of uncontrolled electrical activity between brain cells causing temporary abnormalities in muscle tone or movements, behavior, sensation or state of awareness.   You were treated with your home dose of Zonizamide 50mg daily. Please avoid driving, swimming and any activities that may put your life at danger shall you have a seizure. Please take your medication as prescribed and follow up with neurologist in 1 week from discharge to adjust medications as needed.       PRINCIPAL DISCHARGE DIAGNOSIS  Diagnosis: Femur fracture, right  Assessment and Plan of Treatment: You came in with 10/10 right leg pain and were found to have a spiral fracture at the distal end of your femur. You were seen by orthopedic surgery who explained the risks and benefits of surgical intervention. You were treated conservatively given your comrbodities and bedbound status with -bulky mckinley dressing and knee immobilizer to aid in bone healing position and also alleviate pain. Your pain was managed with Tylenol 1000mg, Gabapentin 200mg at night, Oxycodone. You were also seen by physical therapy, who recommended home PT. Please continue to avoid putting weight on your right leg and follow up with your PCP after 1 week.         SECONDARY DISCHARGE DIAGNOSES  Diagnosis: Anemia  Assessment and Plan of Treatment: You have history of anemia. You were diagnosed with iron deficiency anemia.  On admission, you were found to have Hb of 8.4. You did not require a blood transfusion. You are recommended to eat green leafy vegetables and take Iron and folic acid supplements and maintain hemoglobin more than 9 to 10 gms. Please follow up with your PCP in a week from discharge for further recommendations.      Diagnosis: Anxiety  Assessment and Plan of Treatment: Please continue your home medications and follow up with your PCP 1 week after discharge. You have a history of Generalized Anxiety Disorder which means you have significant anxiety that intereferes with daily activities. You take Lexapro 10mg daily AT HOME.. Please continue to take your HOME medications and follow up with your PCP / Psychiatrist in a week from discharge to adjust medications as needed.      Diagnosis: Seizures  Assessment and Plan of Treatment: You have a history of seizures which is a burst of uncontrolled electrical activity between brain cells causing temporary abnormalities in muscle tone or movements, behavior, sensation or state of awareness.   You were treated with your home dose of Zonizamide 50mg daily. Please avoid driving, swimming and any activities that may put your life at danger shall you have a seizure. Please take your medication as prescribed and follow up with neurologist in 1 week from discharge to adjust medications as needed.      Diagnosis: Hypertension  Assessment and Plan of Treatment: You have a history of high blood pressure on home lisinopril 10mg daily. Continue to take your home meds as prescribed. Eat a diet low in salt, added sugars, and unhealthy fats. Check your blood pressure regularly at home, aim for target BP of less than 130/80. If your BP is elevated over 180/110, please seek urgent medical attention. Follow up with your primary care doctor in 1 week.      Diagnosis: Hyperlipidemia  Assessment and Plan of Treatment: You have a history of high cholesterol. Please continue to take your meds as prescribed. Eat a diet that is low in bad fats, and low in added sugars. If you are physically able to, please exercise at least 3 times a week. Elevated fats in the blood are associated with increased risk of many health complications, including heart attacks and stroke. Please follow up with your primary care doctor in 1 week.       PRINCIPAL DISCHARGE DIAGNOSIS  Diagnosis: Femur fracture, right  Assessment and Plan of Treatment: You came in with 10/10 right leg pain and were found to have a spiral fracture at the distal end of your femur. You were seen by orthopedic surgery who explained the risks and benefits of surgical intervention given your bed confined status. You were treated conservatively given your comrbodities and bedbound status with -bulky mckinley dressing and knee immobilizer to aid in bone healing position and also alleviate pain. Your pain was managed with Tylenol 1000mg, Gabapentin 200mg at night, Oxycodone as needed every 4 hours. You was seen by pain oneal. You were also seen by physical therapy, who recommended home PT. Please continue to avoid putting weight on your right leg and follow up with your PCP after 1 week.         SECONDARY DISCHARGE DIAGNOSES  Diagnosis: Anemia  Assessment and Plan of Treatment: You have history of anemia. You were diagnosed with iron deficiency anemia. On admission, you were found to have Hb of 8.4. You did not require a blood transfusion. You are recommended to eat green leafy vegetables and take Iron and folic acid supplements and maintain hemoglobin more than 9 to 10 gms. You may take one Iron tablet Slow Fe once daily with bowel regimen top prevent constipation as you are also taking strong opiate pain medications.   Please follow up with your PCP ([facility] NP)  in a week from discharge for follow up.      Diagnosis: Anxiety  Assessment and Plan of Treatment: Please continue your home medications and follow up with your PCP 1 week after discharge. You have a history of Generalized Anxiety Disorder which means you have significant anxiety that intereferes with daily activities. You take Lexapro 10mg daily AT HOME.. Please continue to take your HOME medications and follow up with your PCP / Psychiatrist in a week from discharge to adjust medications as needed.      Diagnosis: Seizures  Assessment and Plan of Treatment: You have a history of seizures which is a burst of uncontrolled electrical activity between brain cells causing temporary abnormalities in muscle tone or movements, behavior, sensation or state of awareness.   You were treated with your home dose of Zonisamide 50mg daily. Please avoid driving, swimming and any activities that may put your life at danger shall you have a seizure. Please take your medication as prescribed and follow up with neurologist in 1 week from discharge to adjust medications as needed.      Diagnosis: Hypertension  Assessment and Plan of Treatment: You have a history of high blood pressure on home lisinopril 10mg daily. Continue to take your home meds as prescribed. Eat a diet low in salt, added sugars, and unhealthy fats. Check your blood pressure regularly at home, aim for target BP of less than 130/80. If your BP is elevated over 180/110, please seek urgent medical attention. Follow up with your primary care doctor in 1 week.      Diagnosis: Hyperlipidemia  Assessment and Plan of Treatment: You have a history of high cholesterol. Please continue to take your meds as prescribed. Eat a diet that is low in bad fats, and low in added sugars. If you are physically able to, please exercise at least 3 times a week. Elevated fats in the blood are associated with increased risk of many health complications, including heart attacks and stroke. Please follow up with your primary care doctor in 1 week.       PRINCIPAL DISCHARGE DIAGNOSIS  Diagnosis: Femur fracture, right  Assessment and Plan of Treatment: You came in with 10/10 right leg pain and were found to have a spiral fracture at the distal end of your fconfined status. You were treated conservatively given your comorbidities and bedbound status with bulky mckinley dressing and knee immobilizer to aid in bone healing position and also alleviate pain. Your pain was managed with Tylenol 1000mg, Gabapentin 200mg, Oxycodone 2.5mg, and Oxycodone 5mg. You were seen by orthopedic surgery who explained the risks and benefits of surgical intervention given your age and other medical issues, a shared decision to avoid surgery and let the bone heal on its own was reached with you and your daughter. You were also seen by physical therapy, who recommended home PT. You were seen by pain management who recommends you to take Gabapentin 200mg at night, Acetamenaphen 1000mg every 6 hours, OXYCODONE 2.5mg as needed for moderate pain and OXYCODONE 5mg every 4 hours as needed for severe pain. Please continue to avoid putting weight on your right leg and follow up with your PCP after 1 week for reevaluation.         SECONDARY DISCHARGE DIAGNOSES  Diagnosis: Anemia  Assessment and Plan of Treatment: You have history of anemia. You were diagnosed with iron deficiency anemia. On admission, you were found to have Hb of 8.4. You did not require a blood transfusion. You are recommended to eat green leafy vegetables and take Iron and folic acid supplements and maintain hemoglobin more than 9 to 10 gms. You may take one Iron tablet Slow Fe once daily with bowel regimen top prevent constipation as you are also taking strong opiate pain medications.   Please follow up with your PCP ([facility] NP)  in a week from discharge for follow up.      Diagnosis: Anxiety  Assessment and Plan of Treatment: Please continue your home medications and follow up with your PCP 1 week after discharge. You have a history of Generalized Anxiety Disorder which means you have significant anxiety that intereferes with daily activities. You take Lexapro 10mg daily AT HOME.. Please continue to take your HOME medications and follow up with your PCP / Psychiatrist in a week from discharge to adjust medications as needed.      Diagnosis: Seizures  Assessment and Plan of Treatment: You have a history of seizures which is a burst of uncontrolled electrical activity between brain cells causing temporary abnormalities in muscle tone or movements, behavior, sensation or state of awareness.   You were treated with your home dose of Zonisamide 50mg daily. Please avoid driving, swimming and any activities that may put your life at danger shall you have a seizure. Please take your medication as prescribed and follow up with neurologist in 1 week from discharge to adjust medications as needed.      Diagnosis: Hypertension  Assessment and Plan of Treatment: You have a history of high blood pressure on home lisinopril 10mg daily. Continue to take your home meds as prescribed. Eat a diet low in salt, added sugars, and unhealthy fats. Check your blood pressure regularly at home, aim for target BP of less than 130/80. If your BP is elevated over 180/110, please seek urgent medical attention. Follow up with your primary care doctor in 1 week.      Diagnosis: Hyperlipidemia  Assessment and Plan of Treatment: You have a history of high cholesterol. Please continue to take your meds as prescribed. Eat a diet that is low in bad fats, and low in added sugars. If you are physically able to, please exercise at least 3 times a week. Elevated fats in the blood are associated with increased risk of many health complications, including heart attacks and stroke. Please follow up with your primary care doctor in 1 week.       PRINCIPAL DISCHARGE DIAGNOSIS  Diagnosis: Femur fracture, right  Assessment and Plan of Treatment: You came in with 10/10 right leg pain and were found to have a spiral fracture at the distal end of your fconfined status. You were treated conservatively given your comorbidities and bedbound status with bulky mckinley dressing and knee immobilizer to aid in bone healing position and also alleviate pain. Your pain was managed with Tylenol 1000mg, Gabapentin 200mg, Oxycodone 2.5mg, and Oxycodone 5mg. You were seen by orthopedic surgery who explained the risks and benefits of surgical intervention given your age and other medical issues, a shared decision to avoid surgery and let the bone heal on its own was reached with you and your daughter. You were also seen by physical therapy, who recommended home PT. You were seen by pain management who recommends you to take Gabapentin 200mg at night, Acetamenaphen 1000mg every 6 hours, OXYCODONE 2.5mg as needed for moderate pain and OXYCODONE 5mg every 4 hours as needed for severe pain. Please continue to avoid putting weight on your right leg and follow up with your PCP after 1 week for reevaluation.         SECONDARY DISCHARGE DIAGNOSES  Diagnosis: Anemia  Assessment and Plan of Treatment: You have history of anemia. You were diagnosed with iron deficiency anemia. On admission, you were found to have Hb of 8.4. You did not require a blood transfusion. You are recommended to eat green leafy vegetables and take Iron and folic acid supplements and maintain hemoglobin more than 9 to 10 gms. You may take one Iron tablet Slow Fe once daily with bowel regimen top prevent constipation as you are also taking strong opiate pain medications.   Please follow up with your PCP ([facility] NP)  in a week from discharge for follow up.      Diagnosis: Anxiety  Assessment and Plan of Treatment: Please continue your home medications and follow up with your PCP 1 week after discharge. You have a history of Generalized Anxiety Disorder which means you have significant anxiety that intereferes with daily activities. You take Lexapro 10mg daily AT HOME.. Please continue to take your HOME medications and follow up with your PCP / Psychiatrist in a week from discharge to adjust medications as needed.      Diagnosis: Seizures  Assessment and Plan of Treatment: You have a history of seizures which is a burst of uncontrolled electrical activity between brain cells causing temporary abnormalities in muscle tone or movements, behavior, sensation or state of awareness.   You were treated with your home dose of Zonisamide 50mg daily. Please avoid driving, swimming and any activities that may put your life at danger shall you have a seizure. Please take your medication as prescribed and follow up with neurologist in 1 week from discharge to adjust medications as needed.      Diagnosis: Hypertension  Assessment and Plan of Treatment: You have a history of high blood pressure on home lisinopril 10mg daily. Continue to take your home meds as prescribed. Eat a diet low in salt, added sugars, and unhealthy fats. Check your blood pressure regularly at home, aim for target BP of less than 130/80. If your BP is elevated over 180/110, please seek urgent medical attention. Follow up with your primary care doctor in 1 week.      Diagnosis: Hyperlipidemia  Assessment and Plan of Treatment: You have a history of high cholesterol. Please continue to take your meds as prescribed. Eat a diet that is low in bad fats, and low in added sugars. If you are physically able to, please exercise at least 3 times a week. Elevated fats in the blood are associated with increased risk of many health complications, including heart attacks and stroke. Please follow up with your primary care doctor in 1 week.      Diagnosis: Constipation  Assessment and Plan of Treatment: You have history of Constipation which means you move your bowels less often than normal. You were treated with SENNA AND MIRALAX. Please take medications as prescribed (as needed) and follow up with your PCP in a week from discharge.       PRINCIPAL DISCHARGE DIAGNOSIS  Diagnosis: Femur fracture, right  Assessment and Plan of Treatment: You came in with 10/10 right leg pain and were found to have a spiral fracture at the distal end of your fconfined status. You were treated conservatively given your comorbidities and bedbound status with bulky mckinley dressing and knee immobilizer to aid in bone healing position and also alleviate pain. Your pain was managed with Tylenol 1000mg, Gabapentin 200mg, Oxycodone 2.5mg, and Oxycodone 5mg. You were seen by orthopedic surgery who explained the risks and benefits of surgical intervention given your age and other medical issues, a shared decision to avoid surgery and let the bone heal on its own was reached with you and your daughter. You were also seen by physical therapy, who recommended home PT. You were seen by pain management who recommends you to take Gabapentin 200mg at night, Acetamenophen 1000mg every 6 hours, OXYCODONE 5mg every 4 hours as needed for severe pain. Please continue to avoid putting weight on your right leg and follow up with your PCP/ [facility] NP  Please note that pain will improve over next 2 weeks      SECONDARY DISCHARGE DIAGNOSES  Diagnosis: Anemia  Assessment and Plan of Treatment: You have history of anemia. You were diagnosed with iron deficiency anemia. On admission, you were found to have Hb of 8.4. You did not require a blood transfusion. You are recommended to eat green leafy vegetables and take Iron and folic acid supplements and maintain hemoglobin more than 9 to 10 gms. You may take one Iron tablet Slow Fe once daily with bowel regimen top prevent constipation as you are also taking strong opiate pain medications. STOP IRON SUPPLEMENT ONCE HEMOGLOBIN IS ABOVE 10 GMS.   Please follow up with your PCP ([facility] NP)  in a week from discharge for follow up.      Diagnosis: Anxiety  Assessment and Plan of Treatment: Please continue your home medications and follow up with your PCP 1 week after discharge. You have a history of Generalized Anxiety Disorder which means you have significant anxiety that intereferes with daily activities. You take Lexapro 10mg daily AT HOME.. Please continue to take your HOME medications and follow up with your PCP / Psychiatrist in a week from discharge to adjust medications as needed.      Diagnosis: Seizures  Assessment and Plan of Treatment: You have a history of seizures which is a burst of uncontrolled electrical activity between brain cells causing temporary abnormalities in muscle tone or movements, behavior, sensation or state of awareness.   You were treated with your home dose of ZONISAMIDE 50 mg daily. Please avoid driving, swimming and any activities that may put your life at danger shall you have a seizure. Please take your medication as prescribed and follow up with neurologist in 1 week from discharge to adjust medications as needed.      Diagnosis: Hypertension  Assessment and Plan of Treatment: You have a history of high blood pressure on home lisinopril 10mg daily. Continue to take your home meds as prescribed. Eat a diet low in salt, added sugars, and unhealthy fats. Check your blood pressure regularly at home, aim for target BP of less than 130/80. If your BP is elevated over 180/110, please seek urgent medical attention. Follow up with your primary care doctor in 1 week.      Diagnosis: Hyperlipidemia  Assessment and Plan of Treatment: You have a history of high cholesterol. Please continue to take your meds as prescribed. Eat a diet that is low in bad fats, and low in added sugars. If you are physically able to, please exercise at least 3 times a week. Elevated fats in the blood are associated with increased risk of many health complications, including heart attacks and stroke. Please follow up with your primary care doctor in 1 week.      Diagnosis: Constipation  Assessment and Plan of Treatment: You have history of Constipation which means you move your bowels less often than normal. You were treated with SENNA AND MIRALAX.   You are at high rsik of further constipation from immobility and use of strong pain medications. TAKE SENNA 2 TABS EVERY NIGHT ROUTINELY  TAKE MIRALAX EVERY 2 DAYS ONLY IF YOU HAVE NO BOWEL MOVEMENTS. DO NOT TAKE IF YOU HAVE MORE THAN 3 BOWEL MOVEMENTS A DAY.Please take medications as prescribed (as needed) and follow up with your PCP in a week from discharge.       PRINCIPAL DISCHARGE DIAGNOSIS  Diagnosis: Femur fracture, right  Assessment and Plan of Treatment: You came in with 10/10 right leg pain and were found to have a spiral fracture at the distal end of your fconfined status. You were treated conservatively given your comorbidities and bedbound status with bulky mckinley dressing and knee immobilizer to aid in bone healing position and also alleviate pain. Your pain was managed with Tylenol 1000mg, Gabapentin 200mg, Oxycodone 2.5mg, and Oxycodone 5mg. You were seen by orthopedic surgery who explained the risks and benefits of surgical intervention given your age and other medical issues, a shared decision to avoid surgery and let the bone heal on its own was reached with you and your daughter. You were also seen by physical therapy, who recommended home PT. You were seen by pain management who recommends you to take Gabapentin 200mg at night, Acetamenophen 1000mg every 6 hours, OXYCODONE 5mg every 4 hours as needed for severe pain. Please continue to avoid putting weight on your right leg and follow up with your PCP/ [facility]  Please note that pain will improve over next 2 weeks      SECONDARY DISCHARGE DIAGNOSES  Diagnosis: Anemia  Assessment and Plan of Treatment: You have history of anemia. You were diagnosed with iron deficiency anemia. On admission, you were found to have Hb of 8.4. You did not require a blood transfusion. You are recommended to eat green leafy vegetables and take Iron and folic acid supplements and maintain hemoglobin more than 9 to 10 gms. You may take one Iron tablet Slow Fe once daily with bowel regimen top prevent constipation as you are also taking strong opiate pain medications. STOP IRON SUPPLEMENT ONCE HEMOGLOBIN IS ABOVE 10 GMS.   Please follow up with your PCP ([facility] NP)  in a week from discharge for follow up.      Diagnosis: Anxiety  Assessment and Plan of Treatment: Please continue your home medications and follow up with your PCP 1 week after discharge. You have a history of Generalized Anxiety Disorder which means you have significant anxiety that intereferes with daily activities. You take Lexapro 10mg daily AT HOME.. Please continue to take your HOME medications and follow up with your PCP / Psychiatrist in a week from discharge to adjust medications as needed.      Diagnosis: Seizures  Assessment and Plan of Treatment: You have a history of seizures which is a burst of uncontrolled electrical activity between brain cells causing temporary abnormalities in muscle tone or movements, behavior, sensation or state of awareness.   You were treated with your home dose of ZONISAMIDE 50 mg daily. Please avoid driving, swimming and any activities that may put your life at danger shall you have a seizure. Please take your medication as prescribed and follow up with neurologist in 1 week from discharge to adjust medications as needed.      Diagnosis: Hypertension  Assessment and Plan of Treatment: You have a history of high blood pressure on home lisinopril 10mg daily. Continue to take your home meds as prescribed. Eat a diet low in salt, added sugars, and unhealthy fats. Check your blood pressure regularly at home, aim for target BP of less than 130/80. If your BP is elevated over 180/110, please seek urgent medical attention. Follow up with your primary care doctor in 1 week.      Diagnosis: Hyperlipidemia  Assessment and Plan of Treatment: You have a history of high cholesterol. Please continue to take your meds as prescribed. Eat a diet that is low in bad fats, and low in added sugars. If you are physically able to, please exercise at least 3 times a week. Elevated fats in the blood are associated with increased risk of many health complications, including heart attacks and stroke. Please follow up with your primary care doctor in 1 week.      Diagnosis: Constipation  Assessment and Plan of Treatment: You have history of Constipation which means you move your bowels less often than normal. You were treated with SENNA AND MIRALAX.   You are at high rsik of further constipation from immobility and use of strong pain medications. TAKE SENNA 2 TABS EVERY NIGHT ROUTINELY  TAKE MIRALAX EVERY 2 DAYS ONLY IF YOU HAVE NO BOWEL MOVEMENTS. DO NOT TAKE IF YOU HAVE MORE THAN 3 BOWEL MOVEMENTS A DAY.Please take medications as prescribed (as needed) and follow up with your PCP in a week from discharge.      Diagnosis: Goals of care, counseling/discussion  Assessment and Plan of Treatment: You was seen by Palliative care team given your advanced age and comorbidities including dementia and bed confined status. Advance directives were discussed; Your family wishes yout to be resuscitated and all measures including intubations and respirator to keep you alive.

## 2024-11-07 NOTE — PHYSICAL THERAPY INITIAL EVALUATION ADULT - NSPTDISCHREC_GEN_A_CORE
Home PT educate family with maintenance activities at home and safe equipment handling./Home PT to educate family with maintenance activities at home and proper utilization of safe patient handling equipment at home/Home PT

## 2024-11-07 NOTE — PROGRESS NOTE ADULT - PROBLEM SELECTOR PLAN 4
c/w home med Lexapro No bloody stool, no melena; was previously with vaginal bleeding but went to ObGyn who told her she has an infection and that everything is okay; no vaginal bleeding currently either  -no further episodes of bleeding/no active signs of bleeding  - Monitor Hgb

## 2024-11-07 NOTE — DISCHARGE NOTE PROVIDER - NSDCCAREPROVSEEN_GEN_ALL_CORE_FT
Tim, Ruth Landa, Jem Acevedo, Marc Drake, Sandra Plata Tim, Ruth Landa, Jem Acevedo, Marc Drake, Yanci Dudley, Carlee Do

## 2024-11-07 NOTE — PROGRESS NOTE ADULT - SUBJECTIVE AND OBJECTIVE BOX
PGY-1 Progress Note discussed with attending      PLEASE CONTACT ON CALL TEAM:  - On Call Team (Please refer to Alexandrea) FROM 5:00 PM - 8:30PM  - Nightfloat Team FROM 8:30 -7:30 AM    INTERVAL HPI/OVERNIGHT EVENTS:     No acute overnight events. [facility]. Pt has no new complaints.    _________________________________________________  REVIEW OF SYSTEMS:  CONSTITUTIONAL: No acute distress  RESPIRATORY: No shortness of breath, wheezing, or cough  CARDIOVASCULAR: No chest pain or palpitations  GASTROINTESTINAL: No abdominal pain, nausea, vomiting, diarrhea, or constipation  GENITOURINARY: No dysuria or hermaturia  NEUROLOGICAL: No numbness, tremors, or loss of strength  SKIN: No new lesions    MEDICATIONS  (STANDING):  acetaminophen     Tablet .. 1000 milliGRAM(s) Oral every 8 hours  atorvastatin 20 milliGRAM(s) Oral at bedtime  escitalopram 10 milliGRAM(s) Oral daily  heparin   Injectable 5000 Unit(s) SubCutaneous every 8 hours  lactulose Syrup 10 Gram(s) Oral two times a day  lisinopril 20 milliGRAM(s) Oral daily  senna 2 Tablet(s) Oral at bedtime  zonisamide 50 milliGRAM(s) Oral daily    MEDICATIONS  (PRN):  oxyCODONE    IR 2.5 milliGRAM(s) Oral every 4 hours PRN Severe Pain (7 - 10)      Vital Signs Last 24 Hrs  T(C): 36.5 (07 Nov 2024 12:54), Max: 37.3 (06 Nov 2024 20:52)  T(F): 97.7 (07 Nov 2024 12:54), Max: 99.1 (06 Nov 2024 20:52)  HR: 98 (07 Nov 2024 12:54) (80 - 100)  BP: 131/90 (07 Nov 2024 12:54) (113/53 - 144/60)  BP(mean): 100 (07 Nov 2024 12:54) (100 - 100)  RR: 18 (07 Nov 2024 12:54) (16 - 18)  SpO2: 94% (07 Nov 2024 12:54) (94% - 95%)    Parameters below as of 07 Nov 2024 12:54  Patient On (Oxygen Delivery Method): room air      _________________________________________________  PHYSICAL EXAMINATION:  GENERAL: NAD  HEAD:  Atraumatic, Normocephalic  EYES:  conjunctiva and sclera clear  NECK: Supple, No JVD, Normal thyroid  CHEST/LUNG: Clear to auscultation.  HEART: Regular rate and rhythm; No murmurs, rubs, or gallops  ABDOMEN: Soft, Nontender, Bowel sounds present, no masses on palpation  NERVOUS SYSTEM:  Alert and oriented  EXTREMITIES:  No BLE edema  SKIN: warm, dry    I&O's Summary    _________________________________________________  LABS:                        8.1    8.96  )-----------( 233      ( 06 Nov 2024 10:20 )             24.8     11-06    142  |  111[H]  |  26[H]  ----------------------------<  138[H]  3.7   |  25  |  0.50    Ca    8.3[L]      06 Nov 2024 10:20    TPro  6.2  /  Alb  2.7[L]  /  TBili  0.5  /  DBili  x   /  AST  12  /  ALT  11  /  AlkPhos  70  11-06    LIVER FUNCTIONS - ( 06 Nov 2024 00:12 )  Alb: 2.7 g/dL / Pro: 6.2 g/dL / ALK PHOS: 70 U/L / ALT: 11 U/L DA / AST: 12 U/L / GGT: x           CAPILLARY BLOOD GLUCOSE                  Urinalysis Basic - ( 06 Nov 2024 10:20 )    Color: x / Appearance: x / SG: x / pH: x  Gluc: 138 mg/dL / Ketone: x  / Bili: x / Urobili: x   Blood: x / Protein: x / Nitrite: x   Leuk Esterase: x / RBC: x / WBC x   Sq Epi: x / Non Sq Epi: x / Bacteria: x      _________________________________________________  RADIOLOGY & ADDITIONAL TESTS:    Imaging Personally Reviewed:  YES    Consultant(s) Notes Reviewed:   YES    Care Discussed with Consultants : YES     Plan of care was discussed with patient and /or primary care giver; all questions and concerns were addressed and care was aligned with patient's wishes.       PGY-1 Progress Note discussed with attending      PLEASE CONTACT ON CALL TEAM:  - On Call Team (Please refer to Alexandrea) FROM 5:00 PM - 8:30PM  - Nightfloat Team FROM 8:30 -7:30 AM    INTERVAL HPI/OVERNIGHT EVENTS:     No acute overnight events. Pt evaluated at bedside Pt still complaining of 10/10 pain in right lower extremity.     _________________________________________________  REVIEW OF SYSTEMS:  CONSTITUTIONAL: No acute distress  RESPIRATORY: No shortness of breath, wheezing, or cough  CARDIOVASCULAR: No chest pain or palpitations  GASTROINTESTINAL: No abdominal pain, nausea, vomiting, diarrhea, or constipation  GENITOURINARY: No dysuria or hematuria  NEUROLOGICAL: No numbness, tremors, or loss of strength  SKIN: No new lesions    MEDICATIONS  (STANDING):  acetaminophen     Tablet .. 1000 milliGRAM(s) Oral every 8 hours  atorvastatin 20 milliGRAM(s) Oral at bedtime  escitalopram 10 milliGRAM(s) Oral daily  heparin   Injectable 5000 Unit(s) SubCutaneous every 8 hours  lactulose Syrup 10 Gram(s) Oral two times a day  lisinopril 20 milliGRAM(s) Oral daily  senna 2 Tablet(s) Oral at bedtime  zonisamide 50 milliGRAM(s) Oral daily    MEDICATIONS  (PRN):  oxyCODONE    IR 2.5 milliGRAM(s) Oral every 4 hours PRN Severe Pain (7 - 10)      Vital Signs Last 24 Hrs  T(C): 36.5 (07 Nov 2024 12:54), Max: 37.3 (06 Nov 2024 20:52)  T(F): 97.7 (07 Nov 2024 12:54), Max: 99.1 (06 Nov 2024 20:52)  HR: 98 (07 Nov 2024 12:54) (80 - 100)  BP: 131/90 (07 Nov 2024 12:54) (113/53 - 144/60)  BP(mean): 100 (07 Nov 2024 12:54) (100 - 100)  RR: 18 (07 Nov 2024 12:54) (16 - 18)  SpO2: 94% (07 Nov 2024 12:54) (94% - 95%)    Parameters below as of 07 Nov 2024 12:54  Patient On (Oxygen Delivery Method): room air      _________________________________________________  PHYSICAL EXAMINATION:  GENERAL: NAD  HEAD:  Atraumatic, Normocephalic  EYES:  conjunctiva and sclera clear  NECK: Supple, No JVD, Normal thyroid  CHEST/LUNG: Clear to auscultation.  HEART: Regular rate and rhythm; No murmurs, rubs, or gallops  ABDOMEN: Soft, Nontender, Bowel sounds present, no masses on palpation  NERVOUS SYSTEM:  Alert and orientedx1-2  EXTREMITIES:  No BLE edema  SKIN: warm, dry    I&O's Summary    _________________________________________________  LABS:                        8.1    8.96  )-----------( 233      ( 06 Nov 2024 10:20 )             24.8     11-06    142  |  111[H]  |  26[H]  ----------------------------<  138[H]  3.7   |  25  |  0.50    Ca    8.3[L]      06 Nov 2024 10:20    TPro  6.2  /  Alb  2.7[L]  /  TBili  0.5  /  DBili  x   /  AST  12  /  ALT  11  /  AlkPhos  70  11-06    LIVER FUNCTIONS - ( 06 Nov 2024 00:12 )  Alb: 2.7 g/dL / Pro: 6.2 g/dL / ALK PHOS: 70 U/L / ALT: 11 U/L DA / AST: 12 U/L / GGT: x           CAPILLARY BLOOD GLUCOSE                  Urinalysis Basic - ( 06 Nov 2024 10:20 )    Color: x / Appearance: x / SG: x / pH: x  Gluc: 138 mg/dL / Ketone: x  / Bili: x / Urobili: x   Blood: x / Protein: x / Nitrite: x   Leuk Esterase: x / RBC: x / WBC x   Sq Epi: x / Non Sq Epi: x / Bacteria: x      _________________________________________________  RADIOLOGY & ADDITIONAL TESTS:    Imaging Personally Reviewed:  YES    Consultant(s) Notes Reviewed:   YES    Care Discussed with Consultants : YES     Plan of care was discussed with patient and /or primary care giver; all questions and concerns were addressed and care was aligned with patient's wishes.       PGY-1 Progress Note discussed with attending      PLEASE CONTACT ON CALL TEAM:  - On Call Team (Please refer to Alexandrea) FROM 5:00 PM - 8:30PM  - Nightfloat Team FROM 8:30 -7:30 AM    INTERVAL HPI/OVERNIGHT EVENTS:     No acute overnight events. Pt evaluated at bedside Pt still complaining of 10/10 pain in right lower extremity.     _________________________________________________  REVIEW OF SYSTEMS:  CONSTITUTIONAL: mild distress  RESPIRATORY: No shortness of breath, wheezing, or cough  CARDIOVASCULAR: No chest pain or palpitations  GASTROINTESTINAL: No abdominal pain, nausea, vomiting, diarrhea, or constipation  GENITOURINARY: No dysuria or hematuria  NEUROLOGICAL: No numbness, tremors, or loss of strength  SKIN: No new lesions    MEDICATIONS  (STANDING):  acetaminophen     Tablet .. 1000 milliGRAM(s) Oral every 8 hours  atorvastatin 20 milliGRAM(s) Oral at bedtime  escitalopram 10 milliGRAM(s) Oral daily  heparin   Injectable 5000 Unit(s) SubCutaneous every 8 hours  lactulose Syrup 10 Gram(s) Oral two times a day  lisinopril 20 milliGRAM(s) Oral daily  senna 2 Tablet(s) Oral at bedtime  zonisamide 50 milliGRAM(s) Oral daily    MEDICATIONS  (PRN):  oxyCODONE    IR 2.5 milliGRAM(s) Oral every 4 hours PRN Severe Pain (7 - 10)      Vital Signs Last 24 Hrs  T(C): 36.5 (07 Nov 2024 12:54), Max: 37.3 (06 Nov 2024 20:52)  T(F): 97.7 (07 Nov 2024 12:54), Max: 99.1 (06 Nov 2024 20:52)  HR: 98 (07 Nov 2024 12:54) (80 - 100)  BP: 131/90 (07 Nov 2024 12:54) (113/53 - 144/60)  BP(mean): 100 (07 Nov 2024 12:54) (100 - 100)  RR: 18 (07 Nov 2024 12:54) (16 - 18)  SpO2: 94% (07 Nov 2024 12:54) (94% - 95%)    Parameters below as of 07 Nov 2024 12:54  Patient On (Oxygen Delivery Method): room air      _________________________________________________  PHYSICAL EXAMINATION:  GENERAL: NAD  HEAD:  Atraumatic, Normocephalic  EYES:  conjunctiva and sclera clear  NECK: Supple, No JVD, Normal thyroid  CHEST/LUNG: Clear to auscultation.  HEART: Regular rate and rhythm; No murmurs, rubs, or gallops  ABDOMEN: Soft, Nontender, Bowel sounds present, no masses on palpation  NERVOUS SYSTEM:  Alert and orientedx1-2  EXTREMITIES:  RLE elevated in immobilizer, Mild RLE edema, No LLE edema  SKIN: warm, dry    I&O's Summary    _________________________________________________  LABS:                        8.1    8.96  )-----------( 233      ( 06 Nov 2024 10:20 )             24.8     11-06    142  |  111[H]  |  26[H]  ----------------------------<  138[H]  3.7   |  25  |  0.50    Ca    8.3[L]      06 Nov 2024 10:20    TPro  6.2  /  Alb  2.7[L]  /  TBili  0.5  /  DBili  x   /  AST  12  /  ALT  11  /  AlkPhos  70  11-06    LIVER FUNCTIONS - ( 06 Nov 2024 00:12 )  Alb: 2.7 g/dL / Pro: 6.2 g/dL / ALK PHOS: 70 U/L / ALT: 11 U/L DA / AST: 12 U/L / GGT: x           CAPILLARY BLOOD GLUCOSE                  Urinalysis Basic - ( 06 Nov 2024 10:20 )    Color: x / Appearance: x / SG: x / pH: x  Gluc: 138 mg/dL / Ketone: x  / Bili: x / Urobili: x   Blood: x / Protein: x / Nitrite: x   Leuk Esterase: x / RBC: x / WBC x   Sq Epi: x / Non Sq Epi: x / Bacteria: x      _________________________________________________  RADIOLOGY & ADDITIONAL TESTS:    Imaging Personally Reviewed:  YES    Consultant(s) Notes Reviewed:   YES    Care Discussed with Consultants : YES     Plan of care was discussed with patient and /or primary care giver; all questions and concerns were addressed and care was aligned with patient's wishes.       PGY-1 Progress Note discussed with attending      PLEASE CONTACT ON CALL TEAM:  - On Call Team (Please refer to Alexandrea) FROM 5:00 PM - 8:30PM  - Nightfloat Team FROM 8:30 -7:30 AM    INTERVAL HPI/OVERNIGHT EVENTS:     No acute overnight events. Pt evaluated at bedside Pt still complaining of 10/10 pain in right lower extremity.     _________________________________________________  REVIEW OF SYSTEMS:  CONSTITUTIONAL: mild distress  RESPIRATORY: No shortness of breath, wheezing, or cough  CARDIOVASCULAR: No chest pain or palpitations  GASTROINTESTINAL: No abdominal pain, nausea, vomiting, diarrhea, or constipation  GENITOURINARY: No dysuria or hematuria  NEUROLOGICAL: No numbness, tremors, or loss of strength  SKIN: No new lesions    MEDICATIONS  (STANDING):  acetaminophen     Tablet .. 1000 milliGRAM(s) Oral every 8 hours  atorvastatin 20 milliGRAM(s) Oral at bedtime  escitalopram 10 milliGRAM(s) Oral daily  heparin   Injectable 5000 Unit(s) SubCutaneous every 8 hours  lactulose Syrup 10 Gram(s) Oral two times a day  lisinopril 20 milliGRAM(s) Oral daily  senna 2 Tablet(s) Oral at bedtime  zonisamide 50 milliGRAM(s) Oral daily    MEDICATIONS  (PRN):  oxyCODONE    IR 2.5 milliGRAM(s) Oral every 4 hours PRN Severe Pain (7 - 10)      Vital Signs Last 24 Hrs  T(C): 36.5 (07 Nov 2024 12:54), Max: 37.3 (06 Nov 2024 20:52)  T(F): 97.7 (07 Nov 2024 12:54), Max: 99.1 (06 Nov 2024 20:52)  HR: 98 (07 Nov 2024 12:54) (80 - 100)  BP: 131/90 (07 Nov 2024 12:54) (113/53 - 144/60)  BP(mean): 100 (07 Nov 2024 12:54) (100 - 100)  RR: 18 (07 Nov 2024 12:54) (16 - 18)  SpO2: 94% (07 Nov 2024 12:54) (94% - 95%)    Parameters below as of 07 Nov 2024 12:54  Patient On (Oxygen Delivery Method): room air      _________________________________________________  PHYSICAL EXAMINATION:  GENERAL: mild distress  HEAD:  Atraumatic, Normocephalic  EYES:  conjunctiva and sclera clear  NECK: Supple, No JVD, Normal thyroid  CHEST/LUNG: Clear to auscultation.  HEART: Regular rate and rhythm; No murmurs, rubs, or gallops  ABDOMEN: Soft, Nontender, Bowel sounds present, no masses on palpation  NERVOUS SYSTEM:  Alert and orientedx1-2  EXTREMITIES:  RLE elevated in immobilizer, Mild RLE edema, No LLE edema  SKIN: warm, dry    I&O's Summary    _________________________________________________  LABS:                        8.1    8.96  )-----------( 233      ( 06 Nov 2024 10:20 )             24.8     11-06    142  |  111[H]  |  26[H]  ----------------------------<  138[H]  3.7   |  25  |  0.50    Ca    8.3[L]      06 Nov 2024 10:20    TPro  6.2  /  Alb  2.7[L]  /  TBili  0.5  /  DBili  x   /  AST  12  /  ALT  11  /  AlkPhos  70  11-06    LIVER FUNCTIONS - ( 06 Nov 2024 00:12 )  Alb: 2.7 g/dL / Pro: 6.2 g/dL / ALK PHOS: 70 U/L / ALT: 11 U/L DA / AST: 12 U/L / GGT: x           CAPILLARY BLOOD GLUCOSE                  Urinalysis Basic - ( 06 Nov 2024 10:20 )    Color: x / Appearance: x / SG: x / pH: x  Gluc: 138 mg/dL / Ketone: x  / Bili: x / Urobili: x   Blood: x / Protein: x / Nitrite: x   Leuk Esterase: x / RBC: x / WBC x   Sq Epi: x / Non Sq Epi: x / Bacteria: x      _________________________________________________  RADIOLOGY & ADDITIONAL TESTS:    Imaging Personally Reviewed:  YES    Consultant(s) Notes Reviewed:   YES    Care Discussed with Consultants : YES     Plan of care was discussed with patient and /or primary care giver; all questions and concerns were addressed and care was aligned with patient's wishes.

## 2024-11-07 NOTE — PROGRESS NOTE ADULT - PROBLEM SELECTOR PLAN 1
Rt leg pain with knee swelling s/p accidental bending during transport  Xray  Right femur: Spiral fracture of the distal femur extending to the intercondylar region. Osteopenia.  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        Right tibia-fibula: Distal right femur fracture seen better on   accompanying exams. No tibia or fibula fracture. Regional vascular   calcifications present. Osteopenia. Rt leg pain with knee swelling s/p accidental bending during transport  hx of bedbound/wheelchair bound for last 7 months; not ambulatory  Xray Right femur: Spiral fracture of the distal femur extending to the intercondylar region. Osteopenia.  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  Ortho following: No acute intervention Rt leg pain with knee swelling s/p accidental bending during transport  hx of bedbound/wheelchair bound for last 7 months; not ambulatory  Xray Right femur: Spiral fracture of the distal femur extending to the intercondylar region. Osteopenia.  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  Ortho following: No acute intervention. D/w patient's daughter who is in agreement of conservative management  Pain management following: d/c IV morphine and start on oxycodone 2.5mg PO q4hrs PRN for severe pain and tylenol 1G PO q8hrs x3 days for moderate pain  PT recommended Home PT

## 2024-11-07 NOTE — PROGRESS NOTE ADULT - SUBJECTIVE AND OBJECTIVE BOX
Source of information: MARIA REYESDEJIMENEZ, Chart review  Patient language: Anguillan  : Huey # [id number]     HPI:  Pt is a 89 y/o F with PMHx of HTN, HLD, anxiety, dementia (AAO1-2 at baseline), anxiety, seizures, who is wheelchair/bedbound, sacral pressure ulcer who presented to the ER due to right knee pain/swelling. Per daughter at bedside who provided history, on Sunday, patient/s family was moving the patient with a device to lift patient from bed to wheelchair but patient's leg got stuck and bent in machine.The family straightened the leg and put the patient in the wheelchair and brought her to [facility]. Once home, pt began to complain of new onset R knee and thigh pain. Pain was severe and patient was unable to sleep and continued to scream in pain. On Monday, family noticed increased swelling and mild erythema on R knee which prompted them to come to the ED. Patient has been wheelchair/bedbound for the past 7 months. In the past she used to walk with a walker but could not walk more than a block without having to sit down. She cannot climb stairs due to hip problems. She went to a doctor 4 years ago but was told she is too high a risk for surgery to fix her hips. Daughter does endorse decreased PO intake and generalized weakness since Sunday. Pt admitted to medicine s/p injury to right knee.  (06 Nov 2024 06:53)    Pt is admitted for right femur fracture. Xray right femur demonstrates spiral fracture of the distal femur extending to the intercondylar region. Osteopenia. Regional vascular calcifications present. 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 Per orho- not a surgical candidate.   Pain consulted for management of acute right lower extremity pain 11/6. er daughter, on Moshe 11/3 while transferring from bed to wheelchair, RLE got caught in the transfer device causing immediate pain to the patient. Patient transfer to wheelchair was completed however daughter reports patient's RLE became swollen and painful following the incident which prompted ED arrival.   Pt seen and examined at bedside, this morning. At time of assessment, patient laying in bed in NAD, daughter Nadia at bedside, patient unable to verbalize current pain score SCALE USED: (1-10 VNRS). Pt is A&Ox1-2 and is a poor historian, unable to describe current pain. However, facial grimacing when right hip gently palpated. Pt tolerating PO diet. Reports last BM 11/6. Patient is wheelchair bound at baseline.    PAST MEDICAL & SURGICAL HISTORY:  HLD (hyperlipidemia)    HTN (hypertension)    Dementia    Seizures    No significant past surgical history    FAMILY HISTORY:    Social History:  lives with family  has HHA 10 hours daily  wheelchair/bed bound for last 6 mo (06 Nov 2024 06:53)  [x] Denies ETOH use, illicit drug use and smoking    Allergies  NKA    MEDICATIONS  (STANDING):  acetaminophen     Tablet .. 1000 milliGRAM(s) Oral every 8 hours  atorvastatin 20 milliGRAM(s) Oral at bedtime  escitalopram 10 milliGRAM(s) Oral daily  heparin   Injectable 5000 Unit(s) SubCutaneous every 8 hours  lactulose Syrup 10 Gram(s) Oral two times a day  lisinopril 20 milliGRAM(s) Oral daily  senna 2 Tablet(s) Oral at bedtime  zonisamide 50 milliGRAM(s) Oral daily    MEDICATIONS  (PRN):  oxyCODONE    IR 2.5 milliGRAM(s) Oral every 4 hours PRN Severe Pain (7 - 10)      Vital Signs Last 24 Hrs  T(C): 37.1 (07 Nov 2024 05:35), Max: 37.3 (06 Nov 2024 20:52)  T(F): 98.8 (07 Nov 2024 05:35), Max: 99.1 (06 Nov 2024 20:52)  HR: 80 (07 Nov 2024 05:35) (80 - 85)  BP: 144/60 (07 Nov 2024 05:35) (113/53 - 144/60)  BP(mean): --  RR: 16 (07 Nov 2024 05:35) (16 - 18)  SpO2: 95% (07 Nov 2024 05:35) (94% - 95%)    Parameters below as of 07 Nov 2024 05:35  Patient On (Oxygen Delivery Method): room air        LABS: Reviewed                          8.1    8.96  )-----------( 233      ( 06 Nov 2024 10:20 )             24.8     11-06    142  |  111[H]  |  26[H]  ----------------------------<  138[H]  3.7   |  25  |  0.50    Ca    8.3[L]      06 Nov 2024 10:20    TPro  6.2  /  Alb  2.7[L]  /  TBili  0.5  /  DBili  x   /  AST  12  /  ALT  11  /  AlkPhos  70  11-06      LIVER FUNCTIONS - ( 06 Nov 2024 00:12 )  Alb: 2.7 g/dL / Pro: 6.2 g/dL / ALK PHOS: 70 U/L / ALT: 11 U/L DA / AST: 12 U/L / GGT: x           Urinalysis Basic - ( 06 Nov 2024 10:20 )    Color: x / Appearance: x / SG: x / pH: x  Gluc: 138 mg/dL / Ketone: x  / Bili: x / Urobili: x   Blood: x / Protein: x / Nitrite: x   Leuk Esterase: x / RBC: x / WBC x   Sq Epi: x / Non Sq Epi: x / Bacteria: x    Radiology: Reviewed.   < from: Xray Tibia + Fibula 2 Views, Right (11.06.24 @ 01:11) >    ACC: [id number] EXAM:  XR FEMUR 2 VIEWS RT   ORDERED BY: MARIIA CHOI     ACC: [id number] EXAM:  XR TIB FIB AP LAT 2 VIEWS RT   ORDERED BY: MARIIA CHOI     ACC: [id number] EXAM:  XR KNEE AP LAT OBL 3 VIEWS RT   ORDERED BY: MARIIA CHOI     PROCEDURE DATE:  11/06/2024      INTERPRETATION:  EXAM: XR KNEE AP AND LATERAL AND OBLIQUE 3 VIEWS RIGHT,   XR TIBIA FIBULA AP AND LATERAL 2 VIEWS RIGHT, XR FEMUR 2 VIEWS RIGHT    INDICATION: R knee pain/swelling after fall HXR    COMPARISON: See below    IMPRESSION:    Right femur: Spiral fracture of the distal femur extending to the   intercondylar region. Osteopenia. Follow-up suggested. Regional vascular   calcifications present. 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    Right knee: Exam is limited due to fracture spiral of the distal third of   the femur extending to the condylar region. Edematous changes noted.   Regional vascular calcifications prominent.    Right tibia-fibula: Distal right femur fracture seen better on   accompanying exams. No tibia or fibula fracture. Regional vascular   calcifications present. Osteopenia.    --- End of Report ---    ANALIA BLACKWELL MD; Attending Radiologist  This document has been electronically signed. Nov 6 2024  9:08AM    < end of copied text >    ORT Score -   unable to obtain due to hx dementia   	  REVIEW OF SYSTEMS:  limited due to hx dementia.   + Right hip pain.     PHYSICAL EXAM:  GENERAL:  Alert & Oriented X1 , hx dementia cooperative, NAD, Good concentration. Speech is clear.   RESPIRATORY: Respirations even and unlabored. Clear to auscultation bilaterally; No rales, rhonchi, wheezing, or rubs  CARDIOVASCULAR: Normal S1/S2, regular rate and rhythm; No murmurs, rubs, or gallops.   GASTROINTESTINAL:  Soft, Nontender, Nondistended; Bowel sounds present  PERIPHERAL VASCULAR:  Extremities warm with right leg edema. 2+ Peripheral Pulses, No cyanosis, No calf tenderness  MUSCULOSKELETAL: Unable to assess motor strength. + right hip tenderness to palpation + right lower extremity immobilizer in place, ace dressing c/d/i  SKIN: Warm, dry, intact.     Risk factors associated with adverse outcomes related to opioid treatment  [ ] Concurrent benzodiazepine use  [ ] History/ Active substance use or alcohol use disorder  [X ] Psychiatric co-morbidity  [ ] Sleep apnea  [ ] COPD  [ ] BMI> 35  [ ] Liver dysfunction  [ ] Renal dysfunction  [ ] CHF  [ ] Smoker  [x] Age > 60 years    [x]  [place]  Reviewed and Copied to Chart. See below.    Plan of care and goal oriented pain management treatment options were discussed with patient and /or primary care giver; all questions and concerns were addressed and care was aligned with patient's wishes.    Educated patient on goal oriented pain management treatment options     11-07-24 @ 11:11

## 2024-11-08 LAB
ANION GAP SERPL CALC-SCNC: 5 MMOL/L — SIGNIFICANT CHANGE UP (ref 5–17)
BUN SERPL-MCNC: 26 MG/DL — HIGH (ref 7–18)
CALCIUM SERPL-MCNC: 8.5 MG/DL — SIGNIFICANT CHANGE UP (ref 8.4–10.5)
CHLORIDE SERPL-SCNC: 116 MMOL/L — HIGH (ref 96–108)
CO2 SERPL-SCNC: 24 MMOL/L — SIGNIFICANT CHANGE UP (ref 22–31)
CREAT SERPL-MCNC: 0.37 MG/DL — LOW (ref 0.5–1.3)
EGFR: 95 ML/MIN/1.73M2 — SIGNIFICANT CHANGE UP
GLUCOSE SERPL-MCNC: 113 MG/DL — HIGH (ref 70–99)
HCT VFR BLD CALC: 24.9 % — LOW (ref 34.5–45)
HGB BLD-MCNC: 8.2 G/DL — LOW (ref 11.5–15.5)
MAGNESIUM SERPL-MCNC: 2.3 MG/DL — SIGNIFICANT CHANGE UP (ref 1.6–2.6)
MCHC RBC-ENTMCNC: 29 PG — SIGNIFICANT CHANGE UP (ref 27–34)
MCHC RBC-ENTMCNC: 32.9 G/DL — SIGNIFICANT CHANGE UP (ref 32–36)
MCV RBC AUTO: 88 FL — SIGNIFICANT CHANGE UP (ref 80–100)
NRBC # BLD: 0 /100 WBCS — SIGNIFICANT CHANGE UP (ref 0–0)
PHOSPHATE SERPL-MCNC: 3 MG/DL — SIGNIFICANT CHANGE UP (ref 2.5–4.5)
PLATELET # BLD AUTO: 288 K/UL — SIGNIFICANT CHANGE UP (ref 150–400)
POTASSIUM SERPL-MCNC: 3.6 MMOL/L — SIGNIFICANT CHANGE UP (ref 3.5–5.3)
POTASSIUM SERPL-SCNC: 3.6 MMOL/L — SIGNIFICANT CHANGE UP (ref 3.5–5.3)
RBC # BLD: 2.83 M/UL — LOW (ref 3.8–5.2)
RBC # FLD: 14.2 % — SIGNIFICANT CHANGE UP (ref 10.3–14.5)
SODIUM SERPL-SCNC: 145 MMOL/L — SIGNIFICANT CHANGE UP (ref 135–145)
WBC # BLD: 6.73 K/UL — SIGNIFICANT CHANGE UP (ref 3.8–10.5)
WBC # FLD AUTO: 6.73 K/UL — SIGNIFICANT CHANGE UP (ref 3.8–10.5)

## 2024-11-08 PROCEDURE — 99233 SBSQ HOSP IP/OBS HIGH 50: CPT | Mod: GC

## 2024-11-08 PROCEDURE — 99232 SBSQ HOSP IP/OBS MODERATE 35: CPT

## 2024-11-08 RX ORDER — OXYCODONE HYDROCHLORIDE 30 MG/1
5 TABLET ORAL EVERY 4 HOURS
Refills: 0 | Status: DISCONTINUED | OUTPATIENT
Start: 2024-11-08 | End: 2024-11-13

## 2024-11-08 RX ORDER — POLYETHYLENE GLYCOL 3350 17 G/17G
17 POWDER, FOR SOLUTION ORAL DAILY
Refills: 0 | Status: DISCONTINUED | OUTPATIENT
Start: 2024-11-08 | End: 2024-11-13

## 2024-11-08 RX ORDER — NALOXONE HYDROCHLORIDE 1 MG/ML
0.4 INJECTION, SOLUTION INTRAMUSCULAR; INTRAVENOUS; SUBCUTANEOUS ONCE
Refills: 0 | Status: DISCONTINUED | OUTPATIENT
Start: 2024-11-08 | End: 2024-11-13

## 2024-11-08 RX ORDER — OXYCODONE HYDROCHLORIDE 30 MG/1
2.5 TABLET ORAL EVERY 4 HOURS
Refills: 0 | Status: DISCONTINUED | OUTPATIENT
Start: 2024-11-08 | End: 2024-11-13

## 2024-11-08 RX ADMIN — OXYCODONE HYDROCHLORIDE 2.5 MILLIGRAM(S): 30 TABLET ORAL at 00:41

## 2024-11-08 RX ADMIN — Medication 10 GRAM(S): at 17:43

## 2024-11-08 RX ADMIN — Medication 1000 MILLIGRAM(S): at 15:01

## 2024-11-08 RX ADMIN — Medication 20 MILLIGRAM(S): at 21:56

## 2024-11-08 RX ADMIN — OXYCODONE HYDROCHLORIDE 2.5 MILLIGRAM(S): 30 TABLET ORAL at 11:40

## 2024-11-08 RX ADMIN — Medication 1000 MILLIGRAM(S): at 06:41

## 2024-11-08 RX ADMIN — Medication 1000 MILLIGRAM(S): at 05:21

## 2024-11-08 RX ADMIN — OXYCODONE HYDROCHLORIDE 2.5 MILLIGRAM(S): 30 TABLET ORAL at 05:21

## 2024-11-08 RX ADMIN — OXYCODONE HYDROCHLORIDE 2.5 MILLIGRAM(S): 30 TABLET ORAL at 06:41

## 2024-11-08 RX ADMIN — Medication 20 MILLIGRAM(S): at 05:22

## 2024-11-08 RX ADMIN — Medication 1000 MILLIGRAM(S): at 21:56

## 2024-11-08 RX ADMIN — Medication 1000 MILLIGRAM(S): at 16:33

## 2024-11-08 RX ADMIN — OXYCODONE HYDROCHLORIDE 5 MILLIGRAM(S): 30 TABLET ORAL at 23:24

## 2024-11-08 RX ADMIN — Medication 1000 MILLIGRAM(S): at 23:21

## 2024-11-08 RX ADMIN — Medication 2 TABLET(S): at 21:56

## 2024-11-08 RX ADMIN — HEPARIN SODIUM 5000 UNIT(S): 10000 INJECTION INTRAVENOUS; SUBCUTANEOUS at 21:56

## 2024-11-08 RX ADMIN — POLYETHYLENE GLYCOL 3350 17 GRAM(S): 17 POWDER, FOR SOLUTION ORAL at 17:42

## 2024-11-08 RX ADMIN — HEPARIN SODIUM 5000 UNIT(S): 10000 INJECTION INTRAVENOUS; SUBCUTANEOUS at 15:01

## 2024-11-08 RX ADMIN — ESCITALOPRAM 10 MILLIGRAM(S): 10 TABLET, FILM COATED ORAL at 11:41

## 2024-11-08 RX ADMIN — ZONISAMIDE 50 MILLIGRAM(S): 100 CAPSULE ORAL at 11:41

## 2024-11-08 RX ADMIN — OXYCODONE HYDROCHLORIDE 2.5 MILLIGRAM(S): 30 TABLET ORAL at 12:40

## 2024-11-08 RX ADMIN — HEPARIN SODIUM 5000 UNIT(S): 10000 INJECTION INTRAVENOUS; SUBCUTANEOUS at 05:21

## 2024-11-08 NOTE — PROGRESS NOTE ADULT - PROBLEM SELECTOR PLAN 4
No bloody stool, no melena; was previously with vaginal bleeding but went to ObGyn who told her she has an infection and that everything is okay; no vaginal bleeding currently either  -no further episodes of bleeding/no active signs of bleeding  - Monitor Hgb

## 2024-11-08 NOTE — PROGRESS NOTE ADULT - PROBLEM SELECTOR PLAN 1
Rt leg pain with knee swelling s/p accidental bending during transport  hx of bedbound/wheelchair bound for last 7 months; not ambulatory  Xray Right femur: Spiral fracture of the distal femur extending to the intercondylar region. Osteopenia.  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  Ortho following: No acute intervention. D/w patient's daughter who is in agreement of conservative management  Pain management following: d/c IV morphine and start on oxycodone 2.5mg PO q4hrs PRN for severe pain and tylenol 1G PO q8hrs x3 days for moderate pain  PT recommended Home PT  Pain management consulted  Palliative consulted, f/u recs

## 2024-11-08 NOTE — PROGRESS NOTE ADULT - ATTENDING COMMENTS
CC: R knee pain  S: (late note)   [id number]  Daughter at bedside  her pain is not well controlled  O:  Vital Signs Last 24 Hrs  T(C): 36.7 (11-08-24 @ 14:34), Max: 36.9 (11-08-24 @ 05:07)  T(F): 98.1 (11-08-24 @ 14:34), Max: 98.5 (11-08-24 @ 05:07)  HR: 93 (11-08-24 @ 14:34) (78 - 93)  BP: 122/96 (11-08-24 @ 14:34) (122/96 - 131/50)  BP(mean): 83 (11-08-24 @ 05:07) (83 - 83)  RR: 17 (11-08-24 @ 14:34) (16 - 17)  SpO2: 93% (11-08-24 @ 14:34) (93% - 96%)        PE:  Gen: Orientedx2, alert, No acute distress Eyes: conjunctivae and lid normal, sclera clear with no icterus ENT: nose and throat exam normal CVS: S1, S2, RRR; no murmur, no rubs, no gallops Pulm: Good air exchange, Breath sounds equal bilaterally, no rales rhonchi,  no wheezes Chest: nontender, no chest deformity, chest movement symmetrical Gl: abdomen soft, nontender, nondistended, bowel sounds normoactive, no masses palpated Musk: RLE with dressing and immobilizer, RLE edematous Skin: no skin lesions, skin turgor normal, warm and well perfused Neuro: Awake, alert,Psych: normal affect, insight        A/P:  Maria Reyesdejimenez is a 89 yo bedbound/wheelchair bound woman with PMH significant for HTN, HLD, sacral pressure ulcer, dementia, anxiety, seizures who presents with R knee pain, swelling found to have R distal femur fracture.    #R distal femur fracture  #HTN  #HLD  #Dementia  #Sacral pressure ulcers  -ortho consulted, conservative management, no surgical intervention at this time  -PT , NWB to RLE with knee immobilizer  -pain management  consulted, increasing oxycodone to 5 mg q4h PRN, monitor for respiratory depression  -RTC tylenol  -continue atorvastatin, lisinopril, lexapro, zonisamide  -palliative care consult for GOC discussion  -history obtained from daughter  Labs reviewed:                                 8.2    6.73  )-----------( 288      ( 08 Nov 2024 05:25 )             24.9   11-08    145  |  116[H]  |  26[H]  ----------------------------<  113[H]  3.6   |  24  |  0.37[L]    Ca    8.5      08 Nov 2024 05:25  Phos  3.0     11-08  Mg     2.3     11-08      Imaging reviewed:   IMPRESSION:    Right femur: Spiral fracture of the distal femur extending to the   intercondylar region. Osteopenia. Follow-up suggested. Regional vascular   calcifications present. 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    Right knee: Exam is limited due to fracture spiral of the distal third of   the femur extending to the condylar region. Edematous changes noted.   Regional vascular calcifications prominent.    Right tibia-fibula: Distal right femur fracture seen better on   accompanying exams. No tibia or fibula fracture. Regional vascular   calcifications present. Osteopenia.        Total Time Spent 50 minutes  This includes chart review, patient assessment, discussion and collaboration with interdisciplinary team members, excluding ACP.

## 2024-11-08 NOTE — PHARMACOTHERAPY INTERVENTION NOTE - COMMENTS
Patient’s medication profile reviewed. Discussed with patient and daughter about their medication. All of patient's questions regarding medications were answered.   Patient’s medication profile reviewed. Discussed with patient and daughter about their medication using  ([id number]). All of patient's questions regarding medications were answered.

## 2024-11-08 NOTE — PROGRESS NOTE ADULT - PROBLEM SELECTOR PLAN 1
Pt with acute right hip pain which is somatic in nature due to femur fracture. Per orho- not a surgical candidate.    X-ray right femur demonstrates spiral fracture of the distal femur extending to the intercondylar region. Osteopenia. Regional vascular calcifications present. 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   Opioid pain recommendations   - Adjust oxycodone 2.5/5 mg PO q4h PRN moderate/ severe pain. Monitor for respiratory depression/sedation   Non-opioid pain recommendations   - Tylenol 1G PO q8h x 3 days, then PRN moderate pain.   Bowel Regimen  - Continue lactulose per medicine team   - Continue Senna 2 tablets at bedtime for constipation  Mild pain (pain score 1-3)  - Non-pharmacological pain treatment recommendations  - Warm/ Cool packs PRN   - Repositioning extremity, guided imagery, relaxation, distraction.  - Physical therapy OOB if no contraindications   Recommendations discussed with primary team and RN. Consider [facility] for pain management outpatient as pt is bedbound. Pain team will sign off at this time, please reconsult if needed.

## 2024-11-08 NOTE — PROGRESS NOTE ADULT - SUBJECTIVE AND OBJECTIVE BOX
Source of information: MARIA REYESDEJIMENEZ, Chart review  Patient language: Tongan  : n/a- granddaughter translated per daughters request    HPI:  Pt is a 89 y/o F with PMHx of HTN, HLD, anxiety, dementia (AAO1-2 at baseline), anxiety, seizures, who is wheelchair/bedbound, sacral pressure ulcer who presented to the ER due to right knee pain/swelling. Per daughter at bedside who provided history, on Sunday, patient/s family was moving the patient with a device to lift patient from bed to wheelchair but patient's leg got stuck and bent in machine.The family straightened the leg and put the patient in the wheelchair and brought her to [facility]. Once home, pt began to complain of new onset R knee and thigh pain. Pain was severe and patient was unable to sleep and continued to scream in pain. On Monday, family noticed increased swelling and mild erythema on R knee which prompted them to come to the ED. Patient has been wheelchair/bedbound for the past 7 months. In the past she used to walk with a walker but could not walk more than a block without having to sit down. She cannot climb stairs due to hip problems. She went to a doctor 4 years ago but was told she is too high a risk for surgery to fix her hips. Daughter does endorse decreased PO intake and generalized weakness since Sunday. Pt admitted to medicine s/p injury to right knee.  (06 Nov 2024 06:53)    Pt is admitted for right femur fracture. Xray right femur demonstrates spiral fracture of the distal femur extending to the intercondylar region. Osteopenia. Regional vascular calcifications present. 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 Per orho- not a surgical candidate.   Pain consulted for management of acute right lower extremity pain 11/6. Per daughter, on Moshe 11/3 while transferring from bed to wheelchair, RLE got caught in the transfer device causing immediate pain to the patient. Patient transfer to wheelchair was completed however daughter reports patient's RLE became swollen and painful following the incident which prompted ED arrival.   Pt seen and examined at bedside, this afternoon. Spoke with granddaughter over the phone who translated for daughter at bedside per daughter's request. Questions and concerns addressed. Realistic pain expectations discussed. At time of assessment, patient laying in bed in NAD, daughter Nadia at bedside, patient unable to verbalize current pain score SCALE USED: (1-10 VNRS). Pt is A&Ox1-2 and is a poor historian, unable to describe current pain. However, facial grimacing when right hip gently palpated. Pt tolerating PO diet. Reports last BM 11/8. Patient is wheelchair bound at baseline.    PAST MEDICAL & SURGICAL HISTORY:  HLD (hyperlipidemia)    HTN (hypertension)    Dementia    Seizures    No significant past surgical history    FAMILY HISTORY:    Social History:  lives with family  has HHA 10 hours daily  wheelchair/bed bound for last 6 mo (06 Nov 2024 06:53)  [x] Denies ETOH use, illicit drug use and smoking    Allergies  NKA      MEDICATIONS  (STANDING):  acetaminophen     Tablet .. 1000 milliGRAM(s) Oral every 8 hours  atorvastatin 20 milliGRAM(s) Oral at bedtime  escitalopram 10 milliGRAM(s) Oral daily  heparin   Injectable 5000 Unit(s) SubCutaneous every 8 hours  lactulose Syrup 10 Gram(s) Oral two times a day  lisinopril 20 milliGRAM(s) Oral daily  naloxone Injectable 0.4 milliGRAM(s) IV Push once  polyethylene glycol 3350 17 Gram(s) Oral daily  senna 2 Tablet(s) Oral at bedtime  zonisamide 50 milliGRAM(s) Oral daily    MEDICATIONS  (PRN):  bisacodyl 5 milliGRAM(s) Oral daily PRN Constipation  oxyCODONE    IR 2.5 milliGRAM(s) Oral every 4 hours PRN Moderate Pain (4 - 6)  oxyCODONE    IR 5 milliGRAM(s) Oral every 4 hours PRN Severe Pain (7 - 10)      Vital Signs Last 24 Hrs  T(C): 36.7 (08 Nov 2024 14:34), Max: 36.9 (08 Nov 2024 05:07)  T(F): 98.1 (08 Nov 2024 14:34), Max: 98.5 (08 Nov 2024 05:07)  HR: 93 (08 Nov 2024 14:34) (78 - 93)  BP: 122/96 (08 Nov 2024 14:34) (122/96 - 131/50)  BP(mean): 83 (08 Nov 2024 05:07) (83 - 83)  RR: 17 (08 Nov 2024 14:34) (16 - 17)  SpO2: 93% (08 Nov 2024 14:34) (93% - 96%)    Parameters below as of 08 Nov 2024 14:34  Patient On (Oxygen Delivery Method): room air        LABS: Reviewed                          8.2    6.73  )-----------( 288      ( 08 Nov 2024 05:25 )             24.9     11-08    145  |  116[H]  |  26[H]  ----------------------------<  113[H]  3.6   |  24  |  0.37[L]    Ca    8.5      08 Nov 2024 05:25  Phos  3.0     11-08  Mg     2.3     11-08          Urinalysis Basic - ( 08 Nov 2024 05:25 )    Color: x / Appearance: x / SG: x / pH: x  Gluc: 113 mg/dL / Ketone: x  / Bili: x / Urobili: x   Blood: x / Protein: x / Nitrite: x   Leuk Esterase: x / RBC: x / WBC x   Sq Epi: x / Non Sq Epi: x / Bacteria: x    Radiology: Reviewed.   < from: Xray Tibia + Fibula 2 Views, Right (11.06.24 @ 01:11) >    ACC: [id number] EXAM:  XR FEMUR 2 VIEWS RT   ORDERED BY: MARIIA CHOI     ACC: [id number] EXAM:  XR TIB FIB AP LAT 2 VIEWS RT   ORDERED BY: MARIIA CHOI     ACC: [id number] EXAM:  XR KNEE AP LAT OBL 3 VIEWS RT   ORDERED BY: MARIIA CHOI     PROCEDURE DATE:  11/06/2024      INTERPRETATION:  EXAM: XR KNEE AP AND LATERAL AND OBLIQUE 3 VIEWS RIGHT,   XR TIBIA FIBULA AP AND LATERAL 2 VIEWS RIGHT, XR FEMUR 2 VIEWS RIGHT    INDICATION: R knee pain/swelling after fall HXR    COMPARISON: See below    IMPRESSION:    Right femur: Spiral fracture of the distal femur extending to the   intercondylar region. Osteopenia. Follow-up suggested. Regional vascular   calcifications present. 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    Right knee: Exam is limited due to fracture spiral of the distal third of   the femur extending to the condylar region. Edematous changes noted.   Regional vascular calcifications prominent.    Right tibia-fibula: Distal right femur fracture seen better on   accompanying exams. No tibia or fibula fracture. Regional vascular   calcifications present. Osteopenia.    --- End of Report ---    ANALIA BLACKWELL MD; Attending Radiologist  This document has been electronically signed. Nov 6 2024  9:08AM    < end of copied text >    ORT Score -   unable to obtain due to hx dementia   	  REVIEW OF SYSTEMS:  limited due to hx dementia.   + Right hip pain.     PHYSICAL EXAM:  GENERAL:  Alert & Oriented X1 , hx dementia cooperative, NAD, Speech is clear.   RESPIRATORY: Respirations even and unlabored. Clear to auscultation bilaterally; No rales, rhonchi, wheezing, or rubs  CARDIOVASCULAR: Normal S1/S2, regular rate and rhythm; No murmurs, rubs, or gallops.   GASTROINTESTINAL:  Soft, Nontender, Nondistended; Bowel sounds present  PERIPHERAL VASCULAR:  Extremities warm with right leg edema. 2+ Peripheral Pulses, No cyanosis, No calf tenderness  MUSCULOSKELETAL: Unable to assess motor strength. + toe wiggle; + right hip tenderness to palpation + right lower extremity immobilizer in place, ace dressing c/d/i  SKIN: Warm, dry, intact.     Risk factors associated with adverse outcomes related to opioid treatment  [ ] Concurrent benzodiazepine use  [ ] History/ Active substance use or alcohol use disorder  [X ] Psychiatric co-morbidity  [ ] Sleep apnea  [ ] COPD  [ ] BMI> 35  [ ] Liver dysfunction  [ ] Renal dysfunction  [ ] CHF  [ ] Smoker  [x] Age > 60 years    [x]  [place]  Reviewed and Copied to Chart. See below.    Plan of care and goal oriented pain management treatment options were discussed with patient and /or primary care giver; all questions and concerns were addressed and care was aligned with patient's wishes.    Educated patient on goal oriented pain management treatment options     11-08-24 @ 15:42

## 2024-11-08 NOTE — PROGRESS NOTE ADULT - SUBJECTIVE AND OBJECTIVE BOX
PGY-1 Progress Note discussed with attending    PAGER #: [[phone]] TILL 5:00 PM  PLEASE CONTACT ON CALL TEAM:  - On Call Team (Please refer to Alexandrea) FROM 5:00 PM - 8:30PM  - Nightfloat Team FROM 8:30 -7:30 AM    INTERVAL HPI/OVERNIGHT EVENTS: No acute events overnight.  Patient examined at bedside this AM. Patient complains of severe pain.      REVIEW OF SYSTEMS:  CONSTITUTIONAL: No fever, chills,  or fatigue  RESPIRATORY: No cough, wheezing, No shortness of breath  CARDIOVASCULAR: No chest pain, palpitations,  leg swelling  GASTROINTESTINAL: No abdominal pain. No nausea, vomiting, or hematemesis; No diarrhea or constipation. No bloody or black stool  GENITOURINARY: No dysuria or hematuria, urinary frequency  NEUROLOGICAL: No headaches, dizziness  SKIN: No itching, burning, rashes, or lesions     Vital Signs Last 24 Hrs  T(C): 36.9 (08 Nov 2024 05:07), Max: 36.9 (08 Nov 2024 05:07)  T(F): 98.5 (08 Nov 2024 05:07), Max: 98.5 (08 Nov 2024 05:07)  HR: 92 (08 Nov 2024 05:07) (78 - 92)  BP: 126/61 (08 Nov 2024 05:07) (126/61 - 131/50)  BP(mean): 83 (08 Nov 2024 05:07) (83 - 83)  RR: 16 (08 Nov 2024 05:07) (16 - 16)  SpO2: 95% (08 Nov 2024 05:07) (95% - 96%)    Parameters below as of 08 Nov 2024 05:07  Patient On (Oxygen Delivery Method): room air        PHYSICAL EXAMINATION:  GENERAL: NAD,   HEAD:  Atraumatic, Normocephalic  EYES:  PERRLA,  conjunctiva and sclera clear  NECK: Supple,    CHEST WALL: Nontender  LUNG: Clear to auscultation. No rales, rhonchi, wheezing, or rubs  HEART: Regular rate and rhythm; No murmurs,  ABDOMEN: Soft, Nontender, Nondistended; Bowel sounds present, No Rovsing's sign   NERVOUS SYSTEM:  Alert & Oriented X3,    EXTREMITIES:  2+ Peripheral Pulses, No clubbing, cyanosis, or edema  CALF: No Calf tenderness   SKIN: warm dry                          8.2    6.73  )-----------( 288      ( 08 Nov 2024 05:25 )             24.9     11-08    145  |  116[H]  |  26[H]  ----------------------------<  113[H]  3.6   |  24  |  0.37[L]    Ca    8.5      08 Nov 2024 05:25  Phos  3.0     11-08  Mg     2.3     11-08                CAPILLARY BLOOD GLUCOSE      RADIOLOGY & ADDITIONAL TESTS:                   PGY-1 Progress Note discussed with attending    PAGER #: [phone]] TILL 5:00 PM  PLEASE CONTACT ON CALL TEAM:  - On Call Team (Please refer to Alexandrea) FROM 5:00 PM - 8:30PM  - Nightfloat Team FROM 8:30 -7:30 AM    INTERVAL HPI/ OVERNIGHT EVENTS: No acute events overnight.  Patient examined at bedside this AM. Patient complains of severe pain, says it has not been controlled by current pain regimen. Daughter was at bedside translated, despite  services being being offered.      REVIEW OF SYSTEMS:  CONSTITUTIONAL: No fever, chills,  or fatigue  RESPIRATORY: No cough, wheezing, No shortness of breath  CARDIOVASCULAR: No chest pain, palpitations, +R leg swelling  GASTROINTESTINAL: No abdominal pain. No nausea, vomiting, or hematemesis; No diarrhea or constipation. No bloody or black stool  GENITOURINARY: No dysuria or hematuria, urinary frequency  NEUROLOGICAL: No headaches, dizziness.   SKIN: No itching, burning, rashes, or lesions     Vital Signs Last 24 Hrs  T(C): 36.9 (08 Nov 2024 05:07), Max: 36.9 (08 Nov 2024 05:07)  T(F): 98.5 (08 Nov 2024 05:07), Max: 98.5 (08 Nov 2024 05:07)  HR: 92 (08 Nov 2024 05:07) (78 - 92)  BP: 126/61 (08 Nov 2024 05:07) (126/61 - 131/50)  BP(mean): 83 (08 Nov 2024 05:07) (83 - 83)  RR: 16 (08 Nov 2024 05:07) (16 - 16)  SpO2: 95% (08 Nov 2024 05:07) (95% - 96%)    Parameters below as of 08 Nov 2024 05:07  Patient On (Oxygen Delivery Method): room air      PHYSICAL EXAMINATION:  GENERAL: lying comfortably in bed  HEAD:  Atraumatic, Normocephalic  EYES:  conjunctiva and sclera clear  NECK: Supple, No JVD, Normal thyroid  CHEST/LUNG: Clear to auscultation.  HEART: Regular rate and rhythm; No murmurs, rubs, or gallops  ABDOMEN: Soft, Nontender, Bowel sounds present, no masses on palpation  NERVOUS SYSTEM:  Alert and orientedx1-2  EXTREMITIES:  RLE elevated in immobilizer, +RLE edema, No LLE edema  SKIN: warm, dry                          8.2    6.73  )-----------( 288      ( 08 Nov 2024 05:25 )             24.9     11-08    145  |  116[H]  |  26[H]  ----------------------------<  113[H]  3.6   |  24  |  0.37[L]    Ca    8.5      08 Nov 2024 05:25  Phos  3.0     11-08  Mg     2.3     11-08                CAPILLARY BLOOD GLUCOSE      RADIOLOGY & ADDITIONAL TESTS:

## 2024-11-09 LAB
ALBUMIN SERPL ELPH-MCNC: 2.2 G/DL — LOW (ref 3.5–5)
ALP SERPL-CCNC: 72 U/L — SIGNIFICANT CHANGE UP (ref 40–120)
ALT FLD-CCNC: 10 U/L DA — SIGNIFICANT CHANGE UP (ref 10–60)
ANION GAP SERPL CALC-SCNC: 4 MMOL/L — LOW (ref 5–17)
AST SERPL-CCNC: 8 U/L — LOW (ref 10–40)
BILIRUB SERPL-MCNC: 0.5 MG/DL — SIGNIFICANT CHANGE UP (ref 0.2–1.2)
BUN SERPL-MCNC: 28 MG/DL — HIGH (ref 7–18)
CALCIUM SERPL-MCNC: 8.4 MG/DL — SIGNIFICANT CHANGE UP (ref 8.4–10.5)
CHLORIDE SERPL-SCNC: 115 MMOL/L — HIGH (ref 96–108)
CO2 SERPL-SCNC: 24 MMOL/L — SIGNIFICANT CHANGE UP (ref 22–31)
CREAT SERPL-MCNC: 0.52 MG/DL — SIGNIFICANT CHANGE UP (ref 0.5–1.3)
EGFR: 88 ML/MIN/1.73M2 — SIGNIFICANT CHANGE UP
GLUCOSE SERPL-MCNC: 114 MG/DL — HIGH (ref 70–99)
MAGNESIUM SERPL-MCNC: 2.3 MG/DL — SIGNIFICANT CHANGE UP (ref 1.6–2.6)
PHOSPHATE SERPL-MCNC: 3.4 MG/DL — SIGNIFICANT CHANGE UP (ref 2.5–4.5)
POTASSIUM SERPL-MCNC: 3.6 MMOL/L — SIGNIFICANT CHANGE UP (ref 3.5–5.3)
POTASSIUM SERPL-SCNC: 3.6 MMOL/L — SIGNIFICANT CHANGE UP (ref 3.5–5.3)
PROT SERPL-MCNC: 6.2 G/DL — SIGNIFICANT CHANGE UP (ref 6–8.3)
SODIUM SERPL-SCNC: 143 MMOL/L — SIGNIFICANT CHANGE UP (ref 135–145)

## 2024-11-09 PROCEDURE — 99233 SBSQ HOSP IP/OBS HIGH 50: CPT

## 2024-11-09 RX ORDER — KETOROLAC TROMETHAMINE 30 MG/ML
15 INJECTION INTRAMUSCULAR; INTRAVENOUS ONCE
Refills: 0 | Status: DISCONTINUED | OUTPATIENT
Start: 2024-11-09 | End: 2024-11-09

## 2024-11-09 RX ADMIN — HEPARIN SODIUM 5000 UNIT(S): 10000 INJECTION INTRAVENOUS; SUBCUTANEOUS at 05:15

## 2024-11-09 RX ADMIN — HEPARIN SODIUM 5000 UNIT(S): 10000 INJECTION INTRAVENOUS; SUBCUTANEOUS at 21:57

## 2024-11-09 RX ADMIN — Medication 20 MILLIGRAM(S): at 05:15

## 2024-11-09 RX ADMIN — POLYETHYLENE GLYCOL 3350 17 GRAM(S): 17 POWDER, FOR SOLUTION ORAL at 13:06

## 2024-11-09 RX ADMIN — OXYCODONE HYDROCHLORIDE 5 MILLIGRAM(S): 30 TABLET ORAL at 13:06

## 2024-11-09 RX ADMIN — Medication 10 GRAM(S): at 17:52

## 2024-11-09 RX ADMIN — Medication 1000 MILLIGRAM(S): at 06:26

## 2024-11-09 RX ADMIN — Medication 1000 MILLIGRAM(S): at 14:06

## 2024-11-09 RX ADMIN — Medication 20 MILLIGRAM(S): at 21:57

## 2024-11-09 RX ADMIN — Medication 1000 MILLIGRAM(S): at 05:15

## 2024-11-09 RX ADMIN — KETOROLAC TROMETHAMINE 15 MILLIGRAM(S): 30 INJECTION INTRAMUSCULAR; INTRAVENOUS at 11:45

## 2024-11-09 RX ADMIN — HEPARIN SODIUM 5000 UNIT(S): 10000 INJECTION INTRAVENOUS; SUBCUTANEOUS at 13:06

## 2024-11-09 RX ADMIN — ZONISAMIDE 50 MILLIGRAM(S): 100 CAPSULE ORAL at 13:07

## 2024-11-09 RX ADMIN — OXYCODONE HYDROCHLORIDE 5 MILLIGRAM(S): 30 TABLET ORAL at 18:51

## 2024-11-09 RX ADMIN — OXYCODONE HYDROCHLORIDE 5 MILLIGRAM(S): 30 TABLET ORAL at 17:51

## 2024-11-09 RX ADMIN — Medication 2 TABLET(S): at 21:57

## 2024-11-09 RX ADMIN — Medication 1000 MILLIGRAM(S): at 13:06

## 2024-11-09 RX ADMIN — Medication 1000 MILLIGRAM(S): at 23:43

## 2024-11-09 RX ADMIN — Medication 1000 MILLIGRAM(S): at 21:57

## 2024-11-09 RX ADMIN — OXYCODONE HYDROCHLORIDE 5 MILLIGRAM(S): 30 TABLET ORAL at 02:49

## 2024-11-09 RX ADMIN — OXYCODONE HYDROCHLORIDE 5 MILLIGRAM(S): 30 TABLET ORAL at 14:06

## 2024-11-09 RX ADMIN — Medication 10 GRAM(S): at 05:15

## 2024-11-09 RX ADMIN — KETOROLAC TROMETHAMINE 15 MILLIGRAM(S): 30 INJECTION INTRAMUSCULAR; INTRAVENOUS at 10:45

## 2024-11-09 RX ADMIN — ESCITALOPRAM 10 MILLIGRAM(S): 10 TABLET, FILM COATED ORAL at 13:06

## 2024-11-09 NOTE — PROGRESS NOTE ADULT - SUBJECTIVE AND OBJECTIVE BOX
CC: R knee pain  S:    [id number]  Daughter at bedside  per daughter, patient still has pain but slightly improved to her RLE  O:  Vital Signs Last 24 Hrs  Vital Signs Last 24 Hrs  T(C): 36.7 (11-09-24 @ 04:52), Max: 36.8 (11-08-24 @ 21:11)  T(F): 98 (11-09-24 @ 04:52), Max: 98.2 (11-08-24 @ 21:11)  HR: 80 (11-09-24 @ 04:52) (80 - 93)  BP: 132/59 (11-09-24 @ 04:52) (114/57 - 132/59)  RR: 16 (11-09-24 @ 04:52) (16 - 17)  SpO2: 96% (11-09-24 @ 04:52) (93% - 96%)    PE:  Gen: Orientedx2, alert, No acute distress Eyes: conjunctivae and lid normal, sclera clear with no icterus ENT: nose and throat exam normal CVS: S1, S2, RRR; no murmur, no rubs, no gallops Pulm: Good air exchange, Breath sounds equal bilaterally, no rales rhonchi,  no wheezes Chest: nontender, no chest deformity, chest movement symmetrical Gl: abdomen soft, nontender, nondistended, bowel sounds normoactive, no masses palpated Musk: RLE with dressing and immobilizer, RLE edematous Skin: no skin lesions, skin turgor normal, warm and well perfused Neuro: Awake, alert,Psych: normal affect, insight

## 2024-11-10 LAB
ANION GAP SERPL CALC-SCNC: 3 MMOL/L — LOW (ref 5–17)
BUN SERPL-MCNC: 27 MG/DL — HIGH (ref 7–18)
CALCIUM SERPL-MCNC: 8.7 MG/DL — SIGNIFICANT CHANGE UP (ref 8.4–10.5)
CHLORIDE SERPL-SCNC: 112 MMOL/L — HIGH (ref 96–108)
CO2 SERPL-SCNC: 28 MMOL/L — SIGNIFICANT CHANGE UP (ref 22–31)
CREAT SERPL-MCNC: 0.42 MG/DL — LOW (ref 0.5–1.3)
EGFR: 92 ML/MIN/1.73M2 — SIGNIFICANT CHANGE UP
GLUCOSE SERPL-MCNC: 111 MG/DL — HIGH (ref 70–99)
HCT VFR BLD CALC: 24 % — LOW (ref 34.5–45)
HCT VFR BLD CALC: 24.9 % — LOW (ref 34.5–45)
HGB BLD-MCNC: 7.6 G/DL — LOW (ref 11.5–15.5)
HGB BLD-MCNC: 8.1 G/DL — LOW (ref 11.5–15.5)
MAGNESIUM SERPL-MCNC: 2.3 MG/DL — SIGNIFICANT CHANGE UP (ref 1.6–2.6)
MCHC RBC-ENTMCNC: 27.8 PG — SIGNIFICANT CHANGE UP (ref 27–34)
MCHC RBC-ENTMCNC: 28.5 PG — SIGNIFICANT CHANGE UP (ref 27–34)
MCHC RBC-ENTMCNC: 31.7 G/DL — LOW (ref 32–36)
MCHC RBC-ENTMCNC: 32.5 G/DL — SIGNIFICANT CHANGE UP (ref 32–36)
MCV RBC AUTO: 87.7 FL — SIGNIFICANT CHANGE UP (ref 80–100)
MCV RBC AUTO: 87.9 FL — SIGNIFICANT CHANGE UP (ref 80–100)
NRBC # BLD: 0 /100 WBCS — SIGNIFICANT CHANGE UP (ref 0–0)
PHOSPHATE SERPL-MCNC: 3.2 MG/DL — SIGNIFICANT CHANGE UP (ref 2.5–4.5)
PLATELET # BLD AUTO: 383 K/UL — SIGNIFICANT CHANGE UP (ref 150–400)
PLATELET # BLD AUTO: 418 K/UL — HIGH (ref 150–400)
POTASSIUM SERPL-MCNC: 3.6 MMOL/L — SIGNIFICANT CHANGE UP (ref 3.5–5.3)
POTASSIUM SERPL-SCNC: 3.6 MMOL/L — SIGNIFICANT CHANGE UP (ref 3.5–5.3)
RBC # BLD: 2.73 M/UL — LOW (ref 3.8–5.2)
RBC # BLD: 2.84 M/UL — LOW (ref 3.8–5.2)
RBC # BLD: 2.84 M/UL — LOW (ref 3.8–5.2)
RBC # FLD: 14 % — SIGNIFICANT CHANGE UP (ref 10.3–14.5)
RBC # FLD: 14.2 % — SIGNIFICANT CHANGE UP (ref 10.3–14.5)
RETICS #: 51.1 K/UL — SIGNIFICANT CHANGE UP (ref 25–125)
RETICS/RBC NFR: 1.8 % — SIGNIFICANT CHANGE UP (ref 0.5–2.5)
SODIUM SERPL-SCNC: 143 MMOL/L — SIGNIFICANT CHANGE UP (ref 135–145)
WBC # BLD: 6.08 K/UL — SIGNIFICANT CHANGE UP (ref 3.8–10.5)
WBC # BLD: 6.33 K/UL — SIGNIFICANT CHANGE UP (ref 3.8–10.5)
WBC # FLD AUTO: 6.08 K/UL — SIGNIFICANT CHANGE UP (ref 3.8–10.5)
WBC # FLD AUTO: 6.33 K/UL — SIGNIFICANT CHANGE UP (ref 3.8–10.5)

## 2024-11-10 PROCEDURE — 74177 CT ABD & PELVIS W/CONTRAST: CPT | Mod: 26

## 2024-11-10 PROCEDURE — 99233 SBSQ HOSP IP/OBS HIGH 50: CPT | Mod: GC

## 2024-11-10 RX ORDER — KETOROLAC TROMETHAMINE 30 MG/ML
15 INJECTION INTRAMUSCULAR; INTRAVENOUS ONCE
Refills: 0 | Status: DISCONTINUED | OUTPATIENT
Start: 2024-11-10 | End: 2024-11-10

## 2024-11-10 RX ADMIN — KETOROLAC TROMETHAMINE 15 MILLIGRAM(S): 30 INJECTION INTRAMUSCULAR; INTRAVENOUS at 14:18

## 2024-11-10 RX ADMIN — Medication 10 GRAM(S): at 05:49

## 2024-11-10 RX ADMIN — Medication 10 GRAM(S): at 17:03

## 2024-11-10 RX ADMIN — HEPARIN SODIUM 5000 UNIT(S): 10000 INJECTION INTRAVENOUS; SUBCUTANEOUS at 21:36

## 2024-11-10 RX ADMIN — OXYCODONE HYDROCHLORIDE 5 MILLIGRAM(S): 30 TABLET ORAL at 09:42

## 2024-11-10 RX ADMIN — ESCITALOPRAM 10 MILLIGRAM(S): 10 TABLET, FILM COATED ORAL at 13:17

## 2024-11-10 RX ADMIN — HEPARIN SODIUM 5000 UNIT(S): 10000 INJECTION INTRAVENOUS; SUBCUTANEOUS at 13:17

## 2024-11-10 RX ADMIN — Medication 1000 MILLIGRAM(S): at 07:07

## 2024-11-10 RX ADMIN — POLYETHYLENE GLYCOL 3350 17 GRAM(S): 17 POWDER, FOR SOLUTION ORAL at 13:17

## 2024-11-10 RX ADMIN — Medication 20 MILLIGRAM(S): at 05:49

## 2024-11-10 RX ADMIN — OXYCODONE HYDROCHLORIDE 5 MILLIGRAM(S): 30 TABLET ORAL at 17:03

## 2024-11-10 RX ADMIN — Medication 20 MILLIGRAM(S): at 21:36

## 2024-11-10 RX ADMIN — KETOROLAC TROMETHAMINE 15 MILLIGRAM(S): 30 INJECTION INTRAMUSCULAR; INTRAVENOUS at 13:17

## 2024-11-10 RX ADMIN — OXYCODONE HYDROCHLORIDE 5 MILLIGRAM(S): 30 TABLET ORAL at 10:42

## 2024-11-10 RX ADMIN — HEPARIN SODIUM 5000 UNIT(S): 10000 INJECTION INTRAVENOUS; SUBCUTANEOUS at 05:49

## 2024-11-10 RX ADMIN — OXYCODONE HYDROCHLORIDE 5 MILLIGRAM(S): 30 TABLET ORAL at 18:03

## 2024-11-10 RX ADMIN — Medication 2 TABLET(S): at 21:36

## 2024-11-10 RX ADMIN — Medication 1000 MILLIGRAM(S): at 05:49

## 2024-11-10 RX ADMIN — ZONISAMIDE 50 MILLIGRAM(S): 100 CAPSULE ORAL at 13:16

## 2024-11-10 NOTE — PROGRESS NOTE ADULT - PROBLEM SELECTOR PLAN 1
Rt leg pain with knee swelling s/p accidental bending during transport  hx of bedbound/wheelchair bound for last 7 months; not ambulatory  Xray Right femur: Spiral fracture of the distal femur extending to the intercondylar region. Osteopenia.  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  Ortho following: No acute intervention. D/w patient's daughter who is in agreement of conservative management  Pain management following: d/c IV morphine and start on oxycodone 2.5mg PO q4hrs PRN for severe pain and tylenol 1G PO q8hrs x3 days for moderate pain  PT recommended Home PT  Pain management consulted  Palliative consulted, f/u recs Rt leg pain with knee swelling s/p accidental bending during transport  hx of bedbound/wheelchair bound for last 7 months; not ambulatory  Xray Right femur: Spiral fracture of the distal femur extending to the intercondylar region. Osteopenia.  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  Ortho following: No acute intervention. D/w patient's daughter who is in agreement of conservative management  Pain management following: d/c IV morphine and start on oxycodone 2.5mg PO q4hrs PRN for severe pain and tylenol 1G PO q8hrs x3 days for moderate pain  IV toradol for pain exacerbation  PT recommended Home PT (NWB to RLE with knee immobilizer)  Pain management consulted  Palliative consulted, f/u recs

## 2024-11-10 NOTE — PROGRESS NOTE ADULT - PROBLEM SELECTOR PLAN 4
No bloody stool, no melena; was previously with vaginal bleeding but went to ObGyn who told her she has an infection and that everything is okay; no vaginal bleeding currently either  -no further episodes of bleeding/no active signs of bleeding  - Monitor Hgb No bloody stool, no melena; was previously with vaginal bleeding but went to ObGyn who told her she has an infection and that everything is okay; no vaginal bleeding currently either  -no further episodes of bleeding/no active signs of bleeding  -hgb stable  - Monitor Hgb

## 2024-11-10 NOTE — PHARMACOTHERAPY INTERVENTION NOTE - COMMENTS
Patient identified by [facility] for Patients 64 y/o and Older Report.      - oxycodone PRN, for hip pain. Pain Medicine following.     No intervention at this time.

## 2024-11-10 NOTE — PROGRESS NOTE ADULT - SUBJECTIVE AND OBJECTIVE BOX
PGY-1 Progress Note discussed with attending      PLEASE CONTACT ON CALL TEAM:  - On Call Team (Please refer to Alexandrea) FROM 5:00 PM - 8:30PM  - Nightfloat Team FROM 8:30 -7:30 AM    INTERVAL HPI/OVERNIGHT EVENTS:     No acute overnight events. Pt evaluated at bedside with daughter. Pt complaining of same right sided LE pain 2/2 to distal spiral fracture of femur. Pain being managed with oxycodone.    _________________________________________________  REVIEW OF SYSTEMS:  CONSTITUTIONAL: No acute distress  RESPIRATORY: No shortness of breath, wheezing, or cough  CARDIOVASCULAR: No chest pain or palpitations, RLE swelling  GASTROINTESTINAL: No abdominal pain, nausea, vomiting, diarrhea, or constipation  GENITOURINARY: No dysuria or hermaturia  NEUROLOGICAL: No numbness, tremors, or loss of strength  SKIN: No new lesions    MEDICATIONS  (STANDING):  atorvastatin 20 milliGRAM(s) Oral at bedtime  escitalopram 10 milliGRAM(s) Oral daily  heparin   Injectable 5000 Unit(s) SubCutaneous every 8 hours  ketorolac   Injectable 15 milliGRAM(s) IV Push once  lactulose Syrup 10 Gram(s) Oral two times a day  lisinopril 20 milliGRAM(s) Oral daily  naloxone Injectable 0.4 milliGRAM(s) IV Push once  polyethylene glycol 3350 17 Gram(s) Oral daily  senna 2 Tablet(s) Oral at bedtime  zonisamide 50 milliGRAM(s) Oral daily    MEDICATIONS  (PRN):  bisacodyl 5 milliGRAM(s) Oral daily PRN Constipation  oxyCODONE    IR 5 milliGRAM(s) Oral every 4 hours PRN Severe Pain (7 - 10)  oxyCODONE    IR 2.5 milliGRAM(s) Oral every 4 hours PRN Moderate Pain (4 - 6)      Vital Signs Last 24 Hrs  T(C): 36.8 (10 Nov 2024 05:03), Max: 37 (09 Nov 2024 21:29)  T(F): 98.2 (10 Nov 2024 05:03), Max: 98.6 (09 Nov 2024 21:29)  HR: 80 (10 Nov 2024 05:03) (80 - 102)  BP: 135/81 (10 Nov 2024 05:03) (111/78 - 135/81)  BP(mean): --  RR: 18 (10 Nov 2024 05:03) (18 - 18)  SpO2: 95% (10 Nov 2024 05:03) (94% - 98%)    Parameters below as of 10 Nov 2024 05:03  Patient On (Oxygen Delivery Method): room air      _________________________________________________  PHYSICAL EXAMINATION:  GENERAL: mild distress  HEAD:  Atraumatic, Normocephalic  EYES:  conjunctiva and sclera clear  NECK: Supple, No JVD, Normal thyroid  CHEST/LUNG: Clear to auscultation.  HEART: Regular rate and rhythm; No murmurs, rubs, or gallops  ABDOMEN: Soft, Nontender, Bowel sounds present, no masses on palpation  NERVOUS SYSTEM:  Alert and orientedx1-2  EXTREMITIES:  RLE elevated in immobilizer, Mild RLE edema, No LLE edema  SKIN: warm, dry      I&O's Summary    09 Nov 2024 07:01  -  10 Nov 2024 07:00  --------------------------------------------------------  IN: 0 mL / OUT: 500 mL / NET: -500 mL      _________________________________________________  LABS:                        7.6    6.08  )-----------( 383      ( 10 Nov 2024 06:14 )             24.0     11-10    143  |  112[H]  |  27[H]  ----------------------------<  111[H]  3.6   |  28  |  0.42[L]    Ca    8.7      10 Nov 2024 06:14  Phos  3.2     11-10  Mg     2.3     11-10    TPro  6.2  /  Alb  2.2[L]  /  TBili  0.5  /  DBili  x   /  AST  8[L]  /  ALT  10  /  AlkPhos  72  11-09    LIVER FUNCTIONS - ( 09 Nov 2024 06:00 )  Alb: 2.2 g/dL / Pro: 6.2 g/dL / ALK PHOS: 72 U/L / ALT: 10 U/L DA / AST: 8 U/L / GGT: x           CAPILLARY BLOOD GLUCOSE                  Urinalysis Basic - ( 10 Nov 2024 06:14 )    Color: x / Appearance: x / SG: x / pH: x  Gluc: 111 mg/dL / Ketone: x  / Bili: x / Urobili: x   Blood: x / Protein: x / Nitrite: x   Leuk Esterase: x / RBC: x / WBC x   Sq Epi: x / Non Sq Epi: x / Bacteria: x      _________________________________________________  RADIOLOGY & ADDITIONAL TESTS:    Imaging Personally Reviewed:  YES    Consultant(s) Notes Reviewed:   YES    Care Discussed with Consultants : YES     Plan of care was discussed with patient and /or primary care giver; all questions and concerns were addressed and care was aligned with patient's wishes.

## 2024-11-10 NOTE — PROGRESS NOTE ADULT - ATTENDING COMMENTS
CC: R knee pain  S:   [id number]  Daughter at bedside  she still has R knee pain but with mild improvement  O:  Vital Signs Last 24 Hrs  T(C): 37.1 (11-10-24 @ 13:33), Max: 37.1 (11-10-24 @ 13:33)  T(F): 98.8 (11-10-24 @ 13:33), Max: 98.8 (11-10-24 @ 13:33)  HR: 88 (11-10-24 @ 13:33) (80 - 88)  BP: 128/48 (11-10-24 @ 13:33) (111/78 - 135/81)  RR: 18 (11-10-24 @ 13:33) (18 - 18)  SpO2: 95% (11-10-24 @ 13:33) (94% - 95%)  PE:  Gen: Orientedx2, alert, No acute distress Eyes: conjunctivae and lid normal, sclera clear with no icterus ENT: nose and throat exam normal CVS: S1, S2, RRR; no murmur, no rubs, no gallops Pulm: Good air exchange, Breath sounds equal bilaterally, no rales rhonchi,  no wheezes Chest: nontender, no chest deformity, chest movement symmetrical Gl: abdomen soft, nontender, nondistended, bowel sounds normoactive, no masses palpated Musk: RLE with dressing and immobilizer, RLE edematous Skin: no skin lesions, skin turgor normal, warm and well perfused Neuro: Awake, alert,Psych: normal affect, insight      Assessment and Plan:  Maria Reyesdejimenez is a 89 yo bedbound/wheelchair bound woman with PMH significant for HTN, HLD, sacral pressure ulcer, dementia, anxiety, seizures who presents with R knee pain, swelling found to have R distal femur fracture.  #R distal femur fracture  #HTN  #HLD  #Dementia  #Sacral pressure ulcers  -ortho consulted, conservative management, no surgical intervention at this time  -PT , NWB to RLE with knee immobilizer  -pain management  consulted, continue oxycodone to 5 mg q4h PRN, monitor for respiratory depression  -RTC tylenol  -give  IV toradol today, monitor drug for renal toxicity  -continue atorvastatin, lisinopril, lexapro, zonisamide  -palliative care consult for [facility] discussion  -history obtained from daughter    Labs reviewed:                                7.6    6.08  )-----------( 383      ( 10 Nov 2024 06:14 )             24.0  11-10    143  |  112[H]  |  27[H]  ----------------------------<  111[H]  3.6   |  28  |  0.42[L]    Ca    8.7      10 Nov 2024 06:14  Phos  3.2     11-10  Mg     2.3     11-10    TPro  6.2  /  Alb  2.2[L]  /  TBili  0.5  /  DBili  x   /  AST  8[L]  /  ALT  10  /  AlkPhos  72  11-09       Imaging reviewed:    IMPRESSION:    Right femur: Spiral fracture of the distal femur extending to the  intercondylar region. Osteopenia. Follow-up suggested. Regional vascular  calcifications present. 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    Right knee: Exam is limited due to fracture spiral of the distal third of  the femur extending to the condylar region. Edematous changes noted.  Regional vascular calcifications prominent.    Right tibia-fibula: Distal right femur fracture seen better on  accompanying exams. No tibia or fibula fracture. Regional vascular  calcifications present. Osteopenia.    Total Time Spent 50  minutes  This includes chart review, patient assessment, discussion and collaboration with interdisciplinary team members, excluding ACP.

## 2024-11-11 DIAGNOSIS — Z51.5 ENCOUNTER FOR PALLIATIVE CARE: ICD-10-CM

## 2024-11-11 DIAGNOSIS — Z75.8 OTHER PROBLEMS RELATED TO MEDICAL FACILITIES AND OTHER HEALTH CARE: ICD-10-CM

## 2024-11-11 LAB
ANION GAP SERPL CALC-SCNC: 7 MMOL/L — SIGNIFICANT CHANGE UP (ref 5–17)
BASOPHILS # BLD AUTO: 0 K/UL — SIGNIFICANT CHANGE UP (ref 0–0.2)
BASOPHILS NFR BLD AUTO: 0 % — SIGNIFICANT CHANGE UP (ref 0–2)
BUN SERPL-MCNC: 24 MG/DL — HIGH (ref 7–18)
CALCIUM SERPL-MCNC: 8.7 MG/DL — SIGNIFICANT CHANGE UP (ref 8.4–10.5)
CHLORIDE SERPL-SCNC: 112 MMOL/L — HIGH (ref 96–108)
CO2 SERPL-SCNC: 25 MMOL/L — SIGNIFICANT CHANGE UP (ref 22–31)
CREAT SERPL-MCNC: 0.44 MG/DL — LOW (ref 0.5–1.3)
EGFR: 91 ML/MIN/1.73M2 — SIGNIFICANT CHANGE UP
ELLIPTOCYTES BLD QL SMEAR: SLIGHT — SIGNIFICANT CHANGE UP
EOSINOPHIL # BLD AUTO: 0.13 K/UL — SIGNIFICANT CHANGE UP (ref 0–0.5)
EOSINOPHIL NFR BLD AUTO: 2 % — SIGNIFICANT CHANGE UP (ref 0–6)
FERRITIN SERPL-MCNC: 192 NG/ML — SIGNIFICANT CHANGE UP (ref 13–330)
GLUCOSE SERPL-MCNC: 111 MG/DL — HIGH (ref 70–99)
HCT VFR BLD CALC: 25.4 % — LOW (ref 34.5–45)
HGB BLD-MCNC: 8.2 G/DL — LOW (ref 11.5–15.5)
HYPOSEGMENTATION: PRESENT — SIGNIFICANT CHANGE UP
IRON SATN MFR SERPL: 17 % — SIGNIFICANT CHANGE UP (ref 15–50)
IRON SATN MFR SERPL: 29 UG/DL — LOW (ref 40–150)
LG PLATELETS BLD QL AUTO: SLIGHT — SIGNIFICANT CHANGE UP
LYMPHOCYTES # BLD AUTO: 1.46 K/UL — SIGNIFICANT CHANGE UP (ref 1–3.3)
LYMPHOCYTES # BLD AUTO: 23 % — SIGNIFICANT CHANGE UP (ref 13–44)
MAGNESIUM SERPL-MCNC: 2.4 MG/DL — SIGNIFICANT CHANGE UP (ref 1.6–2.6)
MANUAL SMEAR VERIFICATION: SIGNIFICANT CHANGE UP
MCHC RBC-ENTMCNC: 28.4 PG — SIGNIFICANT CHANGE UP (ref 27–34)
MCHC RBC-ENTMCNC: 32.3 G/DL — SIGNIFICANT CHANGE UP (ref 32–36)
MCV RBC AUTO: 87.9 FL — SIGNIFICANT CHANGE UP (ref 80–100)
METAMYELOCYTES # FLD: 1 % — HIGH (ref 0–0)
MONOCYTES # BLD AUTO: 0.57 K/UL — SIGNIFICANT CHANGE UP (ref 0–0.9)
MONOCYTES NFR BLD AUTO: 9 % — SIGNIFICANT CHANGE UP (ref 2–14)
MYELOCYTES NFR BLD: 3 % — HIGH (ref 0–0)
NEUTROPHILS # BLD AUTO: 3.73 K/UL — SIGNIFICANT CHANGE UP (ref 1.8–7.4)
NEUTROPHILS NFR BLD AUTO: 59 % — SIGNIFICANT CHANGE UP (ref 43–77)
NEUTS HYPERSEG # BLD: PRESENT — SIGNIFICANT CHANGE UP
NRBC # BLD: 0 /100 WBCS — SIGNIFICANT CHANGE UP (ref 0–0)
NRBC # BLD: 0 /100 WBCS — SIGNIFICANT CHANGE UP (ref 0–0)
OVALOCYTES BLD QL SMEAR: SLIGHT — SIGNIFICANT CHANGE UP
PHOSPHATE SERPL-MCNC: 3.2 MG/DL — SIGNIFICANT CHANGE UP (ref 2.5–4.5)
PLAT MORPH BLD: NORMAL — SIGNIFICANT CHANGE UP
PLATELET # BLD AUTO: 458 K/UL — HIGH (ref 150–400)
PLATELET COUNT - ESTIMATE: NORMAL — SIGNIFICANT CHANGE UP
POLYCHROMASIA BLD QL SMEAR: SLIGHT — SIGNIFICANT CHANGE UP
POTASSIUM SERPL-MCNC: 3.3 MMOL/L — LOW (ref 3.5–5.3)
POTASSIUM SERPL-SCNC: 3.3 MMOL/L — LOW (ref 3.5–5.3)
RBC # BLD: 2.89 M/UL — LOW (ref 3.8–5.2)
RBC # FLD: 14 % — SIGNIFICANT CHANGE UP (ref 10.3–14.5)
RBC BLD AUTO: ABNORMAL
SODIUM SERPL-SCNC: 144 MMOL/L — SIGNIFICANT CHANGE UP (ref 135–145)
TIBC SERPL-MCNC: 174 UG/DL — LOW (ref 250–450)
UIBC SERPL-MCNC: 145 UG/DL — SIGNIFICANT CHANGE UP (ref 110–370)
VARIANT LYMPHS # BLD: 3 % — SIGNIFICANT CHANGE UP (ref 0–6)
VIT B12 SERPL-MCNC: 363 PG/ML — SIGNIFICANT CHANGE UP (ref 232–1245)
WBC # BLD: 5.96 K/UL — SIGNIFICANT CHANGE UP (ref 3.8–10.5)
WBC # FLD AUTO: 5.96 K/UL — SIGNIFICANT CHANGE UP (ref 3.8–10.5)

## 2024-11-11 PROCEDURE — 99233 SBSQ HOSP IP/OBS HIGH 50: CPT

## 2024-11-11 PROCEDURE — 99223 1ST HOSP IP/OBS HIGH 75: CPT

## 2024-11-11 RX ORDER — HYDROMORPHONE HCL/0.9% NACL/PF 6 MG/30 ML
0.5 PATIENT CONTROLLED ANALGESIA SYRINGE INTRAVENOUS ONCE
Refills: 0 | Status: DISCONTINUED | OUTPATIENT
Start: 2024-11-11 | End: 2024-11-11

## 2024-11-11 RX ORDER — GABAPENTIN 300 MG/1
100 CAPSULE ORAL AT BEDTIME
Refills: 0 | Status: DISCONTINUED | OUTPATIENT
Start: 2024-11-11 | End: 2024-11-12

## 2024-11-11 RX ORDER — ACETAMINOPHEN 500 MG
1000 TABLET ORAL EVERY 6 HOURS
Refills: 0 | Status: DISCONTINUED | OUTPATIENT
Start: 2024-11-11 | End: 2024-11-13

## 2024-11-11 RX ORDER — POTASSIUM CHLORIDE 10 MEQ
40 TABLET, EXTENDED RELEASE ORAL ONCE
Refills: 0 | Status: COMPLETED | OUTPATIENT
Start: 2024-11-11 | End: 2024-11-11

## 2024-11-11 RX ADMIN — ESCITALOPRAM 10 MILLIGRAM(S): 10 TABLET, FILM COATED ORAL at 13:10

## 2024-11-11 RX ADMIN — OXYCODONE HYDROCHLORIDE 5 MILLIGRAM(S): 30 TABLET ORAL at 00:24

## 2024-11-11 RX ADMIN — Medication 1000 MILLIGRAM(S): at 17:51

## 2024-11-11 RX ADMIN — Medication 0.5 MILLIGRAM(S): at 12:31

## 2024-11-11 RX ADMIN — HEPARIN SODIUM 5000 UNIT(S): 10000 INJECTION INTRAVENOUS; SUBCUTANEOUS at 13:12

## 2024-11-11 RX ADMIN — ZONISAMIDE 50 MILLIGRAM(S): 100 CAPSULE ORAL at 13:10

## 2024-11-11 RX ADMIN — Medication 1000 MILLIGRAM(S): at 23:48

## 2024-11-11 RX ADMIN — HEPARIN SODIUM 5000 UNIT(S): 10000 INJECTION INTRAVENOUS; SUBCUTANEOUS at 22:02

## 2024-11-11 RX ADMIN — Medication 0.5 MILLIGRAM(S): at 11:31

## 2024-11-11 RX ADMIN — OXYCODONE HYDROCHLORIDE 5 MILLIGRAM(S): 30 TABLET ORAL at 08:34

## 2024-11-11 RX ADMIN — HEPARIN SODIUM 5000 UNIT(S): 10000 INJECTION INTRAVENOUS; SUBCUTANEOUS at 05:34

## 2024-11-11 RX ADMIN — Medication 10 GRAM(S): at 05:33

## 2024-11-11 RX ADMIN — Medication 2 TABLET(S): at 22:02

## 2024-11-11 RX ADMIN — GABAPENTIN 100 MILLIGRAM(S): 300 CAPSULE ORAL at 22:01

## 2024-11-11 RX ADMIN — Medication 40 MILLIEQUIVALENT(S): at 08:35

## 2024-11-11 RX ADMIN — Medication 20 MILLIGRAM(S): at 05:34

## 2024-11-11 RX ADMIN — OXYCODONE HYDROCHLORIDE 5 MILLIGRAM(S): 30 TABLET ORAL at 04:29

## 2024-11-11 RX ADMIN — Medication 20 MILLIGRAM(S): at 22:01

## 2024-11-11 RX ADMIN — POLYETHYLENE GLYCOL 3350 17 GRAM(S): 17 POWDER, FOR SOLUTION ORAL at 13:09

## 2024-11-11 RX ADMIN — OXYCODONE HYDROCHLORIDE 5 MILLIGRAM(S): 30 TABLET ORAL at 07:24

## 2024-11-11 RX ADMIN — OXYCODONE HYDROCHLORIDE 5 MILLIGRAM(S): 30 TABLET ORAL at 09:34

## 2024-11-11 RX ADMIN — Medication 1000 MILLIGRAM(S): at 18:51

## 2024-11-11 RX ADMIN — OXYCODONE HYDROCHLORIDE 5 MILLIGRAM(S): 30 TABLET ORAL at 02:12

## 2024-11-11 NOTE — CONSULT NOTE ADULT - CONSULT REASON
Right distal femur fracture
RAMÓN
Consult to: Discuss complex medical decision making related to goals of care

## 2024-11-11 NOTE — PROGRESS NOTE ADULT - ATTENDING COMMENTS
CC: R knee pain  S:   [id number]  Daughter at bedside  daughter was concerned about left side of abdomen being more inflamed, patient also is still having severe pain to RLE.  O:  Vital Signs Last 24 Hrs  T(C): 37.2 (11-11-24 @ 04:56), Max: 37.2 (11-11-24 @ 04:56)  T(F): 99 (11-11-24 @ 04:56), Max: 99 (11-11-24 @ 04:56)  HR: 80 (11-11-24 @ 04:56) (78 - 88)  BP: 153/69 (11-11-24 @ 04:56) (128/48 - 153/69)  BP(mean): 72 (11-10-24 @ 20:56) (72 - 72)  RR: 18 (11-11-24 @ 04:56) (17 - 18)  SpO2: 94% (11-11-24 @ 04:56) (93% - 95%)      PE:  Gen: Orientedx2, alert, No acute distress Eyes: conjunctivae and lid normal, sclera clear with no icterus ENT: nose and throat exam normal CVS: S1, S2, RRR; no murmur, no rubs, no gallops Pulm: Good air exchange, Breath sounds equal bilaterally, no rales rhonchi,  no wheezes Chest: nontender, no chest deformity, chest movement symmetrical Gl: abdomen soft, nontender, nondistended, bowel sounds normoactive, no masses palpated Musk: RLE with dressing and immobilizer, RLE edematous Skin: no skin lesions, skin turgor normal, warm and well perfused Neuro: Awake, alert,Psych: normal affect, insight      Assessment and Plan:  Maria Reyesdejimenez is a 89 yo bedbound/wheelchair bound woman with PMH significant for HTN, HLD, sacral pressure ulcer, dementia, anxiety, seizures who presents with R knee pain, swelling found to have R distal femur fracture.  #R distal femur fracture  #HTN  #HLD  #Dementia  #Sacral pressure ulcers  -ortho consulted, conservative management, no surgical intervention at this time  -PT , NWB to RLE with knee immobilizer, ortho will replace immobilizer today  -pain management  consulted, continue oxycodone to 5 mg q4h PRN, monitor for respiratory depression  -palliative care consulted for [facility], patient is full code, family interested in house call coming to the house  -continue atorvastatin, lisinopril, lexapro, zonisamide  -history obtained from daughter    Labs reviewed:                             8.2    5.96  )-----------( 458      ( 11 Nov 2024 06:17 )             25.4   11-11    144  |  112[H]  |  24[H]  ----------------------------<  111[H]  3.3[L]   |  25  |  0.44[L]    Ca    8.7      11 Nov 2024 06:17  Phos  3.2     11-11  Mg     2.4     11-11         Imaging reviewed:    IMPRESSION:    Right femur: Spiral fracture of the distal femur extending to the  intercondylar region. Osteopenia. Follow-up suggested. Regional vascular  calcifications present. 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    Right knee: Exam is limited due to fracture spiral of the distal third of  the femur extending to the condylar region. Edematous changes noted.  Regional vascular calcifications prominent.    Right tibia-fibula: Distal right femur fracture seen better on  accompanying exams. No tibia or fibula fracture. Regional vascular  calcifications present. Osteopenia.    Total Time Spent 50  minutes  This includes chart review, patient assessment, discussion and collaboration with interdisciplinary team members, excluding ACP. CC: R knee pain  S:   [id number]  Daughter at bedside  daughter was concerned about left side of abdomen being more inflamed, patient also is still having severe pain to RLE.  O:  Vital Signs Last 24 Hrs  T(C): 37.2 (11-11-24 @ 04:56), Max: 37.2 (11-11-24 @ 04:56)  T(F): 99 (11-11-24 @ 04:56), Max: 99 (11-11-24 @ 04:56)  HR: 80 (11-11-24 @ 04:56) (78 - 88)  BP: 153/69 (11-11-24 @ 04:56) (128/48 - 153/69)  BP(mean): 72 (11-10-24 @ 20:56) (72 - 72)  RR: 18 (11-11-24 @ 04:56) (17 - 18)  SpO2: 94% (11-11-24 @ 04:56) (93% - 95%)      PE:  Gen: Orientedx2, alert, No acute distress Eyes: conjunctivae and lid normal, sclera clear with no icterus ENT: nose and throat exam normal CVS: S1, S2, RRR; no murmur, no rubs, no gallops Pulm: Good air exchange, Breath sounds equal bilaterally, no rales rhonchi,  no wheezes Chest: nontender, no chest deformity, chest movement symmetrical Gl: abdomen soft, nontender, nondistended, bowel sounds normoactive, no masses palpated Musk: RLE with dressing and immobilizer, RLE edematous Skin: no skin lesions, skin turgor normal, warm and well perfused Neuro: Awake, alert,Psych: normal affect, insight      Assessment and Plan:  Maria Reyesdejimenez is a 89 yo bedbound/wheelchair bound woman with PMH significant for HTN, HLD, sacral pressure ulcer, dementia, anxiety, seizures who presents with R knee pain, swelling found to have R distal femur fracture.  #R distal femur fracture  #HTN  #HLD  #Dementia  #Hypokalemia  #Sacral pressure ulcers  -ortho consulted, conservative management, no surgical intervention at this time  -PT , NWB to RLE with knee immobilizer, ortho will replace immobilizer today  -pain management  consulted, continue oxycodone to 5 mg q4h PRN, monitor for respiratory depression  -palliative care consulted for [facility], patient is full code, family interested in house call coming to the house  -continue atorvastatin, lisinopril, lexapro, zonisamide  -history obtained from daughter  -give K supplement, monitor K level for toxicity  -CTAB obtained overnight per daughter's concern of abd distention, swelling, no obstruction on imaging.  Question of cystitis, patient denies urinary symptoms.    Labs reviewed:                             8.2    5.96  )-----------( 458      ( 11 Nov 2024 06:17 )             25.4   11-11    144  |  112[H]  |  24[H]  ----------------------------<  111[H]  3.3[L]   |  25  |  0.44[L]    Ca    8.7      11 Nov 2024 06:17  Phos  3.2     11-11  Mg     2.4     11-11         Imaging reviewed:    IMPRESSION:    Right femur: Spiral fracture of the distal femur extending to the  intercondylar region. Osteopenia. Follow-up suggested. Regional vascular  calcifications present. 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    Right knee: Exam is limited due to fracture spiral of the distal third of  the femur extending to the condylar region. Edematous changes noted.  Regional vascular calcifications prominent.    Right tibia-fibula: Distal right femur fracture seen better on  accompanying exams. No tibia or fibula fracture. Regional vascular  calcifications present. Osteopenia.    Total Time Spent 50  minutes  This includes chart review, patient assessment, discussion and collaboration with interdisciplinary team members, excluding ACP.

## 2024-11-11 NOTE — PROGRESS NOTE ADULT - SUBJECTIVE AND OBJECTIVE BOX
PGY-1 Progress Note discussed with attending      PLEASE CONTACT ON CALL TEAM:  - On Call Team (Please refer to Alexandrea) FROM 5:00 PM - 8:30PM  - Nightfloat Team FROM 8:30 -7:30 AM    INTERVAL HPI/OVERNIGHT EVENTS:     No acute overnight events. Pt evaluated at [facility]. Pt has no new complaints.    _________________________________________________  REVIEW OF SYSTEMS:  CONSTITUTIONAL: No acute distress  RESPIRATORY: No shortness of breath, wheezing, or cough  CARDIOVASCULAR: No chest pain or palpitations  GASTROINTESTINAL: No abdominal pain, nausea, vomiting, diarrhea, or constipation  GENITOURINARY: No dysuria or hermaturia  NEUROLOGICAL: No numbness, tremors, or loss of strength  SKIN: No new lesions    MEDICATIONS  (STANDING):  atorvastatin 20 milliGRAM(s) Oral at bedtime  escitalopram 10 milliGRAM(s) Oral daily  heparin   Injectable 5000 Unit(s) SubCutaneous every 8 hours  lisinopril 20 milliGRAM(s) Oral daily  naloxone Injectable 0.4 milliGRAM(s) IV Push once  polyethylene glycol 3350 17 Gram(s) Oral daily  senna 2 Tablet(s) Oral at bedtime  zonisamide 50 milliGRAM(s) Oral daily    MEDICATIONS  (PRN):  oxyCODONE    IR 2.5 milliGRAM(s) Oral every 4 hours PRN Moderate Pain (4 - 6)  oxyCODONE    IR 5 milliGRAM(s) Oral every 4 hours PRN Severe Pain (7 - 10)      Vital Signs Last 24 Hrs  T(C): 37.2 (11 Nov 2024 04:56), Max: 37.2 (11 Nov 2024 04:56)  T(F): 99 (11 Nov 2024 04:56), Max: 99 (11 Nov 2024 04:56)  HR: 80 (11 Nov 2024 04:56) (78 - 88)  BP: 153/69 (11 Nov 2024 04:56) (128/48 - 153/69)  BP(mean): 72 (10 Nov 2024 20:56) (72 - 72)  RR: 18 (11 Nov 2024 04:56) (17 - 18)  SpO2: 94% (11 Nov 2024 04:56) (93% - 95%)    Parameters below as of 11 Nov 2024 04:56  Patient On (Oxygen Delivery Method): room air      _________________________________________________  PHYSICAL EXAMINATION:  GENERAL: NAD  HEAD:  Atraumatic, Normocephalic  EYES:  conjunctiva and sclera clear  NECK: Supple, No JVD, Normal thyroid  CHEST/LUNG: Clear to auscultation.  HEART: Regular rate and rhythm; No murmurs, rubs, or gallops  ABDOMEN: Soft, Nontender, Bowel sounds present, no masses on palpation  NERVOUS SYSTEM:  Alert and oriented  EXTREMITIES:  No BLE edema  SKIN: warm, dry    I&O's Summary    10 Nov 2024 07:01  -  11 Nov 2024 07:00  --------------------------------------------------------  IN: 0 mL / OUT: 400 mL / NET: -400 mL      _________________________________________________  LABS:                        8.2    5.96  )-----------( 458      ( 11 Nov 2024 06:17 )             25.4     11-11    144  |  112[H]  |  24[H]  ----------------------------<  111[H]  3.3[L]   |  25  |  0.44[L]    Ca    8.7      11 Nov 2024 06:17  Phos  3.2     11-11  Mg     2.4     11-11        CAPILLARY BLOOD GLUCOSE                  Urinalysis Basic - ( 11 Nov 2024 06:17 )    Color: x / Appearance: x / SG: x / pH: x  Gluc: 111 mg/dL / Ketone: x  / Bili: x / Urobili: x   Blood: x / Protein: x / Nitrite: x   Leuk Esterase: x / RBC: x / WBC x   Sq Epi: x / Non Sq Epi: x / Bacteria: x      _________________________________________________  RADIOLOGY & ADDITIONAL TESTS:    Imaging Personally Reviewed:  YES    Consultant(s) Notes Reviewed:   YES    Care Discussed with Consultants : YES     Plan of care was discussed with patient and /or primary care giver; all questions and concerns were addressed and care was aligned with patient's wishes.      \ PGY-1 Progress Note discussed with attending      PLEASE CONTACT ON CALL TEAM:  - On Call Team (Please refer to Alexandrea) FROM 5:00 PM - 8:30PM  - Nightfloat Team FROM 8:30 -7:30 AM    INTERVAL HPI/OVERNIGHT EVENTS:     Patient daughter complaining of abdominal distention overnight. Patient denied any pain and was having loose bowel movements. CTAP showed no obstruction with dilated sigmoid as previous imaging with a question of cystitis. Patient denies urinary symptoms and is making urine through purewicc. Patient examined today and still complaining of leg pain.    _________________________________________________  REVIEW OF SYSTEMS:  CONSTITUTIONAL: mild  RESPIRATORY: No shortness of breath, wheezing, or cough  CARDIOVASCULAR: No chest pain or palpitations  GASTROINTESTINAL: No abdominal pain, nausea, vomiting, diarrhea, or constipation  GENITOURINARY: No dysuria or hematuria  NEUROLOGICAL: No numbness, tremors, or loss of strength  SKIN: No new lesions    MEDICATIONS  (STANDING):  atorvastatin 20 milliGRAM(s) Oral at bedtime  escitalopram 10 milliGRAM(s) Oral daily  heparin   Injectable 5000 Unit(s) SubCutaneous every 8 hours  lisinopril 20 milliGRAM(s) Oral daily  naloxone Injectable 0.4 milliGRAM(s) IV Push once  polyethylene glycol 3350 17 Gram(s) Oral daily  senna 2 Tablet(s) Oral at bedtime  zonisamide 50 milliGRAM(s) Oral daily    MEDICATIONS  (PRN):  oxyCODONE    IR 2.5 milliGRAM(s) Oral every 4 hours PRN Moderate Pain (4 - 6)  oxyCODONE    IR 5 milliGRAM(s) Oral every 4 hours PRN Severe Pain (7 - 10)      Vital Signs Last 24 Hrs  T(C): 37.2 (11 Nov 2024 04:56), Max: 37.2 (11 Nov 2024 04:56)  T(F): 99 (11 Nov 2024 04:56), Max: 99 (11 Nov 2024 04:56)  HR: 80 (11 Nov 2024 04:56) (78 - 88)  BP: 153/69 (11 Nov 2024 04:56) (128/48 - 153/69)  BP(mean): 72 (10 Nov 2024 20:56) (72 - 72)  RR: 18 (11 Nov 2024 04:56) (17 - 18)  SpO2: 94% (11 Nov 2024 04:56) (93% - 95%)    Parameters below as of 11 Nov 2024 04:56  Patient On (Oxygen Delivery Method): room air      _________________________________________________  PHYSICAL EXAMINATION:  GENERAL: mild distress  HEAD:  Atraumatic, Normocephalic  EYES:  conjunctiva and sclera clear  NECK: Supple, No JVD, Normal thyroid  CHEST/LUNG: Clear to auscultation.  HEART: Regular rate and rhythm; No murmurs, rubs, or gallops  ABDOMEN: Soft, Nontender, Bowel sounds present, no masses on palpation  NERVOUS SYSTEM:  Alert and orientedx1-2  EXTREMITIES:  RLE elevated in immobilizer, Mild RLE edema, No LLE edema  SKIN: warm, dry      I&O's Summary    10 Nov 2024 07:01  -  11 Nov 2024 07:00  --------------------------------------------------------  IN: 0 mL / OUT: 400 mL / NET: -400 mL      _________________________________________________  LABS:                        8.2    5.96  )-----------( 458      ( 11 Nov 2024 06:17 )             25.4     11-11    144  |  112[H]  |  24[H]  ----------------------------<  111[H]  3.3[L]   |  25  |  0.44[L]    Ca    8.7      11 Nov 2024 06:17  Phos  3.2     11-11  Mg     2.4     11-11        CAPILLARY BLOOD GLUCOSE                  Urinalysis Basic - ( 11 Nov 2024 06:17 )    Color: x / Appearance: x / SG: x / pH: x  Gluc: 111 mg/dL / Ketone: x  / Bili: x / Urobili: x   Blood: x / Protein: x / Nitrite: x   Leuk Esterase: x / RBC: x / WBC x   Sq Epi: x / Non Sq Epi: x / Bacteria: x      _________________________________________________  RADIOLOGY & ADDITIONAL TESTS:    Imaging Personally Reviewed:  YES    Consultant(s) Notes Reviewed:   YES    Care Discussed with Consultants : YES     Plan of care was discussed with patient and /or primary care giver; all questions and concerns were addressed and care was aligned with patient's wishes.      \

## 2024-11-11 NOTE — CONSULT NOTE ADULT - PROBLEM SELECTOR RECOMMENDATION 2
Moderate to severe  Bedbound, dependent for ADLs  Would likely be hospice-eligible but daughter not interested  FAST 7C

## 2024-11-11 NOTE — CONSULT NOTE ADULT - SUBJECTIVE AND OBJECTIVE BOX
Orthopedic Consult  MARIA REYESDEJIMENEZ [id number]  89yFemale      Diagnosis:  89y Female with right distal femur fracture      89yFemale HPI:  Pt is a 89 y/o F with PMHx of HTN, HLD, anxiety, dementia (AAO1-2 at baseline), anxiety, seizures, who is wheelchair/bedbound, sacral pressure ulcer who presented to the ER due to right knee pain/swelling. Per daughter at bedside who provided history, on Sunday, patient/s family was moving the patient with a device to lift patient from bed to wheelchair but patient's leg got stuck and bent in machine.The family straightened the leg and put the patient in the wheelchair and brought her to [facility]. Once home, pt began to complain of new onset R knee and thigh pain. Pain was severe and patient was unable to sleep and continued to scream in pain. On Monday, family noticed increased swelling and mild erythema on R knee which prompted them to come to the ED. Patient has been wheelchair/bedbound for the past 7 months. In the past she used to walk with a walker but could not walk more than a block without having to sit down. She cannot climb stairs due to hip problems. She went to a doctor 4 years ago but was told she is too high a risk for surgery to fix her hips. Daughter does endorse decreased PO intake and generalized weakness since Sunday. Pt admitted to medicine s/p injury to right knee.  (06 Nov 2024 06:53)    89 y/o F, who is bedbound/wheelchair bound, with PMHx of HTN, HLD, anxiety, dementia (AAO1-2 at baseline), anxiety, seizures, presented to  ED s/p injury during transfer 2 days ago. Patient is a poor historian at baseline, but patient's daughter was at bedside to aid in history taking. As per daughter, patient was being transferred from bed to wheelchair 2 days ago and her leg got "stuck and twisted" in the wheelchair. States they proceeded to attend [facility], but the next day (yesterday) the patient had severe pain to the right knee, and they decided to bring her to the ED. Patient had Xrays of right RLE showing a distal femur fracture. Patient unable to provide current ROS, but is resting comfortably in NAD. Daughter states that patient never walks and is mostly bedbound, only traveling occasionally by wheelchair. Denies history of surgery to the RLE.     PAST MEDICAL & SURGICAL HISTORY:  HLD (hyperlipidemia)      HTN (hypertension)      Dementia      Seizures      No significant past surgical history          Social History:  lives with family  has HHA 10 hours daily  wheelchair/bed bound for last 6 mo (06 Nov 2024 06:53)      Allergies    No Known Allergies    Intolerances        Vital Signs Last 24 Hrs  T(C): 37.2 (06 Nov 2024 13:48), Max: 37.5 (06 Nov 2024 10:39)  T(F): 99 (06 Nov 2024 13:48), Max: 99.5 (06 Nov 2024 10:39)  HR: 83 (06 Nov 2024 13:48) (80 - 99)  BP: 124/45 (06 Nov 2024 13:48) (108/54 - 150/79)  BP(mean): 82 (06 Nov 2024 05:33) (72 - 82)  RR: 18 (06 Nov 2024 13:48) (17 - 18)  SpO2: 95% (06 Nov 2024 13:48) (94% - 96%)    Parameters below as of 06 Nov 2024 13:48  Patient On (Oxygen Delivery Method): room air            Physical Exam:  General: AAOx1-2, NAD, resting in bed, extremities positioned in right and left knee flexion.   RLE: Moderate swelling noted to the right knee, no ecchymosis or erythema. +TTP along fracture site. Patient unable to follow verbal commands to assess passive range of motion or muscle strength.  Patient groaning in pain with active ROM of the RLE with knee flexion and extension.  Skin is pink and warm.  SILT. NVI.   LLE: Appears to have flexion contracture, able to passively extend the left knee to about 10 degrees. Daughter states that is baseline.   Lower extremity bilaterally:  No calf tenderness, calves are soft. 2+pulses. NVI. Good capillary refill. Warm, well-perfused.                           8.1    8.96  )-----------( 233      ( 06 Nov 2024 10:20 )             24.8     11-06    142  |  111[H]  |  26[H]  ----------------------------<  138[H]  3.7   |  25  |  0.50    Ca    8.3[L]      06 Nov 2024 10:20    TPro  6.2  /  Alb  2.7[L]  /  TBili  0.5  /  DBili  x   /  AST  12  /  ALT  11  /  AlkPhos  70  11-06      Urinalysis Basic - ( 06 Nov 2024 10:20 )    Color: x / Appearance: x / SG: x / pH: x  Gluc: 138 mg/dL / Ketone: x  / Bili: x / Urobili: x   Blood: x / Protein: x / Nitrite: x   Leuk Esterase: x / RBC: x / WBC x   Sq Epi: x / Non Sq Epi: x / Bacteria: x        Imaging:    < from: Xray Knee 3 Views, Right (11.06.24 @ 01:10) >    ACC: [id number] EXAM:  XR FEMUR 2 VIEWS RT   ORDERED BY: MARIIA CHOI     ACC: [id number] EXAM:  XR TIB FIB AP LAT 2 VIEWS RT   ORDERED BY: MARIIA CHOI     ACC: [id number] EXAM:  XR KNEE AP LAT OBL 3 VIEWS RT   ORDERED BY: MARIIA CHOI     PROCEDURE DATE:  11/06/2024          INTERPRETATION:  EXAM: XR KNEE AP AND LATERAL AND OBLIQUE 3 VIEWS RIGHT,   XR TIBIA FIBULA AP AND LATERAL 2 VIEWS RIGHT, XR FEMUR 2 VIEWS RIGHT    INDICATION: R knee pain/swelling after fall HXR    COMPARISON: See below    IMPRESSION:    Right femur: Spiral fracture of the distal femur extending to the   intercondylar region. Osteopenia. Follow-up suggested. Regional vascular   calcifications present. 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    Right knee: Exam is limited due to fracture spiral of the distal third of   the femur extending to the condylar region. Edematous changes noted.   Regional vascular calcifications prominent.    Right tibia-fibula: Distal right femur fracture seen better on   accompanying exams. No tibia or fibula fracture. Regional vascular   calcifications present. Osteopenia.    --- End of Report ---            ANALIA BLACKWELL MD; Attending Radiologist  This document has been electronically signed. Nov 6 2024  9:08AM    < end of copied text >      Impression:  89yFemale with right distal femur fracture    Plan:    - No surgical intervention at this time.     >Due to the patient's age, bedbound status, lack of ambulation, and comorbidities, patient is not a surgical candidate.      > Patient to be treated conservatively at this time and allow time for fracture to heal on its own.     > Patient's flexion contracture is most likely due to her bedbound state and lack of movement in the b/l LE, also daughter states that this is her baseline.   -  Patient placed in bulky mckinley dressing and knee immobilizer to aid in bone healing position and also alleviate pain.   -  Daily PT- NWB to RLE with knee immobilizer in place at all times  -  Pain management  -  DVT prophylaxis with Venodynes and chemical DVT ppx as per medicine team  -  Case d/w Dr. Kuhn  
  [facility] Palliative Consult Service:  Ruth Ramon DO: cell ([phone])  Sunil Rao MD: cell ([phone])  Ruddy Lambert NP: cell ([phone])   Les Jensen SW: cell ([phone])   Jessica Fleischer-Black, MD: cell ([phone])    Can contact any Palliative Team member via [employer] Teams for consults and questions    HPI:  Pt is a 89 y/o F with PMHx of HTN, HLD, anxiety, dementia (AAO1-2 at baseline), anxiety, seizures, who is wheelchair/bedbound, sacral pressure ulcer who presented to the ER due to right knee pain/swelling. Per daughter at bedside who provided history, on Sunday, patient/s family was moving the patient with a device to lift patient from bed to wheelchair but patient's leg got stuck and bent in machine.The family straightened the leg and put the patient in the wheelchair and brought her to [facility]. Once home, pt began to complain of new onset R knee and thigh pain. Pain was severe and patient was unable to sleep and continued to scream in pain. On Monday, family noticed increased swelling and mild erythema on R knee which prompted them to come to the ED. Patient has been wheelchair/bedbound for the past 7 months. In the past she used to walk with a walker but could not walk more than a block without having to sit down. She cannot climb stairs due to hip problems. She went to a doctor 4 years ago but was told she is too high a risk for surgery to fix her hips. Daughter does endorse decreased PO intake and generalized weakness since Sunday. Pt admitted to medicine s/p injury to right knee.  (06 Nov 2024 06:53)      PAST MEDICAL & SURGICAL HISTORY:  HLD (hyperlipidemia)      HTN (hypertension)      Dementia      Seizures      No significant past surgical history          SOCIAL HISTORY:    Admitted from:  home  (with HHA)     [ none ] Substance abuse,  [employer]: [employer]ist  Language preferred: Slovenian    FAMILY HISTORY:   see H&P for history  Baseline ADLs (prior to admission): Bedbound. Dependent for ADLs    Allergies    No Known Allergies    Intolerances      Review of Systems: Unable to obtain due to poor mentation  Present Symptoms:   Pain:             Location -        RLE                       Aggravating factors -             Quality -             Radiation -             Timing-             Severity (0-10 scale):             Minimal acceptable level (0-10 scale):  Fatigue:  Nausea:  Lack of Appetite:   SOB:  Depression:  Anxiety:  Constipation:     CPOT:    https://ccs-st.org/resources/Documents/Sedation%20Analgesia%20Delirium%20in%20CC/CCS%20STH%20Guideline%20template%20CPOT.pdf  PAIN AD Score: 0  http://geriatrictoolkit.[facility]/cog/painad.pdf (press ctrl +  left click to view)      MEDICATIONS  (STANDING):  atorvastatin 20 milliGRAM(s) Oral at bedtime  escitalopram 10 milliGRAM(s) Oral daily  heparin   Injectable 5000 Unit(s) SubCutaneous every 8 hours  lisinopril 20 milliGRAM(s) Oral daily  naloxone Injectable 0.4 milliGRAM(s) IV Push once  polyethylene glycol 3350 17 Gram(s) Oral daily  senna 2 Tablet(s) Oral at bedtime  zonisamide 50 milliGRAM(s) Oral daily    MEDICATIONS  (PRN):  oxyCODONE    IR 2.5 milliGRAM(s) Oral every 4 hours PRN Moderate Pain (4 - 6)  oxyCODONE    IR 5 milliGRAM(s) Oral every 4 hours PRN Severe Pain (7 - 10)      PHYSICAL EXAM:  Vital Signs Last 24 Hrs  T(C): 37.2 (11 Nov 2024 04:56), Max: 37.2 (11 Nov 2024 04:56)  T(F): 99 (11 Nov 2024 04:56), Max: 99 (11 Nov 2024 04:56)  HR: 80 (11 Nov 2024 04:56) (78 - 80)  BP: 153/69 (11 Nov 2024 04:56) (130/53 - 153/69)  BP(mean): 72 (10 Nov 2024 20:56) (72 - 72)  RR: 18 (11 Nov 2024 04:56) (17 - 18)  SpO2: 94% (11 Nov 2024 04:56) (93% - 94%)    Parameters below as of 11 Nov 2024 04:56  Patient On (Oxygen Delivery Method): room air        General: alert  oriented x 0    verbal    Palliative Performance Scale/Karnofsky Score: 30%  http://npcrc.org/files/news/palliative_performance_scale_ppsv2.pdf    HEENT: no abnormal lesion  Lungs: CTA b/l  CV: RRR, S1S2  GI: soft non distended non tender  incontinent                constipation  last BM: 11/9/24  : incontinent    Musculoskeletal: weakness x4.  fully bedbound/wheelchair bound. RLE in   Skin: no abnormal skin lesions,  Neuro: cognitive impairment   Oral intake ability: full capability    LABS:                        8.2    5.96  )-----------( 458      ( 11 Nov 2024 06:17 )             25.4     11-11    144  |  112[H]  |  24[H]  ----------------------------<  111[H]  3.3[L]   |  25  |  0.44[L]    Ca    8.7      11 Nov 2024 06:17  Phos  3.2     11-11  Mg     2.4     11-11      Urinalysis Basic - ( 11 Nov 2024 06:17 )    Color: x / Appearance: x / SG: x / pH: x  Gluc: 111 mg/dL / Ketone: x  / Bili: x / Urobili: x   Blood: x / Protein: x / Nitrite: x   Leuk Esterase: x / RBC: x / WBC x   Sq Epi: x / Non Sq Epi: x / Bacteria: x        RADIOLOGY & ADDITIONAL STUDIES: Reviewed    
Source of information: MARIA REYESDEJIMENEZ, Chart review  Patient language: Mauritanian  : Manuel ID #[id number]    HPI:  Pt is a 89 y/o F with PMHx of HTN, HLD, anxiety, dementia (AAO1-2 at baseline), anxiety, seizures, who is wheelchair/bedbound, sacral pressure ulcer who presented to the ER due to right knee pain/swelling. Per daughter at bedside who provided history, on Sunday, patient/s family was moving the patient with a device to lift patient from bed to wheelchair but patient's leg got stuck and bent in machine.The family straightened the leg and put the patient in the wheelchair and brought her to [facility]. Once home, pt began to complain of new onset R knee and thigh pain. Pain was severe and patient was unable to sleep and continued to scream in pain. On Monday, family noticed increased swelling and mild erythema on R knee which prompted them to come to the ED. Patient has been wheelchair/bedbound for the past 7 months. In the past she used to walk with a walker but could not walk more than a block without having to sit down. She cannot climb stairs due to hip problems. She went to a doctor 4 years ago but was told she is too high a risk for surgery to fix her hips. Daughter does endorse decreased PO intake and generalized weakness since Sunday. Pt admitted to medicine s/p injury to right knee.  (06 Nov 2024 06:53)    Pt is admitted for right femur fracture. Xray right femur demonstrates spiral fracture of the distal femur extending to the intercondylar region. Osteopenia. Regional vascular calcifications present. 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 Orthopedic consult pending.  Pain consulted for management of acute right lower extremity pain. Pt seen and examined at bedside, this morning. At time of assessment, patient laying in bed in NAD, daughter Nadia at bedside, patient unable to verbalize current pain score SCALE USED: (1-10 VNRS). Per daughter, on Moshe 11/3 while transferring from bed to wheelchair, RLE got caught in the transfer device causing immediate pain to the patient. Patient transfer to wheelchair was completed however daughter reports patient's RLE became swollen and painful following the incident which prompted ED arrival. Pt is A&Ox1-2 and is a poor historian, unable to describe current pain. However, facial grimacing when right hip gently palpated. Pt tolerating PO diet. Denies lethargy, chest pain, SOB, nausea, vomiting, constipation. Reports last BM 11/6. Patient stated goal for pain control: to be able to take deep breaths, get out of bed to chair with tolerable pain control. Patient is wheelchair bound at baseline.    PAST MEDICAL & SURGICAL HISTORY:  HLD (hyperlipidemia)    HTN (hypertension)    Dementia    Seizures    No significant past surgical history    FAMILY HISTORY:    Social History:  lives with family  has HHA 10 hours daily  wheelchair/bed bound for last 6 mo (06 Nov 2024 06:53)  [x] Denies ETOH use, illicit drug use and smoking    Allergies    No Known Allergies    Intolerances    MEDICATIONS  (STANDING):  acetaminophen   IVPB .. 1000 milliGRAM(s) IV Intermittent once  acetaminophen   IVPB .. 1000 milliGRAM(s) IV Intermittent once  atorvastatin 20 milliGRAM(s) Oral at bedtime  escitalopram 10 milliGRAM(s) Oral daily  heparin   Injectable 5000 Unit(s) SubCutaneous every 8 hours  lactulose Syrup 10 Gram(s) Oral two times a day  lisinopril 20 milliGRAM(s) Oral daily  potassium chloride    Tablet ER 40 milliEquivalent(s) Oral once  senna 2 Tablet(s) Oral at bedtime  zonisamide 50 milliGRAM(s) Oral daily    MEDICATIONS  (PRN):  morphine  - Injectable 2 milliGRAM(s) IV Push every 4 hours PRN Severe Pain (7 - 10)  morphine  - Injectable 1 milliGRAM(s) IV Push every 4 hours PRN Moderate Pain (4 - 6)    Vital Signs Last 24 Hrs  T(C): 37.5 (06 Nov 2024 10:39), Max: 37.5 (06 Nov 2024 10:39)  T(F): 99.5 (06 Nov 2024 10:39), Max: 99.5 (06 Nov 2024 10:39)  HR: 99 (06 Nov 2024 10:39) (80 - 99)  BP: 122/102 (06 Nov 2024 10:39) (108/54 - 150/79)  BP(mean): 82 (06 Nov 2024 05:33) (72 - 82)  RR: 18 (06 Nov 2024 10:39) (17 - 18)  SpO2: 94% (06 Nov 2024 10:39) (94% - 96%)    Parameters below as of 06 Nov 2024 10:39  Patient On (Oxygen Delivery Method): room air    LABS: Reviewed.                          8.1    8.96  )-----------( 233      ( 06 Nov 2024 10:20 )             24.8     11-06    142  |  111[H]  |  26[H]  ----------------------------<  138[H]  3.7   |  25  |  0.50    Ca    8.3[L]      06 Nov 2024 10:20    TPro  6.2  /  Alb  2.7[L]  /  TBili  0.5  /  DBili  x   /  AST  12  /  ALT  11  /  AlkPhos  70  11-06    LIVER FUNCTIONS - ( 06 Nov 2024 00:12 )  Alb: 2.7 g/dL / Pro: 6.2 g/dL / ALK PHOS: 70 U/L / ALT: 11 U/L DA / AST: 12 U/L / GGT: x           Urinalysis Basic - ( 06 Nov 2024 10:20 )    Color: x / Appearance: x / SG: x / pH: x  Gluc: 138 mg/dL / Ketone: x  / Bili: x / Urobili: x   Blood: x / Protein: x / Nitrite: x   Leuk Esterase: x / RBC: x / WBC x   Sq Epi: x / Non Sq Epi: x / Bacteria: x    CAPILLARY BLOOD GLUCOSE    Radiology: Reviewed.   < from: Xray Tibia + Fibula 2 Views, Right (11.06.24 @ 01:11) >    ACC: [id number] EXAM:  XR FEMUR 2 VIEWS RT   ORDERED BY: MARIIA CHOI     ACC: [id number] EXAM:  XR TIB FIB AP LAT 2 VIEWS RT   ORDERED BY: MARIIA CHOI     ACC: [id number] EXAM:  XR KNEE AP LAT OBL 3 VIEWS RT   ORDERED BY: MARIIA CHOI     PROCEDURE DATE:  11/06/2024      INTERPRETATION:  EXAM: XR KNEE AP AND LATERAL AND OBLIQUE 3 VIEWS RIGHT,   XR TIBIA FIBULA AP AND LATERAL 2 VIEWS RIGHT, XR FEMUR 2 VIEWS RIGHT    INDICATION: R knee pain/swelling after fall HXR    COMPARISON: See below    IMPRESSION:    Right femur: Spiral fracture of the distal femur extending to the   intercondylar region. Osteopenia. Follow-up suggested. Regional vascular   calcifications present. 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    Right knee: Exam is limited due to fracture spiral of the distal third of   the femur extending to the condylar region. Edematous changes noted.   Regional vascular calcifications prominent.    Right tibia-fibula: Distal right femur fracture seen better on   accompanying exams. No tibia or fibula fracture. Regional vascular   calcifications present. Osteopenia.    --- End of Report ---    ANALIA BLACKWELL MD; Attending Radiologist  This document has been electronically signed. Nov 6 2024  9:08AM    < end of copied text >    ORT Score -   Family Hx of substance abuse	Female	      Male  Alcohol 	                                           1                     3  Illegal drugs	                                   2                     3  Rx drugs                                           4 	                  4  Personal Hx of substance abuse		  Alcohol 	                                          3	                  3  Illegal drugs                                     4	                  4  Rx drugs                                            5 	                  5  Age between 16- 45 years	           1                     1  hx preadolescent sexual abuse	   3 	                  0  Psychological disease		  ADD, OCD, bipolar, schizophrenia   2	          2  Depression                                           1 	          1  Total: 0    a score of 3 or lower indicates low risk for opioid abuse		  a score of 4-7 indicates moderate risk for opioid abuse		  a score of 8 or higher indicates high risk for opioid abuse  	  REVIEW OF SYSTEMS:  CONSTITUTIONAL: No fever or fatigue  HEENT:  No difficulty hearing, no change in vision  NECK: No pain or stiffness  RESPIRATORY: No cough, wheezing, chills or hemoptysis; No shortness of breath  CARDIOVASCULAR: No chest pain, palpitations, dizziness, or leg swelling  GASTROINTESTINAL: No loss of appetite, decreased PO intake. No abdominal or epigastric pain. No nausea, vomiting; No diarrhea or constipation.   GENITOURINARY: + urinary incontinence   MUSCULOSKELETAL: + right hip pain; No back pain, no upper motor strength weakness, + RLE motor weakness secondary to pain, no saddle anesthesia, baseline incontinence  NEURO: No headaches, No numbness/tingling b/l LE  ENDOCRINE: No polyuria, polydipsia, heat or cold intolerance; No hair loss  PSYCHIATRIC: No depression, anxiety or difficulty sleeping    PHYSICAL EXAM:  GENERAL:  Alert & Oriented X1-2 (baseline), cooperative, NAD, Good concentration. Speech is clear.   RESPIRATORY: Respirations even and unlabored. Clear to auscultation bilaterally; No rales, rhonchi, wheezing, or rubs  CARDIOVASCULAR: Normal S1/S2, regular rate and rhythm; No murmurs, rubs, or gallops. No JVD.   GASTROINTESTINAL:  Soft, Nontender, Nondistended; Bowel sounds present  PERIPHERAL VASCULAR:  Extremities warm without edema. 2+ Peripheral Pulses, No cyanosis, No calf tenderness  MUSCULOSKELETAL: Motor Strength 4/5 B/L upper. Unable to assess BLE motor weakness due to pain and current positioning on left side. + right hip tenderness to palpation   SKIN: Warm, dry, intact.     Risk factors associated with adverse outcomes related to opioid treatment  [ ] Concurrent benzodiazepine use  [ ] History/ Active substance use or alcohol use disorder  [ ] Psychiatric co-morbidity  [ ] Sleep apnea  [ ] COPD  [ ] BMI> 35  [ ] Liver dysfunction  [ ] Renal dysfunction  [ ] CHF  [ ] Smoker  [x] Age > 60 years    [x]  [place]  Reviewed and Copied to Chart. See below.    Plan of care and goal oriented pain management treatment options were discussed with patient and /or primary care giver; all questions and concerns were addressed and care was aligned with patient's wishes.    Educated patient on goal oriented pain management treatment options

## 2024-11-11 NOTE — CONSULT NOTE ADULT - PROBLEM SELECTOR RECOMMENDATION 3
No HCP. No MOLST  Daughter Melany is surrogate along with 3 other siblings (she speaks for them)    Not interested in hospice  Would like physician house calls    d/w team and Dr. Dumont

## 2024-11-11 NOTE — PROGRESS NOTE ADULT - PROBLEM SELECTOR PLAN 4
No bloody stool, no melena; was previously with vaginal bleeding but went to ObGyn who told her she has an infection and that everything is okay; no vaginal bleeding currently either  -no further episodes of bleeding/no active signs of bleeding  -hgb stable 8.2  - Monitor Hgb

## 2024-11-11 NOTE — CONSULT NOTE ADULT - ASSESSMENT
89 y/o F with PMHx of HTN, HLD, anxiety, dementia (AAO1-2 at baseline), anxiety, seizures, who is wheelchair/bedbound, sacral pressure ulcer who presented to the ER due to right knee pain/swelling. Found to have R femur fracture. Non-operative.
Search Terms: Maria Reyesdejimenez, 02/07/1933Search Date: 11/06/2024 09:26:43 AM  The Drug Utilization Report below displays all of the controlled substance prescriptions, if any, that your patient has filled in the last twelve months. The information displayed on this report is compiled from pharmacy submissions to the Department, and accurately reflects the information as submitted by the pharmacies.    This report was requested by: Pamela Corona | Reference #: [id number]    There are no results for the search terms that you entered.

## 2024-11-11 NOTE — PROGRESS NOTE ADULT - PROBLEM SELECTOR PLAN 1
Rt leg pain with knee swelling s/p accidental bending during transport  hx of bedbound/wheelchair bound for last 7 months; not ambulatory  Xray Right femur: Spiral fracture of the distal femur extending to the intercondylar region. Osteopenia.  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  Ortho following: No acute intervention. D/w patient's daughter who is in agreement of conservative management  Pain management following: d/c IV morphine and start on oxycodone 2.5mg PO q4hrs PRN for severe pain and tylenol 1G PO q8hrs x3 days for moderate pain  IV toradol for pain exacerbation  Ortho replacing knee immobilizer today (11/11)  PT recommended Home PT (NWB to RLE with knee immobilizer)  Pain management consulted  Palliative consulted, patient is full code and requesting house call

## 2024-11-11 NOTE — CONSULT NOTE ADULT - CONVERSATION DETAILS
I spoke at bedside with Melany Holley, Pretty's daughter and primary caregiver.   Her phone number ys [phone].  We used the language line  [id number]    Melany told me that since January, Pretty has had a series of falls that have resulted in fractures to ribs and now, the RLE. She is mostly bedbound at home and they do transfers using a hoya lift. She has been hospitalized about 3 times this year. She had home PT but they stopped coming when Pretty would not participate.     Melany would like Pretty to be able to go chi and hopes that she could get PT services reinstated and that her mother's pain could be better controlled. Pretty had been having leg pain even before this most recent fall.     I introduced hospice philosophy and services, including focus on comfort goals of care and symptom management at home, along with prevention of future hospitalization. Melany appreciated hearing about it but did not think it would be a thing she would want for her mother as she felt she would always want to bring her to the hospital for care.     She was interested in the possibility of a house calls service that would send a doctor to the house.     Support offered and all questions answered.     Pretty remains FULL CODE.  d/w team.

## 2024-11-11 NOTE — CONSULT NOTE ADULT - PROBLEM SELECTOR RECOMMENDATION 9
Pt with acute right hip pain which is somatic and neuropathic in nature due to femur fracture. X-ray right femur demonstrates spiral fracture of the distal femur extending to the intercondylar region. Osteopenia. Regional vascular calcifications present. 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 Orthopedic consult pending.  Opioid pain recommendations   - Start Morphine 1 - 2 mg IVP q 4hrs PRN for moderate/severe pain. Monitor for respiratory depression/sedation   Non-opioid pain recommendations   - Start Ofirmev 1g q 6hrs x 4 doses. Monitor LFTs  - Discontinue Toradol 15mg IVP q 6hrs for moderate pain until ortho recommendations   Bowel Regimen  - Continue Miralax 17G PO daily  - Continue Senna 2 tablets at bedtime for constipation  Mild pain (pain score 1-3)  - Non-pharmacological pain treatment recommendations  - Warm/ Cool packs PRN   - Repositioning extremity, guided imagery, relaxation, distraction.  - Physical therapy OOB if no contraindications   Recommendations discussed with primary team and RN.
Non-operative  In brace  Difficult to determine response to pain meds be of dementia (when asked, says it's 10/10 but then calmly watching TV)  recommend acetaminophen 100mg PO q6h standing  continue oxycodone 2.5 mg po q4h prn moderate pain  continue oxycodone 5 mg PO q4h severe pain  recommend adding gabapentin 100mg po qhs for neuropathic pain

## 2024-11-12 LAB
ALBUMIN SERPL ELPH-MCNC: 2.1 G/DL — LOW (ref 3.5–5)
ALP SERPL-CCNC: 109 U/L — SIGNIFICANT CHANGE UP (ref 40–120)
ALT FLD-CCNC: 13 U/L DA — SIGNIFICANT CHANGE UP (ref 10–60)
ANION GAP SERPL CALC-SCNC: 2 MMOL/L — LOW (ref 5–17)
ANION GAP SERPL CALC-SCNC: 5 MMOL/L — SIGNIFICANT CHANGE UP (ref 5–17)
AST SERPL-CCNC: 12 U/L — SIGNIFICANT CHANGE UP (ref 10–40)
BILIRUB SERPL-MCNC: 0.6 MG/DL — SIGNIFICANT CHANGE UP (ref 0.2–1.2)
BUN SERPL-MCNC: 19 MG/DL — HIGH (ref 7–18)
BUN SERPL-MCNC: 20 MG/DL — HIGH (ref 7–18)
CALCIUM SERPL-MCNC: 8.5 MG/DL — SIGNIFICANT CHANGE UP (ref 8.4–10.5)
CALCIUM SERPL-MCNC: 8.5 MG/DL — SIGNIFICANT CHANGE UP (ref 8.4–10.5)
CHLORIDE SERPL-SCNC: 116 MMOL/L — HIGH (ref 96–108)
CHLORIDE SERPL-SCNC: 117 MMOL/L — HIGH (ref 96–108)
CO2 SERPL-SCNC: 25 MMOL/L — SIGNIFICANT CHANGE UP (ref 22–31)
CO2 SERPL-SCNC: 26 MMOL/L — SIGNIFICANT CHANGE UP (ref 22–31)
CREAT SERPL-MCNC: 0.39 MG/DL — LOW (ref 0.5–1.3)
CREAT SERPL-MCNC: 0.43 MG/DL — LOW (ref 0.5–1.3)
EGFR: 92 ML/MIN/1.73M2 — SIGNIFICANT CHANGE UP
EGFR: 94 ML/MIN/1.73M2 — SIGNIFICANT CHANGE UP
GLUCOSE SERPL-MCNC: 105 MG/DL — HIGH (ref 70–99)
GLUCOSE SERPL-MCNC: 136 MG/DL — HIGH (ref 70–99)
MAGNESIUM SERPL-MCNC: 2.3 MG/DL — SIGNIFICANT CHANGE UP (ref 1.6–2.6)
PHOSPHATE SERPL-MCNC: 3.3 MG/DL — SIGNIFICANT CHANGE UP (ref 2.5–4.5)
POTASSIUM SERPL-MCNC: 3.4 MMOL/L — LOW (ref 3.5–5.3)
POTASSIUM SERPL-MCNC: 4.2 MMOL/L — SIGNIFICANT CHANGE UP (ref 3.5–5.3)
POTASSIUM SERPL-SCNC: 3.4 MMOL/L — LOW (ref 3.5–5.3)
POTASSIUM SERPL-SCNC: 4.2 MMOL/L — SIGNIFICANT CHANGE UP (ref 3.5–5.3)
PROT SERPL-MCNC: 6.1 G/DL — SIGNIFICANT CHANGE UP (ref 6–8.3)
SODIUM SERPL-SCNC: 145 MMOL/L — SIGNIFICANT CHANGE UP (ref 135–145)
SODIUM SERPL-SCNC: 146 MMOL/L — HIGH (ref 135–145)

## 2024-11-12 PROCEDURE — 99232 SBSQ HOSP IP/OBS MODERATE 35: CPT

## 2024-11-12 PROCEDURE — 99232 SBSQ HOSP IP/OBS MODERATE 35: CPT | Mod: GC

## 2024-11-12 RX ORDER — POTASSIUM CHLORIDE 10 MEQ
20 TABLET, EXTENDED RELEASE ORAL ONCE
Refills: 0 | Status: DISCONTINUED | OUTPATIENT
Start: 2024-11-12 | End: 2024-11-12

## 2024-11-12 RX ORDER — GUAIFENESIN 100 MG/5ML
100 LIQUID ORAL EVERY 6 HOURS
Refills: 0 | Status: DISCONTINUED | OUTPATIENT
Start: 2024-11-12 | End: 2024-11-13

## 2024-11-12 RX ORDER — POTASSIUM CHLORIDE 10 MEQ
40 TABLET, EXTENDED RELEASE ORAL ONCE
Refills: 0 | Status: COMPLETED | OUTPATIENT
Start: 2024-11-12 | End: 2024-11-12

## 2024-11-12 RX ORDER — GABAPENTIN 300 MG/1
200 CAPSULE ORAL AT BEDTIME
Refills: 0 | Status: DISCONTINUED | OUTPATIENT
Start: 2024-11-12 | End: 2024-11-13

## 2024-11-12 RX ADMIN — OXYCODONE HYDROCHLORIDE 5 MILLIGRAM(S): 30 TABLET ORAL at 22:19

## 2024-11-12 RX ADMIN — POLYETHYLENE GLYCOL 3350 17 GRAM(S): 17 POWDER, FOR SOLUTION ORAL at 12:47

## 2024-11-12 RX ADMIN — ESCITALOPRAM 10 MILLIGRAM(S): 10 TABLET, FILM COATED ORAL at 12:47

## 2024-11-12 RX ADMIN — Medication 1000 MILLIGRAM(S): at 23:45

## 2024-11-12 RX ADMIN — Medication 1000 MILLIGRAM(S): at 12:47

## 2024-11-12 RX ADMIN — Medication 1000 MILLIGRAM(S): at 22:24

## 2024-11-12 RX ADMIN — Medication 1000 MILLIGRAM(S): at 13:32

## 2024-11-12 RX ADMIN — HEPARIN SODIUM 5000 UNIT(S): 10000 INJECTION INTRAVENOUS; SUBCUTANEOUS at 15:31

## 2024-11-12 RX ADMIN — Medication 1000 MILLIGRAM(S): at 18:20

## 2024-11-12 RX ADMIN — Medication 1000 MILLIGRAM(S): at 09:51

## 2024-11-12 RX ADMIN — Medication 20 MILLIGRAM(S): at 06:03

## 2024-11-12 RX ADMIN — OXYCODONE HYDROCHLORIDE 5 MILLIGRAM(S): 30 TABLET ORAL at 23:45

## 2024-11-12 RX ADMIN — Medication 1000 MILLIGRAM(S): at 19:13

## 2024-11-12 RX ADMIN — HEPARIN SODIUM 5000 UNIT(S): 10000 INJECTION INTRAVENOUS; SUBCUTANEOUS at 22:20

## 2024-11-12 RX ADMIN — OXYCODONE HYDROCHLORIDE 2.5 MILLIGRAM(S): 30 TABLET ORAL at 16:11

## 2024-11-12 RX ADMIN — OXYCODONE HYDROCHLORIDE 2.5 MILLIGRAM(S): 30 TABLET ORAL at 22:19

## 2024-11-12 RX ADMIN — OXYCODONE HYDROCHLORIDE 2.5 MILLIGRAM(S): 30 TABLET ORAL at 23:45

## 2024-11-12 RX ADMIN — GUAIFENESIN 100 MILLIGRAM(S): 100 LIQUID ORAL at 22:24

## 2024-11-12 RX ADMIN — Medication 20 MILLIGRAM(S): at 22:21

## 2024-11-12 RX ADMIN — ZONISAMIDE 50 MILLIGRAM(S): 100 CAPSULE ORAL at 12:47

## 2024-11-12 RX ADMIN — Medication 1000 MILLIGRAM(S): at 01:03

## 2024-11-12 RX ADMIN — OXYCODONE HYDROCHLORIDE 2.5 MILLIGRAM(S): 30 TABLET ORAL at 10:18

## 2024-11-12 RX ADMIN — Medication 40 MILLIEQUIVALENT(S): at 09:02

## 2024-11-12 RX ADMIN — Medication 250 MILLILITER(S): at 09:02

## 2024-11-12 RX ADMIN — HEPARIN SODIUM 5000 UNIT(S): 10000 INJECTION INTRAVENOUS; SUBCUTANEOUS at 06:04

## 2024-11-12 RX ADMIN — GUAIFENESIN 100 MILLIGRAM(S): 100 LIQUID ORAL at 18:20

## 2024-11-12 RX ADMIN — Medication 1000 MILLIGRAM(S): at 06:03

## 2024-11-12 RX ADMIN — GABAPENTIN 200 MILLIGRAM(S): 300 CAPSULE ORAL at 22:21

## 2024-11-12 NOTE — PROGRESS NOTE ADULT - SUBJECTIVE AND OBJECTIVE BOX
PGY-1 Progress Note discussed with attending      PLEASE CONTACT ON CALL TEAM:  - On Call Team (Please refer to Alexandrea) FROM 5:00 PM - 8:30PM  - Nightfloat Team FROM 8:30 -7:30 AM    INTERVAL HPI/OVERNIGHT EVENTS:     No acute overnight events. Pt evaluated at bedisde. Pt has no new complaints.    _________________________________________________  REVIEW OF SYSTEMS:  CONSTITUTIONAL: No acute distress  RESPIRATORY: No shortness of breath, wheezing, or cough  CARDIOVASCULAR: No chest pain or palpitations  GASTROINTESTINAL: No abdominal pain, nausea, vomiting, diarrhea, or constipation  GENITOURINARY: No dysuria or hermaturia  NEUROLOGICAL: No numbness, tremors, or loss of strength  SKIN: No new lesions    MEDICATIONS  (STANDING):  acetaminophen     Tablet .. 1000 milliGRAM(s) Oral every 6 hours  atorvastatin 20 milliGRAM(s) Oral at bedtime  escitalopram 10 milliGRAM(s) Oral daily  gabapentin 200 milliGRAM(s) Oral at bedtime  heparin   Injectable 5000 Unit(s) SubCutaneous every 8 hours  lisinopril 20 milliGRAM(s) Oral daily  naloxone Injectable 0.4 milliGRAM(s) IV Push once  polyethylene glycol 3350 17 Gram(s) Oral daily  senna 2 Tablet(s) Oral at bedtime  zonisamide 50 milliGRAM(s) Oral daily    MEDICATIONS  (PRN):  oxyCODONE    IR 2.5 milliGRAM(s) Oral every 4 hours PRN Moderate Pain (4 - 6)  oxyCODONE    IR 5 milliGRAM(s) Oral every 4 hours PRN Severe Pain (7 - 10)      Vital Signs Last 24 Hrs  T(C): 36.8 (12 Nov 2024 05:04), Max: 37.1 (11 Nov 2024 14:15)  T(F): 98.3 (12 Nov 2024 05:04), Max: 98.8 (11 Nov 2024 14:15)  HR: 75 (12 Nov 2024 05:04) (75 - 85)  BP: 154/70 (12 Nov 2024 05:04) (122/61 - 154/70)  BP(mean): --  RR: 16 (12 Nov 2024 05:04) (16 - 18)  SpO2: 96% (12 Nov 2024 05:04) (94% - 96%)    Parameters below as of 12 Nov 2024 05:04  Patient On (Oxygen Delivery Method): room air      _________________________________________________  PHYSICAL EXAMINATION:  GENERAL: NAD  HEAD:  Atraumatic, Normocephalic  EYES:  conjunctiva and sclera clear  NECK: Supple, No JVD, Normal thyroid  CHEST/LUNG: Clear to auscultation.  HEART: Regular rate and rhythm; No murmurs, rubs, or gallops  ABDOMEN: Soft, Nontender, Bowel sounds present, no masses on palpation  NERVOUS SYSTEM:  Alert and oriented  EXTREMITIES:  No BLE edema  SKIN: warm, dry    I&O's Summary    11 Nov 2024 07:01  -  12 Nov 2024 07:00  --------------------------------------------------------  IN: 0 mL / OUT: 300 mL / NET: -300 mL      _________________________________________________  LABS:                        8.2    5.96  )-----------( 458      ( 11 Nov 2024 06:17 )             25.4     11-12    145  |  117[H]  |  19[H]  ----------------------------<  136[H]  4.2   |  26  |  0.43[L]    Ca    8.5      12 Nov 2024 09:51  Phos  3.3     11-12  Mg     2.3     11-12    TPro  6.1  /  Alb  2.1[L]  /  TBili  0.6  /  DBili  x   /  AST  12  /  ALT  13  /  AlkPhos  109  11-12    LIVER FUNCTIONS - ( 12 Nov 2024 09:51 )  Alb: 2.1 g/dL / Pro: 6.1 g/dL / ALK PHOS: 109 U/L / ALT: 13 U/L DA / AST: 12 U/L / GGT: x           CAPILLARY BLOOD GLUCOSE                  Urinalysis Basic - ( 12 Nov 2024 09:51 )    Color: x / Appearance: x / SG: x / pH: x  Gluc: 136 mg/dL / Ketone: x  / Bili: x / Urobili: x   Blood: x / Protein: x / Nitrite: x   Leuk Esterase: x / RBC: x / WBC x   Sq Epi: x / Non Sq Epi: x / Bacteria: x      _________________________________________________  RADIOLOGY & ADDITIONAL TESTS:    Imaging Personally Reviewed:  YES    Consultant(s) Notes Reviewed:   YES    Care Discussed with Consultants : YES     Plan of care was discussed with patient and /or primary care giver; all questions and concerns were addressed and care was aligned with patient's wishes.       PGY-1 Progress Note discussed with attending      PLEASE CONTACT ON CALL TEAM:  - On Call Team (Please refer to Alexandrea) FROM 5:00 PM - 8:30PM  - Nightfloat Team FROM 8:30 -7:30 AM    INTERVAL HPI/OVERNIGHT EVENTS:     No acute overnight events. Pt evaluated at bedside with daughter this morning. Patient at baseline mentation and still complaining of pain in her RLE. Per daughter at bedside, patient continues to have loose bowel movementsx3. Denies abdominal pain.    _________________________________________________  REVIEW OF SYSTEMS:  CONSTITUTIONAL: Mild distress 2/2 pain  RESPIRATORY: No shortness of breath, wheezing, or cough  CARDIOVASCULAR: No chest pain or palpitations  GASTROINTESTINAL: No abdominal pain, nausea, vomiting, diarrhea, or constipation  GENITOURINARY: No dysuria or hematuria  NEUROLOGICAL: No numbness, tremors  SKIN: No new lesions    MEDICATIONS  (STANDING):  acetaminophen     Tablet .. 1000 milliGRAM(s) Oral every 6 hours  atorvastatin 20 milliGRAM(s) Oral at bedtime  escitalopram 10 milliGRAM(s) Oral daily  gabapentin 200 milliGRAM(s) Oral at bedtime  heparin   Injectable 5000 Unit(s) SubCutaneous every 8 hours  lisinopril 20 milliGRAM(s) Oral daily  naloxone Injectable 0.4 milliGRAM(s) IV Push once  polyethylene glycol 3350 17 Gram(s) Oral daily  senna 2 Tablet(s) Oral at bedtime  zonisamide 50 milliGRAM(s) Oral daily    MEDICATIONS  (PRN):  oxyCODONE    IR 2.5 milliGRAM(s) Oral every 4 hours PRN Moderate Pain (4 - 6)  oxyCODONE    IR 5 milliGRAM(s) Oral every 4 hours PRN Severe Pain (7 - 10)      Vital Signs Last 24 Hrs  T(C): 36.8 (12 Nov 2024 05:04), Max: 37.1 (11 Nov 2024 14:15)  T(F): 98.3 (12 Nov 2024 05:04), Max: 98.8 (11 Nov 2024 14:15)  HR: 75 (12 Nov 2024 05:04) (75 - 85)  BP: 154/70 (12 Nov 2024 05:04) (122/61 - 154/70)  BP(mean): --  RR: 16 (12 Nov 2024 05:04) (16 - 18)  SpO2: 96% (12 Nov 2024 05:04) (94% - 96%)    Parameters below as of 12 Nov 2024 05:04  Patient On (Oxygen Delivery Method): room air      _________________________________________________  PHYSICAL EXAMINATION:  GENERAL: mild distress, elderly female  HEAD:  Atraumatic, Normocephalic  EYES:  conjunctiva and sclera clear  NECK: Supple, No JVD  CHEST/LUNG: Clear to auscultation.  HEART: Regular rate and rhythm; No murmurs, rubs, or gallops  ABDOMEN: Soft, Nontender, Bowel sounds present, no masses on palpation  NERVOUS SYSTEM:  Alert and orientedx1-2  EXTREMITIES:  RLE swelling in knee immobilizer  SKIN: warm, dry    I&O's Summary    11 Nov 2024 07:01  -  12 Nov 2024 07:00  --------------------------------------------------------  IN: 0 mL / OUT: 300 mL / NET: -300 mL      _________________________________________________  LABS:                        8.2    5.96  )-----------( 458      ( 11 Nov 2024 06:17 )             25.4     11-12    145  |  117[H]  |  19[H]  ----------------------------<  136[H]  4.2   |  26  |  0.43[L]    Ca    8.5      12 Nov 2024 09:51  Phos  3.3     11-12  Mg     2.3     11-12    TPro  6.1  /  Alb  2.1[L]  /  TBili  0.6  /  DBili  x   /  AST  12  /  ALT  13  /  AlkPhos  109  11-12    LIVER FUNCTIONS - ( 12 Nov 2024 09:51 )  Alb: 2.1 g/dL / Pro: 6.1 g/dL / ALK PHOS: 109 U/L / ALT: 13 U/L DA / AST: 12 U/L / GGT: x           CAPILLARY BLOOD GLUCOSE                  Urinalysis Basic - ( 12 Nov 2024 09:51 )    Color: x / Appearance: x / SG: x / pH: x  Gluc: 136 mg/dL / Ketone: x  / Bili: x / Urobili: x   Blood: x / Protein: x / Nitrite: x   Leuk Esterase: x / RBC: x / WBC x   Sq Epi: x / Non Sq Epi: x / Bacteria: x      _________________________________________________  RADIOLOGY & ADDITIONAL TESTS:    Imaging Personally Reviewed:  YES    Consultant(s) Notes Reviewed:   YES    Care Discussed with Consultants : YES     Plan of care was discussed with patient and /or primary care giver; all questions and concerns were addressed and care was aligned with patient's wishes.       Date of Service: 01-10-24 @ 11:20    SUBJECTIVE AND OBJECTIVE:  Indication for Geriatrics and Palliative Care Services/INTERVAL HPI: Symptom management in patient with pancreatic adenocarcinoma c/b gastric perforation, now on TPN.     OVERNIGHT EVENTS: Patient reports sleeping much better overnight and pain was well controlled following initiating the Dilaudid ATC. He reports having pain control for about 4 hours at a time and not feeling overly sleepy. He still has some anxiety mainly when he thinks about the gastric perforation not being repaired. He is hopeful to return home later this week.     DNR on chart:  Allergies    No Known Allergies    Intolerances    MEDICATIONS  (STANDING):  chlorhexidine 4% Liquid 1 Application(s) Topical <User Schedule>  dextrose 10%. 1000 milliLiter(s) (100 mL/Hr) IV Continuous <Continuous>  heparin   Injectable 5000 Unit(s) SubCutaneous every 8 hours  HYDROmorphone  Injectable 0.5 milliGRAM(s) IV Push every 6 hours  insulin lispro (ADMELOG) corrective regimen sliding scale   SubCutaneous every 6 hours  lidocaine   4% Patch 1 Patch Transdermal daily  lipid, fat emulsion (Fish Oil and Plant Based) 20% Infusion 29.2 mL/Hr (29.2 mL/Hr) IV Continuous <Continuous>  meningococcal Group B (BEXSERO) Vaccine 0.5 milliLiter(s) IntraMuscular once  meningococcal Groups A/C/Y and W-135 Vaccine (MENVEO) 0.5 milliLiter(s) IntraMuscular once  octreotide  Injectable 200 MICROGram(s) SubCutaneous every 8 hours  pantoprazole  Injectable 40 milliGRAM(s) IV Push every 12 hours  Parenteral Nutrition - Adult 1 Each TPN Continuous <Continuous>  sodium chloride 0.9%. 1000 milliLiter(s) (30 mL/Hr) IV Continuous <Continuous>    MEDICATIONS  (PRN):  acetylcysteine 20%  Inhalation 4 milliLiter(s) Inhalation four times a day PRN Congestion  albuterol    0.083% 2.5 milliGRAM(s) Nebulizer three times a day PRN Wheezing  naloxone Injectable 0.1 milliGRAM(s) IV Push every 3 minutes PRN Sedation or respiratory depression 2/2 to opioiods  ondansetron Injectable 4 milliGRAM(s) IV Push once PRN Nausea and/or Vomiting  sodium chloride 0.9% lock flush 10 milliLiter(s) IV Push every 1 hour PRN Pre/post blood products, medications, blood draw, and to maintain line patency      ITEMS UNCHECKED ARE NOT PRESENT    PRESENT SYMPTOMS: [ ]Unable to self-report - see [ ] CPOT [ ] PAINADS [ ] RDOS  Source if other than patient:  [ ]Family   [ ]Team     Pain:  [ ]yes [x ]no  QOL impact -   Location -                    Aggravating factors -  Quality -  Radiation -  Timing-  Severity (0-10 scale):  Minimal acceptable level (0-10 scale):     CPOT:    https://www.[facility].org/getattachment/[email or web address]/Critical-Care-Pain-Observation-Tool-(CPOT)    PAINAD Score: See PAINAD tool and score below       Dyspnea:                           [ ]Mild [ ]Moderate [ ]Severe    RDOS: See RDOS tool and score below   0 to 2  minimal or no respiratory distress   3  mild distress  4 to 6 moderate distress  >7 severe distress      Anxiety:                             [ ]Mild [ ]Moderate [ ]Severe  Fatigue:                             [ ]Mild [ ]Moderate [ ]Severe  Nausea:                             [ ]Mild [ ]Moderate [ ]Severe  Loss of appetite:              [ ]Mild [ ]Moderate [ ]Severe  Constipation:                    [ ]Mild [ ]Moderate [ ]Severe    PCSSQ[Palliative Care Spiritual Screening Question]   Severity (0-10):  Score of 4 or > indicate consideration of Chaplaincy referral.  Chaplaincy Referral: [ ] yes [ ] refused [ ] following [ ] Deferred     Caregiver [place]? : [ ] yes [ ] no [ ] Deferred [ ] Declined             Social work referral [ ] Patient & Family Centered Care Referral [ ]     Anticipatory Grief present?:  [ ] yes [ ] no  [ ] Deferred                  Social work referral [ ] Chaplaincy Referral [ ]    		  Other Symptoms:  [ ]All other review of systems negative     Palliative Performance Status Version 2:   See PPSv2 tool and score below         PHYSICAL EXAM:  Vital Signs Last 24 Hrs  T(C): 37 (10 Mark 2024 10:13), Max: 37 (09 Jan 2024 13:44)  T(F): 98.6 (10 Mark 2024 10:13), Max: 98.6 (09 Jan 2024 13:44)  HR: 97 (10 Mark 2024 10:13) (60 - 97)  BP: 100/60 (10 Mark 2024 10:13) (100/60 - 122/70)  BP(mean): --  RR: 18 (10 Mark 2024 10:13) (18 - 18)  SpO2: 96% (10 Mark 2024 10:13) (95% - 98%)    Parameters below as of 10 Mark 2024 10:13  Patient On (Oxygen Delivery Method): room air     I&O's Summary    09 Jan 2024 07:01  -  10 Mark 2024 07:00  --------------------------------------------------------  IN: 0 mL / OUT: 1640 mL / NET: -1640 mL    10 Mark 2024 07:01  -  10 Mark 2024 11:20  --------------------------------------------------------  IN: 0 mL / OUT: 150 mL / NET: -150 mL       GENERAL: [ ]Cachexia    [x ]Alert  [x ]Oriented x  3 [ ]Lethargic  [ ]Unarousable  [ ]Verbal  [ ]Non-Verbal  Behavioral:   [ ]Anxiety  [ ]Delirium [ ]Agitation [ ]Other  HEENT:  [ ]Normal   [ ]Dry mouth   [ ]ET Tube/Trach  [ ]Oral lesions  PULMONARY:   [x ]Clear [ ]Tachypnea  [ ]Audible excessive secretions   [ ]Rhonchi        [ ]Right [ ]Left [ ]Bilateral  [ ]Crackles        [ ]Right [ ]Left [ ]Bilateral  [ ]Wheezing     [ ]Right [ ]Left [ ]Bilateral  [ ]Diminished BS [ ] Right [ ]Left [ ]Bilateral  CARDIOVASCULAR:    [x ]Regular [ ]Irregular [ ]Tachy  [ ]Prabhjot [ ]Murmur [ ]Other  GASTROINTESTINAL:  [x ]Soft  []Distended   [ ]+BS  [x ]Non tender [ ]Tender  [ ]Other [ ]PEG [ ]OGT/ NGT   Last BM:   GENITOURINARY:  [ ]Normal [ ]Incontinent   [ ]Oliguria/Anuria   [ ]Harris  MUSCULOSKELETAL:   [ ]Normal   [ ]Weakness  [ ]Bed/Wheelchair bound [ ]Edema  NEUROLOGIC:   [ ]No focal deficits  [ ] Cognitive impairment  [ ] Dysphagia [ ]Dysarthria [ ] Paresis [ ]Other   SKIN:   [ ]Normal  [ ]Rash  [ ]Other  [ ]Pressure ulcer(s) [ ]y [ ]n present on admission    CRITICAL CARE:  [ ]Shock Present  [ ]Septic [ ]Cardiogenic [ ]Neurologic [ ]Hypovolemic  [ ]Vasopressors [ ]Inotropes  [ ]Respiratory failure present [ ]Mechanical Ventilation [ ]Non-invasive ventilatory support [ ]High-Flow   [ ]Acute  [ ]Chronic [ ]Hypoxic  [ ]Hypercarbic [ ]Other  [ ]Other organ failure     LABS:                        10.0   19.84 )-----------( 449      ( 10 Mark 2024 07:47 )             32.0   01-10    133<L>  |  99  |  20  ----------------------------<  173<H>  4.1   |  20<L>  |  0.52    Ca    9.1      10 Mark 2024 07:44  Phos  3.6     01-10  Mg     2.0     01-10    TPro  7.9  /  Alb  3.4  /  TBili  0.6  /  DBili  x   /  AST  19  /  ALT  18  /  AlkPhos  133<H>  01-10      Urinalysis Basic - ( 10 Mark 2024 07:44 )    Color: x / Appearance: x / SG: x / pH: x  Gluc: 173 mg/dL / Ketone: x  / Bili: x / Urobili: x   Blood: x / Protein: x / Nitrite: x   Leuk Esterase: x / RBC: x / WBC x   Sq Epi: x / Non Sq Epi: x / Bacteria: x      RADIOLOGY & ADDITIONAL STUDIES:    Protein Calorie Malnutrition Present: [ ]mild [ ]moderate [ ]severe [ ]underweight [ ]morbid obesity  https://www.andeal.org/vault/2440/web/files/ONC/Table_Clinical%20Characteristics%20to%20Document%20Malnutrition-White%20JV%20et%20al%202012.pdf    Height (cm): 167.6 (01-04-24 @ 16:00), 166.5 (09-07-23 @ 10:33), 167.6 (07-10-23 @ 19:56)  Weight (kg): 72.1 (01-04-24 @ 16:00), 67.99 (10-12-23 @ 14:00), 70.3 (07-10-23 @ 19:56)  BMI (kg/m2): 25.7 (01-04-24 @ 16:00), 24.5 (10-12-23 @ 14:00), 25.4 (09-07-23 @ 10:33)    [ ]PPSV2 < or = 30%  [ ]significant weight loss [ ]poor nutritional intake [ ]anasarcaPrealbumin, Serum: 6 mg/dL (01-04-24 @ 04:30)  [ ]Artificial Nutrition    Other REFERRALS:  [ ]Hospice  [ ]Child Life  [ ]Social Work  [ ]Case management [ ]Holistic Therapy     Goals of Care Document:DEMETRIA Cook (12-14-20 @ 13:25)  Goals of Care Conversation:   Participants:  · Participants  Patient    Advance Directives:  · Does patient have Advance Directive  No  · Do you want to complete the HCP and name a Андрей Care Agent  No       · Does Patient Have a Surrogate  No  · Does the Patient have a Court Appointed Guardian ([id number])  No  · Caregiver:  no    Conversation Discussion:  · Conversation  Diagnosis; Prognosis; MOLST Discussed; Treatment Options  · Conversation Details  Provider presented to the bedside to have goals of care conversations. 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 At this time, patient would like to pursue full code. Patient wishes to get CPR and intubation to prolong blood flow from the heart as well as breathing even if it means that patient has to be dependent on a machine to breath. At this time, patient appointed his daughter as the HCP, and form was filled by the patient. HCP form is in the chart.    What Matters Most To Patient and Family:  · What matters most to patient and family  Patient's health and prolongation of his life.    Personal Advance Directives Treatment Guidelines:   Treatment Guidelines:  · Decision Maker  Patient    Location of Discussion:   Location of Discussion:  · Location of discussion  Face to face       Electronic Signatures:  Yulia Cook)  (Signed 14-Dec-2020 13:48)  	Authored: Goals of Care Conversation, Personal Advance Directives Treatment Guidelines, Location of Discussion      Last Updated: 14-Dec-2020 13:48 by Yulia Cook)     Date of Service: 01-10-24 @ 11:20    SUBJECTIVE AND OBJECTIVE:  Indication for Geriatrics and Palliative Care Services/INTERVAL HPI: Symptom management in patient with pancreatic adenocarcinoma c/b gastric perforation, now on TPN.     OVERNIGHT EVENTS: Patient reports sleeping much better overnight and pain was well controlled following initiating the Dilaudid ATC. He reports having pain control for about 4 hours at a time and not feeling overly sleepy. He still has some anxiety mainly when he thinks about the gastric perforation not being repaired. He is hopeful to return home later this week.     DNR on chart:  Allergies    No Known Allergies    Intolerances    MEDICATIONS  (STANDING):  chlorhexidine 4% Liquid 1 Application(s) Topical <User Schedule>  dextrose 10%. 1000 milliLiter(s) (100 mL/Hr) IV Continuous <Continuous>  heparin   Injectable 5000 Unit(s) SubCutaneous every 8 hours  HYDROmorphone  Injectable 0.5 milliGRAM(s) IV Push every 6 hours  insulin lispro (ADMELOG) corrective regimen sliding scale   SubCutaneous every 6 hours  lidocaine   4% Patch 1 Patch Transdermal daily  lipid, fat emulsion (Fish Oil and Plant Based) 20% Infusion 29.2 mL/Hr (29.2 mL/Hr) IV Continuous <Continuous>  meningococcal Group B (BEXSERO) Vaccine 0.5 milliLiter(s) IntraMuscular once  meningococcal Groups A/C/Y and W-135 Vaccine (MENVEO) 0.5 milliLiter(s) IntraMuscular once  octreotide  Injectable 200 MICROGram(s) SubCutaneous every 8 hours  pantoprazole  Injectable 40 milliGRAM(s) IV Push every 12 hours  Parenteral Nutrition - Adult 1 Each TPN Continuous <Continuous>  sodium chloride 0.9%. 1000 milliLiter(s) (30 mL/Hr) IV Continuous <Continuous>    MEDICATIONS  (PRN):  acetylcysteine 20%  Inhalation 4 milliLiter(s) Inhalation four times a day PRN Congestion  albuterol    0.083% 2.5 milliGRAM(s) Nebulizer three times a day PRN Wheezing  naloxone Injectable 0.1 milliGRAM(s) IV Push every 3 minutes PRN Sedation or respiratory depression 2/2 to opioiods  ondansetron Injectable 4 milliGRAM(s) IV Push once PRN Nausea and/or Vomiting  sodium chloride 0.9% lock flush 10 milliLiter(s) IV Push every 1 hour PRN Pre/post blood products, medications, blood draw, and to maintain line patency      ITEMS UNCHECKED ARE NOT PRESENT    PRESENT SYMPTOMS: [ ]Unable to self-report - see [ ] CPOT [ ] PAINADS [ ] RDOS  Source if other than patient:  [ ]Family   [ ]Team     Pain:  [ ]yes [x ]no  QOL impact -   Location -                    Aggravating factors -  Quality -  Radiation -  Timing-  Severity (0-10 scale):  Minimal acceptable level (0-10 scale):     CPOT:    https://www.[facility].org/getattachment/[email or web address]/Critical-Care-Pain-Observation-Tool-(CPOT)    PAINAD Score: See PAINAD tool and score below       Dyspnea:                           [ ]Mild [ ]Moderate [ ]Severe    RDOS: See RDOS tool and score below   0 to 2  minimal or no respiratory distress   3  mild distress  4 to 6 moderate distress  >7 severe distress      Anxiety:                             [ ]Mild [ ]Moderate [ ]Severe  Fatigue:                             [ ]Mild [ ]Moderate [ ]Severe  Nausea:                             [ ]Mild [ ]Moderate [ ]Severe  Loss of appetite:              [ ]Mild [ ]Moderate [ ]Severe  Constipation:                    [ ]Mild [ ]Moderate [ ]Severe    PCSSQ[Palliative Care Spiritual Screening Question]   Severity (0-10):  Score of 4 or > indicate consideration of Chaplaincy referral.  Chaplaincy Referral: [ ] yes [ ] refused [ ] following [ ] Deferred     Caregiver [place]? : [ ] yes [ ] no [ ] Deferred [ ] Declined             Social work referral [ ] Patient & Family Centered Care Referral [ ]     Anticipatory Grief present?:  [ ] yes [ ] no  [ ] Deferred                  Social work referral [ ] Chaplaincy Referral [ ]    		  Other Symptoms:  [ ]All other review of systems negative     Palliative Performance Status Version 2:   See PPSv2 tool and score below         PHYSICAL EXAM:  Vital Signs Last 24 Hrs  T(C): 37 (10 Mark 2024 10:13), Max: 37 (09 Jan 2024 13:44)  T(F): 98.6 (10 Mark 2024 10:13), Max: 98.6 (09 Jan 2024 13:44)  HR: 97 (10 Mark 2024 10:13) (60 - 97)  BP: 100/60 (10 Mark 2024 10:13) (100/60 - 122/70)  BP(mean): --  RR: 18 (10 Mark 2024 10:13) (18 - 18)  SpO2: 96% (10 Mark 2024 10:13) (95% - 98%)    Parameters below as of 10 Mark 2024 10:13  Patient On (Oxygen Delivery Method): room air     I&O's Summary    09 Jan 2024 07:01  -  10 Mark 2024 07:00  --------------------------------------------------------  IN: 0 mL / OUT: 1640 mL / NET: -1640 mL    10 Mark 2024 07:01  -  10 Mark 2024 11:20  --------------------------------------------------------  IN: 0 mL / OUT: 150 mL / NET: -150 mL       GENERAL: [ ]Cachexia    [x ]Alert  [x ]Oriented x  3 [ ]Lethargic  [ ]Unarousable  [ ]Verbal  [ ]Non-Verbal  Behavioral:   [ ]Anxiety  [ ]Delirium [ ]Agitation [ ]Other  HEENT:  [ ]Normal   [ ]Dry mouth   [ ]ET Tube/Trach  [ ]Oral lesions  PULMONARY:   [x ]Clear [ ]Tachypnea  [ ]Audible excessive secretions   [ ]Rhonchi        [ ]Right [ ]Left [ ]Bilateral  [ ]Crackles        [ ]Right [ ]Left [ ]Bilateral  [ ]Wheezing     [ ]Right [ ]Left [ ]Bilateral  [ ]Diminished BS [ ] Right [ ]Left [ ]Bilateral  CARDIOVASCULAR:    [x ]Regular [ ]Irregular [ ]Tachy  [ ]Prabhjot [ ]Murmur [ ]Other  GASTROINTESTINAL:  [x ]Soft  []Distended   [ ]+BS  [x ]Non tender [ ]Tender  [ ]Other [ ]PEG [ ]OGT/ NGT   Last BM:   GENITOURINARY:  [ ]Normal [ ]Incontinent   [ ]Oliguria/Anuria   [ ]Harris  MUSCULOSKELETAL:   [ ]Normal   [ ]Weakness  [ ]Bed/Wheelchair bound [ ]Edema  NEUROLOGIC:   [ ]No focal deficits  [ ] Cognitive impairment  [ ] Dysphagia [ ]Dysarthria [ ] Paresis [ ]Other   SKIN:   [ ]Normal  [ ]Rash  [ ]Other  [ ]Pressure ulcer(s) [ ]y [ ]n present on admission    CRITICAL CARE:  [ ]Shock Present  [ ]Septic [ ]Cardiogenic [ ]Neurologic [ ]Hypovolemic  [ ]Vasopressors [ ]Inotropes  [ ]Respiratory failure present [ ]Mechanical Ventilation [ ]Non-invasive ventilatory support [ ]High-Flow   [ ]Acute  [ ]Chronic [ ]Hypoxic  [ ]Hypercarbic [ ]Other  [ ]Other organ failure     LABS:                        10.0   19.84 )-----------( 449      ( 10 Mark 2024 07:47 )             32.0   01-10    133<L>  |  99  |  20  ----------------------------<  173<H>  4.1   |  20<L>  |  0.52    Ca    9.1      10 Mark 2024 07:44  Phos  3.6     01-10  Mg     2.0     01-10    TPro  7.9  /  Alb  3.4  /  TBili  0.6  /  DBili  x   /  AST  19  /  ALT  18  /  AlkPhos  133<H>  01-10      Urinalysis Basic - ( 10 Mark 2024 07:44 )    Color: x / Appearance: x / SG: x / pH: x  Gluc: 173 mg/dL / Ketone: x  / Bili: x / Urobili: x   Blood: x / Protein: x / Nitrite: x   Leuk Esterase: x / RBC: x / WBC x   Sq Epi: x / Non Sq Epi: x / Bacteria: x      RADIOLOGY & ADDITIONAL STUDIES:    Protein Calorie Malnutrition Present: [ ]mild [ ]moderate [ ]severe [ ]underweight [ ]morbid obesity  https://www.andeal.org/vault/2440/web/files/ONC/Table_Clinical%20Characteristics%20to%20Document%20Malnutrition-White%20JV%20et%20al%202012.pdf    Height (cm): 167.6 (01-04-24 @ 16:00), 166.5 (09-07-23 @ 10:33), 167.6 (07-10-23 @ 19:56)  Weight (kg): 72.1 (01-04-24 @ 16:00), 67.99 (10-12-23 @ 14:00), 70.3 (07-10-23 @ 19:56)  BMI (kg/m2): 25.7 (01-04-24 @ 16:00), 24.5 (10-12-23 @ 14:00), 25.4 (09-07-23 @ 10:33)    [ ]PPSV2 < or = 30%  [ ]significant weight loss [ ]poor nutritional intake [ ]anasarcaPrealbumin, Serum: 6 mg/dL (01-04-24 @ 04:30)  [ ]Artificial Nutrition    Other REFERRALS:  [ ]Hospice  [ ]Child Life  [ ]Social Work  [ ]Case management [ ]Holistic Therapy     Goals of Care Document:DEMETRIA Cook (12-14-20 @ 13:25)  Goals of Care Conversation:   Participants:  · Participants  Patient    Advance Directives:  · Does patient have Advance Directive  No  · Do you want to complete the HCP and name a Андрей Care Agent  No       · Does Patient Have a Surrogate  No  · Does the Patient have a Court Appointed Guardian ([address])  No  · Caregiver:  no    Conversation Discussion:  · Conversation  Diagnosis; Prognosis; MOLST Discussed; Treatment Options  · Conversation Details  Provider presented to the bedside to have goals of care conversations. 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 At this time, patient would like to pursue full code. Patient wishes to get CPR and intubation to prolong blood flow from the heart as well as breathing even if it means that patient has to be dependent on a machine to breath. At this time, patient appointed his daughter as the HCP, and form was filled by the patient. HCP form is in the chart.    What Matters Most To Patient and Family:  · What matters most to patient and family  Patient's health and prolongation of his life.    Personal Advance Directives Treatment Guidelines:   Treatment Guidelines:  · Decision Maker  Patient    Location of Discussion:   Location of Discussion:  · Location of discussion  Face to face       Electronic Signatures:  Yulia Cook)  (Signed 14-Dec-2020 13:48)  	Authored: Goals of Care Conversation, Personal Advance Directives Treatment Guidelines, Location of Discussion      Last Updated: 14-Dec-2020 13:48 by Yulia Cook)     Date of Service: 01-10-24 @ 11:20    SUBJECTIVE AND OBJECTIVE:  Indication for Geriatrics and Palliative Care Services/INTERVAL HPI: Symptom management in patient with pancreatic adenocarcinoma c/b gastric perforation, now on TPN.     OVERNIGHT EVENTS: Patient reports sleeping much better overnight and pain was well controlled following initiating the Dilaudid ATC. He reports having pain control for about 4 hours at a time and not feeling overly sleepy. He still has some anxiety mainly when he thinks about the gastric perforation not being repaired. He is hopeful to return home later this week.     DNR on chart:  Allergies    No Known Allergies    Intolerances    MEDICATIONS  (STANDING):  chlorhexidine 4% Liquid 1 Application(s) Topical <User Schedule>  dextrose 10%. 1000 milliLiter(s) (100 mL/Hr) IV Continuous <Continuous>  heparin   Injectable 5000 Unit(s) SubCutaneous every 8 hours  HYDROmorphone  Injectable 0.5 milliGRAM(s) IV Push every 6 hours  insulin lispro (ADMELOG) corrective regimen sliding scale   SubCutaneous every 6 hours  lidocaine   4% Patch 1 Patch Transdermal daily  lipid, fat emulsion (Fish Oil and Plant Based) 20% Infusion 29.2 mL/Hr (29.2 mL/Hr) IV Continuous <Continuous>  meningococcal Group B (BEXSERO) Vaccine 0.5 milliLiter(s) IntraMuscular once  meningococcal Groups A/C/Y and W-135 Vaccine (MENVEO) 0.5 milliLiter(s) IntraMuscular once  octreotide  Injectable 200 MICROGram(s) SubCutaneous every 8 hours  pantoprazole  Injectable 40 milliGRAM(s) IV Push every 12 hours  Parenteral Nutrition - Adult 1 Each TPN Continuous <Continuous>  sodium chloride 0.9%. 1000 milliLiter(s) (30 mL/Hr) IV Continuous <Continuous>    MEDICATIONS  (PRN):  acetylcysteine 20%  Inhalation 4 milliLiter(s) Inhalation four times a day PRN Congestion  albuterol    0.083% 2.5 milliGRAM(s) Nebulizer three times a day PRN Wheezing  naloxone Injectable 0.1 milliGRAM(s) IV Push every 3 minutes PRN Sedation or respiratory depression 2/2 to opioiods  ondansetron Injectable 4 milliGRAM(s) IV Push once PRN Nausea and/or Vomiting  sodium chloride 0.9% lock flush 10 milliLiter(s) IV Push every 1 hour PRN Pre/post blood products, medications, blood draw, and to maintain line patency      ITEMS UNCHECKED ARE NOT PRESENT    PRESENT SYMPTOMS: [ ]Unable to self-report - see [ ] CPOT [ ] PAINADS [ ] RDOS  Source if other than patient:  [ ]Family   [ ]Team     Pain:  [ ]yes [x ]no  QOL impact -   Location -                    Aggravating factors -  Quality -  Radiation -  Timing-  Severity (0-10 scale):  Minimal acceptable level (0-10 scale):     CPOT:    https://www.[facility].org/getattachment/[email or web address]/Critical-Care-Pain-Observation-Tool-(CPOT)    PAINAD Score: See PAINAD tool and score below       Dyspnea:                           [ ]Mild [ ]Moderate [ ]Severe    RDOS: See RDOS tool and score below   0 to 2  minimal or no respiratory distress   3  mild distress  4 to 6 moderate distress  >7 severe distress      Anxiety:                             [ ]Mild [ ]Moderate [ ]Severe  Fatigue:                             [ ]Mild [ ]Moderate [ ]Severe  Nausea:                             [ ]Mild [ ]Moderate [ ]Severe  Loss of appetite:              [ ]Mild [ ]Moderate [ ]Severe  Constipation:                    [ ]Mild [ ]Moderate [ ]Severe    PCSSQ[Palliative Care Spiritual Screening Question]   Severity (0-10):  Score of 4 or > indicate consideration of Chaplaincy referral.  Chaplaincy Referral: [ ] yes [ ] refused [ ] following [x ] Deferred     Caregiver [place]? : [ ] yes [x ] no [ ] Deferred [ ] Declined             Social work referral [ ] Patient & Family Centered Care Referral [ ]     Anticipatory Grief present?:  [ ] yes [x ] no  [ ] Deferred                  Social work referral [ ] Chaplaincy Referral [ ]    		  Other Symptoms:  [x ]All other review of systems negative     Palliative Performance Status Version 2:   See PPSv2 tool and score below         PHYSICAL EXAM:  Vital Signs Last 24 Hrs  T(C): 37 (10 Mark 2024 10:13), Max: 37 (09 Jan 2024 13:44)  T(F): 98.6 (10 Mark 2024 10:13), Max: 98.6 (09 Jan 2024 13:44)  HR: 97 (10 Mark 2024 10:13) (60 - 97)  BP: 100/60 (10 Mark 2024 10:13) (100/60 - 122/70)  BP(mean): --  RR: 18 (10 Mark 2024 10:13) (18 - 18)  SpO2: 96% (10 Mark 2024 10:13) (95% - 98%)    Parameters below as of 10 Mark 2024 10:13  Patient On (Oxygen Delivery Method): room air     I&O's Summary    09 Jan 2024 07:01  -  10 Mark 2024 07:00  --------------------------------------------------------  IN: 0 mL / OUT: 1640 mL / NET: -1640 mL    10 Mark 2024 07:01  -  10 Mark 2024 11:20  --------------------------------------------------------  IN: 0 mL / OUT: 150 mL / NET: -150 mL       GENERAL: [ ]Cachexia    [x ]Alert  [x ]Oriented x  3 [ ]Lethargic  [ ]Unarousable  [ ]Verbal  [ ]Non-Verbal  Behavioral:   [ ]Anxiety  [ ]Delirium [ ]Agitation [ ]Other  HEENT:  [ ]Normal   [ ]Dry mouth   [ ]ET Tube/Trach  [ ]Oral lesions  PULMONARY:   [x ]Clear [ ]Tachypnea  [ ]Audible excessive secretions   [ ]Rhonchi        [ ]Right [ ]Left [ ]Bilateral  [ ]Crackles        [ ]Right [ ]Left [ ]Bilateral  [ ]Wheezing     [ ]Right [ ]Left [ ]Bilateral  [ ]Diminished BS [ ] Right [ ]Left [ ]Bilateral  CARDIOVASCULAR:    [x ]Regular [ ]Irregular [ ]Tachy  [ ]Prabhjot [ ]Murmur [ ]Other  GASTROINTESTINAL:  [x ]Soft  []Distended   [ ]+BS  [x ]Non tender [ ]Tender  [ ]Other [ ]PEG [ ]OGT/ NGT   Last BM: 1/9/2024  [x] Left sided abdominal drain.   GENITOURINARY:  [x ]Normal [ ]Incontinent   [ ]Oliguria/Anuria   [ ]Harris  MUSCULOSKELETAL:   [ ]Normal   [x ]Weakness  [ ]Bed/Wheelchair bound [ ]Edema  NEUROLOGIC:   [x ]No focal deficits  [ ] Cognitive impairment  [ ] Dysphagia [ ]Dysarthria [ ] Paresis [ ]Other   SKIN:   [x ]Normal  [ ]Rash  [ ]Other  [ ]Pressure ulcer(s) [ ]y [ ]n present on admission    CRITICAL CARE:  [ ]Shock Present  [ ]Septic [ ]Cardiogenic [ ]Neurologic [ ]Hypovolemic  [ ]Vasopressors [ ]Inotropes  [ ]Respiratory failure present [ ]Mechanical Ventilation [ ]Non-invasive ventilatory support [ ]High-Flow   [ ]Acute  [ ]Chronic [ ]Hypoxic  [ ]Hypercarbic [ ]Other  [ ]Other organ failure     LABS:                        10.0   19.84 )-----------( 449      ( 10 Mark 2024 07:47 )             32.0   01-10    133<L>  |  99  |  20  ----------------------------<  173<H>  4.1   |  20<L>  |  0.52    Ca    9.1      10 Mark 2024 07:44  Phos  3.6     01-10  Mg     2.0     01-10    TPro  7.9  /  Alb  3.4  /  TBili  0.6  /  DBili  x   /  AST  19  /  ALT  18  /  AlkPhos  133<H>  01-10      Urinalysis Basic - ( 10 Mark 2024 07:44 )    Color: x / Appearance: x / SG: x / pH: x  Gluc: 173 mg/dL / Ketone: x  / Bili: x / Urobili: x   Blood: x / Protein: x / Nitrite: x   Leuk Esterase: x / RBC: x / WBC x   Sq Epi: x / Non Sq Epi: x / Bacteria: x      RADIOLOGY & ADDITIONAL STUDIES:  There are not new studies to review   Protein Calorie Malnutrition Present: [ ]mild [ ]moderate [ ]severe [ ]underweight [ ]morbid obesity  https://www.andeal.org/vault/5370/web/files/ONC/Table_Clinical%20Characteristics%20to%20Document%20Malnutrition-White%20JV%20et%20al%202012.pdf    Height (cm): 167.6 (01-04-24 @ 16:00), 166.5 (09-07-23 @ 10:33), 167.6 (07-10-23 @ 19:56)  Weight (kg): 72.1 (01-04-24 @ 16:00), 67.99 (10-12-23 @ 14:00), 70.3 (07-10-23 @ 19:56)  BMI (kg/m2): 25.7 (01-04-24 @ 16:00), 24.5 (10-12-23 @ 14:00), 25.4 (09-07-23 @ 10:33)    [ ]PPSV2 < or = 30%  [ ]significant weight loss [ ]poor nutritional intake [ ]anasarcaPrealbumin, Serum: 6 mg/dL (01-04-24 @ 04:30)  [ ]Artificial Nutrition    Other REFERRALS:  [ ]Hospice  [ ]Child Life  [ ]Social Work  [ ]Case management [ ]Holistic Therapy     Goals of Care Document:DEMETRIA Cook (12-14-20 @ 13:25)  Goals of Care Conversation:   Participants:  · Participants  Patient    Advance Directives:  · Does patient have Advance Directive  No  · Do you want to complete the HCP and name a Андрей Care Agent  No       · Does Patient Have a Surrogate  No  · Does the Patient have a Court Appointed Guardian ([address])  No  · Caregiver:  no    Conversation Discussion:  · Conversation  Diagnosis; Prognosis; MOLST Discussed; Treatment Options  · Conversation Details  Provider presented to the bedside to have goals of care conversations. 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 At this time, patient would like to pursue full code. Patient wishes to get CPR and intubation to prolong blood flow from the heart as well as breathing even if it means that patient has to be dependent on a machine to breath. At this time, patient appointed his daughter as the HCP, and form was filled by the patient. HCP form is in the chart.    What Matters Most To Patient and Family:  · What matters most to patient and family  Patient's health and prolongation of his life.    Personal Advance Directives Treatment Guidelines:   Treatment Guidelines:  · Decision Maker  Patient    Location of Discussion:   Location of Discussion:  · Location of discussion  Face to face       Electronic Signatures:  Yulia Cook)  (Signed 14-Dec-2020 13:48)  	Authored: Goals of Care Conversation, Personal Advance Directives Treatment Guidelines, Location of Discussion      Last Updated: 14-Dec-2020 13:48 by Yulia Cook)     Date of Service: 01-10-24 @ 11:20    SUBJECTIVE AND OBJECTIVE:  Indication for Geriatrics and Palliative Care Services/INTERVAL HPI: Symptom management in patient with pancreatic adenocarcinoma c/b gastric perforation, now on TPN.     OVERNIGHT EVENTS: Patient reports sleeping much better overnight and pain was well controlled following initiating the Dilaudid ATC. He reports having pain control for about 4 hours at a time and not feeling overly sleepy. He still has some anxiety mainly when he thinks about the gastric perforation not being repaired. He is hopeful to return home later this week.     DNR on chart:  Allergies    No Known Allergies    Intolerances    MEDICATIONS  (STANDING):  chlorhexidine 4% Liquid 1 Application(s) Topical <User Schedule>  dextrose 10%. 1000 milliLiter(s) (100 mL/Hr) IV Continuous <Continuous>  heparin   Injectable 5000 Unit(s) SubCutaneous every 8 hours  HYDROmorphone  Injectable 0.5 milliGRAM(s) IV Push every 6 hours  insulin lispro (ADMELOG) corrective regimen sliding scale   SubCutaneous every 6 hours  lidocaine   4% Patch 1 Patch Transdermal daily  lipid, fat emulsion (Fish Oil and Plant Based) 20% Infusion 29.2 mL/Hr (29.2 mL/Hr) IV Continuous <Continuous>  meningococcal Group B (BEXSERO) Vaccine 0.5 milliLiter(s) IntraMuscular once  meningococcal Groups A/C/Y and W-135 Vaccine (MENVEO) 0.5 milliLiter(s) IntraMuscular once  octreotide  Injectable 200 MICROGram(s) SubCutaneous every 8 hours  pantoprazole  Injectable 40 milliGRAM(s) IV Push every 12 hours  Parenteral Nutrition - Adult 1 Each TPN Continuous <Continuous>  sodium chloride 0.9%. 1000 milliLiter(s) (30 mL/Hr) IV Continuous <Continuous>    MEDICATIONS  (PRN):  acetylcysteine 20%  Inhalation 4 milliLiter(s) Inhalation four times a day PRN Congestion  albuterol    0.083% 2.5 milliGRAM(s) Nebulizer three times a day PRN Wheezing  naloxone Injectable 0.1 milliGRAM(s) IV Push every 3 minutes PRN Sedation or respiratory depression 2/2 to opioiods  ondansetron Injectable 4 milliGRAM(s) IV Push once PRN Nausea and/or Vomiting  sodium chloride 0.9% lock flush 10 milliLiter(s) IV Push every 1 hour PRN Pre/post blood products, medications, blood draw, and to maintain line patency      ITEMS UNCHECKED ARE NOT PRESENT    PRESENT SYMPTOMS: [ ]Unable to self-report - see [ ] CPOT [ ] PAINADS [ ] RDOS  Source if other than patient:  [ ]Family   [ ]Team     Pain:  [ ]yes [x ]no  QOL impact -   Location -                    Aggravating factors -  Quality -  Radiation -  Timing-  Severity (0-10 scale):  Minimal acceptable level (0-10 scale):     CPOT:    https://www.[facility].org/getattachment/[email or web address]/Critical-Care-Pain-Observation-Tool-(CPOT)    PAINAD Score: See PAINAD tool and score below       Dyspnea:                           [ ]Mild [ ]Moderate [ ]Severe    RDOS: See RDOS tool and score below   0 to 2  minimal or no respiratory distress   3  mild distress  4 to 6 moderate distress  >7 severe distress      Anxiety:                             [ ]Mild [ ]Moderate [ ]Severe  Fatigue:                             [ ]Mild [ ]Moderate [ ]Severe  Nausea:                             [ ]Mild [ ]Moderate [ ]Severe  Loss of appetite:              [ ]Mild [ ]Moderate [ ]Severe  Constipation:                    [ ]Mild [ ]Moderate [ ]Severe    PCSSQ[Palliative Care Spiritual Screening Question]   Severity (0-10):  Score of 4 or > indicate consideration of Chaplaincy referral.  Chaplaincy Referral: [ ] yes [ ] refused [ ] following [x ] Deferred     Caregiver [place]? : [ ] yes [x ] no [ ] Deferred [ ] Declined             Social work referral [ ] Patient & Family Centered Care Referral [ ]     Anticipatory Grief present?:  [ ] yes [x ] no  [ ] Deferred                  Social work referral [ ] Chaplaincy Referral [ ]    		  Other Symptoms:  [x ]All other review of systems negative     Palliative Performance Status Version 2:   See PPSv2 tool and score below         PHYSICAL EXAM:  Vital Signs Last 24 Hrs  T(C): 37 (10 Mark 2024 10:13), Max: 37 (09 Jan 2024 13:44)  T(F): 98.6 (10 Mark 2024 10:13), Max: 98.6 (09 Jan 2024 13:44)  HR: 97 (10 Mark 2024 10:13) (60 - 97)  BP: 100/60 (10 Mark 2024 10:13) (100/60 - 122/70)  BP(mean): --  RR: 18 (10 Mark 2024 10:13) (18 - 18)  SpO2: 96% (10 Mark 2024 10:13) (95% - 98%)    Parameters below as of 10 Mark 2024 10:13  Patient On (Oxygen Delivery Method): room air     I&O's Summary    09 Jan 2024 07:01  -  10 Mark 2024 07:00  --------------------------------------------------------  IN: 0 mL / OUT: 1640 mL / NET: -1640 mL    10 Mark 2024 07:01  -  10 Mark 2024 11:20  --------------------------------------------------------  IN: 0 mL / OUT: 150 mL / NET: -150 mL       GENERAL: [ ]Cachexia    [x ]Alert  [x ]Oriented x  3 [ ]Lethargic  [ ]Unarousable  [ ]Verbal  [ ]Non-Verbal  Behavioral:   [ ]Anxiety  [ ]Delirium [ ]Agitation [ ]Other  HEENT:  [ ]Normal   [ ]Dry mouth   [ ]ET Tube/Trach  [ ]Oral lesions  PULMONARY:   [x ]Clear [ ]Tachypnea  [ ]Audible excessive secretions   [ ]Rhonchi        [ ]Right [ ]Left [ ]Bilateral  [ ]Crackles        [ ]Right [ ]Left [ ]Bilateral  [ ]Wheezing     [ ]Right [ ]Left [ ]Bilateral  [ ]Diminished BS [ ] Right [ ]Left [ ]Bilateral  CARDIOVASCULAR:    [x ]Regular [ ]Irregular [ ]Tachy  [ ]Prabhjot [ ]Murmur [ ]Other  GASTROINTESTINAL:  [x ]Soft  []Distended   [ ]+BS  [x ]Non tender [ ]Tender  [ ]Other [ ]PEG [ ]OGT/ NGT   Last BM: 1/9/2024  [x] Left sided abdominal drain.   GENITOURINARY:  [x ]Normal [ ]Incontinent   [ ]Oliguria/Anuria   [ ]Harris  MUSCULOSKELETAL:   [ ]Normal   [x ]Weakness  [ ]Bed/Wheelchair bound [ ]Edema  NEUROLOGIC:   [x ]No focal deficits  [ ] Cognitive impairment  [ ] Dysphagia [ ]Dysarthria [ ] Paresis [ ]Other   SKIN:   [x ]Normal  [ ]Rash  [ ]Other  [ ]Pressure ulcer(s) [ ]y [ ]n present on admission    CRITICAL CARE:  [ ]Shock Present  [ ]Septic [ ]Cardiogenic [ ]Neurologic [ ]Hypovolemic  [ ]Vasopressors [ ]Inotropes  [ ]Respiratory failure present [ ]Mechanical Ventilation [ ]Non-invasive ventilatory support [ ]High-Flow   [ ]Acute  [ ]Chronic [ ]Hypoxic  [ ]Hypercarbic [ ]Other  [ ]Other organ failure     LABS:                        10.0   19.84 )-----------( 449      ( 10 Mark 2024 07:47 )             32.0   01-10    133<L>  |  99  |  20  ----------------------------<  173<H>  4.1   |  20<L>  |  0.52    Ca    9.1      10 Mark 2024 07:44  Phos  3.6     01-10  Mg     2.0     01-10    TPro  7.9  /  Alb  3.4  /  TBili  0.6  /  DBili  x   /  AST  19  /  ALT  18  /  AlkPhos  133<H>  01-10      Urinalysis Basic - ( 10 Mark 2024 07:44 )    Color: x / Appearance: x / SG: x / pH: x  Gluc: 173 mg/dL / Ketone: x  / Bili: x / Urobili: x   Blood: x / Protein: x / Nitrite: x   Leuk Esterase: x / RBC: x / WBC x   Sq Epi: x / Non Sq Epi: x / Bacteria: x      RADIOLOGY & ADDITIONAL STUDIES:  There are not new studies to review   Protein Calorie Malnutrition Present: [ ]mild [ ]moderate [ ]severe [ ]underweight [ ]morbid obesity  https://www.andeal.org/vault/2090/web/files/ONC/Table_Clinical%20Characteristics%20to%20Document%20Malnutrition-White%20JV%20et%20al%202012.pdf    Height (cm): 167.6 (01-04-24 @ 16:00), 166.5 (09-07-23 @ 10:33), 167.6 (07-10-23 @ 19:56)  Weight (kg): 72.1 (01-04-24 @ 16:00), 67.99 (10-12-23 @ 14:00), 70.3 (07-10-23 @ 19:56)  BMI (kg/m2): 25.7 (01-04-24 @ 16:00), 24.5 (10-12-23 @ 14:00), 25.4 (09-07-23 @ 10:33)    [ ]PPSV2 < or = 30%  [ ]significant weight loss [ ]poor nutritional intake [ ]anasarcaPrealbumin, Serum: 6 mg/dL (01-04-24 @ 04:30)  [ ]Artificial Nutrition    Other REFERRALS:  [ ]Hospice  [ ]Child Life  [ ]Social Work  [ ]Case management [ ]Holistic Therapy     Goals of Care Document:DEMETRIA Cook (12-14-20 @ 13:25)  Goals of Care Conversation:   Participants:  · Participants  Patient    Advance Directives:  · Does patient have Advance Directive  No  · Do you want to complete the HCP and name a Андрей Care Agent  No       · Does Patient Have a Surrogate  No  · Does the Patient have a Court Appointed Guardian ([address])  No  · Caregiver:  no    Conversation Discussion:  · Conversation  Diagnosis; Prognosis; MOLST Discussed; Treatment Options  · Conversation Details  Provider presented to the bedside to have goals of care conversations. 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 At this time, patient would like to pursue full code. Patient wishes to get CPR and intubation to prolong blood flow from the heart as well as breathing even if it means that patient has to be dependent on a machine to breath. At this time, patient appointed his daughter as the HCP, and form was filled by the patient. HCP form is in the chart.    What Matters Most To Patient and Family:  · What matters most to patient and family  Patient's health and prolongation of his life.    Personal Advance Directives Treatment Guidelines:   Treatment Guidelines:  · Decision Maker  Patient    Location of Discussion:   Location of Discussion:  · Location of discussion  Face to face       Electronic Signatures:  Yulia Cook)  (Signed 14-Dec-2020 13:48)  	Authored: Goals of Care Conversation, Personal Advance Directives Treatment Guidelines, Location of Discussion      Last Updated: 14-Dec-2020 13:48 by Yulia Cook)

## 2024-11-12 NOTE — DIETITIAN INITIAL EVALUATION ADULT - PROBLEM SELECTOR PLAN 4
No bloody stool, no melena; was previously with vaginal bleeding but went to ObGyn who told her she has an infection and that everything is okay; no vaginal bleeding currently either  - Monitor Hgb

## 2024-11-12 NOTE — PROGRESS NOTE ADULT - PROBLEM SELECTOR PLAN 1
Rt leg pain with knee swelling s/p accidental bending during transport  hx of bedbound/wheelchair bound for last 7 months; not ambulatory  Xray Right femur: Spiral fracture of the distal femur extending to the intercondylar region.   Ortho following: No acute intervention. D/w patient's daughter who is in agreement of conservative management  Pain management following: d/c IV morphine and start on oxycodone 2.5mg PO q4hrs PRN for severe pain and tylenol 1G PO q8hrs x3 days for moderate pain  IV toradol for pain exacerbation  Ortho replacing knee immobilizer today (11/11)  PT recommended Home PT (NWB to RLE with knee immobilizer)  Pain management consulted  Palliative consulted, patient is full code and requesting house call Rt leg pain with knee swelling s/p accidental bending during transport  hx of bedbound/wheelchair bound for last 7 months; not ambulatory  Xray Right femur: Spiral fracture of the distal femur extending to the intercondylar region.   Ortho following: No acute intervention. D/w patient's daughter who is in agreement of conservative management  Pain mgmt: c/w on oxycodone 2.5mg PO q4hrs PRN for severe pain and tylenol 1G PO q8hrs x3 days for moderate pain.  -added acetaminophen 1000mg and increased to gabapentin 200mg for uncontrolled pain  Ortho replaced knee immobilizer (11/11)  PT recommended Home PT (NWB to RLE with knee immobilizer)  Palliative consulted, patient is full code and requesting house call-->house call denied due to insurance Rt leg pain with knee swelling s/p accidental bending during transport  hx of bedbound/wheelchair bound for last 7 months; not ambulatory  Xray Right femur: Spiral fracture of the distal femur extending to the intercondylar region.   Ortho following: No acute intervention. D/w patient's daughter who is in agreement of conservative management  Pain mgmt: c/w on oxycodone 2.5mg PO q4hrs PRN for severe pain and tylenol 1G PO q8hrs x3 days for moderate pain.  -added acetaminophen 1000mg and increased to gabapentin 200mg for uncontrolled pain  Ortho replaced knee immobilizer (11/11)  PT recommended Home PT (NWB to RLE with knee immobilizer)  Palliative consulted, patient is full code and requesting house call-->house call denied due to insurance  will follow up alternative measures for house calls

## 2024-11-12 NOTE — CHART NOTE - NSCHARTNOTEFT_GEN_A_CORE
D/w Dr. Mehta regarding GOC. GOC are clear at this time. No interest in hospice. Plan to d/c home with services.  Palliative care will sign off. Please reconsult if needed. D/w Dr. Mehta regarding GOC. GOC are clear at this time. FULL CODE. No interest in hospice. Plan to d/c home with services.  Palliative care will sign off. Please reconsult if needed.

## 2024-11-12 NOTE — PROGRESS NOTE ADULT - PROBLEM SELECTOR PLAN 4
No bloody stool, no melena; was previously with vaginal bleeding but went to ObGyn who told her she has an infection and that everything is okay; no vaginal bleeding currently either  -no further episodes of bleeding/no active signs of bleeding  -hgb stable 8.2  - Monitor Hgb No bloody stool, no melena, or vaginal bleeding  -no active signs of bleeding  -hgb stable 8.2  - Monitor Hgb

## 2024-11-12 NOTE — DIETITIAN INITIAL EVALUATION ADULT - PERTINENT LABORATORY DATA
11-12    145  |  117[H]  |  19[H]  ----------------------------<  136[H]  4.2   |  26  |  x     Ca    8.5      12 Nov 2024 09:51  Phos  3.3     11-12  Mg     2.3     11-12    TPro  x   /  Alb  2.1[L]  /  TBili  x   /  DBili  x   /  AST  x   /  ALT  x   /  AlkPhos  x   11-12  A1C with Estimated Average Glucose Result: 5.5 % (03-01-24 @ 12:10)

## 2024-11-12 NOTE — PROGRESS NOTE ADULT - ASSESSMENT
A/P:  Maria Reyesdejimenez is a 89 yo bedbound/wheelchair bound woman with PMH significant for HTN, HLD, sacral pressure ulcer, dementia, anxiety, seizures who presents with R knee pain, swelling found to have R distal femur fracture.    #R distal femur fracture  #HTN  #HLD  #Dementia  #Sacral pressure ulcers  -ortho consulted, conservative management, no surgical intervention at this time  -PT , NWB to RLE with knee immobilizer  -pain management  consulted, continue oxycodone to 5 mg q4h PRN, monitor for respiratory depression  -RTC tylenol  -give one time dose IV toradol, monitor drug for renal toxicity  -continue atorvastatin, lisinopril, lexapro, zonisamide  -palliative care consult for GOC discussion  -history obtained from daughter  Labs reviewed:                               8.2    6.73  )-----------( [id number]      ( 08 Nov 2024 05:25 )             24.9   11-09    143  |  115[H]  |  28[H]  ----------------------------<  114[H]  3.6   |  24  |  0.52    Ca    8.4      09 Nov 2024 06:00  Phos  3.4     11-09  Mg     2.3     11-09    TPro  6.2  /  Alb  2.2[L]  /  TBili  0.5  /  DBili  x   /  AST  8[L]  /  ALT  10  /  AlkPhos  72  11-09     Imaging reviewed:     IMPRESSION:    Right femur: Spiral fracture of the distal femur extending to the   intercondylar region. Osteopenia. Follow-up suggested. Regional vascular   calcifications present. 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    Right knee: Exam is limited due to fracture spiral of the distal third of   the femur extending to the condylar region. Edematous changes noted.   Regional vascular calcifications prominent.    Right tibia-fibula: Distal right femur fracture seen better on   accompanying exams. No tibia or fibula fracture. Regional vascular   calcifications present. Osteopenia.        Total Time Spent 50 minutes  This includes chart review, patient assessment, discussion and collaboration with interdisciplinary team members, excluding ACP.     
Pt is a 89 y/o F with PMHx of HTN, HLD, anxiety, dementia (AAO1-2 at baseline), anxiety, seizures, who is wheelchair/bedbound for the past 7 months, sacral pressure ulcer who presented to the ER due to right knee pain/swelling. Found to have spiral fracture of rt femur. Patient was seen by orhto who advised conservative management. Pt admitted to medicine for right femur fracture management.  
Search Terms: Maria Reyesdejimenez, 02/07/1933Search Date: 11/06/2024 09:26:43 AM  The Drug Utilization Report below displays all of the controlled substance prescriptions, if any, that your patient has filled in the last twelve months. The information displayed on this report is compiled from pharmacy submissions to the Department, and accurately reflects the information as submitted by the pharmacies.    This report was requested by: Pamela Corona | Reference #: [id number]    There are no results for the search terms that you entered.
Search Terms: Maria Reyesdejimenez, 02/07/1933Search Date: 11/06/2024 09:26:43 AM  The Drug Utilization Report below displays all of the controlled substance prescriptions, if any, that your patient has filled in the last twelve months. The information displayed on this report is compiled from pharmacy submissions to the Department, and accurately reflects the information as submitted by the pharmacies.    This report was requested by: Pamela Corona | Reference #: [id number]    There are no results for the search terms that you entered.
Search Terms: Maria Reyesdejimenez, 02/07/1933Search Date: 11/06/2024 09:26:43 AM  The Drug Utilization Report below displays all of the controlled substance prescriptions, if any, that your patient has filled in the last twelve months. The information displayed on this report is compiled from pharmacy submissions to the Department, and accurately reflects the information as submitted by the pharmacies.    This report was requested by: Pamela Corona | Reference #: [id number]    There are no results for the search terms that you entered.
Pt is a 89 y/o F with PMHx of HTN, HLD, anxiety, dementia (AAO1-2 at baseline), anxiety, seizures, who is wheelchair/bedbound for the past 7 months, sacral pressure ulcer who presented to the ER due to right knee pain/swelling. Found to have spiral fracture of rt femur. Patient was seen by orhto who advised conservative management. Pt admitted to medicine for right femur fracture management.  
Pt is a 89 y/o F with PMHx of HTN, HLD, anxiety, dementia (AAO1-2 at baseline), anxiety, seizures, who is wheelchair/bedbound for the past 7 months, sacral pressure ulcer who presented to the ER due to right knee pain/swelling. Found to have spiral fracture of rt femur. Patient was seen by orhto who advised conservative management. Pt admitted to medicine for right femur fracture management.  
Pt is a 89 y/o F with PMHx of HTN, HLD, anxiety, dementia (AAO1-2 at baseline), anxiety, seizures, who is wheelchair/bedbound for the past 7 months, sacral pressure ulcer who presented to the ER due to right knee pain/swelling. Found to have spiral fracture of rt femur. Patient was seen by orhto who advised conservative management. Pt admitted to medicine for right femur fracture management.  
Pt is a 89 y/o F with PMHx of HTN, HLD, anxiety, dementia (AAO1-2 at baseline), anxiety, seizures, who is wheelchair/bedbound for the past 7 months, sacral pressure ulcer who presented to the ER due to right knee pain/swelling. Found to have spiral fracture of rt femur. Patient was seen by orhto who advised conservative management with pain control. Pt admitted to medicine for right femur fracture management.

## 2024-11-12 NOTE — PROGRESS NOTE ADULT - PROBLEM SELECTOR PLAN 1
Pt with acute right hip pain which is somatic in nature due to femur fracture. Per ortho- not a surgical candidate.    X-ray right femur demonstrates spiral fracture of the distal femur extending to the intercondylar region. Osteopenia. Regional vascular calcifications present. 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   Opioid pain recommendations   - Oxycodone 2.5/5 mg PO q4h PRN moderate/ severe pain. Monitor for respiratory depression/sedation   Non-opioid pain recommendations   - Tylenol 1G PO q8h x 3 days, then PRN moderate pain.   - Gabapentin 200mg PO at bedtime per medicine team.   Bowel Regimen  - Continue Miralax 17G PO daily.   - Continue Senna 2 tablets at bedtime for constipation  Mild pain (pain score 1-3)  - Non-pharmacological pain treatment recommendations  - Warm/ Cool packs PRN   - Repositioning extremity, guided imagery, relaxation, distraction.  - Physical therapy OOB if no contraindications   Recommendations discussed with primary team and RN. Consider Housecalls for pain management outpatient as pt is bedbound. Pain team will sign off at this time, please reconsult if needed.

## 2024-11-12 NOTE — PROGRESS NOTE ADULT - PROVIDER SPECIALTY LIST ADULT
Internal Medicine
Orthopedics
Pain Medicine
Internal Medicine
Internal Medicine
Pain Medicine
Pain Medicine

## 2024-11-12 NOTE — PROGRESS NOTE ADULT - ATTENDING COMMENTS
Patient seen and examined with Daughter at bedside able to speak in English and understand and able to translate     89 y/o F with PMHx of HTN, HLD, anxiety, dementia (AAO1-2 at baseline), anxiety, seizures, who is wheelchair/bedbound for the past 7 months, sacral pressure ulcer who presented to the ER due to right knee pain/swelling. Found to have spiral fracture of rt femur. Patient was seen by orhto who advised conservative management with pain control. Pt admitted to medicine for right femur fracture and pain management and discharge plan    Patient comfortably lying in bed in no acute distress but does have pain with movement of right leg; of note also reports pain to multiple body areas arms and chest wall pain on asking; has underlying dementia; eating well.   Daughter reported patient fell 4-5 months ago and had fracture of sternum/ ribs and since then     Vital Signs Last 24 Hrs  T(C): 37.2 (12 Nov 2024 14:36), Max: 37.2 (12 Nov 2024 14:36)  T(F): 98.9 (12 Nov 2024 14:36), Max: 98.9 (12 Nov 2024 14:36)  HR: 78 (12 Nov 2024 14:36) (75 - 80)  BP: 148/66 (12 Nov 2024 14:36) (148/66 - 154/70)  RR: 17 (12 Nov 2024 14:36) (16 - 17)  SpO2: 96% (12 Nov 2024 14:36) (95% - 96%)room air      PE:  Gen: Orientedx2, alert, No acute   Neuro: Awake, alert,Psych: normal affect, insight  CVS: S1, S2, RRR; no murmur,   Pulm: BLAE+, no rales rhonchi,  no wheezes   Chest: nontender, no chest deformity, chest movement symmetrical   Gl: abdomen soft, nontender, nondistended, bowel sounds normoacti  Extr: RLE with dressing and immobilizer, RLE edematous Skin: no skin lesions,       Assessment and Plan:  Maria Reyesdejimenez is a 89 yo bedbound/wheelchair bound woman with PMH significant for HTN, HLD, sacral pressure ulcer, dementia, anxiety, seizures who presents with R knee pain, swelling found to have R distal femur fracture.  #R distal femur fracture  #HTN  #HLD  #Dementia  #Hypokalemia  #Sacral pressure ulcers  -ortho consulted, conservative management, no surgical intervention at this time  -PT , NWB to RLE with knee immobilizer, ortho will replace immobilizer today  -pain management  consulted, continue oxycodone to 5 mg q4h PRN, monitor for respiratory depression  -palliative care consulted for [facility], patient is full code, family interested in house call coming to the house  -continue atorvastatin, lisinopril, lexapro, zonisamide  -history obtained from daughter  -give K supplement, monitor K level for toxicity  -CTAB obtained overnight per daughter's concern of abd distention, swelling, no obstruction on imaging.  Question of cystitis, patient denies urinary symptoms.    Labs reviewed:                             8.2    5.96  )-----------( 458      ( 11 Nov 2024 06:17 )             25.4   11-11    144  |  112[H]  |  24[H]  ----------------------------<  111[H]  3.3[L]   |  25  |  0.44[L]    Ca    8.7      11 Nov 2024 06:17  Phos  3.2     11-11  Mg     2.4     11-11         Imaging reviewed:    IMPRESSION:    Right femur: Spiral fracture of the distal femur extending to the  intercondylar region. Osteopenia. Follow-up suggested. Regional vascular  calcifications present. 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    Right knee: Exam is limited due to fracture spiral of the distal third of  the femur extending to the condylar region. Edematous changes noted.  Regional vascular calcifications prominent.    Right tibia-fibula: Distal right femur fracture seen better on  accompanying exams. No tibia or fibula fracture. Regional vascular  calcifications present. Osteopenia.    Total Time Spent 50  minutes  This includes chart review, patient assessment, discussion and collaboration with interdisciplinary team members, excluding ACP.

## 2024-11-12 NOTE — PROGRESS NOTE ADULT - PROBLEM SELECTOR PLAN 2
wheelchair bound 7 mo ago  aaox1-2 at baseline  HHA 10 hours daily wheelchair bound for 7 months  aaox1-2 at baseline  HHA 10 hours daily

## 2024-11-12 NOTE — DIETITIAN INITIAL EVALUATION ADULT - PROBLEM SELECTOR PLAN 1
- p/w R knee pain 2/2 injury on Sunday  - bedbound/wheelchair bound for the past 7 months; no longer ambulatory  - pt was told 4 years ago she is too high a surgical risk for hip surgery  -p/w 10 /10 severe pain in ED    - Consult Ortho in AM  - Consult Pain Medicine  - Follow up x-rays official read  - Tylenol prn for mild pain, IV Toradol prn for moderate pain, IV Morphine prn for severe pain

## 2024-11-12 NOTE — DIETITIAN INITIAL EVALUATION ADULT - ORAL INTAKE PTA/DIET HISTORY
Pt is from home, A&Ox1-2 at baseline and with confusion, Citizen of [place] speaking; pt sleeping at time of visit. Pt's daughter is at bedside and able to participate in nutritional interview. Language line utilized for  services [place] [id number]. H/o seizures, dementia, HTN, HLD; admitted for right femur fracture. Pt's daughter reports that pt is with good appetite PTA and in-house consuming % of meals as per flowsheet. Unable to recall any weight changes at this time. No chewing/ swallowing issues reported. No food preferences at this time. No known food allergies reported. Reports episode of diarrhea today; nutrition education provided.

## 2024-11-12 NOTE — DIETITIAN INITIAL EVALUATION ADULT - PERTINENT MEDS FT
MEDICATIONS  (STANDING):  acetaminophen     Tablet .. 1000 milliGRAM(s) Oral every 6 hours  atorvastatin 20 milliGRAM(s) Oral at bedtime  escitalopram 10 milliGRAM(s) Oral daily  gabapentin 200 milliGRAM(s) Oral at bedtime  heparin   Injectable 5000 Unit(s) SubCutaneous every 8 hours  lisinopril 20 milliGRAM(s) Oral daily  naloxone Injectable 0.4 milliGRAM(s) IV Push once  polyethylene glycol 3350 17 Gram(s) Oral daily  senna 2 Tablet(s) Oral at bedtime  zonisamide 50 milliGRAM(s) Oral daily    MEDICATIONS  (PRN):  oxyCODONE    IR 2.5 milliGRAM(s) Oral every 4 hours PRN Moderate Pain (4 - 6)  oxyCODONE    IR 5 milliGRAM(s) Oral every 4 hours PRN Severe Pain (7 - 10)

## 2024-11-12 NOTE — PROGRESS NOTE ADULT - SUBJECTIVE AND OBJECTIVE BOX
Source of information: MARIA REYESDEJIMENEZ, Chart review  Patient language: Albanian  : # [id number]     HPI:  Pt is a 89 y/o F with PMHx of HTN, HLD, anxiety, dementia (AAO1-2 at baseline), anxiety, seizures, who is wheelchair/bedbound, sacral pressure ulcer who presented to the ER due to right knee pain/swelling. Per daughter at bedside who provided history, on Sunday, patient/s family was moving the patient with a device to lift patient from bed to wheelchair but patient's leg got stuck and bent in machine.The family straightened the leg and put the patient in the wheelchair and brought her to [employer]. Once home, pt began to complain of new onset R knee and thigh pain. Pain was severe and patient was unable to sleep and continued to scream in pain. On Monday, family noticed increased swelling and mild erythema on R knee which prompted them to come to the ED. Patient has been wheelchair/bedbound for the past 7 months. In the past she used to walk with a walker but could not walk more than a block without having to sit down. She cannot climb stairs due to hip problems. She went to a doctor 4 years ago but was told she is too high a risk for surgery to fix her hips. Daughter does endorse decreased PO intake and generalized weakness since Sunday. Pt admitted to medicine s/p injury to right knee.  (06 Nov 2024 06:53)    Pt is admitted for right femur fracture. Xray right femur demonstrates spiral fracture of the distal femur extending to the intercondylar region. Osteopenia. Regional vascular calcifications present. 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 Per orho- not a surgical candidate.   Pain consulted for management of acute right lower extremity pain 11/6. Per daughter, on Moshe 11/3 while transferring from bed to wheelchair, RLE got caught in the transfer device causing immediate pain to the patient. Patient transfer to wheelchair was completed however daughter reports patient's RLE became swollen and painful following the incident which prompted ED arrival.   Pt seen and examined at bedside, this afternoon. Daughter at bedside. Questions and concerns addressed. Realistic pain expectations discussed. At time of assessment, patient sleeping in bed in NAD, daughter Nadia at bedside. Awakened pt, patient unable to verbalize current pain score SCALE USED: (1-10 VNRS). Pt is A&Ox1-2 and is a poor historian, unable to describe current pain. However, facial grimacing when right hip gently palpated. Pt tolerating PO diet. Reports last BM 11/12. Patient is wheelchair bound at baseline.    PAST MEDICAL & SURGICAL HISTORY:  HLD (hyperlipidemia)    HTN (hypertension)    Dementia    Seizures    No significant past surgical history    FAMILY HISTORY:    Social History:  lives with family  has HHA 10 hours daily  wheelchair/bed bound for last 6 mo (06 Nov 2024 06:53)  [x] Denies ETOH use, illicit drug use and smoking    Allergies  NKA          MEDICATIONS  (STANDING):  acetaminophen     Tablet .. 1000 milliGRAM(s) Oral every 6 hours  atorvastatin 20 milliGRAM(s) Oral at bedtime  escitalopram 10 milliGRAM(s) Oral daily  gabapentin 200 milliGRAM(s) Oral at bedtime  heparin   Injectable 5000 Unit(s) SubCutaneous every 8 hours  lisinopril 20 milliGRAM(s) Oral daily  naloxone Injectable 0.4 milliGRAM(s) IV Push once  polyethylene glycol 3350 17 Gram(s) Oral daily  senna 2 Tablet(s) Oral at bedtime  zonisamide 50 milliGRAM(s) Oral daily    MEDICATIONS  (PRN):  oxyCODONE    IR 2.5 milliGRAM(s) Oral every 4 hours PRN Moderate Pain (4 - 6)  oxyCODONE    IR 5 milliGRAM(s) Oral every 4 hours PRN Severe Pain (7 - 10)      Vital Signs Last 24 Hrs  T(C): 36.8 (12 Nov 2024 05:04), Max: 37.1 (11 Nov 2024 14:15)  T(F): 98.3 (12 Nov 2024 05:04), Max: 98.8 (11 Nov 2024 14:15)  HR: 75 (12 Nov 2024 05:04) (75 - 85)  BP: 154/70 (12 Nov 2024 05:04) (122/61 - 154/70)  BP(mean): --  RR: 16 (12 Nov 2024 05:04) (16 - 18)  SpO2: 96% (12 Nov 2024 05:04) (94% - 96%)    Parameters below as of 12 Nov 2024 05:04  Patient On (Oxygen Delivery Method): room air        LABS: Reviewed                          8.2    5.96  )-----------( 458      ( 11 Nov 2024 06:17 )             25.4     11-12    145  |  117[H]  |  19[H]  ----------------------------<  136[H]  4.2   |  26  |  0.43[L]    Ca    8.5      12 Nov 2024 09:51  Phos  3.3     11-12  Mg     2.3     11-12    TPro  6.1  /  Alb  2.1[L]  /  TBili  0.6  /  DBili  x   /  AST  12  /  ALT  13  /  AlkPhos  109  11-12      LIVER FUNCTIONS - ( 12 Nov 2024 09:51 )  Alb: 2.1 g/dL / Pro: 6.1 g/dL / ALK PHOS: 109 U/L / ALT: 13 U/L DA / AST: 12 U/L / GGT: x           Urinalysis Basic - ( 12 Nov 2024 09:51 )    Color: x / Appearance: x / SG: x / pH: x  Gluc: 136 mg/dL / Ketone: x  / Bili: x / Urobili: x   Blood: x / Protein: x / Nitrite: x   Leuk Esterase: x / RBC: x / WBC x   Sq Epi: x / Non Sq Epi: x / Bacteria: x    Radiology: Reviewed.   < from: Xray Tibia + Fibula 2 Views, Right (11.06.24 @ 01:11) >    ACC: [id number] EXAM:  XR FEMUR 2 VIEWS RT   ORDERED BY: MARIIA CHOI     ACC: [id number] EXAM:  XR TIB FIB AP LAT 2 VIEWS RT   ORDERED BY: MARIIA CHOI     ACC: [id number] EXAM:  XR KNEE AP LAT OBL 3 VIEWS RT   ORDERED BY: MARIIA CHOI     PROCEDURE DATE:  11/06/2024      INTERPRETATION:  EXAM: XR KNEE AP AND LATERAL AND OBLIQUE 3 VIEWS RIGHT,   XR TIBIA FIBULA AP AND LATERAL 2 VIEWS RIGHT, XR FEMUR 2 VIEWS RIGHT    INDICATION: R knee pain/swelling after fall HXR    COMPARISON: See below    IMPRESSION:    Right femur: Spiral fracture of the distal femur extending to the   intercondylar region. Osteopenia. Follow-up suggested. Regional vascular   calcifications present. 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    Right knee: Exam is limited due to fracture spiral of the distal third of   the femur extending to the condylar region. Edematous changes noted.   Regional vascular calcifications prominent.    Right tibia-fibula: Distal right femur fracture seen better on   accompanying exams. No tibia or fibula fracture. Regional vascular   calcifications present. Osteopenia.    --- End of Report ---    ANALIA BLACKWELL MD; Attending Radiologist  This document has been electronically signed. Nov 6 2024  9:08AM    < end of copied text >    ORT Score -   unable to obtain due to hx dementia   	  REVIEW OF SYSTEMS:  limited due to hx dementia.   + Right hip pain.     PHYSICAL EXAM:  GENERAL:  Somnolent & Oriented X1 , hx dementia, NAD  RESPIRATORY: Respirations even and unlabored. Clear to auscultation bilaterally; No rales, rhonchi, wheezing, or rubs  CARDIOVASCULAR: Normal S1/S2, regular rate and rhythm; No murmurs, rubs, or gallops.   GASTROINTESTINAL:  Soft, Nontender, Nondistended; Bowel sounds present  PERIPHERAL VASCULAR:  Extremities warm with right leg edema. 2+ Peripheral Pulses, No cyanosis, No calf tenderness  MUSCULOSKELETAL: Unable to assess motor strength. + toe wiggle; + right hip tenderness to palpation + right lower extremity immobilizer in place, dressing c/d/i  SKIN: Warm, dry, intact.     Risk factors associated with adverse outcomes related to opioid treatment  [ ] Concurrent benzodiazepine use  [ ] History/ Active substance use or alcohol use disorder  [X ] Psychiatric co-morbidity  [ ] Sleep apnea  [ ] COPD  [ ] BMI> 35  [ ] Liver dysfunction  [ ] Renal dysfunction  [ ] CHF  [ ] Smoker  [x] Age > 60 years    [x]  [place]  Reviewed and Copied to Chart. See below.    Plan of care and goal oriented pain management treatment options were discussed with patient and /or primary care giver; all questions and concerns were addressed and care was aligned with patient's wishes.    Educated patient on goal oriented pain management treatment options     11-12-24 @ 14:13 Source of information: MARIA REYESDEJIMENEZ, Chart review  Patient language: Cypriot  : # [id number]     HPI:  Pt is a 89 y/o F with PMHx of HTN, HLD, anxiety, dementia (AAO1-2 at baseline), anxiety, seizures, who is wheelchair/bedbound, sacral pressure ulcer who presented to the ER due to right knee pain/swelling. Per daughter at bedside who provided history, on Sunday, patient/s family was moving the patient with a device to lift patient from bed to wheelchair but patient's leg got stuck and bent in machine.The family straightened the leg and put the patient in the wheelchair and brought her to [employer]. Once home, pt began to complain of new onset R knee and thigh pain. Pain was severe and patient was unable to sleep and continued to scream in pain. On Monday, family noticed increased swelling and mild erythema on R knee which prompted them to come to the ED. Patient has been wheelchair/bedbound for the past 7 months. In the past she used to walk with a walker but could not walk more than a block without having to sit down. She cannot climb stairs due to hip problems. She went to a doctor 4 years ago but was told she is too high a risk for surgery to fix her hips. Daughter does endorse decreased PO intake and generalized weakness since Sunday. Pt admitted to medicine s/p injury to right knee.  (06 Nov 2024 06:53)    Pt is admitted for right femur fracture. Xray right femur demonstrates spiral fracture of the distal femur extending to the intercondylar region. Osteopenia. Regional vascular calcifications present. 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 Per ortho- not a surgical candidate.   Pain consulted for management of acute right lower extremity pain 11/6. Per daughter, on Moshe 11/3 while transferring from bed to wheelchair, RLE got caught in the transfer device causing immediate pain to the patient. Patient transfer to wheelchair was completed however daughter reports patient's RLE became swollen and painful following the incident which prompted ED arrival.   Pt seen and examined at bedside, this afternoon. Daughter at bedside. Questions and concerns addressed. Realistic pain expectations discussed. At time of assessment, patient sleeping in bed in NAD, daughter Nadia at bedside. Awakened pt, patient unable to verbalize current pain score SCALE USED: (1-10 VNRS). Pt is A&Ox1-2 and is a poor historian, unable to describe current pain. However, facial grimacing when right hip gently palpated. Pt tolerating PO diet. Reports last BM 11/12. Patient is wheelchair bound at baseline.    PAST MEDICAL & SURGICAL HISTORY:  HLD (hyperlipidemia)    HTN (hypertension)    Dementia    Seizures    No significant past surgical history    FAMILY HISTORY:    Social History:  lives with family  has HHA 10 hours daily  wheelchair/bed bound for last 6 mo (06 Nov 2024 06:53)  [x] Denies ETOH use, illicit drug use and smoking    Allergies  NKA          MEDICATIONS  (STANDING):  acetaminophen     Tablet .. 1000 milliGRAM(s) Oral every 6 hours  atorvastatin 20 milliGRAM(s) Oral at bedtime  escitalopram 10 milliGRAM(s) Oral daily  gabapentin 200 milliGRAM(s) Oral at bedtime  heparin   Injectable 5000 Unit(s) SubCutaneous every 8 hours  lisinopril 20 milliGRAM(s) Oral daily  naloxone Injectable 0.4 milliGRAM(s) IV Push once  polyethylene glycol 3350 17 Gram(s) Oral daily  senna 2 Tablet(s) Oral at bedtime  zonisamide 50 milliGRAM(s) Oral daily    MEDICATIONS  (PRN):  oxyCODONE    IR 2.5 milliGRAM(s) Oral every 4 hours PRN Moderate Pain (4 - 6)  oxyCODONE    IR 5 milliGRAM(s) Oral every 4 hours PRN Severe Pain (7 - 10)      Vital Signs Last 24 Hrs  T(C): 36.8 (12 Nov 2024 05:04), Max: 37.1 (11 Nov 2024 14:15)  T(F): 98.3 (12 Nov 2024 05:04), Max: 98.8 (11 Nov 2024 14:15)  HR: 75 (12 Nov 2024 05:04) (75 - 85)  BP: 154/70 (12 Nov 2024 05:04) (122/61 - 154/70)  BP(mean): --  RR: 16 (12 Nov 2024 05:04) (16 - 18)  SpO2: 96% (12 Nov 2024 05:04) (94% - 96%)    Parameters below as of 12 Nov 2024 05:04  Patient On (Oxygen Delivery Method): room air        LABS: Reviewed                          8.2    5.96  )-----------( 458      ( 11 Nov 2024 06:17 )             25.4     11-12    145  |  117[H]  |  19[H]  ----------------------------<  136[H]  4.2   |  26  |  0.43[L]    Ca    8.5      12 Nov 2024 09:51  Phos  3.3     11-12  Mg     2.3     11-12    TPro  6.1  /  Alb  2.1[L]  /  TBili  0.6  /  DBili  x   /  AST  12  /  ALT  13  /  AlkPhos  109  11-12      LIVER FUNCTIONS - ( 12 Nov 2024 09:51 )  Alb: 2.1 g/dL / Pro: 6.1 g/dL / ALK PHOS: 109 U/L / ALT: 13 U/L DA / AST: 12 U/L / GGT: x           Urinalysis Basic - ( 12 Nov 2024 09:51 )    Color: x / Appearance: x / SG: x / pH: x  Gluc: 136 mg/dL / Ketone: x  / Bili: x / Urobili: x   Blood: x / Protein: x / Nitrite: x   Leuk Esterase: x / RBC: x / WBC x   Sq Epi: x / Non Sq Epi: x / Bacteria: x    Radiology: Reviewed.   < from: Xray Tibia + Fibula 2 Views, Right (11.06.24 @ 01:11) >    ACC: [id number] EXAM:  XR FEMUR 2 VIEWS RT   ORDERED BY: MARIIA CHOI     ACC: [id number] EXAM:  XR TIB FIB AP LAT 2 VIEWS RT   ORDERED BY: MARIIA CHOI     ACC: [id number] EXAM:  XR KNEE AP LAT OBL 3 VIEWS RT   ORDERED BY: MARIIA CHOI     PROCEDURE DATE:  11/06/2024      INTERPRETATION:  EXAM: XR KNEE AP AND LATERAL AND OBLIQUE 3 VIEWS RIGHT,   XR TIBIA FIBULA AP AND LATERAL 2 VIEWS RIGHT, XR FEMUR 2 VIEWS RIGHT    INDICATION: R knee pain/swelling after fall HXR    COMPARISON: See below    IMPRESSION:    Right femur: Spiral fracture of the distal femur extending to the   intercondylar region. Osteopenia. Follow-up suggested. Regional vascular   calcifications present. 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    Right knee: Exam is limited due to fracture spiral of the distal third of   the femur extending to the condylar region. Edematous changes noted.   Regional vascular calcifications prominent.    Right tibia-fibula: Distal right femur fracture seen better on   accompanying exams. No tibia or fibula fracture. Regional vascular   calcifications present. Osteopenia.    --- End of Report ---    ANALIA BLACKWELL MD; Attending Radiologist  This document has been electronically signed. Nov 6 2024  9:08AM    < end of copied text >    ORT Score -   unable to obtain due to hx dementia   	  REVIEW OF SYSTEMS:  limited due to hx dementia.   + Right hip pain.     PHYSICAL EXAM:  GENERAL:  Somnolent & Oriented X1 , hx dementia, NAD  RESPIRATORY: Respirations even and unlabored. Clear to auscultation bilaterally; No rales, rhonchi, wheezing, or rubs  CARDIOVASCULAR: Normal S1/S2, regular rate and rhythm; No murmurs, rubs, or gallops.   GASTROINTESTINAL:  Soft, Nontender, Nondistended; Bowel sounds present  PERIPHERAL VASCULAR:  Extremities warm with right leg edema. 2+ Peripheral Pulses, No cyanosis, No calf tenderness  MUSCULOSKELETAL: Unable to assess motor strength. + toe wiggle; + right hip tenderness to palpation + right lower extremity immobilizer in place, dressing c/d/i  SKIN: Warm, dry, intact.     Risk factors associated with adverse outcomes related to opioid treatment  [ ] Concurrent benzodiazepine use  [ ] History/ Active substance use or alcohol use disorder  [X ] Psychiatric co-morbidity  [ ] Sleep apnea  [ ] COPD  [ ] BMI> 35  [ ] Liver dysfunction  [ ] Renal dysfunction  [ ] CHF  [ ] Smoker  [x] Age > 60 years    [x]  [place]  Reviewed and Copied to Chart. See below.    Plan of care and goal oriented pain management treatment options were discussed with patient and /or primary care giver; all questions and concerns were addressed and care was aligned with patient's wishes.    Educated patient on goal oriented pain management treatment options     11-12-24 @ 14:13

## 2024-11-13 ENCOUNTER — TRANSCRIPTION ENCOUNTER (OUTPATIENT)
Age: 89
End: 2024-11-13

## 2024-11-13 VITALS
HEART RATE: 77 BPM | RESPIRATION RATE: 16 BRPM | TEMPERATURE: 99 F | DIASTOLIC BLOOD PRESSURE: 70 MMHG | SYSTOLIC BLOOD PRESSURE: 136 MMHG | OXYGEN SATURATION: 96 %

## 2024-11-13 LAB
ANION GAP SERPL CALC-SCNC: 4 MMOL/L — LOW (ref 5–17)
BUN SERPL-MCNC: 26 MG/DL — HIGH (ref 7–18)
CALCIUM SERPL-MCNC: 8.4 MG/DL — SIGNIFICANT CHANGE UP (ref 8.4–10.5)
CHLORIDE SERPL-SCNC: 116 MMOL/L — HIGH (ref 96–108)
CO2 SERPL-SCNC: 26 MMOL/L — SIGNIFICANT CHANGE UP (ref 22–31)
CREAT SERPL-MCNC: 0.46 MG/DL — LOW (ref 0.5–1.3)
EGFR: 90 ML/MIN/1.73M2 — SIGNIFICANT CHANGE UP
GLUCOSE SERPL-MCNC: 114 MG/DL — HIGH (ref 70–99)
HCT VFR BLD CALC: 24.4 % — LOW (ref 34.5–45)
HGB BLD-MCNC: 7.7 G/DL — LOW (ref 11.5–15.5)
MAGNESIUM SERPL-MCNC: 2.2 MG/DL — SIGNIFICANT CHANGE UP (ref 1.6–2.6)
MCHC RBC-ENTMCNC: 28 PG — SIGNIFICANT CHANGE UP (ref 27–34)
MCHC RBC-ENTMCNC: 31.6 G/DL — LOW (ref 32–36)
MCV RBC AUTO: 88.7 FL — SIGNIFICANT CHANGE UP (ref 80–100)
NRBC # BLD: 0 /100 WBCS — SIGNIFICANT CHANGE UP (ref 0–0)
PHOSPHATE SERPL-MCNC: 3.1 MG/DL — SIGNIFICANT CHANGE UP (ref 2.5–4.5)
PLATELET # BLD AUTO: 472 K/UL — HIGH (ref 150–400)
POTASSIUM SERPL-MCNC: 3.4 MMOL/L — LOW (ref 3.5–5.3)
POTASSIUM SERPL-SCNC: 3.4 MMOL/L — LOW (ref 3.5–5.3)
RBC # BLD: 2.75 M/UL — LOW (ref 3.8–5.2)
RBC # FLD: 14.2 % — SIGNIFICANT CHANGE UP (ref 10.3–14.5)
SODIUM SERPL-SCNC: 146 MMOL/L — HIGH (ref 135–145)
WBC # BLD: 7.01 K/UL — SIGNIFICANT CHANGE UP (ref 3.8–10.5)
WBC # FLD AUTO: 7.01 K/UL — SIGNIFICANT CHANGE UP (ref 3.8–10.5)

## 2024-11-13 PROCEDURE — 99239 HOSP IP/OBS DSCHRG MGMT >30: CPT | Mod: GC

## 2024-11-13 RX ORDER — NALOXONE HYDROCHLORIDE 1 MG/ML
1 INJECTION, SOLUTION INTRAMUSCULAR; INTRAVENOUS; SUBCUTANEOUS
Qty: 1 | Refills: 0
Start: 2024-11-13 | End: 2024-11-13

## 2024-11-13 RX ORDER — OXYCODONE HYDROCHLORIDE 30 MG/1
1 TABLET ORAL
Qty: 30 | Refills: 0
Start: 2024-11-13 | End: 2024-11-17

## 2024-11-13 RX ORDER — SENNA 187 MG
2 TABLET ORAL
Qty: 20 | Refills: 0
Start: 2024-11-13 | End: 2024-11-22

## 2024-11-13 RX ORDER — ACETAMINOPHEN 500 MG
2 TABLET ORAL
Qty: 80 | Refills: 0
Start: 2024-11-13 | End: 2024-11-22

## 2024-11-13 RX ORDER — FERROUS SULFATE 325(65) MG
1 TABLET ORAL
Qty: 30 | Refills: 0
Start: 2024-11-13 | End: 2024-12-12

## 2024-11-13 RX ORDER — GABAPENTIN 300 MG/1
2 CAPSULE ORAL
Qty: 60 | Refills: 0
Start: 2024-11-13 | End: 2024-12-12

## 2024-11-13 RX ORDER — POTASSIUM CHLORIDE 10 MEQ
40 TABLET, EXTENDED RELEASE ORAL ONCE
Refills: 0 | Status: COMPLETED | OUTPATIENT
Start: 2024-11-13 | End: 2024-11-13

## 2024-11-13 RX ORDER — POLYETHYLENE GLYCOL 3350 17 G/17G
17 POWDER, FOR SOLUTION ORAL
Qty: 85 | Refills: 0
Start: 2024-11-13 | End: 2024-11-22

## 2024-11-13 RX ADMIN — ZONISAMIDE 50 MILLIGRAM(S): 100 CAPSULE ORAL at 11:31

## 2024-11-13 RX ADMIN — Medication 20 MILLIGRAM(S): at 06:37

## 2024-11-13 RX ADMIN — HEPARIN SODIUM 5000 UNIT(S): 10000 INJECTION INTRAVENOUS; SUBCUTANEOUS at 06:36

## 2024-11-13 RX ADMIN — Medication 1000 MILLIGRAM(S): at 11:43

## 2024-11-13 RX ADMIN — OXYCODONE HYDROCHLORIDE 5 MILLIGRAM(S): 30 TABLET ORAL at 11:31

## 2024-11-13 RX ADMIN — GUAIFENESIN 100 MILLIGRAM(S): 100 LIQUID ORAL at 06:37

## 2024-11-13 RX ADMIN — ESCITALOPRAM 10 MILLIGRAM(S): 10 TABLET, FILM COATED ORAL at 11:31

## 2024-11-13 RX ADMIN — Medication 1000 MILLIGRAM(S): at 06:36

## 2024-11-13 RX ADMIN — Medication 40 MILLIEQUIVALENT(S): at 09:15

## 2024-11-13 NOTE — DISCHARGE NOTE NURSING/CASE MANAGEMENT/SOCIAL WORK - PATIENT PORTAL LINK FT
You can access the FollowMyHealth Patient Portal offered by [facility] by registering at the following website: [email or web address]. By joining [employer]’s FollowMyHealth portal, you will also be able to view your health information using other applications (apps) compatible with our system.

## 2024-11-13 NOTE — DISCHARGE NOTE NURSING/CASE MANAGEMENT/SOCIAL WORK - FINANCIAL ASSISTANCE
[facility] provides services at a reduced cost to those who are determined to be eligible through [facility]’s financial assistance program. Information regarding [facility]’s financial assistance program can be found by going to [email or web address] or by calling [phone].

## 2025-03-03 PROCEDURE — 82607 VITAMIN B-12: CPT

## 2025-03-03 PROCEDURE — 73552 X-RAY EXAM OF FEMUR 2/>: CPT

## 2025-03-03 PROCEDURE — 97110 THERAPEUTIC EXERCISES: CPT

## 2025-03-03 PROCEDURE — 82728 ASSAY OF FERRITIN: CPT

## 2025-03-03 PROCEDURE — 84100 ASSAY OF PHOSPHORUS: CPT

## 2025-03-03 PROCEDURE — 72125 CT NECK SPINE W/O DYE: CPT | Mod: MC

## 2025-03-03 PROCEDURE — T1013: CPT

## 2025-03-03 PROCEDURE — 83540 ASSAY OF IRON: CPT

## 2025-03-03 PROCEDURE — 36415 COLL VENOUS BLD VENIPUNCTURE: CPT

## 2025-03-03 PROCEDURE — 74177 CT ABD & PELVIS W/CONTRAST: CPT | Mod: MC

## 2025-03-03 PROCEDURE — 70450 CT HEAD/BRAIN W/O DYE: CPT | Mod: MC

## 2025-03-03 PROCEDURE — 80048 BASIC METABOLIC PNL TOTAL CA: CPT

## 2025-03-03 PROCEDURE — 99285 EMERGENCY DEPT VISIT HI MDM: CPT | Mod: 25

## 2025-03-03 PROCEDURE — 72192 CT PELVIS W/O DYE: CPT | Mod: MC

## 2025-03-03 PROCEDURE — 85045 AUTOMATED RETICULOCYTE COUNT: CPT

## 2025-03-03 PROCEDURE — 96374 THER/PROPH/DIAG INJ IV PUSH: CPT

## 2025-03-03 PROCEDURE — 97162 PT EVAL MOD COMPLEX 30 MIN: CPT

## 2025-03-03 PROCEDURE — 83735 ASSAY OF MAGNESIUM: CPT

## 2025-03-03 PROCEDURE — 85025 COMPLETE CBC W/AUTO DIFF WBC: CPT

## 2025-03-03 PROCEDURE — 80053 COMPREHEN METABOLIC PANEL: CPT

## 2025-03-03 PROCEDURE — 85027 COMPLETE CBC AUTOMATED: CPT

## 2025-03-03 PROCEDURE — 73590 X-RAY EXAM OF LOWER LEG: CPT

## 2025-03-03 PROCEDURE — 83550 IRON BINDING TEST: CPT

## 2025-03-03 PROCEDURE — 73562 X-RAY EXAM OF KNEE 3: CPT

## 2025-08-27 ENCOUNTER — INPATIENT (INPATIENT)
Facility: HOSPITAL | Age: 89
LOS: 4 days | Discharge: HOME | DRG: 607 | End: 2025-09-01
Attending: STUDENT IN AN ORGANIZED HEALTH CARE EDUCATION/TRAINING PROGRAM | Admitting: STUDENT IN AN ORGANIZED HEALTH CARE EDUCATION/TRAINING PROGRAM
Payer: MEDICAID

## 2025-08-27 VITALS
OXYGEN SATURATION: 99 % | HEART RATE: 60 BPM | SYSTOLIC BLOOD PRESSURE: 107 MMHG | WEIGHT: 130.07 LBS | DIASTOLIC BLOOD PRESSURE: 51 MMHG | RESPIRATION RATE: 18 BRPM | TEMPERATURE: 97 F

## 2025-08-27 LAB
ALBUMIN SERPL ELPH-MCNC: 3.1 G/DL — LOW (ref 3.5–5)
ALP SERPL-CCNC: 76 U/L — SIGNIFICANT CHANGE UP (ref 40–120)
ALT FLD-CCNC: 16 U/L DA — SIGNIFICANT CHANGE UP (ref 10–60)
ANION GAP SERPL CALC-SCNC: 1 MMOL/L — LOW (ref 5–17)
APTT BLD: 28.8 SEC — SIGNIFICANT CHANGE UP (ref 26.1–36.8)
AST SERPL-CCNC: 11 U/L — SIGNIFICANT CHANGE UP (ref 10–40)
BASOPHILS # BLD AUTO: 0.05 K/UL — SIGNIFICANT CHANGE UP (ref 0–0.2)
BASOPHILS NFR BLD AUTO: 0.6 % — SIGNIFICANT CHANGE UP (ref 0–2)
BILIRUB SERPL-MCNC: 0.3 MG/DL — SIGNIFICANT CHANGE UP (ref 0.2–1.2)
BUN SERPL-MCNC: 26 MG/DL — HIGH (ref 7–18)
CALCIUM SERPL-MCNC: 8.4 MG/DL — SIGNIFICANT CHANGE UP (ref 8.4–10.5)
CHLORIDE SERPL-SCNC: 109 MMOL/L — HIGH (ref 96–108)
CO2 SERPL-SCNC: 29 MMOL/L — SIGNIFICANT CHANGE UP (ref 22–31)
CREAT SERPL-MCNC: 0.46 MG/DL — LOW (ref 0.5–1.3)
CRP SERPL-MCNC: 10 MG/L — HIGH (ref 0–5)
EGFR: 90 ML/MIN/1.73M2 — SIGNIFICANT CHANGE UP
EGFR: 90 ML/MIN/1.73M2 — SIGNIFICANT CHANGE UP
EOSINOPHIL # BLD AUTO: 0.68 K/UL — HIGH (ref 0–0.5)
EOSINOPHIL NFR BLD AUTO: 7.5 % — HIGH (ref 0–6)
ERYTHROCYTE [SEDIMENTATION RATE] IN BLOOD: 22 MM/HR — HIGH (ref 0–20)
GLUCOSE SERPL-MCNC: 89 MG/DL — SIGNIFICANT CHANGE UP (ref 70–99)
HCT VFR BLD CALC: 36.9 % — SIGNIFICANT CHANGE UP (ref 34.5–45)
HGB BLD-MCNC: 11.7 G/DL — SIGNIFICANT CHANGE UP (ref 11.5–15.5)
IMM GRANULOCYTES NFR BLD AUTO: 0.8 % — SIGNIFICANT CHANGE UP (ref 0–0.9)
INR BLD: 0.96 RATIO — SIGNIFICANT CHANGE UP (ref 0.85–1.16)
LACTATE SERPL-SCNC: 0.9 MMOL/L — SIGNIFICANT CHANGE UP (ref 0.7–2)
LYMPHOCYTES # BLD AUTO: 1.97 K/UL — SIGNIFICANT CHANGE UP (ref 1–3.3)
LYMPHOCYTES # BLD AUTO: 21.8 % — SIGNIFICANT CHANGE UP (ref 13–44)
MCHC RBC-ENTMCNC: 27 PG — SIGNIFICANT CHANGE UP (ref 27–34)
MCHC RBC-ENTMCNC: 31.7 G/DL — LOW (ref 32–36)
MCV RBC AUTO: 85 FL — SIGNIFICANT CHANGE UP (ref 80–100)
MONOCYTES # BLD AUTO: 0.67 K/UL — SIGNIFICANT CHANGE UP (ref 0–0.9)
MONOCYTES NFR BLD AUTO: 7.4 % — SIGNIFICANT CHANGE UP (ref 2–14)
NEUTROPHILS # BLD AUTO: 5.61 K/UL — SIGNIFICANT CHANGE UP (ref 1.8–7.4)
NEUTROPHILS NFR BLD AUTO: 61.9 % — SIGNIFICANT CHANGE UP (ref 43–77)
NRBC BLD AUTO-RTO: 0 /100 WBCS — SIGNIFICANT CHANGE UP (ref 0–0)
PLATELET # BLD AUTO: 265 K/UL — SIGNIFICANT CHANGE UP (ref 150–400)
POTASSIUM SERPL-MCNC: 4.1 MMOL/L — SIGNIFICANT CHANGE UP (ref 3.5–5.3)
POTASSIUM SERPL-SCNC: 4.1 MMOL/L — SIGNIFICANT CHANGE UP (ref 3.5–5.3)
PROT SERPL-MCNC: 6.6 G/DL — SIGNIFICANT CHANGE UP (ref 6–8.3)
PROTHROM AB SERPL-ACNC: 11.1 SEC — SIGNIFICANT CHANGE UP (ref 9.9–13.4)
RBC # BLD: 4.34 M/UL — SIGNIFICANT CHANGE UP (ref 3.8–5.2)
RBC # FLD: 15.6 % — HIGH (ref 10.3–14.5)
SODIUM SERPL-SCNC: 139 MMOL/L — SIGNIFICANT CHANGE UP (ref 135–145)
WBC # BLD: 9.05 K/UL — SIGNIFICANT CHANGE UP (ref 3.8–10.5)
WBC # FLD AUTO: 9.05 K/UL — SIGNIFICANT CHANGE UP (ref 3.8–10.5)

## 2025-08-27 PROCEDURE — 71260 CT THORAX DX C+: CPT | Mod: 26

## 2025-08-27 PROCEDURE — 74177 CT ABD & PELVIS W/CONTRAST: CPT | Mod: 26

## 2025-08-27 PROCEDURE — 99285 EMERGENCY DEPT VISIT HI MDM: CPT

## 2025-08-27 PROCEDURE — 73552 X-RAY EXAM OF FEMUR 2/>: CPT | Mod: 26,RT

## 2025-08-27 RX ORDER — VANCOMYCIN HCL IN 5 % DEXTROSE 1.5G/250ML
1000 PLASTIC BAG, INJECTION (ML) INTRAVENOUS ONCE
Refills: 0 | Status: COMPLETED | OUTPATIENT
Start: 2025-08-27 | End: 2025-08-27

## 2025-08-27 RX ORDER — SODIUM CHLORIDE 9 G/1000ML
1000 INJECTION, SOLUTION INTRAVENOUS ONCE
Refills: 0 | Status: COMPLETED | OUTPATIENT
Start: 2025-08-27 | End: 2025-08-27

## 2025-08-27 RX ORDER — CEFEPIME 2 G/20ML
1000 INJECTION, POWDER, FOR SOLUTION INTRAVENOUS ONCE
Refills: 0 | Status: COMPLETED | OUTPATIENT
Start: 2025-08-27 | End: 2025-08-27

## 2025-08-27 RX ORDER — ACETAMINOPHEN 500 MG/5ML
1000 LIQUID (ML) ORAL ONCE
Refills: 0 | Status: COMPLETED | OUTPATIENT
Start: 2025-08-27 | End: 2025-08-27

## 2025-08-27 RX ADMIN — SODIUM CHLORIDE 1000 MILLILITER(S): 9 INJECTION, SOLUTION INTRAVENOUS at 21:41

## 2025-08-27 RX ADMIN — Medication 1000 MILLIGRAM(S): at 22:10

## 2025-08-27 RX ADMIN — SODIUM CHLORIDE 1000 MILLILITER(S): 9 INJECTION, SOLUTION INTRAVENOUS at 23:00

## 2025-08-27 RX ADMIN — CEFEPIME 100 MILLIGRAM(S): 2 INJECTION, POWDER, FOR SOLUTION INTRAVENOUS at 21:42

## 2025-08-27 RX ADMIN — Medication 250 MILLIGRAM(S): at 22:47

## 2025-08-27 RX ADMIN — Medication 400 MILLIGRAM(S): at 21:42

## 2025-08-27 RX ADMIN — Medication 1000 MILLIGRAM(S): at 23:50

## 2025-08-27 RX ADMIN — CEFEPIME 1000 MILLIGRAM(S): 2 INJECTION, POWDER, FOR SOLUTION INTRAVENOUS at 22:15

## 2025-08-28 DIAGNOSIS — S72.91XA UNSPECIFIED FRACTURE OF RIGHT FEMUR, INITIAL ENCOUNTER FOR CLOSED FRACTURE: ICD-10-CM

## 2025-08-28 DIAGNOSIS — L98.429 NON-PRESSURE CHRONIC ULCER OF BACK WITH UNSPECIFIED SEVERITY: ICD-10-CM

## 2025-08-28 DIAGNOSIS — L89.90 PRESSURE ULCER OF UNSPECIFIED SITE, UNSPECIFIED STAGE: ICD-10-CM

## 2025-08-28 DIAGNOSIS — N20.0 CALCULUS OF KIDNEY: ICD-10-CM

## 2025-08-28 DIAGNOSIS — R53.81 OTHER MALAISE: ICD-10-CM

## 2025-08-28 DIAGNOSIS — Z29.9 ENCOUNTER FOR PROPHYLACTIC MEASURES, UNSPECIFIED: ICD-10-CM

## 2025-08-28 PROBLEM — R56.9 UNSPECIFIED CONVULSIONS: Chronic | Status: ACTIVE | Noted: 2024-11-06

## 2025-08-28 LAB
APPEARANCE UR: ABNORMAL
BACTERIA # UR AUTO: ABNORMAL /HPF
BILIRUB UR-MCNC: NEGATIVE — SIGNIFICANT CHANGE UP
COLOR SPEC: YELLOW — SIGNIFICANT CHANGE UP
COMMENT - URINE: SIGNIFICANT CHANGE UP
DIFF PNL FLD: ABNORMAL
EPI CELLS # UR: PRESENT
GLUCOSE UR QL: NEGATIVE MG/DL — SIGNIFICANT CHANGE UP
KETONES UR QL: NEGATIVE MG/DL — SIGNIFICANT CHANGE UP
LEUKOCYTE ESTERASE UR-ACNC: ABNORMAL
NITRITE UR-MCNC: NEGATIVE — SIGNIFICANT CHANGE UP
PH UR: 6.5 — SIGNIFICANT CHANGE UP (ref 5–8)
PROT UR-MCNC: NEGATIVE MG/DL — SIGNIFICANT CHANGE UP
RBC CASTS # UR COMP ASSIST: 15 /HPF — HIGH (ref 0–4)
SP GR SPEC: 1.03 — SIGNIFICANT CHANGE UP (ref 1–1.03)
UROBILINOGEN FLD QL: 0.2 MG/DL — SIGNIFICANT CHANGE UP (ref 0.2–1)
WBC UR QL: 8 /HPF — HIGH (ref 0–5)

## 2025-08-28 PROCEDURE — 73552 X-RAY EXAM OF FEMUR 2/>: CPT

## 2025-08-28 PROCEDURE — 71260 CT THORAX DX C+: CPT

## 2025-08-28 PROCEDURE — 74177 CT ABD & PELVIS W/CONTRAST: CPT

## 2025-08-28 PROCEDURE — 80053 COMPREHEN METABOLIC PANEL: CPT

## 2025-08-28 PROCEDURE — 97162 PT EVAL MOD COMPLEX 30 MIN: CPT

## 2025-08-28 PROCEDURE — 87040 BLOOD CULTURE FOR BACTERIA: CPT

## 2025-08-28 PROCEDURE — 85652 RBC SED RATE AUTOMATED: CPT

## 2025-08-28 PROCEDURE — 81001 URINALYSIS AUTO W/SCOPE: CPT

## 2025-08-28 PROCEDURE — 85025 COMPLETE CBC W/AUTO DIFF WBC: CPT

## 2025-08-28 PROCEDURE — 36415 COLL VENOUS BLD VENIPUNCTURE: CPT

## 2025-08-28 PROCEDURE — 83605 ASSAY OF LACTIC ACID: CPT

## 2025-08-28 PROCEDURE — 85730 THROMBOPLASTIN TIME PARTIAL: CPT

## 2025-08-28 PROCEDURE — 85610 PROTHROMBIN TIME: CPT

## 2025-08-28 PROCEDURE — 87086 URINE CULTURE/COLONY COUNT: CPT

## 2025-08-28 PROCEDURE — 86140 C-REACTIVE PROTEIN: CPT

## 2025-08-28 RX ORDER — TRAZODONE HCL 100 MG
1 TABLET ORAL
Refills: 0 | DISCHARGE

## 2025-08-28 RX ORDER — HYDROCORTISONE 10 MG/G
1 CREAM TOPICAL ONCE
Refills: 0 | Status: COMPLETED | OUTPATIENT
Start: 2025-08-28 | End: 2025-08-29

## 2025-08-28 RX ORDER — ACETAMINOPHEN 500 MG/5ML
650 LIQUID (ML) ORAL EVERY 6 HOURS
Refills: 0 | Status: DISCONTINUED | OUTPATIENT
Start: 2025-08-28 | End: 2025-09-01

## 2025-08-28 RX ORDER — ACETAMINOPHEN 500 MG/5ML
1000 LIQUID (ML) ORAL ONCE
Refills: 0 | Status: COMPLETED | OUTPATIENT
Start: 2025-08-28 | End: 2025-08-28

## 2025-08-28 RX ORDER — LISINOPRIL 5 MG/1
30 TABLET ORAL DAILY
Refills: 0 | Status: DISCONTINUED | OUTPATIENT
Start: 2025-08-28 | End: 2025-09-01

## 2025-08-28 RX ORDER — MELATONIN 5 MG
1 TABLET ORAL AT BEDTIME
Refills: 0 | Status: DISCONTINUED | OUTPATIENT
Start: 2025-08-28 | End: 2025-09-01

## 2025-08-28 RX ORDER — MIRTAZAPINE 30 MG/1
3.75 TABLET, FILM COATED ORAL AT BEDTIME
Refills: 0 | Status: DISCONTINUED | OUTPATIENT
Start: 2025-08-28 | End: 2025-09-01

## 2025-08-28 RX ORDER — ESCITALOPRAM OXALATE 20 MG/1
1 TABLET ORAL
Refills: 0 | DISCHARGE

## 2025-08-28 RX ORDER — ENOXAPARIN SODIUM 100 MG/ML
30 INJECTION SUBCUTANEOUS EVERY 24 HOURS
Refills: 0 | Status: DISCONTINUED | OUTPATIENT
Start: 2025-08-28 | End: 2025-09-01

## 2025-08-28 RX ORDER — GABAPENTIN 400 MG/1
200 CAPSULE ORAL AT BEDTIME
Refills: 0 | Status: DISCONTINUED | OUTPATIENT
Start: 2025-08-28 | End: 2025-09-01

## 2025-08-28 RX ORDER — ESCITALOPRAM OXALATE 20 MG/1
10 TABLET ORAL DAILY
Refills: 0 | Status: DISCONTINUED | OUTPATIENT
Start: 2025-08-28 | End: 2025-09-01

## 2025-08-28 RX ORDER — TRAZODONE HCL 100 MG
50 TABLET ORAL ONCE
Refills: 0 | Status: COMPLETED | OUTPATIENT
Start: 2025-08-28 | End: 2025-08-28

## 2025-08-28 RX ORDER — GABAPENTIN 400 MG/1
2 CAPSULE ORAL
Refills: 0 | DISCHARGE

## 2025-08-28 RX ORDER — TRAZODONE HCL 100 MG
0 TABLET ORAL
Refills: 0 | DISCHARGE

## 2025-08-28 RX ORDER — AMLODIPINE BESYLATE 10 MG/1
2.5 TABLET ORAL DAILY
Refills: 0 | Status: DISCONTINUED | OUTPATIENT
Start: 2025-08-28 | End: 2025-09-01

## 2025-08-28 RX ADMIN — Medication 650 MILLIGRAM(S): at 04:36

## 2025-08-28 RX ADMIN — Medication 1000 MILLIGRAM(S): at 17:03

## 2025-08-28 RX ADMIN — Medication 650 MILLIGRAM(S): at 03:33

## 2025-08-28 RX ADMIN — GABAPENTIN 200 MILLIGRAM(S): 400 CAPSULE ORAL at 22:15

## 2025-08-28 RX ADMIN — Medication 1 MILLIGRAM(S): at 22:16

## 2025-08-28 RX ADMIN — MIRTAZAPINE 3.75 MILLIGRAM(S): 30 TABLET, FILM COATED ORAL at 22:16

## 2025-08-28 RX ADMIN — Medication 400 MILLIGRAM(S): at 16:03

## 2025-08-28 RX ADMIN — ENOXAPARIN SODIUM 30 MILLIGRAM(S): 100 INJECTION SUBCUTANEOUS at 03:20

## 2025-08-28 RX ADMIN — Medication 50 MILLIGRAM(S): at 03:20

## 2025-08-29 ENCOUNTER — TRANSCRIPTION ENCOUNTER (OUTPATIENT)
Age: 89
End: 2025-08-29

## 2025-08-29 DIAGNOSIS — B36.9 SUPERFICIAL MYCOSIS, UNSPECIFIED: ICD-10-CM

## 2025-08-29 LAB
ALBUMIN SERPL ELPH-MCNC: 2.9 G/DL — LOW (ref 3.5–5)
ALP SERPL-CCNC: 77 U/L — SIGNIFICANT CHANGE UP (ref 40–120)
ALT FLD-CCNC: 15 U/L DA — SIGNIFICANT CHANGE UP (ref 10–60)
ANION GAP SERPL CALC-SCNC: 5 MMOL/L — SIGNIFICANT CHANGE UP (ref 5–17)
AST SERPL-CCNC: 8 U/L — LOW (ref 10–40)
BASOPHILS # BLD AUTO: 0.04 K/UL — SIGNIFICANT CHANGE UP (ref 0–0.2)
BASOPHILS NFR BLD AUTO: 0.5 % — SIGNIFICANT CHANGE UP (ref 0–2)
BILIRUB SERPL-MCNC: 0.3 MG/DL — SIGNIFICANT CHANGE UP (ref 0.2–1.2)
BUN SERPL-MCNC: 21 MG/DL — HIGH (ref 7–18)
CALCIUM SERPL-MCNC: 8.7 MG/DL — SIGNIFICANT CHANGE UP (ref 8.4–10.5)
CHLORIDE SERPL-SCNC: 108 MMOL/L — SIGNIFICANT CHANGE UP (ref 96–108)
CO2 SERPL-SCNC: 29 MMOL/L — SIGNIFICANT CHANGE UP (ref 22–31)
CREAT SERPL-MCNC: 0.44 MG/DL — LOW (ref 0.5–1.3)
CULTURE RESULTS: SIGNIFICANT CHANGE UP
EGFR: 91 ML/MIN/1.73M2 — SIGNIFICANT CHANGE UP
EGFR: 91 ML/MIN/1.73M2 — SIGNIFICANT CHANGE UP
EOSINOPHIL # BLD AUTO: 0.75 K/UL — HIGH (ref 0–0.5)
EOSINOPHIL NFR BLD AUTO: 8.7 % — HIGH (ref 0–6)
GLUCOSE SERPL-MCNC: 87 MG/DL — SIGNIFICANT CHANGE UP (ref 70–99)
HCT VFR BLD CALC: 39.8 % — SIGNIFICANT CHANGE UP (ref 34.5–45)
HGB BLD-MCNC: 12.7 G/DL — SIGNIFICANT CHANGE UP (ref 11.5–15.5)
IMM GRANULOCYTES NFR BLD AUTO: 0.8 % — SIGNIFICANT CHANGE UP (ref 0–0.9)
LYMPHOCYTES # BLD AUTO: 2.16 K/UL — SIGNIFICANT CHANGE UP (ref 1–3.3)
LYMPHOCYTES # BLD AUTO: 24.9 % — SIGNIFICANT CHANGE UP (ref 13–44)
MCHC RBC-ENTMCNC: 27.3 PG — SIGNIFICANT CHANGE UP (ref 27–34)
MCHC RBC-ENTMCNC: 31.9 G/DL — LOW (ref 32–36)
MCV RBC AUTO: 85.4 FL — SIGNIFICANT CHANGE UP (ref 80–100)
MONOCYTES # BLD AUTO: 0.73 K/UL — SIGNIFICANT CHANGE UP (ref 0–0.9)
MONOCYTES NFR BLD AUTO: 8.4 % — SIGNIFICANT CHANGE UP (ref 2–14)
NEUTROPHILS # BLD AUTO: 4.91 K/UL — SIGNIFICANT CHANGE UP (ref 1.8–7.4)
NEUTROPHILS NFR BLD AUTO: 56.7 % — SIGNIFICANT CHANGE UP (ref 43–77)
NRBC BLD AUTO-RTO: 0 /100 WBCS — SIGNIFICANT CHANGE UP (ref 0–0)
PLATELET # BLD AUTO: 273 K/UL — SIGNIFICANT CHANGE UP (ref 150–400)
POTASSIUM SERPL-MCNC: 4.4 MMOL/L — SIGNIFICANT CHANGE UP (ref 3.5–5.3)
POTASSIUM SERPL-SCNC: 4.4 MMOL/L — SIGNIFICANT CHANGE UP (ref 3.5–5.3)
PROT SERPL-MCNC: 6.5 G/DL — SIGNIFICANT CHANGE UP (ref 6–8.3)
RBC # BLD: 4.66 M/UL — SIGNIFICANT CHANGE UP (ref 3.8–5.2)
RBC # FLD: 15.8 % — HIGH (ref 10.3–14.5)
SODIUM SERPL-SCNC: 142 MMOL/L — SIGNIFICANT CHANGE UP (ref 135–145)
SPECIMEN SOURCE: SIGNIFICANT CHANGE UP
WBC # BLD: 8.66 K/UL — SIGNIFICANT CHANGE UP (ref 3.8–10.5)
WBC # FLD AUTO: 8.66 K/UL — SIGNIFICANT CHANGE UP (ref 3.8–10.5)

## 2025-08-29 PROCEDURE — 99232 SBSQ HOSP IP/OBS MODERATE 35: CPT | Mod: GC

## 2025-08-29 PROCEDURE — 99497 ADVNCD CARE PLAN 30 MIN: CPT

## 2025-08-29 RX ORDER — FLUCONAZOLE 150 MG
150 TABLET ORAL ONCE
Refills: 0 | Status: COMPLETED | OUTPATIENT
Start: 2025-08-29 | End: 2025-08-29

## 2025-08-29 RX ORDER — FLUCONAZOLE 150 MG
150 TABLET ORAL DAILY
Refills: 0 | Status: DISCONTINUED | OUTPATIENT
Start: 2025-08-30 | End: 2025-09-01

## 2025-08-29 RX ORDER — OXYCODONE HYDROCHLORIDE 30 MG/1
2.5 TABLET ORAL ONCE
Refills: 0 | Status: DISCONTINUED | OUTPATIENT
Start: 2025-08-29 | End: 2025-08-29

## 2025-08-29 RX ADMIN — LISINOPRIL 30 MILLIGRAM(S): 5 TABLET ORAL at 06:40

## 2025-08-29 RX ADMIN — OXYCODONE HYDROCHLORIDE 2.5 MILLIGRAM(S): 30 TABLET ORAL at 12:19

## 2025-08-29 RX ADMIN — AMLODIPINE BESYLATE 2.5 MILLIGRAM(S): 10 TABLET ORAL at 06:41

## 2025-08-29 RX ADMIN — HYDROCORTISONE 1 APPLICATION(S): 10 CREAM TOPICAL at 06:41

## 2025-08-29 RX ADMIN — OXYCODONE HYDROCHLORIDE 2.5 MILLIGRAM(S): 30 TABLET ORAL at 14:57

## 2025-08-29 RX ADMIN — GABAPENTIN 200 MILLIGRAM(S): 400 CAPSULE ORAL at 21:24

## 2025-08-29 RX ADMIN — Medication 150 MILLIGRAM(S): at 12:23

## 2025-08-29 RX ADMIN — ENOXAPARIN SODIUM 30 MILLIGRAM(S): 100 INJECTION SUBCUTANEOUS at 06:40

## 2025-08-29 RX ADMIN — Medication 1 MILLIGRAM(S): at 21:24

## 2025-08-29 RX ADMIN — ESCITALOPRAM OXALATE 10 MILLIGRAM(S): 20 TABLET ORAL at 12:19

## 2025-08-29 RX ADMIN — MIRTAZAPINE 3.75 MILLIGRAM(S): 30 TABLET, FILM COATED ORAL at 21:25

## 2025-08-30 PROCEDURE — 99233 SBSQ HOSP IP/OBS HIGH 50: CPT

## 2025-08-30 RX ORDER — TOUCHLESS CARE ZINC OXIDE PROTECTANT 20; 25 G/100G; G/100G
1 SPRAY TOPICAL DAILY
Refills: 0 | Status: DISCONTINUED | OUTPATIENT
Start: 2025-08-30 | End: 2025-09-01

## 2025-08-30 RX ADMIN — AMLODIPINE BESYLATE 2.5 MILLIGRAM(S): 10 TABLET ORAL at 05:16

## 2025-08-30 RX ADMIN — ESCITALOPRAM OXALATE 10 MILLIGRAM(S): 20 TABLET ORAL at 12:57

## 2025-08-30 RX ADMIN — Medication 150 MILLIGRAM(S): at 12:57

## 2025-08-30 RX ADMIN — ENOXAPARIN SODIUM 30 MILLIGRAM(S): 100 INJECTION SUBCUTANEOUS at 05:16

## 2025-08-30 RX ADMIN — MIRTAZAPINE 3.75 MILLIGRAM(S): 30 TABLET, FILM COATED ORAL at 21:46

## 2025-08-30 RX ADMIN — GABAPENTIN 200 MILLIGRAM(S): 400 CAPSULE ORAL at 21:46

## 2025-08-30 RX ADMIN — LISINOPRIL 30 MILLIGRAM(S): 5 TABLET ORAL at 05:16

## 2025-08-30 RX ADMIN — TOUCHLESS CARE ZINC OXIDE PROTECTANT 1 APPLICATION(S): 20; 25 SPRAY TOPICAL at 13:01

## 2025-08-30 RX ADMIN — Medication 1 MILLIGRAM(S): at 21:46

## 2025-08-31 LAB
ALBUMIN SERPL ELPH-MCNC: 2.9 G/DL — LOW (ref 3.5–5)
ALP SERPL-CCNC: 68 U/L — SIGNIFICANT CHANGE UP (ref 40–120)
ALT FLD-CCNC: 14 U/L DA — SIGNIFICANT CHANGE UP (ref 10–60)
ANION GAP SERPL CALC-SCNC: 3 MMOL/L — LOW (ref 5–17)
AST SERPL-CCNC: 10 U/L — SIGNIFICANT CHANGE UP (ref 10–40)
BASOPHILS # BLD AUTO: 0.03 K/UL — SIGNIFICANT CHANGE UP (ref 0–0.2)
BASOPHILS NFR BLD AUTO: 0.4 % — SIGNIFICANT CHANGE UP (ref 0–2)
BILIRUB SERPL-MCNC: 0.4 MG/DL — SIGNIFICANT CHANGE UP (ref 0.2–1.2)
BUN SERPL-MCNC: 24 MG/DL — HIGH (ref 7–18)
CALCIUM SERPL-MCNC: 8 MG/DL — LOW (ref 8.4–10.5)
CHLORIDE SERPL-SCNC: 108 MMOL/L — SIGNIFICANT CHANGE UP (ref 96–108)
CO2 SERPL-SCNC: 30 MMOL/L — SIGNIFICANT CHANGE UP (ref 22–31)
CREAT SERPL-MCNC: 0.45 MG/DL — LOW (ref 0.5–1.3)
EGFR: 90 ML/MIN/1.73M2 — SIGNIFICANT CHANGE UP
EGFR: 90 ML/MIN/1.73M2 — SIGNIFICANT CHANGE UP
EOSINOPHIL # BLD AUTO: 0.57 K/UL — HIGH (ref 0–0.5)
EOSINOPHIL NFR BLD AUTO: 8 % — HIGH (ref 0–6)
GLUCOSE SERPL-MCNC: 92 MG/DL — SIGNIFICANT CHANGE UP (ref 70–99)
HCT VFR BLD CALC: 37.1 % — SIGNIFICANT CHANGE UP (ref 34.5–45)
HGB BLD-MCNC: 11.4 G/DL — LOW (ref 11.5–15.5)
IMM GRANULOCYTES NFR BLD AUTO: 0.4 % — SIGNIFICANT CHANGE UP (ref 0–0.9)
LYMPHOCYTES # BLD AUTO: 2.16 K/UL — SIGNIFICANT CHANGE UP (ref 1–3.3)
LYMPHOCYTES # BLD AUTO: 30.5 % — SIGNIFICANT CHANGE UP (ref 13–44)
MCHC RBC-ENTMCNC: 26.5 PG — LOW (ref 27–34)
MCHC RBC-ENTMCNC: 30.7 G/DL — LOW (ref 32–36)
MCV RBC AUTO: 86.3 FL — SIGNIFICANT CHANGE UP (ref 80–100)
MONOCYTES # BLD AUTO: 0.65 K/UL — SIGNIFICANT CHANGE UP (ref 0–0.9)
MONOCYTES NFR BLD AUTO: 9.2 % — SIGNIFICANT CHANGE UP (ref 2–14)
NEUTROPHILS # BLD AUTO: 3.65 K/UL — SIGNIFICANT CHANGE UP (ref 1.8–7.4)
NEUTROPHILS NFR BLD AUTO: 51.5 % — SIGNIFICANT CHANGE UP (ref 43–77)
NRBC BLD AUTO-RTO: 0 /100 WBCS — SIGNIFICANT CHANGE UP (ref 0–0)
PLATELET # BLD AUTO: 274 K/UL — SIGNIFICANT CHANGE UP (ref 150–400)
POTASSIUM SERPL-MCNC: 4.6 MMOL/L — SIGNIFICANT CHANGE UP (ref 3.5–5.3)
POTASSIUM SERPL-SCNC: 4.6 MMOL/L — SIGNIFICANT CHANGE UP (ref 3.5–5.3)
PROT SERPL-MCNC: 6 G/DL — SIGNIFICANT CHANGE UP (ref 6–8.3)
RBC # BLD: 4.3 M/UL — SIGNIFICANT CHANGE UP (ref 3.8–5.2)
RBC # FLD: 15.6 % — HIGH (ref 10.3–14.5)
SODIUM SERPL-SCNC: 141 MMOL/L — SIGNIFICANT CHANGE UP (ref 135–145)
WBC # BLD: 7.09 K/UL — SIGNIFICANT CHANGE UP (ref 3.8–10.5)
WBC # FLD AUTO: 7.09 K/UL — SIGNIFICANT CHANGE UP (ref 3.8–10.5)

## 2025-08-31 PROCEDURE — 99239 HOSP IP/OBS DSCHRG MGMT >30: CPT | Mod: GC

## 2025-08-31 RX ORDER — LISINOPRIL 5 MG/1
1 TABLET ORAL
Refills: 0 | DISCHARGE

## 2025-08-31 RX ORDER — MIRTAZAPINE 30 MG/1
0.5 TABLET, FILM COATED ORAL
Refills: 0 | DISCHARGE

## 2025-08-31 RX ORDER — AMLODIPINE BESYLATE 10 MG/1
1 TABLET ORAL
Refills: 0 | DISCHARGE

## 2025-08-31 RX ORDER — FLUCONAZOLE 150 MG
1 TABLET ORAL
Qty: 5 | Refills: 0
Start: 2025-08-31 | End: 2025-09-04

## 2025-08-31 RX ORDER — TOUCHLESS CARE ZINC OXIDE PROTECTANT 20; 25 G/100G; G/100G
1 SPRAY TOPICAL
Qty: 0 | Refills: 0 | DISCHARGE
Start: 2025-08-31

## 2025-08-31 RX ADMIN — Medication 1 MILLIGRAM(S): at 22:36

## 2025-08-31 RX ADMIN — AMLODIPINE BESYLATE 2.5 MILLIGRAM(S): 10 TABLET ORAL at 06:55

## 2025-08-31 RX ADMIN — ENOXAPARIN SODIUM 30 MILLIGRAM(S): 100 INJECTION SUBCUTANEOUS at 02:59

## 2025-08-31 RX ADMIN — MIRTAZAPINE 3.75 MILLIGRAM(S): 30 TABLET, FILM COATED ORAL at 22:37

## 2025-08-31 RX ADMIN — Medication 150 MILLIGRAM(S): at 12:10

## 2025-08-31 RX ADMIN — LISINOPRIL 30 MILLIGRAM(S): 5 TABLET ORAL at 06:55

## 2025-08-31 RX ADMIN — GABAPENTIN 200 MILLIGRAM(S): 400 CAPSULE ORAL at 22:37

## 2025-08-31 RX ADMIN — ESCITALOPRAM OXALATE 10 MILLIGRAM(S): 20 TABLET ORAL at 12:11

## 2025-08-31 RX ADMIN — TOUCHLESS CARE ZINC OXIDE PROTECTANT 1 APPLICATION(S): 20; 25 SPRAY TOPICAL at 12:11

## 2025-09-01 VITALS
RESPIRATION RATE: 17 BRPM | SYSTOLIC BLOOD PRESSURE: 121 MMHG | TEMPERATURE: 98 F | DIASTOLIC BLOOD PRESSURE: 76 MMHG | OXYGEN SATURATION: 99 % | HEART RATE: 81 BPM

## 2025-09-01 PROCEDURE — 99232 SBSQ HOSP IP/OBS MODERATE 35: CPT | Mod: GC

## 2025-09-01 PROCEDURE — 85025 COMPLETE CBC W/AUTO DIFF WBC: CPT

## 2025-09-01 PROCEDURE — 36415 COLL VENOUS BLD VENIPUNCTURE: CPT

## 2025-09-01 PROCEDURE — 97162 PT EVAL MOD COMPLEX 30 MIN: CPT

## 2025-09-01 PROCEDURE — 80053 COMPREHEN METABOLIC PANEL: CPT

## 2025-09-01 PROCEDURE — 85652 RBC SED RATE AUTOMATED: CPT

## 2025-09-01 PROCEDURE — 81001 URINALYSIS AUTO W/SCOPE: CPT

## 2025-09-01 PROCEDURE — 86140 C-REACTIVE PROTEIN: CPT

## 2025-09-01 PROCEDURE — 83605 ASSAY OF LACTIC ACID: CPT

## 2025-09-01 PROCEDURE — 85730 THROMBOPLASTIN TIME PARTIAL: CPT

## 2025-09-01 PROCEDURE — 71260 CT THORAX DX C+: CPT

## 2025-09-01 PROCEDURE — 87086 URINE CULTURE/COLONY COUNT: CPT

## 2025-09-01 PROCEDURE — 74177 CT ABD & PELVIS W/CONTRAST: CPT

## 2025-09-01 PROCEDURE — 73552 X-RAY EXAM OF FEMUR 2/>: CPT

## 2025-09-01 PROCEDURE — 96367 TX/PROPH/DG ADDL SEQ IV INF: CPT

## 2025-09-01 PROCEDURE — 85610 PROTHROMBIN TIME: CPT

## 2025-09-01 PROCEDURE — 87040 BLOOD CULTURE FOR BACTERIA: CPT

## 2025-09-01 PROCEDURE — 96368 THER/DIAG CONCURRENT INF: CPT

## 2025-09-01 PROCEDURE — 93005 ELECTROCARDIOGRAM TRACING: CPT

## 2025-09-01 PROCEDURE — 96365 THER/PROPH/DIAG IV INF INIT: CPT

## 2025-09-01 PROCEDURE — 99285 EMERGENCY DEPT VISIT HI MDM: CPT | Mod: 25

## 2025-09-01 RX ADMIN — Medication 150 MILLIGRAM(S): at 12:11

## 2025-09-01 RX ADMIN — ESCITALOPRAM OXALATE 10 MILLIGRAM(S): 20 TABLET ORAL at 12:09

## 2025-09-01 RX ADMIN — LISINOPRIL 30 MILLIGRAM(S): 5 TABLET ORAL at 06:07

## 2025-09-01 RX ADMIN — TOUCHLESS CARE ZINC OXIDE PROTECTANT 1 APPLICATION(S): 20; 25 SPRAY TOPICAL at 13:26

## 2025-09-01 RX ADMIN — AMLODIPINE BESYLATE 2.5 MILLIGRAM(S): 10 TABLET ORAL at 06:07

## 2025-09-01 RX ADMIN — ENOXAPARIN SODIUM 30 MILLIGRAM(S): 100 INJECTION SUBCUTANEOUS at 02:56
